# Patient Record
Sex: FEMALE | Race: WHITE | Employment: UNEMPLOYED | URBAN - METROPOLITAN AREA
[De-identification: names, ages, dates, MRNs, and addresses within clinical notes are randomized per-mention and may not be internally consistent; named-entity substitution may affect disease eponyms.]

---

## 2017-04-19 ENCOUNTER — HOSPITAL ENCOUNTER (OUTPATIENT)
Dept: LAB | Age: 72
Discharge: HOME | End: 2017-04-19
Payer: MEDICARE

## 2017-04-19 ENCOUNTER — HOSPITAL ENCOUNTER (OUTPATIENT)
Dept: GENERAL RADIOLOGY | Age: 72
Discharge: HOME | End: 2017-04-19
Payer: MEDICARE

## 2017-04-19 DIAGNOSIS — E55.9 UNSPECIFIED VITAMIN D DEFICIENCY: ICD-10-CM

## 2017-04-19 DIAGNOSIS — M21.921 SHOULDER JOINT DEFORMITY, RIGHT: ICD-10-CM

## 2017-04-19 DIAGNOSIS — I10 ESSENTIAL HYPERTENSION, MALIGNANT: ICD-10-CM

## 2017-04-19 LAB
25(OH)D3 SERPL-MCNC: 24 NG/ML (ref 30–100)
ALBUMIN SERPL BCP-MCNC: 3.3 G/DL (ref 3.4–5)
ALBUMIN/GLOB SERPL: 1.1 {RATIO} (ref 0.8–1.7)
ALP SERPL-CCNC: 72 U/L (ref 45–117)
ALT SERPL-CCNC: 18 U/L (ref 13–56)
ANION GAP BLD CALC-SCNC: 5 MMOL/L (ref 3–18)
AST SERPL W P-5'-P-CCNC: 12 U/L (ref 15–37)
BASOPHILS # BLD AUTO: 0 K/UL (ref 0–0.1)
BASOPHILS # BLD: 0 % (ref 0–2)
BILIRUB DIRECT SERPL-MCNC: 0.2 MG/DL (ref 0–0.2)
BILIRUB SERPL-MCNC: 0.4 MG/DL (ref 0.2–1)
BUN SERPL-MCNC: 21 MG/DL (ref 7–18)
BUN/CREAT SERPL: 46 (ref 12–20)
CALCIUM SERPL-MCNC: 8.8 MG/DL (ref 8.5–10.1)
CHLORIDE SERPL-SCNC: 105 MMOL/L (ref 100–108)
CHOLEST SERPL-MCNC: 153 MG/DL
CO2 SERPL-SCNC: 33 MMOL/L (ref 21–32)
CREAT SERPL-MCNC: 0.46 MG/DL (ref 0.6–1.3)
DIFFERENTIAL METHOD BLD: NORMAL
EOSINOPHIL # BLD: 0.2 K/UL (ref 0–0.4)
EOSINOPHIL NFR BLD: 5 % (ref 0–5)
ERYTHROCYTE [DISTWIDTH] IN BLOOD BY AUTOMATED COUNT: 14.2 % (ref 11.6–14.5)
GLOBULIN SER CALC-MCNC: 3 G/DL (ref 2–4)
GLUCOSE SERPL-MCNC: 81 MG/DL (ref 74–99)
HCT VFR BLD AUTO: 40.7 % (ref 35–45)
HDLC SERPL-MCNC: 79 MG/DL (ref 40–60)
HDLC SERPL: 1.9 {RATIO} (ref 0–5)
HGB BLD-MCNC: 13.2 G/DL (ref 12–16)
LDLC SERPL CALC-MCNC: 59 MG/DL (ref 0–100)
LIPID PROFILE,FLP: ABNORMAL
LYMPHOCYTES # BLD AUTO: 25 % (ref 21–52)
LYMPHOCYTES # BLD: 1.3 K/UL (ref 0.9–3.6)
MCH RBC QN AUTO: 28.6 PG (ref 24–34)
MCHC RBC AUTO-ENTMCNC: 32.4 G/DL (ref 31–37)
MCV RBC AUTO: 88.3 FL (ref 74–97)
MONOCYTES # BLD: 0.4 K/UL (ref 0.05–1.2)
MONOCYTES NFR BLD AUTO: 8 % (ref 3–10)
NEUTS SEG # BLD: 3.1 K/UL (ref 1.8–8)
NEUTS SEG NFR BLD AUTO: 62 % (ref 40–73)
PLATELET # BLD AUTO: 211 K/UL (ref 135–420)
PMV BLD AUTO: 11 FL (ref 9.2–11.8)
POTASSIUM SERPL-SCNC: 4.3 MMOL/L (ref 3.5–5.5)
PROT SERPL-MCNC: 6.3 G/DL (ref 6.4–8.2)
RBC # BLD AUTO: 4.61 M/UL (ref 4.2–5.3)
SODIUM SERPL-SCNC: 143 MMOL/L (ref 136–145)
T4 FREE SERPL-MCNC: 1 NG/DL (ref 0.7–1.5)
TRIGL SERPL-MCNC: 75 MG/DL (ref ?–150)
TSH SERPL DL<=0.05 MIU/L-ACNC: 2.03 UIU/ML (ref 0.36–3.74)
VLDLC SERPL CALC-MCNC: 15 MG/DL
WBC # BLD AUTO: 5 K/UL (ref 4.6–13.2)

## 2017-04-19 PROCEDURE — 84439 ASSAY OF FREE THYROXINE: CPT

## 2017-04-19 PROCEDURE — 72040 X-RAY EXAM NECK SPINE 2-3 VW: CPT

## 2017-04-19 PROCEDURE — 82306 VITAMIN D 25 HYDROXY: CPT

## 2017-04-19 PROCEDURE — 80061 LIPID PANEL: CPT

## 2017-04-19 PROCEDURE — 85025 COMPLETE CBC W/AUTO DIFF WBC: CPT

## 2017-04-19 PROCEDURE — 80076 HEPATIC FUNCTION PANEL: CPT

## 2017-04-19 PROCEDURE — 36415 COLL VENOUS BLD VENIPUNCTURE: CPT

## 2017-04-19 PROCEDURE — 80048 BASIC METABOLIC PNL TOTAL CA: CPT

## 2017-04-19 PROCEDURE — 84443 ASSAY THYROID STIM HORMONE: CPT

## 2018-06-14 ENCOUNTER — HOSPITAL ENCOUNTER (OUTPATIENT)
Dept: LAB | Age: 73
Discharge: HOME | End: 2018-06-14
Payer: MEDICARE

## 2018-06-14 LAB
ALBUMIN SERPL-MCNC: 3.3 G/DL (ref 3.4–5)
ALBUMIN/GLOB SERPL: 1.1 {RATIO} (ref 0.8–1.7)
ALP SERPL-CCNC: 74 U/L (ref 45–117)
ALT SERPL-CCNC: 16 U/L (ref 13–56)
ANION GAP SERPL CALC-SCNC: 4 MMOL/L (ref 3–18)
APPEARANCE UR: CLEAR
AST SERPL-CCNC: 12 U/L (ref 15–37)
BASOPHILS # BLD: 0 K/UL (ref 0–0.06)
BASOPHILS NFR BLD: 1 % (ref 0–2)
BILIRUB SERPL-MCNC: 0.6 MG/DL (ref 0.2–1)
BILIRUB UR QL: NEGATIVE
BUN SERPL-MCNC: 23 MG/DL (ref 7–18)
BUN/CREAT SERPL: 47 (ref 12–20)
CALCIUM SERPL-MCNC: 9.6 MG/DL (ref 8.5–10.1)
CHLORIDE SERPL-SCNC: 106 MMOL/L (ref 100–108)
CK SERPL-CCNC: 64 U/L (ref 26–192)
CO2 SERPL-SCNC: 34 MMOL/L (ref 21–32)
COLOR UR: YELLOW
CREAT SERPL-MCNC: 0.49 MG/DL (ref 0.6–1.3)
CRP SERPL-MCNC: 0.5 MG/DL (ref 0–0.3)
DIFFERENTIAL METHOD BLD: NORMAL
EOSINOPHIL # BLD: 0.3 K/UL (ref 0–0.4)
EOSINOPHIL NFR BLD: 5 % (ref 0–5)
ERYTHROCYTE [DISTWIDTH] IN BLOOD BY AUTOMATED COUNT: 13.9 % (ref 11.6–14.5)
GLOBULIN SER CALC-MCNC: 2.9 G/DL (ref 2–4)
GLUCOSE SERPL-MCNC: 87 MG/DL (ref 74–99)
GLUCOSE UR STRIP.AUTO-MCNC: NEGATIVE MG/DL
HAV IGM SER QL: NEGATIVE
HBV CORE IGM SER QL: NEGATIVE
HBV SURFACE AG SER QL: <0.1 INDEX
HBV SURFACE AG SER QL: NEGATIVE
HCT VFR BLD AUTO: 42.4 % (ref 35–45)
HCV AB SER IA-ACNC: 0.1 INDEX
HCV AB SERPL QL IA: NEGATIVE
HCV COMMENT,HCGAC: NORMAL
HGB BLD-MCNC: 13.6 G/DL (ref 12–16)
HGB UR QL STRIP: NEGATIVE
KETONES UR QL STRIP.AUTO: NEGATIVE MG/DL
LEUKOCYTE ESTERASE UR QL STRIP.AUTO: NEGATIVE
LYMPHOCYTES # BLD: 1.4 K/UL (ref 0.9–3.6)
LYMPHOCYTES NFR BLD: 22 % (ref 21–52)
MCH RBC QN AUTO: 28.9 PG (ref 24–34)
MCHC RBC AUTO-ENTMCNC: 32.1 G/DL (ref 31–37)
MCV RBC AUTO: 90 FL (ref 74–97)
MONOCYTES # BLD: 0.5 K/UL (ref 0.05–1.2)
MONOCYTES NFR BLD: 8 % (ref 3–10)
NEUTS SEG # BLD: 4.1 K/UL (ref 1.8–8)
NEUTS SEG NFR BLD: 64 % (ref 40–73)
NITRITE UR QL STRIP.AUTO: NEGATIVE
PH UR STRIP: 6 [PH] (ref 5–8)
PLATELET # BLD AUTO: 207 K/UL (ref 135–420)
PMV BLD AUTO: 10.9 FL (ref 9.2–11.8)
POTASSIUM SERPL-SCNC: 4.8 MMOL/L (ref 3.5–5.5)
PROT SERPL-MCNC: 6.2 G/DL (ref 6.4–8.2)
PROT UR STRIP-MCNC: NEGATIVE MG/DL
RBC # BLD AUTO: 4.71 M/UL (ref 4.2–5.3)
RHEUMATOID FACT SER QL LA: NEGATIVE
SODIUM SERPL-SCNC: 144 MMOL/L (ref 136–145)
SP GR UR REFRACTOMETRY: 1.02 (ref 1–1.03)
SP1: NORMAL
SP2: NORMAL
SP3: NORMAL
TSH SERPL DL<=0.05 MIU/L-ACNC: 2.79 UIU/ML (ref 0.36–3.74)
URATE SERPL-MCNC: 3.8 MG/DL (ref 2.6–7.2)
UROBILINOGEN UR QL STRIP.AUTO: 0.2 EU/DL (ref 0.2–1)
WBC # BLD AUTO: 6.4 K/UL (ref 4.6–13.2)

## 2018-06-14 PROCEDURE — 86235 NUCLEAR ANTIGEN ANTIBODY: CPT | Performed by: SPECIALIST

## 2018-06-14 PROCEDURE — 86480 TB TEST CELL IMMUN MEASURE: CPT | Performed by: SPECIALIST

## 2018-06-14 PROCEDURE — 86200 CCP ANTIBODY: CPT | Performed by: SPECIALIST

## 2018-06-14 PROCEDURE — 36415 COLL VENOUS BLD VENIPUNCTURE: CPT | Performed by: SPECIALIST

## 2018-06-14 PROCEDURE — 86140 C-REACTIVE PROTEIN: CPT | Performed by: SPECIALIST

## 2018-06-14 PROCEDURE — 80074 ACUTE HEPATITIS PANEL: CPT | Performed by: SPECIALIST

## 2018-06-14 PROCEDURE — 84443 ASSAY THYROID STIM HORMONE: CPT | Performed by: SPECIALIST

## 2018-06-14 PROCEDURE — 86038 ANTINUCLEAR ANTIBODIES: CPT | Performed by: SPECIALIST

## 2018-06-14 PROCEDURE — 80053 COMPREHEN METABOLIC PANEL: CPT | Performed by: SPECIALIST

## 2018-06-14 PROCEDURE — 81003 URINALYSIS AUTO W/O SCOPE: CPT | Performed by: SPECIALIST

## 2018-06-14 PROCEDURE — 84550 ASSAY OF BLOOD/URIC ACID: CPT | Performed by: SPECIALIST

## 2018-06-14 PROCEDURE — 86256 FLUORESCENT ANTIBODY TITER: CPT | Performed by: SPECIALIST

## 2018-06-14 PROCEDURE — 86430 RHEUMATOID FACTOR TEST QUAL: CPT | Performed by: SPECIALIST

## 2018-06-14 PROCEDURE — 82550 ASSAY OF CK (CPK): CPT | Performed by: SPECIALIST

## 2018-06-14 PROCEDURE — 85025 COMPLETE CBC W/AUTO DIFF WBC: CPT | Performed by: SPECIALIST

## 2018-06-15 LAB
ANA SER QL: NEGATIVE
ANA TITR SER IF: NEGATIVE {TITER}
CCP IGA+IGG SERPL IA-ACNC: 5 UNITS (ref 0–19)
ENA RNP AB SER-ACNC: <0.2 AI (ref 0–0.9)
ENA SM AB SER-ACNC: <0.2 AI (ref 0–0.9)

## 2018-06-18 LAB
C-ANCA TITR SER IF: NORMAL TITER
MYELOPEROXIDASE AB SER IA-ACNC: <9 U/ML (ref 0–9)
P-ANCA ATYPICAL TITR SER IF: NORMAL TITER
P-ANCA TITR SER IF: NORMAL TITER
PROTEINASE3 AB SER IA-ACNC: <3.5 U/ML (ref 0–3.5)

## 2018-06-19 LAB
ANNOTATION COMMENT IMP: NORMAL
M TB IFN-G CD4+ BCKGRND COR BLD-ACNC: <0 IU/ML
M TB IFN-G CD4+ T-CELLS BLD-ACNC: 0.07 IU/ML
M TB TUBERC IFN-G BLD QL: NEGATIVE
M TB TUBERC IGNF/MITOGEN IGNF CONTROL: >10 IU/ML
QUANTIFERON NIL VALUE: 0.08 IU/ML
SERVICE CMNT-IMP: NORMAL

## 2018-09-27 ENCOUNTER — HOSPITAL ENCOUNTER (OUTPATIENT)
Dept: LAB | Age: 73
Discharge: HOME | End: 2018-09-27
Payer: MEDICARE

## 2018-09-27 DIAGNOSIS — M35.9 DIFFUSE DISEASE OF CONNECTIVE TISSUE (HCC): ICD-10-CM

## 2018-09-27 DIAGNOSIS — M13.0 POLYARTHROPATHY: ICD-10-CM

## 2018-09-27 PROCEDURE — 36415 COLL VENOUS BLD VENIPUNCTURE: CPT | Performed by: SPECIALIST

## 2018-09-27 PROCEDURE — 86140 C-REACTIVE PROTEIN: CPT | Performed by: SPECIALIST

## 2018-09-28 LAB — CRP SERPL-MCNC: 0.4 MG/DL (ref 0–0.3)

## 2019-03-07 ENCOUNTER — HOSPITAL ENCOUNTER (OUTPATIENT)
Dept: GENERAL RADIOLOGY | Age: 74
Discharge: HOME | End: 2019-03-07
Payer: MEDICARE

## 2019-03-07 DIAGNOSIS — M25.552 LEFT HIP PAIN: ICD-10-CM

## 2019-03-07 DIAGNOSIS — M25.569 PAIN IN JOINT, LOWER LEG: ICD-10-CM

## 2019-03-07 DIAGNOSIS — M54.50 LUMBAGO: ICD-10-CM

## 2019-03-07 PROCEDURE — 73502 X-RAY EXAM HIP UNI 2-3 VIEWS: CPT

## 2019-03-07 PROCEDURE — 73560 X-RAY EXAM OF KNEE 1 OR 2: CPT

## 2019-03-07 PROCEDURE — 72100 X-RAY EXAM L-S SPINE 2/3 VWS: CPT

## 2019-03-28 ENCOUNTER — HOSPITAL ENCOUNTER (OUTPATIENT)
Dept: LAB | Age: 74
Discharge: HOME | End: 2019-03-28
Payer: MEDICARE

## 2019-03-28 DIAGNOSIS — I10 ESSENTIAL HYPERTENSION, MALIGNANT: ICD-10-CM

## 2019-03-28 DIAGNOSIS — M79.609 PAIN IN LIMB: ICD-10-CM

## 2019-03-28 DIAGNOSIS — I77.6 ARTERITIS, UNSPECIFIED (HCC): ICD-10-CM

## 2019-03-28 DIAGNOSIS — M13.0 POLYARTHROPATHY: ICD-10-CM

## 2019-03-28 LAB
ALBUMIN SERPL-MCNC: 3.5 G/DL (ref 3.4–5)
ALBUMIN/GLOB SERPL: 1.1 {RATIO} (ref 0.8–1.7)
ALP SERPL-CCNC: 95 U/L (ref 45–117)
ALT SERPL-CCNC: 16 U/L (ref 13–56)
ANION GAP SERPL CALC-SCNC: 2 MMOL/L (ref 3–18)
APPEARANCE UR: CLEAR
AST SERPL-CCNC: 12 U/L (ref 15–37)
BACTERIA URNS QL MICRO: ABNORMAL /HPF
BILIRUB SERPL-MCNC: 0.4 MG/DL (ref 0.2–1)
BILIRUB UR QL: NEGATIVE
BUN SERPL-MCNC: 39 MG/DL (ref 7–18)
BUN/CREAT SERPL: 85 (ref 12–20)
CALCIUM SERPL-MCNC: 9.4 MG/DL (ref 8.5–10.1)
CHLORIDE SERPL-SCNC: 104 MMOL/L (ref 100–108)
CO2 SERPL-SCNC: 34 MMOL/L (ref 21–32)
COLOR UR: YELLOW
CREAT SERPL-MCNC: 0.46 MG/DL (ref 0.6–1.3)
CRP SERPL-MCNC: 1.2 MG/DL (ref 0–0.3)
EPITH CASTS URNS QL MICRO: ABNORMAL /LPF (ref 0–5)
GLOBULIN SER CALC-MCNC: 3.1 G/DL (ref 2–4)
GLUCOSE SERPL-MCNC: 111 MG/DL (ref 74–99)
GLUCOSE UR STRIP.AUTO-MCNC: NEGATIVE MG/DL
HGB UR QL STRIP: NEGATIVE
KETONES UR QL STRIP.AUTO: NEGATIVE MG/DL
LEUKOCYTE ESTERASE UR QL STRIP.AUTO: ABNORMAL
NITRITE UR QL STRIP.AUTO: NEGATIVE
PH UR STRIP: 6.5 [PH] (ref 5–8)
POTASSIUM SERPL-SCNC: 5.1 MMOL/L (ref 3.5–5.5)
PROT SERPL-MCNC: 6.6 G/DL (ref 6.4–8.2)
PROT UR STRIP-MCNC: NEGATIVE MG/DL
RBC #/AREA URNS HPF: ABNORMAL /HPF (ref 0–5)
SODIUM SERPL-SCNC: 140 MMOL/L (ref 136–145)
SP GR UR REFRACTOMETRY: 1.03 (ref 1–1.03)
URATE SERPL-MCNC: 3.5 MG/DL (ref 2.6–7.2)
UROBILINOGEN UR QL STRIP.AUTO: 1 EU/DL (ref 0.2–1)
WBC URNS QL MICRO: ABNORMAL /HPF (ref 0–4)

## 2019-03-28 PROCEDURE — 36415 COLL VENOUS BLD VENIPUNCTURE: CPT

## 2019-03-28 PROCEDURE — 81001 URINALYSIS AUTO W/SCOPE: CPT

## 2019-03-28 PROCEDURE — 84550 ASSAY OF BLOOD/URIC ACID: CPT

## 2019-03-28 PROCEDURE — 80053 COMPREHEN METABOLIC PANEL: CPT

## 2019-03-28 PROCEDURE — 86140 C-REACTIVE PROTEIN: CPT

## 2019-04-22 ENCOUNTER — OFFICE VISIT (OUTPATIENT)
Dept: ORTHOPEDIC SURGERY | Age: 74
End: 2019-04-22

## 2019-04-22 VITALS
BODY MASS INDEX: 53.36 KG/M2 | HEART RATE: 67 BPM | SYSTOLIC BLOOD PRESSURE: 119 MMHG | HEIGHT: 61 IN | WEIGHT: 282.6 LBS | OXYGEN SATURATION: 89 % | DIASTOLIC BLOOD PRESSURE: 83 MMHG | TEMPERATURE: 98.9 F | RESPIRATION RATE: 22 BRPM

## 2019-04-22 DIAGNOSIS — M16.0 OSTEOARTHRITIS OF BOTH HIPS, UNSPECIFIED OSTEOARTHRITIS TYPE: ICD-10-CM

## 2019-04-22 DIAGNOSIS — M48.062 SPINAL STENOSIS OF LUMBAR REGION WITH NEUROGENIC CLAUDICATION: ICD-10-CM

## 2019-04-22 DIAGNOSIS — F40.240 CLAUSTROPHOBIA: ICD-10-CM

## 2019-04-22 DIAGNOSIS — M51.36 DDD (DEGENERATIVE DISC DISEASE), LUMBAR: Primary | ICD-10-CM

## 2019-04-22 DIAGNOSIS — R26.9 GAIT ABNORMALITY: ICD-10-CM

## 2019-04-22 DIAGNOSIS — M47.816 LUMBAR FACET ARTHROPATHY: ICD-10-CM

## 2019-04-22 DIAGNOSIS — Z96.653 S/P TOTAL KNEE REPLACEMENT, BILATERAL: ICD-10-CM

## 2019-04-22 DIAGNOSIS — M32.9 LUPUS (HCC): ICD-10-CM

## 2019-04-22 PROBLEM — E66.01 OBESITY, MORBID (HCC): Status: ACTIVE | Noted: 2019-04-22

## 2019-04-22 RX ORDER — IBUPROFEN 800 MG/1
1 TABLET ORAL
Refills: 3 | COMMUNITY
Start: 2019-04-03 | End: 2020-03-02 | Stop reason: DRUGHIGH

## 2019-04-22 RX ORDER — DIAZEPAM 5 MG/1
TABLET ORAL
Qty: 2 TAB | Refills: 0 | Status: SHIPPED | OUTPATIENT
Start: 2019-04-22 | End: 2020-07-28 | Stop reason: ALTCHOICE

## 2019-04-22 RX ORDER — GABAPENTIN 100 MG/1
1 CAPSULE ORAL
Refills: 0 | COMMUNITY
Start: 2019-03-29 | End: 2019-06-17 | Stop reason: SDUPTHER

## 2019-04-22 RX ORDER — DIFLUNISAL 500 MG/1
500 TABLET, FILM COATED ORAL
COMMUNITY
End: 2020-03-02

## 2019-04-22 RX ORDER — PREDNISONE 5 MG/1
TABLET ORAL
Refills: 0 | COMMUNITY
Start: 2019-04-03 | End: 2019-04-22 | Stop reason: SDUPTHER

## 2019-04-22 RX ORDER — ESOMEPRAZOLE MAGNESIUM 40 MG/1
CAPSULE, DELAYED RELEASE ORAL
COMMUNITY
End: 2019-04-22 | Stop reason: SDUPTHER

## 2019-04-22 RX ORDER — ASPIRIN 81 MG/1
TABLET ORAL DAILY
COMMUNITY
End: 2020-03-02

## 2019-04-22 RX ORDER — PREDNISONE 5 MG/1
5 TABLET ORAL DAILY
Qty: 21 TAB | Refills: 0 | Status: SHIPPED | OUTPATIENT
Start: 2019-04-22 | End: 2020-07-28 | Stop reason: ALTCHOICE

## 2019-04-22 RX ORDER — PREDNISONE 2.5 MG/1
2.5 TABLET ORAL
COMMUNITY
End: 2019-04-22 | Stop reason: ALTCHOICE

## 2019-04-22 NOTE — PATIENT INSTRUCTIONS
Low Back Arthritis: Exercises Your Care Instructions Here are some examples of typical rehabilitation exercises for your condition. Start each exercise slowly. Ease off the exercise if you start to have pain. Your doctor or physical therapist will tell you when you can start these exercises and which ones will work best for you. When you are not being active, find a comfortable position for rest. Some people are comfortable on the floor or a medium-firm bed with a small pillow under their head and another under their knees. Some people prefer to lie on their side with a pillow between their knees. Don't stay in one position for too long. Take short walks (10 to 20 minutes) every 2 to 3 hours. Avoid slopes, hills, and stairs until you feel better. Walk only distances you can manage without pain, especially leg pain. How to do the exercises Pelvic tilt 1. Lie on your back with your knees bent. 2. \"Brace\" your stomachtighten your muscles by pulling in and imagining your belly button moving toward your spine. 3. Press your lower back into the floor. You should feel your hips and pelvis rock back. 4. Hold for 6 seconds while breathing smoothly. 5. Relax and allow your pelvis and hips to rock forward. 6. Repeat 8 to 12 times. Back stretches 1. Get down on your hands and knees on the floor. 2. Relax your head and allow it to droop. Round your back up toward the ceiling until you feel a nice stretch in your upper, middle, and lower back. Hold this stretch for as long as it feels comfortable, or about 15 to 30 seconds. 3. Return to the starting position with a flat back while you are on your hands and knees. 4. Let your back sway by pressing your stomach toward the floor. Lift your buttocks toward the ceiling. 5. Hold this position for 15 to 30 seconds. 6. Repeat 2 to 4 times. Follow-up care is a key part of your treatment and safety.  Be sure to make and go to all appointments, and call your doctor if you are having problems. It's also a good idea to know your test results and keep a list of the medicines you take. Where can you learn more? Go to http://tanna-rasheeda.info/. Enter W174 in the search box to learn more about \"Low Back Arthritis: Exercises. \" Current as of: September 20, 2018 Content Version: 11.9 © 3678-0617 [employer]. Care instructions adapted under license by [employer] (which disclaims liability or warranty for this information). If you have questions about a medical condition or this instruction, always ask your healthcare professional. [address] any warranty or liability for your use of this information.

## 2019-04-22 NOTE — PROGRESS NOTES
[facility] [address]. 
[address] [place], [address] [place] [address] Street Phone: [phone] Fax: [phone] NEW PATIENT Pt's YOB: 1945 ASSESSMENT Diagnoses and all orders for this visit: 1. DDD (degenerative disc disease), lumbar -     MRI LUMB SPINE WO CONT; Future 2. Spinal stenosis of lumbar region with neurogenic claudication -     MRI LUMB SPINE WO CONT; Future 3. Lumbar facet arthropathy 
-     predniSONE (DELTASONE) 5 mg tablet; Take 1 Tab by mouth daily. 
-     MRI LUMB SPINE WO CONT; Future 4. Gait abnormality -     MRI LUMB SPINE WO CONT; Future 5. Osteoarthritis of both hips, unspecified osteoarthritis type 6. S/p total knee replacement, bilateral 
 
7. Lupus ([facility] [address] 8. BMI 50.0-59.9, adult ([facility] [address] -     MRI LUMB SPINE WO CONT; Future 9. Claustrophobia 
-     diazePAM (VALIUM) 5 mg tablet; Take 1 tablet 30 minutes before diagnostic test - may repeat x 1  Indications: inducing of a relaxed easy state -     MRI LUMB SPINE WO CONT; Future IMPRESSION AND PLAN: 
Derick Soria is a 75 y.o. female with history of low back pain. Pt complains of pain in the lower back that radiates down the left leg. She also reports weakness in the left leg and numbness in the right 1st-3rd toes. Pt notes significant relief when taking prednisone 5 mg as prescribed by LEVI Last. She also takes Neurontin 100 mg. 
 
1) Pt was given information on lumbar arthritis exercises. 2) Discussed treatment options with the Pt, including medication, physical therapy, steroid injections, and surgical intervention. 3) A lumbar MRI was ordered. She has progressive lower back pain, weakness in the left leg, and difficulty with ambulation. 4) Pt was offered a referral to [facility] physical therapy but declined. 5) She was prescribed Valium to take prior to the MRI. 6) Pt will continue taking Neurontin as prescribed by her PCP, Humberto LEVI Dupont. 7) Ms. Stan Fernandez has a reminder for a \"due or due soon\" health maintenance. I have asked that she contact her primary care provider, LEVI Cortes, for follow-up on this health maintenance. 8)  demonstrated consistency with prescribing. 9) Pt will follow-up in 2-3 weeks or sooner if needed. 10) Pt encouraged to lose weight HISTORY OF PRESENT ILLNESS: 
Alfonso Kiser is a 75 y.o. female with history of low back pain. Pt presents to the office today as a new patient and was last seen 9 years ago just prior to her knee replacement with Dr. Marco Antonio Lazo. Pt complains of pain in the lower back that radiates down the left leg. She admits to weakness in the left leg. Pt's pain is better when leaning forward. Pt notes that she hit her left calf on a car door and has numbness in the right 1st-3rd toes. Pt was followed by Dr. Ricci Jane after the injury and according to the patient she has nerve damage in the leg. She is currently taking Neurontin 100 mg as prescribed by LEVI Galaviz. Pt notes that she recently lost her  who was bedridden for the last 3 months of his life. She states that after her  passed so was no longer as active and started to experience increased lower back pain. Pt notes significant relief when taking prednisone 5 mg as prescribed by LEVI Galaviz. Of note, she has a Dalton filter and admits history of DVT's. She also admits to a history of Lupus. Pt is not on blood thinners. Pt at this time desires to proceed with medication treatment with prednisone and a lumbar MRI. Pain Scale: 8/10 PCP: LEVI Cortes Past Medical History:  
Diagnosis Date  Arthritis  Hypertension  Indigestion  Lupus ([facility] [address]  Memory difficulty  Ringing of ears   
 sometimes Social History Socioeconomic History  Marital status:  Spouse name: Not on file  Number of children: Not on file  Years of education: Not on file  Highest education level: Not on file Occupational History  Not on file Social Needs  Financial resource strain: Not on file  Food insecurity:  
  Worry: Not on file Inability: Not on file  Transportation needs:  
  Medical: Not on file Non-medical: Not on file Tobacco Use  Smoking status: Never Smoker  Smokeless tobacco: Never Used Substance and Sexual Activity  Alcohol use: No  
 Drug use: Not on file  Sexual activity: Never Lifestyle  Physical activity:  
  Days per week: Not on file Minutes per session: Not on file  Stress: Not on file Relationships  Social connections:  
  Talks on phone: Not on file Gets together: Not on file Attends [employer]ism service: Not on file Active member of club or organization: Not on file Attends meetings of clubs or organizations: Not on file Relationship status: Not on file  Intimate partner violence:  
  Fear of current or ex partner: Not on file Emotionally abused: Not on file Physically abused: Not on file Forced sexual activity: Not on file Other Topics Concern [address] Service Not Asked  Blood Transfusions Not Asked  Caffeine Concern Not Asked  Occupational Exposure Not Asked Candance Nevarez Hazards Not Asked  Sleep Concern Not Asked  Stress Concern Not Asked  Weight Concern Not Asked  Special Diet Not Asked  Back Care Not Asked  Exercise Not Asked  Bike Helmet Not Asked  Seat Belt Not Asked  Self-Exams Not Asked Social History Narrative  Not on file Current Outpatient Medications Medication Sig Dispense Refill  gabapentin (NEURONTIN) 100 mg capsule Take 1 Cap by mouth nightly. 0  
 ibuprofen (MOTRIN) 800 mg tablet Take 1 Tab by mouth two (2) times daily as needed. 3  
 aspirin delayed-release 81 mg tablet Take  by mouth daily.  predniSONE (DELTASONE) 5 mg tablet Take 1 Tab by mouth daily.  21 Tab 0  
  diazePAM (VALIUM) 5 mg tablet Take 1 tablet 30 minutes before diagnostic test - may repeat x 1  Indications: inducing of a relaxed easy state 2 Tab 0  
 ERGOCALCIFEROL, VITAMIN D2, (VITAMIN D PO) Take  by mouth.  sertraline (ZOLOFT) 50 mg tablet Take  by mouth daily.  hydroxychloroquine (PLAQUENIL) 200 mg tablet Take 200 mg by mouth daily.  benazepril-hydrochlorothiazide (LOTENSIN HCT) 20-25 mg per tablet Take  by mouth daily.  diflunisal (DOLOBID) 500 mg tab Take 500 mg by mouth every twelve (12) hours as needed.  LORazepam (ATIVAN) 0.5 mg tablet Take 0.5 mg by mouth.  oxyCODONE-acetaminophen (PERCOCET) 5-325 mg per tablet Take 1 tablet by mouth every four (4) hours as needed for Pain for up to 20 doses. (Patient not taking: Reported on 4/22/2019) 20 tablet 0  
 nortriptyline (PAMELOR) 25 mg capsule Take 1 Cap by mouth two (2) times a day. (Patient not taking: Reported on 4/22/2019) 60 Cap 6  celecoxib (CELEBREX) 200 mg capsule Take  by mouth two (2) times a day.  esomeprazole (NEXIUM) 40 mg capsule Take  by mouth daily.  ZOLPIDEM TARTRATE (AMBIEN PO) Take  by mouth. Allergies Allergen Reactions  Morphine Anxiety REVIEW OF SYSTEMS Constitutional: Negative for fever, chills, or weight change. Respiratory: Negative for cough or shortness of breath. Cardiovascular: Negative for chest pain or palpitations. Gastrointestinal: Negative for acid reflux, change in bowel habits, or constipation. Genitourinary: Negative for dysuria and flank pain. Musculoskeletal: Positive for lumbar pain and weakness in the left leg. Skin: Negative for rash. Neurological: Negative for headaches, dizziness; positive for numbness. Endo/Heme/Allergies: Negative for increased bruising. Psychiatric/Behavioral: Negative for difficulty with sleep. PHYSICAL EXAMINATION Visit Vitals /83 Pulse 67 Temp 98.9 °F (37.2 °C) (Oral) Resp 22 Ht 5' 1\" (1.549 m) Wt 282 lb 9.6 oz (128.2 kg) SpO2 (!) 89% BMI 53.40 kg/m² Constitutional: Awake, alert, and in no acute distress. HEENT: Normocephalic. Atraumatic. Oropharynx is moist and clear. PERRL. EOMI. Sclerae are nonicteric Heart: Regular rate and rhythm Lungs: Clear to auscultation bilaterally Abdomen: Soft and nontender. Bowel sounds are present Neurological: 1+ symmetrical DTRs in the upper extremities. 1+ symmetrical DTRs in the lower extremities. Sensation to light touch is intact. Negative Chang's sign bilaterally. Skin: warm, dry, and intact. Musculoskeletal: Good range of motion in the cervical spine with side to side cervical flexion. No tenderness with palpation of the upper trapezius bilaterally. No pain with extension, axial loading, or forward flexion. No pain with internal or external rotation of her hips. Negative straight leg raise bilaterally. Patient ambulates with the assistance of a rolling walker. Biceps  Triceps Deltoids Wrist Ext Wrist Flex Hand Intrin Right +4/5 +4/5 +4/5 +4/5 +4/5 +4/5 Left +4/5 +4/5 +4/5 +4/5 +4/5 +4/5 Hip Flex  Quads Hamstrings Ankle DF EHL Ankle PF Right +4/5 +4/5 +4/5 +4/5 +4/5 +4/5 Left +4/5 +4/5 +4/5  -4/5  -4/5 +4/5 IMAGING: 
 
Lumbar spine x-rays from 03/07/2019 were personally reviewed with the patient and demonstrated: FINDINGS: There are 5 lumbar-type vertebra. There is 7 mm of retrolisthesis of 
L4 on L5 which is progressed since prior plain film. No fracture appreciated. Multilevel facet arthropathy is noted. Moderate to severe disc space height loss 
is noted at L3-L4. Moderate to severe disc space height loss is noted at L4-L5 
and L5-S1. Endplate sclerosis and osteophyte formation are noted at every level. Prominent flowing ossification anterior longitudinal ligament is noted in the 
lower thoracic and upper lumbar spine.  IVC filter is noted with the superior 
portion of the L2-L3 disc space. 
  
 IMPRESSION: 
1.  Multilevel degenerative disc disease with retrolisthesis of L4 on L5.  
  
2.   Multilevel facet arthropathy. 
  
3. Changes in the lower thoracic and upper lumbar spine suggestive of diffuse 
idiopathic skeletal hyperostosis. Left knee x-rays from 03/07/2019 were personally reviewed with the patient and demonstrated: 
Results from [place] encounter on 03/07/19 XR KNEE LT MAX 2 VWS Narrative EXAM: Left knee X-Ray INDICATION: Left knee pain TECHNIQUE: 2 views of the left knee COMPARISON: 9/13/2010, 12/18/2009 and 2/10/2007 FINDINGS:  
A left total knee arthroplasty is present. No perihardware fracture or 
perihardware lucency appreciated. No dislocation present. The bone density is 
grossly unremarkable. Vascular calcifications are present. The soft tissues are 
unremarkable. Impression IMPRESSION: 
1. Left total knee   arthroplasty   without complication. Left hip x-rays from 03/07/2019 were personally reviewed with the patient and demonstrated: FINDINGS:  
The bone density is grossly unremarkable. No evidence of acute fracture of the 
pelvis. Mild SI joint osteoarthritis is noted. Degenerative changes in the lower 
lumbar spine are noted. The pubic symphysis is unremarkable. No lytic or 
blastic focus identified. No erosions or periostitis appreciated.  
  
The femoral heads are well aligned with the osseous pelvis. No evidence of left 
hip fracture or dislocation. The left hip demonstrates osteophyte formation and 
subchondral sclerosis. Mild asymmetric joint space narrowing. On the AP view of 
the pelvis, there is suggestion of mild to moderate degenerative changes of the 
right hip. 
  
IMPRESSION: 
1. Moderate left hip osteoarthritis and mild/moderate right hip osteoarthritis. Written by Anna Rao, as dictated by Palak Arizmendi MD. 
I, Dr. Palak Arizmendi confirm that all documentation is accurate.

## 2019-04-22 NOTE — PROGRESS NOTES
Shan Babin presents today for Chief Complaint Patient presents with  Back Pain  
  lower  Leg Pain  
  left  New Patient  
  referred by PA ROBERT [facility] Is someone accompanying this pt? Yes, Daughter Yani Cleveland Is the patient using any DME equipment during OV? Rolling walker with a seat XL Depression Screening: 
3 most recent PHQ Screens 4/22/2019 Little interest or pleasure in doing things Not at all Feeling down, depressed, irritable, or hopeless Several days Total Score PHQ 2 1 Learning Assessment: 
Learning Assessment 4/22/2019 PRIMARY LEARNER Patient HIGHEST LEVEL OF EDUCATION - PRIMARY LEARNER  GRADUATED HIGH SCHOOL OR GED  
BARRIERS PRIMARY LEARNER NONE  
CO-LEARNER CAREGIVER Yes CO-LEARNER NAME Yani Cleveland CO-LEARNER HIGHEST LEVEL OF EDUCATION GRADUATED HIGH SCHOOL OR GED  
BARRIERS CO-LEARNER NONE PRIMARY LANGUAGE ENGLISH  
PRIMARY LANGUAGE CO-LEARNER ENGLISH  NEED No  
LEARNER PREFERENCE PRIMARY DEMONSTRATION  
  READING  
LEARNER [address] ANSWERED BY Patient RELATIONSHIP SELF Abuse Screening: No flowsheet data found. Fall Risk Fall Risk Assessment, last 12 mths 4/22/2019 Able to walk? Yes Fall in past 12 months? No  
 
 
OPIOID RISK TOOL No flowsheet data found. Pt currently taking Antiplatelet therapy? No 
 
Coordination of Care: 1. Have you been to the ER, urgent care clinic since your last visit? Not in the past 30 days  Hospitalized since your last visit? No 
 
2. Have you seen or consulted any other health care providers outside of the [address] [place] [address] [place] since your last visit? No Include any pap smears or colon screening. Not in the past 30 days. Last  Checked 04/22/19 Last UDS Checked N/A Last Pain Agreement Signed N/A

## 2019-04-22 NOTE — LETTER
4/22/19 Patient: Derick Soria YOB: 1945 Date of Visit: 4/22/2019 Rajinder De Paz, [address] [address] VIA Facsimile: [phone] Dear LEVI Alfaro, Thank you for referring Ms. Deonte Nick to [facility] for evaluation. My notes for this consultation are attached. If you have questions, please do not hesitate to call me. I look forward to following your patient along with you. Sincerely, Seth Silva MD

## 2019-05-07 ENCOUNTER — HOSPITAL ENCOUNTER (OUTPATIENT)
Age: 74
Discharge: HOME | End: 2019-05-07
Attending: PHYSICAL MEDICINE & REHABILITATION
Payer: MEDICARE

## 2019-05-07 DIAGNOSIS — M47.816 LUMBAR FACET ARTHROPATHY: ICD-10-CM

## 2019-05-07 DIAGNOSIS — F40.240 CLAUSTROPHOBIA: ICD-10-CM

## 2019-05-07 DIAGNOSIS — M51.36 DDD (DEGENERATIVE DISC DISEASE), LUMBAR: ICD-10-CM

## 2019-05-07 DIAGNOSIS — M48.062 SPINAL STENOSIS OF LUMBAR REGION WITH NEUROGENIC CLAUDICATION: ICD-10-CM

## 2019-05-07 DIAGNOSIS — R26.9 GAIT ABNORMALITY: ICD-10-CM

## 2019-05-07 PROCEDURE — 72148 MRI LUMBAR SPINE W/O DYE: CPT

## 2019-05-15 ENCOUNTER — OFFICE VISIT (OUTPATIENT)
Dept: ORTHOPEDIC SURGERY | Age: 74
End: 2019-05-15

## 2019-05-15 VITALS
HEART RATE: 66 BPM | OXYGEN SATURATION: 96 % | BODY MASS INDEX: 51.86 KG/M2 | SYSTOLIC BLOOD PRESSURE: 128 MMHG | WEIGHT: 281.8 LBS | DIASTOLIC BLOOD PRESSURE: 85 MMHG | HEIGHT: 62 IN | TEMPERATURE: 97.9 F | RESPIRATION RATE: 20 BRPM

## 2019-05-15 DIAGNOSIS — M51.26 HNP (HERNIATED NUCLEUS PULPOSUS), LUMBAR: Primary | ICD-10-CM

## 2019-05-15 DIAGNOSIS — M48.062 SPINAL STENOSIS OF LUMBAR REGION WITH NEUROGENIC CLAUDICATION: ICD-10-CM

## 2019-05-15 DIAGNOSIS — R29.898 LEFT LEG WEAKNESS: ICD-10-CM

## 2019-05-15 DIAGNOSIS — M54.16 LUMBAR RADICULOPATHY: ICD-10-CM

## 2019-05-15 DIAGNOSIS — N28.1 RENAL CYST: ICD-10-CM

## 2019-05-15 RX ORDER — PREDNISONE 5 MG/1
TABLET ORAL
Qty: 35 TAB | Refills: 0 | Status: SHIPPED | OUTPATIENT
Start: 2019-05-15 | End: 2020-03-12 | Stop reason: SDUPTHER

## 2019-05-15 NOTE — LETTER
5/16/19 Patient: You Guzman YOB: 1945 Date of Visit: 5/15/2019 Nany Mack, [address] [address] VIA Facsimile: [phone] Dear LEVI Bruno, Thank you for referring Ms. Melinda Butts to [facility] for evaluation. My notes for this consultation are attached. If you have questions, please do not hesitate to call me. I look forward to following your patient along with you. Sincerely, William Britt MD

## 2019-05-15 NOTE — PATIENT INSTRUCTIONS
Herniated Disc: Exercises Your Care Instructions Here are some examples of typical rehabilitation exercises for your condition. Start each exercise slowly. Ease off the exercise if you start to have pain. Your doctor or physical therapist will tell you when you can start these exercises and which ones will work best for you. How to stay safe These exercises can help you move easier and feel better. But when you first start doing them, you may have more pain in your back. This is normal. But it is important to pay close attention to your pain during and after each exercise. · Keep doing these exercises if your pain stays the same or moves from your leg and buttock more toward the middle of your spine. Pain moving out of your leg and buttock is a good sign. · Stop doing these exercises if your pain gets worse in your leg and buttock. Stop if you start to have pain in your leg and buttock that you didn't have before. Be sure to do these exercises in the order they appear. Note how your pain changes before you move to the next one. If your pain is much worse right after exercise and stays worse the next day, do not do any of these exercises. How to do the exercises 1. Rest on belly 1. Lie on your stomach, face down, with your head turned to the side. Place your arms beside your body. If this bothers your neck, place your hands, one on top of the other, underneath your forehead. This will help support your head and neck. 2. Try to relax your lower back muscles as much as you can. 3. Continue to lie on your stomach for 2 minutes. 4. If your pain spreads down your leg or increases down your leg, stop this exercise and do not do the next exercises. 2. Press-up 1. Lie on your stomach, face down. Keep your elbows tucked into your sides and under your shoulders. 2. Press your elbows down into the floor to raise your upper back.  As you do this, relax your stomach muscles. Allow your back to arch without using your back muscles. Let your low back relax completely as you arch up. 3. Hold this position for 2 minutes. 4. Repeat 2 to 4 times. 5. If your pain spreads down your leg or increases down your leg, stop this exercise and do not do the next exercises. 3. Full press-up 1. Lie on your stomach, face down. Keep your elbows tucked into your sides and under your shoulders. 2. Straighten your elbows, and push your upper body up as far as you can. Allow your lower back to sag. Keep your hips, pelvis, and legs relaxed. 3. Hold this position for 5 seconds, and then relax. 4. Repeat 10 times. Each time, try to raise your upper body a little higher and hold your arms a bit straighter. 5. If your pain spreads down your leg or gets worse down your leg, stop this exercise and do not move to the next exercise. 6. If you can't do this exercise, you may instead try the backward bend exercise that follows. 4. Backward bend 1. Stand with your feet hip-width apart. Your toes should point forward. Do not lock your knees. 2. Place your hands in the small of your back. 3. Bend backward as far as you can, keeping your knees straight. Hold this position for 2 to 3 seconds. Then return to your starting position. 4. Repeat 2 to 4 times. Each time, try to bend backward a little farther, until you bend backward as far as you can. 5. If your pain spreads down your leg or increases down your leg, stop this exercise. Follow-up care is a key part of your treatment and safety. Be sure to make and go to all appointments, and call your doctor if you are having problems. It's also a good idea to know your test results and keep a list of the medicines you take. Where can you learn more? Go to http://tanna-rasheeda.info/. Enter 61633 [id number] in the search box to learn more about \"Herniated Disc: Exercises. \" Current as of: September 20, 2018 Content Version: 11.9 © 5982-7473 [employer]. Care instructions adapted under license by [employer] (which disclaims liability or warranty for this information). If you have questions about a medical condition or this instruction, always ask your healthcare professional. [address] any warranty or liability for your use of this information.

## 2019-05-15 NOTE — PROGRESS NOTES
Harshad Amato [facility] 2. 
[address] [place] Phone: [phone] Fax: [phone] Pt's YOB: 1945 ASSESSMENT Diagnoses and all orders for this visit: 
 
1. HNP (herniated nucleus pulposus), lumbar 
-     REFERRAL TO PHYSICAL THERAPY 2. Lumbar radiculopathy 
-     REFERRAL TO PHYSICAL THERAPY 3. Left leg weakness 
-     REFERRAL TO PHYSICAL THERAPY 4. Spinal stenosis of lumbar region with neurogenic claudication -     predniSONE (DELTASONE) 5 mg tablet; 1 po daily for 15 days then 1/2 tablet daily till completed 
-     REFERRAL TO PHYSICAL THERAPY 5. Renal cyst 
-     US RETROPERITONEUM COMP; Future 6. [facility] 50.0-59.9[facility] IMPRESSION AND PLAN: 
Derick Soria is a 75 y.o. female with history of lumbar pain. Pt complains of pain in the lower back that radiates down the left leg. She admits to improvement in her pain when taking prednisone 5 mg daily. Pt also takes Neurontin 100 mg 1 tab QHS. 1) Pt was given information on herniated disc exercises. 2) She was referred to physical therapy for pain centralization and modalities. Pt notes that she will try home exercise first. 3) A renal ultrasound was ordered to further assess MRI documented cystic changes. 4) She received a refill of prednisone 5 mg 1 tab daily for 15 days, the decrease to 1/2 tab daily until complete. 5) Pt will continue taking Neurontin 100 mg as prescribed and does not need refills at this time. 6) Ms. Darylene Jordan has a reminder for a \"due or due soon\" health maintenance. I have asked that she contact her primary care provider, LEVI Vegas, for follow-up on this health maintenance. 7)  demonstrated consistency with prescribing. 8) Pt will follow-up in 4 weeks or sooner if needed.  
 
 
HISTORY OF PRESENT ILLNESS: 
Derick Soria is a 75 y.o. female with history of lumbar pain and presents to the office today for MRI follow up. Pt complains of pain in the lower back that radiates down the left leg. Pt also complains of weakness in the left leg but denies dragging the leg with ambulation. She admits to improvement in her pain when taking prednisone 5 mg daily. Pt notes that she was placed on prednisone when she developed lesions with her Lupus. She also takes Neurontin 100 mg 1 tab QHS. Pt has not recently attended physical therapy. Pt at this time desires to proceed with a renal ultrasound, medication evaluation, and physical therapy. Pain Scale: /10 PCP: LEVI Villarreal Past Medical History:  
Diagnosis Date  Arthritis  Hypertension  Indigestion  Lupus ([facility]  Memory difficulty  Ringing of ears   
 sometimes Social History Socioeconomic History  Marital status:  Spouse name: Not on file  Number of children: Not on file  Years of education: Not on file  Highest education level: Not on file Occupational History  Not on file Social Needs  Financial resource strain: Not on file  Food insecurity:  
  Worry: Not on file Inability: Not on file  Transportation needs:  
  Medical: Not on file Non-medical: Not on file Tobacco Use  Smoking status: Never Smoker  Smokeless tobacco: Never Used Substance and Sexual Activity  Alcohol use: No  
 Drug use: Not on file  Sexual activity: Never Lifestyle  Physical activity:  
  Days per week: Not on file Minutes per session: Not on file  Stress: Not on file Relationships  Social connections:  
  Talks on phone: Not on file Gets together: Not on file Attends Oriental orthodox service: Not on file Active member of club or organization: Not on file Attends meetings of clubs or organizations: Not on file Relationship status: Not on file  Intimate partner violence:  
  Fear of current or ex partner: Not on file Emotionally abused: Not on file Physically abused: Not on file Forced sexual activity: Not on file Other Topics Concern [address] Service Not Asked  Blood Transfusions Not Asked  Caffeine Concern Not Asked  Occupational Exposure Not Asked Annika Fisher Hazards Not Asked  Sleep Concern Not Asked  Stress Concern Not Asked  Weight Concern Not Asked  Special Diet Not Asked  Back Care Not Asked  Exercise Not Asked  Bike Helmet Not Asked  Seat Belt Not Asked  Self-Exams Not Asked Social History Narrative  Not on file Current Outpatient Medications Medication Sig Dispense Refill  predniSONE (DELTASONE) 5 mg tablet 1 po daily for 15 days then 1/2 tablet daily till completed 35 Tab 0  
 gabapentin (NEURONTIN) 100 mg capsule Take 1 Cap by mouth nightly. 0  
 ibuprofen (MOTRIN) 800 mg tablet Take 1 Tab by mouth two (2) times daily as needed. 3  
 aspirin delayed-release 81 mg tablet Take  by mouth daily.  predniSONE (DELTASONE) 5 mg tablet Take 1 Tab by mouth daily. 21 Tab 0  
 sertraline (ZOLOFT) 50 mg tablet Take 50 mg by mouth three (3) times daily.  hydroxychloroquine (PLAQUENIL) 200 mg tablet Take 200 mg by mouth daily.  benazepril-hydrochlorothiazide (LOTENSIN HCT) 20-25 mg per tablet Take  by mouth daily.  diflunisal (DOLOBID) 500 mg tab Take 500 mg by mouth every twelve (12) hours as needed.  diazePAM (VALIUM) 5 mg tablet Take 1 tablet 30 minutes before diagnostic test - may repeat x 1  Indications: inducing of a relaxed easy state 2 Tab 0  
 LORazepam (ATIVAN) 0.5 mg tablet Take 0.5 mg by mouth.  oxyCODONE-acetaminophen (PERCOCET) 5-325 mg per tablet Take 1 tablet by mouth every four (4) hours as needed for Pain for up to 20 doses.  (Patient not taking: Reported on 4/22/2019) 20 tablet 0  
 nortriptyline (PAMELOR) 25 mg capsule Take 1 Cap by mouth two (2) times a day. (Patient not taking: Reported on 4/22/2019) 60 Cap 6  celecoxib (CELEBREX) 200 mg capsule Take  by mouth two (2) times a day.  esomeprazole (NEXIUM) 40 mg capsule Take  by mouth daily.  ZOLPIDEM TARTRATE (AMBIEN PO) Take  by mouth. Allergies Allergen Reactions  Morphine Anxiety REVIEW OF SYSTEMS Constitutional: Negative for fever, chills, or weight change. Respiratory: Negative for cough or shortness of breath. Cardiovascular: Negative for chest pain or palpitations. Gastrointestinal: Negative for acid reflux, change in bowel habits, or constipation. Genitourinary: Negative for dysuria and flank pain. Musculoskeletal: Positive for lumbar pain. Skin: Negative for rash. Neurological: Negative for headaches, dizziness, or numbness. Endo/Heme/Allergies: Negative for increased bruising. Psychiatric/Behavioral: Negative for difficulty with sleep. PHYSICAL EXAMINATION Visit Vitals /85 Pulse 66 Temp 97.9 °F (36.6 °C) (Oral) Resp 20 Ht 5' 2\" (1.575 m) Wt 281 lb 12.8 oz (127.8 kg) SpO2 96% BMI 51.54 kg/m² Constitutional: Awake, alert, and in no acute distress. Neurological: 1+ symmetrical DTRs in the lower extremities. Sensation to light touch is intact. Skin: warm, dry, and intact. Musculoskeletal: No tenderness with palpation of the lumbar region. Mild pain with extension, axial loading, less pain with forward flexion. No pain with internal or external rotation of her hips. Negative straight leg raise bilaterally. Patient ambulates with the assistance of a rolling walker. Hip Flex  Quads Hamstrings Ankle DF EHL Ankle PF Right +4/5 +4/5 +4/5 +4/5 +4/5 +4/5 Left +4/5 +4/5 +4/5 +4/5 +4/5 +4/5 IMAGING: 
 
Lumbar spine MRI from 05/07/2019 was personally reviewed with the patient and demonstrated: 
Results from [place] encounter on 05/07/19 MRI LUMB SPINE WO CONT Narrative EXAM: Lumbar MRI without contrast 
 
CLINICAL INDICATION: Degenerative disc disease but no stenosis. Neurogenic 
claudication. Facet arthropathy. Gait abnormality. Leg weakness in the L5 
distribution. TECHNIQUE: MRI of the lumbar spine without contrast obtained. Multiplanar 
multisequence MR images of the lumbar spine obtained. IV Contrast: None COMPARISON: CT abdomen pelvis dated 10/6/2009 FINDINGS: All numbering assumes 5 lumbar type vertebrae. The alignment 
demonstrates straightening of the lumbar lordosis. The marrow signal 
demonstrates several vertebral body hemangiomas. There is moderate type II and 
type III endplate changes at B9-G4 with Modic type II endplate changes at C0-D7 
and L5-S1. The cord terminates at L1. No cord signal abnormalities appreciated. The abdominal aorta is without evidence of aneurysm. No paraaortic adenopathy 
appreciated. Numerous cystic changes are noted in the left kidney which appear 
progressed since prior CT. Parapelvic cysts and cystic changes in the right 
kidney are suggested which also appear progressed since prior imaging. L1-L2 level: Facet hypertrophy and mild broad-based disc bulge is noted. No 
significant canal or neural foraminal stenosis. L2-L3: Facet hypertrophy and mild broad-based disc bulge. Mild canal and mild 
bilateral neural foraminal narrowing are noted. L3-L4: Facet hypertrophy with broad-based disc bulge and ligamentum flavum 
buckling. Mild to moderate canal narrowing is noted. Moderate bilateral neural 
foraminal narrowing with possible abutment of the exiting nerve roots. L4-L5: Facet hypertrophy and ligament flavum buckling are present. There is a 
large disc extrusion which extends above and below the disc level. It extends 6 
mm below the disc level and 3 mm above the disc level. There is complete 
effacement of the canal. Severe left and moderate to severe right neural 
foraminal narrowing are noted. L5-S1: Broad-based disc osteophyte formation and central disc protrusion are 
present. Facet hypertrophy and ligament flavum buckling are present. There is 
mild canal narrowing. The facet hypertrophy and disc protrusion combine to 
compress the descending S1 nerve roots in the lateral recesses. Severe bilateral 
neural foraminal narrowing as result of disc osteophyte formation and facet 
hypertrophy. Impression Impression: 1. Multilevel degenerative disc disease and facet arthropathy. 2.   Severe canal stenosis at L4-L5 with severe left and moderate to severe 
right neural foraminal narrowing 3. Likely compression of the descending S1 nerve roots at L5-S1 by disc 
protrusion combined with facet arthropathy with severe bilateral neural 
foraminal narrowing. 4.   Cystic changes in the kidneys which appear progressed since prior imaging. May consider correlation with ultrasound. 
 
  
  
Left knee x-rays from 03/07/2019 were personally reviewed with the patient and demonstrated: 
    
Results from [facility] on 03/07/19 XR KNEE LT MAX 2 VWS  
  Narrative EXAM: Left knee X-Ray 
  INDICATION: Left knee pain 
  
TECHNIQUE: 2 views of the left knee 
  
COMPARISON: 9/13/2010, 12/18/2009 and 2/10/2007 
  
FINDINGS:  
A left total knee arthroplasty is present. No perihardware fracture or 
perihardware lucency appreciated. No dislocation present. The bone density is 
grossly unremarkable. Vascular calcifications are present. The soft tissues are 
unremarkable.  
   
  Impression IMPRESSION: 
1. Left total knee   arthroplasty   without complication.   
  
Left hip x-rays from 03/07/2019 were personally reviewed with the patient and demonstrated: FINDINGS:  
The bone density is grossly unremarkable. No evidence of acute fracture of the 
pelvis. Mild SI joint osteoarthritis is noted. Degenerative changes in the lower 
lumbar spine are noted. The pubic symphysis is unremarkable.   No lytic or blastic focus identified. No erosions or periostitis appreciated.  
  
The femoral heads are well aligned with the osseous pelvis. No evidence of left 
hip fracture or dislocation.  The left hip demonstrates osteophyte formation and 
subchondral sclerosis. Mild asymmetric joint space narrowing. On the AP view of 
the pelvis, there is suggestion of mild to moderate degenerative changes of the 
right hip. 
  
IMPRESSION: 1.  Moderate left hip osteoarthritis and mild/moderate right hip osteoarthritis. Written by Bell Miranda, as dictated by Dina Fernandes MD. 
I, Dr. Dina Fernandes confirm that all documentation is accurate.

## 2019-05-22 ENCOUNTER — HOSPITAL ENCOUNTER (OUTPATIENT)
Dept: ULTRASOUND IMAGING | Age: 74
Discharge: HOME | End: 2019-05-22
Attending: PHYSICAL MEDICINE & REHABILITATION
Payer: MEDICARE

## 2019-05-22 DIAGNOSIS — N28.1 RENAL CYST: ICD-10-CM

## 2019-05-22 PROCEDURE — 76770 US EXAM ABDO BACK WALL COMP: CPT

## 2019-05-31 ENCOUNTER — HOSPITAL ENCOUNTER (OUTPATIENT)
Dept: PHYSICAL THERAPY | Age: 74
Discharge: HOME | End: 2019-05-31
Payer: MEDICARE

## 2019-05-31 PROCEDURE — 97162 PT EVAL MOD COMPLEX 30 MIN: CPT

## 2019-05-31 PROCEDURE — 97110 THERAPEUTIC EXERCISES: CPT

## 2019-05-31 NOTE — PROGRESS NOTES
[facility]  [address] [facility]  [phone] fax    Plan of Care/ Statement of Necessity for Physical Therapy Services    Patient name: Julita Luna Start of Care: 2019   Referral source: Elsie Mccoy MD : 1945    Medical Diagnosis: Spinal stenosis, lumbar region with neurogenic claudication [M48.062]  Other symptoms and signs involving the musculoskeletal system [R29.898]  [place]or: [place] MEDICARE / Plan: [place] MEDICARE PART A & B / Product Type: Medicare /  Onset Date:3 months ago    Treatment Diagnosis: low back pain   Prior Hospitalization: see medical history Provider#: [id number]   Medications: Verified on Patient summary List    Comorbidities: depression, arthritis, hypertension   Prior Level of Function: uses rollator during amb., independent with ADLs      The Plan of Care and following information is based on the information from the initial evaluation. Assessment/ key information: Pt is a 75 y.o. Female with complaints of low back pain for three months. Pt reports insidious onset of low back pain with radicular symptoms left>right. Radicular symptoms extend to toes. Left first three toes are reported as tingling and numbness in left foot due to injury to left middle third of tibia. Pt demonstrates decreased trunk AROM and strength. Negative slump test. Reduction of symptoms with forward flexion in seated position. Increase in symptoms with repeated extension in seated position. Pt also demonstrates weak hip musculature. Pt is unable to tolerate sitting for extended periods of time. Pt can tolerate 10-15 min of standing while holding onto furniture or rollator. Pt uses rollator during ambulation. Pt education included HEP and activity modification. Pt would benefit from skilled therapy to address the above deficits in order to facilitate recovery of function and improved QOL.  Pt is a good candidate for therapy due to motivation to return to PLOF. Evaluation Complexity History MEDIUM  Complexity : 1-2 comorbidities / personal factors will impact the outcome/ POC ; Examination MEDIUM Complexity : 3 Standardized tests and measures addressing body structure, function, activity limitation and / or participation in recreation  ;Presentation LOW Complexity : Stable, uncomplicated  ;Clinical Decision Making MEDIUM Complexity : FOTO score of 26-74  Overall Complexity Rating: MEDIUM  Problem List: pain affecting function, decrease ROM, decrease strength, impaired gait/ balance, decrease ADL/ functional abilitiies, decrease activity tolerance and decrease flexibility/ joint mobility   Treatment Plan may include any combination of the following: Therapeutic exercise, Physical agent/modality and Patient education  Patient / Family readiness to learn indicated by: asking questions, trying to perform skills and interest  Persons(s) to be included in education: patient (P)  Barriers to Learning/Limitations: None  Patient Goal (s): \"decrease pain  Patient Self Reported Health Status: fair  Rehabilitation Potential: good    Short Term Goals: To be accomplished in 1 weeks:   1. Pt will be 100% compliant with HEP in order to facilitate recovery of function. Long Term Goals: To be accomplished in 5 weeks:   1. Pt will increase FOTO score by 13 points in order to facilitate improved QOL. 2. Pt will increase trunk AROM: flex/ext by 25% in order to facilitate ability to perform ADLs with ease. 3. Pt will increase hip mmt ABD: 3/5 in order to facilitate ease of ambulation. 4. Pt will report 5/10 pain during daily activities in order to facilitate ability to perform ADLs with ease. Frequency / Duration: Patient to be seen 1-2 times per week for 5 weeks.     Patient/ Caregiver education and instruction: Diagnosis, prognosis, activity modification and exercises   [x]  Plan of care has been reviewed with PTA    Certification Period: 5/31/19 - 7/30/19  Abel Dunbar 5/31/2019 3:11 PM    ________________________________________________________________________    I certify that the above Therapy Services are being furnished while the patient is under my care. I agree with the treatment plan and certify that this therapy is necessary.     Physician's Signature:____________Date:_________TIME:________    ** Signature, Date and Time must be completed for valid certification **    Please sign and return to [facility]  [address] [place] [address]  [phone] fax

## 2019-05-31 NOTE — PROGRESS NOTES
PT DAILY TREATMENT NOTE/LUMBAR EVAL 10-18    Patient Name: Ed Alcazar  Date:2019  : 1945  [x]  Patient  Verified  Payor: VA MEDICARE / Plan: VA MEDICARE PART A & B / Product Type: Medicare /    In time:2:12  Out time:2:50  Total Treatment Time (min): 38  Visit #: 1 of 10    Medicare/BCBS Only   Total Timed Codes (min):  8 1:1 Treatment Time:  33     Treatment Area: Spinal stenosis, lumbar region with neurogenic claudication [M48.062]  Other symptoms and signs involving the musculoskeletal system [R29.898]  SUBJECTIVE  Pain Level (0-10 scale): 8/10  []constant []intermittent []improving []worsening [x]no change since onset    Any medication changes, allergies to medications, adverse drug reactions, diagnosis change, or new procedure performed?: [x] No    [] Yes (see summary sheet for update)  Subjective functional status/changes:     Mechanism of Injury: Pt reports 3 month hx of pain in LBP, some pain in bilateral hips. Sitting and laying down make the pain worse. Pain with standing up and washing dishes. Pain is constant at 8/10, worst at 10/10. Able to stand 10-15 minutes at time. Reports ibuprofen 3x/dayy 100 mg makes the pain better. Numbness and tingling down bilateral legs to toes. Toes on left LE are tingling and first three are numb due to hitting lower extremity on car door. In the last month, numbness and tingling reported in right LE to toes. Pt reports she sleeps on left side. Living Situation: lives with daughter. Able to perform ADLs. Has shower chair.    Pt Goals: decrease the pain    OBJECTIVE/EXAMINATION    33 min [x]Eval                  []Re-Eval     8 min: Therapeutic Exercise: pt. Education and HEP             [x] TE   [] TA   [] neuro   [] other: Patient Education: [x] Review HEP    [] Progressed/Changed HEP based on:   [] positioning   [] body mechanics   [] transfers   [] heat/ice application    [] other:      Other Objective/Functional Measures:   Pt demonstrated sit <--> supine x2. Pt required increased time to perform skill. Physical Therapy Evaluation - Lumbar Spine ([facility])  OBJECTIVE  Posture:  Forward head with rounded shoulders  Lateral Shift: [] R    [] L     [] +  [] -  Kyphosis: [x] Increased [] Decreased   []  WNL  Lordosis:  [] Increased [] Decreased   [] WNL  Pelvic symmetry: [x] WNL    [] Other:    Gait:  [] Normal     [] Abnormal:    Active Movements: [] N/A   [] Too acute   [] Other:  ROM % AROM % PROM Comments:pain, area   Forward flexion 40-60 50%     Extension 20-30 25%     SB right 20-30 25%     SB left 20-30 25%  Pain in back   Rotation right 5-10 25%  Pain in hip   Rotation left 5-10 25%  Most pain in back     Dural Mobility:  SLR Sitting: [] R    [] L    [] +    [] -  @ (degrees):           Supine: [] R    [] L    [] +    [] -  @ (degrees):   Slump Test: [x] R    [x] L    [] +    [x] -  @ (degrees):   Prone Knee Bend: [] R    [] L    [] +    [] -     Palpation  [] Min  [x] Mod  [] Severe    Location: L4-S2 TTP, Lumbar paraspinals left>right  [] Min  [] Mod  [] Severe    Location:  [] Min  [] Mod  [] Severe    Location:    Strength   L(0-5) R (0-5) N/T   Hip Flexion (L1,2) 4-/5 4/5 []   Knee Extension (L3,4) 5/5 5/5 []   Ankle Dorsiflexion (L4) 4+/5 4+/5 []   Great Toe Extension (L5)   []   Ankle Plantarflexion (S1) 4/5 4/5 []   Knee Flexion (S1,2) 5/5 5/5 []   Upper Abdominals   []   Lower Abdominals   []   Paraspinals   []   Back Rotators   []   Gluteus Gonzalez   []   Gluteus Medius  2/5 []     Special Tests  Sacroilliac:  Gaenslen's: [] R    [] L    [] +    [] -     Compression: [] +    [] -     Gapping:  [] +    [] -     Thigh Thrust: [] R    [] L    [] +    [] -     Leg Length: [] +    [] -   Position:    Crests:    ASIS:    PSIS:    Sacral Sulcus:    Mobility: Standing flex:     Sitting flex:     Supine to sit:     Prone knee bend:         Hip: Naya:  [] R    [] L    [] +    [] -     Scour:  [] R    [] L    [] +    [] -     Piriformis: [] R [] L    [] +    [] -     Hip flexor: R: positive for tightness         Deficits: Jacki's: [] R    [] L    [] +    [] -     Patrick: [] R    [] L    [] +    [] -     Hamstrings 90/90:    Gastrocsoleus (to neutral): Right: Left:    Pain Level (0-10 scale) post treatment: 8/10    ASSESSMENT/Changes in Function: See POC. Patient will continue to benefit from skilled PT services to modify and progress therapeutic interventions, address functional mobility deficits, address ROM deficits, address strength deficits, analyze and address soft tissue restrictions, analyze and cue movement patterns, analyze and modify body mechanics/ergonomics and assess and modify postural abnormalities to attain remaining goals. [x]  See Plan of Care  []  See progress note/recertification  []  See Discharge Summary         Progress towards goals / Updated goals:  See POC.     PLAN  []  Upgrade activities as tolerated     [x]  Continue plan of care  []  Update interventions per flow sheet       []  Discharge due to:_  []  Other:_      Lolis Crooks 5/31/2019  2:02 PM

## 2019-06-03 ENCOUNTER — HOSPITAL ENCOUNTER (OUTPATIENT)
Dept: PHYSICAL THERAPY | Age: 74
Discharge: HOME | End: 2019-06-03
Payer: MEDICARE

## 2019-06-03 PROCEDURE — 97110 THERAPEUTIC EXERCISES: CPT

## 2019-06-03 NOTE — PROGRESS NOTES
PT DAILY TREATMENT NOTE 10-18    Patient Name: Alfonso Kiser  Date:6/3/2019  : 1945  [x]  Patient  Verified  [place]or: [place] / Plan: [place] MED[place] A & B / Product Type: Medicare /    In time: 3:59  Out time: 4:28  Total Treatment Time (min): 29  Visit #: 2 of 10    Medicare/BCBS Only   Total Timed Codes (min):  29 1:1 Treatment Time:  29       Treatment Area: Spinal stenosis, lumbar region with neurogenic claudication [M48.062]  Other symptoms and signs involving the musculoskeletal system [R29.898]    SUBJECTIVE  Pain Level (0-10 scale):  6/10  Any medication changes, allergies to medications, adverse drug reactions, diagnosis change, or new procedure performed?: [x] No    [] Yes (see summary sheet for update)  Subjective functional status/changes:   [] No changes reported  Pt. Reports she had pain with the standing up exercise at home. OBJECTIVE    29 min Therapeutic Exercise:  [x] See flow sheet :   Rationale: increase ROM and increase strength to improve the patients ability to increase ease of ADLs        With   [x] TE   [] TA   [] neuro   [] other: Patient Education: [x] Review HEP    [] Progressed/Changed HEP based on:   [] positioning   [] body mechanics   [] transfers   [] heat/ice application    [] other:      Other Objective/Functional Measures:   Pt. Was educated on holding sit to stand exercise for now since it increased pain  No difficulty with orange band for seated abduction   She was challenged with seated wild disc     Pain Level (0-10 scale) post treatment:  7/10    ASSESSMENT/Changes in Function:  Pt. Initiated PT and is compliant with her HEP. She continues to have significant pain and difficulty with changing positions and sit to stand transfers.      Patient will continue to benefit from skilled PT services to modify and progress therapeutic interventions, address functional mobility deficits, address ROM deficits, address strength deficits, analyze and address soft tissue restrictions, analyze and cue movement patterns and analyze and modify body mechanics/ergonomics to attain remaining goals. Progress towards goals / Updated goals:  Short Term Goals: To be accomplished in 1 weeks:              1. Pt will be 100% compliant with HEP in order to facilitate recovery of function. Met (6/3/19)    Long Term Goals: To be accomplished in 5 weeks:              1. Pt will increase FOTO score by 13 points in order to facilitate improved QOL. 2. Pt will increase trunk AROM: flex/ext by 25% in order to facilitate ability to perform ADLs with ease. 3. Pt will increase hip mmt ABD: 3/5 in order to facilitate ease of ambulation. 4. Pt will report 5/10 pain during daily activities in order to facilitate ability to perform ADLs with ease.   PLAN  []  Upgrade activities as tolerated     [x]  Continue plan of care  []  Update interventions per flow sheet       []  Discharge due to:_  []  Other:_      Darlene Rachel, PT 6/3/2019  3:47 PM    Future Appointments   Date Time Provider Freya Murillo   6/3/2019  4:00 PM Landy Torrez PT [id number] [facility]   6/7/2019  4:00 PM Cecilia Lopes [id number] [facility]   6/10/2019  4:00 PM Landy Torrez PT [id number] [facility]   6/14/2019  4:30 PM Cecilia Lopes [id number] [facility]   6/17/2019  1:15 PM Vineet Guzman  [address]   6/21/2019  4:00 PM Cecilia Lopes [id number] [facility]   6/24/2019  2:00 PM Landy Torrez PT [id number] [facility]   6/26/2019  2:00 PM Landy Torrez PT [id number] [facility]

## 2019-06-07 ENCOUNTER — HOSPITAL ENCOUNTER (OUTPATIENT)
Dept: PHYSICAL THERAPY | Age: 74
Discharge: HOME | End: 2019-06-07
Payer: MEDICARE

## 2019-06-07 PROCEDURE — 97110 THERAPEUTIC EXERCISES: CPT

## 2019-06-07 NOTE — PROGRESS NOTES
PT DAILY TREATMENT NOTE 10-18    Patient Name: Sherren Moynahan  Date:2019  : 1945  [x]  Patient  Verified  Payor: [place] MEDICARE / Plan: [place] MEDICARE PART A & B / Product Type: Medicare /    In time:4:00  Out time: 4:43  Total Treatment Time (min): 43  Visit #: 3 of 10    Medicare/BCBS Only   Total Timed Codes (min):  33 1:1 Treatment Time:  23       Treatment Area: Spinal stenosis, lumbar region with neurogenic claudication [M48.062]  Other symptoms and signs involving the musculoskeletal system [R29.898]    SUBJECTIVE  Pain Level (0-10 scale): 6/10  Any medication changes, allergies to medications, adverse drug reactions, diagnosis change, or new procedure performed?: [x] No    [] Yes (see summary sheet for update)  Subjective functional status/changes:   [] No changes reported  Pt reports having soreness and fatigue after PT session     OBJECTIVE    Modality rationale: decrease pain and increase tissue extensibility to improve the patients ability to tolerate ADLs/amb   Min Type Additional Details    [] Estim:  []Unatt       []IFC  []Premod                        []Other:  []w/ice   []w/heat  Position:  Location:    [] Estim: []Att    []TENS instruct  []NMES                    []Other:  []w/US   []w/ice   []w/heat  Position:  Location:    []  Traction: [] Cervical       []Lumbar                       [] Prone          []Supine                       []Intermittent   []Continuous Lbs:  [] before manual  [] after manual    []  Ultrasound: []Continuous   [] Pulsed                           []1MHz   []3MHz W/cm2:  Location:    []  Iontophoresis with dexamethasone         Location: [] Take home patch   [] In clinic   10 []  Ice     [x]  heat  []  Ice massage  []  Laser   []  Anodyne Position: long sitting  Location: back    []  Laser with stim  []  Other:  Position:  Location:    []  Vasopneumatic Device Pressure:       [] lo [] med [] hi   Temperature: [] lo [] med [] hi   [x] Skin assessment post-treatment:  [x]intact []redness- no adverse reaction    []redness  adverse reaction:     33 min Therapeutic Exercise:  [x] See flow sheet :   Rationale: increase ROM and increase strength to improve the patients ability to increase ease of ADLs                                                            With   - TE   - TA   - neuro   - other: Patient Education: - Review HEP    - Progressed/Changed HEP based on:   - positioning   - body mechanics   - transfers   - heat/ice application    - other:      Other Objective/Functional Measures:    Fair motivation with therex    Pleased with heat but reported no change of pain   Compensated with ER during side walk    Pain Level (0-10 scale) post treatment: 6/10    ASSESSMENT/Changes in Function: pt making limited progress, cont to present with mod pain and fair tolerance/motivation with therex. Pleased with moist heat. Will cont to progress strengthening therex as tolerated. Patient will continue to benefit from skilled PT services to modify and progress therapeutic interventions, address functional mobility deficits, address ROM deficits, address strength deficits, analyze and address soft tissue restrictions, analyze and cue movement patterns and analyze and modify body mechanics/ergonomics to attain remaining goals. [x]  See Plan of Care  []  See progress note/recertification  []  See Discharge Summary               Progress towards goals / Updated goals:  Short Term Goals: To be accomplished in 1 weeks:              1. Pt will be 100% compliant with HEP in order to facilitate recovery of function. Met (6/3/19)     Long Term Goals: To be accomplished in 5 weeks:              1. Pt will increase FOTO score by 13 points in order to facilitate improved QOL.               2. Pt will increase trunk AROM: flex/ext by 25% in order to facilitate ability to perform ADLs with ease.              3. Pt will increase hip mmt ABD: 3/5 in order to facilitate ease of ambulation.              4. Pt will report 5/10 pain during daily activities in order to facilitate ability to perform ADLs with ease.     PLAN  []  Upgrade activities as tolerated     [x]  Continue plan of care  []  Update interventions per flow sheet       []  Discharge due to:_  []  Other:_      Ke Green, PT 6/7/2019  9:54 AM    Future Appointments   Date Time Provider Freya Murillo   6/7/2019  4:00 PM Gurmeet Bryant [id number] [facility]   6/10/2019  4:00 PM Ramo David, PT MMCPTPB [facility]   6/14/2019  4:30 PM Gurmeet Bryant [id number] [facility]   6/17/2019  1:15 PM Adelina Ramirez  [address]   6/21/2019  4:00 PM Gurmeet Bryant [id number] [facility]   6/24/2019  2:00 PM Ramo David, PT [id number] [facility]   6/26/2019  2:00 PM Ramo David PT [id number] [facility]

## 2019-06-10 ENCOUNTER — HOSPITAL ENCOUNTER (OUTPATIENT)
Dept: PHYSICAL THERAPY | Age: 74
Discharge: HOME | End: 2019-06-10
Payer: MEDICARE

## 2019-06-10 PROCEDURE — 97110 THERAPEUTIC EXERCISES: CPT

## 2019-06-10 NOTE — PROGRESS NOTES
PT DAILY TREATMENT NOTE 10-18    Patient Name: Owen Thomas  Date:6/10/2019  : 1945  [x]  Patient  Verified  Payor: [place] MEDICARE / Plan: [place] MEDICARE PART A & B / Product Type: Medicare /    In time:3:57  Out time:4:23  Total Treatment Time (min): 26  Visit #: 4 of 10    Medicare/BCBS Only   Total Timed Codes (min):  26 1:1 Treatment Time:  26       Treatment Area: Spinal stenosis, lumbar region with neurogenic claudication [M48.062]  Other symptoms and signs involving the musculoskeletal system [R29.898]    SUBJECTIVE  Pain Level (0-10 scale): 8-10  Any medication changes, allergies to medications, adverse drug reactions, diagnosis change, or new procedure performed?: [x] No    [] Yes (see summary sheet for update)  Subjective functional status/changes:   [] No changes reported  I'm in a lot of pain today. I don't know if it's my lupus or the rain. OBJECTIVE    26 min Therapeutic Exercise:  [x] See flow sheet :   Rationale: increase ROM and increase strength to improve the patients ability to perform ADLs with ease. With   [] TE   [] TA   [] neuro   [] other: Patient Education: [x] Review HEP    [] Progressed/Changed HEP based on:   [] positioning   [] body mechanics   [] transfers   [] heat/ice application    [] other:      Other Objective/Functional Measures:   Progressed ball/band to GTB. Challenged with GTB. Challenged with horizontal adduction on dynadisc. Pain Level (0-10 scale) post treatment: 8-9/10    ASSESSMENT/Changes in Function: Pt is making limited progress with therapy. She reported high pain today. Therex did not increase symptoms. Cont with POC.     Patient will continue to benefit from skilled PT services to modify and progress therapeutic interventions, address functional mobility deficits, address ROM deficits, address strength deficits, analyze and address soft tissue restrictions, analyze and cue movement patterns, analyze and modify body mechanics/ergonomics and assess and modify postural abnormalities to attain remaining goals. [x]  See Plan of Care  []  See progress note/recertification  []  See Discharge Summary         Progress towards goals / Updated goals:  Short Term Goals: To be accomplished in 1 weeks:              1. Pt will be 100% compliant with HEP in order to facilitate recovery of function. Met (6/3/19)     Long Term Goals: To be accomplished in 5 weeks:              1. Pt will increase FOTO score by 13 points in order to facilitate improved QOL.             2. Pt will increase trunk AROM: flex/ext by 25% in order to facilitate ability to perform ADLs with ease.              3. Pt will increase hip mmt ABD: 3/5 in order to facilitate ease of ambulation.              4. Pt will report 5/10 pain during daily activities in order to facilitate ability to perform ADLs with ease.     PLAN  []  Upgrade activities as tolerated     [x]  Continue plan of care  []  Update interventions per flow sheet       []  Discharge due to:_  []  Other:_      Kelvin Daina 6/10/2019  4:02 PM    Future Appointments   Date Time Provider Freya Murillo   6/14/2019  4:30 PM Ramila De La Rosa PT [id number] [facility]   6/17/2019  1:15 PM Aracely Fox  [address]   6/21/2019  4:00 PM Edmonia Soulier [id number] [facility]   6/24/2019  2:00 PM Ramila De La Rosa PT [id number] [facility]   6/26/2019  2:00 PM Ramila De La Rosa PT [id number] [facility]

## 2019-06-17 ENCOUNTER — OFFICE VISIT (OUTPATIENT)
Dept: ORTHOPEDIC SURGERY | Age: 74
End: 2019-06-17

## 2019-06-17 VITALS
WEIGHT: 282 LBS | OXYGEN SATURATION: 93 % | DIASTOLIC BLOOD PRESSURE: 90 MMHG | TEMPERATURE: 97.7 F | HEIGHT: 62 IN | RESPIRATION RATE: 16 BRPM | BODY MASS INDEX: 51.89 KG/M2 | HEART RATE: 78 BPM | SYSTOLIC BLOOD PRESSURE: 162 MMHG

## 2019-06-17 DIAGNOSIS — N28.1 RENAL CYST: ICD-10-CM

## 2019-06-17 DIAGNOSIS — M51.26 HNP (HERNIATED NUCLEUS PULPOSUS), LUMBAR: Primary | ICD-10-CM

## 2019-06-17 DIAGNOSIS — M54.16 LUMBAR RADICULOPATHY: ICD-10-CM

## 2019-06-17 DIAGNOSIS — M48.062 SPINAL STENOSIS OF LUMBAR REGION WITH NEUROGENIC CLAUDICATION: ICD-10-CM

## 2019-06-17 DIAGNOSIS — R29.898 LEFT LEG WEAKNESS: ICD-10-CM

## 2019-06-17 RX ORDER — GABAPENTIN 100 MG/1
CAPSULE ORAL
Qty: 180 CAP | Refills: 1 | Status: SHIPPED | OUTPATIENT
Start: 2019-06-17 | End: 2019-09-05 | Stop reason: SDUPTHER

## 2019-06-17 NOTE — PROGRESS NOTES
[facility]  [address] [place], [facility] [address]  Phone: [phone]  Fax: [phone]    Pt's YOB: 1945    ASSESSMENT   Diagnoses and all orders for this visit:    1. HNP (herniated nucleus pulposus), lumbar  -     SCHEDULE SURGERY    2. Lumbar radiculopathy  -     gabapentin (NEURONTIN) 100 mg capsule; Take 2-3 po in the morning and 2-3 po in the evening as directed  -     SCHEDULE SURGERY    3. Left leg weakness  -     SCHEDULE SURGERY    4. Spinal stenosis of lumbar region with neurogenic claudication  -     SCHEDULE SURGERY    5. Renal cyst         IMPRESSION AND PLAN:  Julita Luna is a 75 y.o. female with history of lumbar pain. She complains of pain in the lower back that radiates down both legs, L>R. She also complains of weakness in the legs. Pt takes Neurontin 100 mg 1 tab QAM and 1 tab QHS. 1) Pt was given information on herniated disc exercises. 2) Discussed treatment options with the patient including medication and steroid injections. 3) Pt was scheduled for a left L4-5 SNRB. 4) She will increase her Neurontin 300 mg to 3 tabs BID to better manage her symptoms. 5) Ms. Carey Wasserman has a reminder for a \"due or due soon\" health maintenance. I have asked that she contact her primary care provider, LEVI Luis, for follow-up on this health maintenance. 6)  demonstrated consistency with prescribing. 7) Pt may hold PT for now pending response to injection  8) Weight loss also recommended. Follow-up and Dispositions    · Return in about 1 month (around 7/15/2019) for Medication follow up, Injection follow up. HISTORY OF PRESENT ILLNESS:  Julita Luna is a 75 y.o. female with history of lumbar pain and presents to the office today for follow up. She complains of pain in the lower back that radiates down both legs, L>R. Pt also complains of weakness in the legs.  She notes that she is unable to stand to cook an entire meal before she has to take a break to sit down. Pt reports increased minimal improvement with physical therapy. She notes that she experiences soreness for about 1 day after each session which improves after she takes ibuprofen. Pt notes that he attends sessions 2 x a week. She reports mild relief when taking prednisone 5 mg. Pt has been prescribed Neurontin 100 mg and takes 1 tab QAM and 1 tab QHS. She is not currently on blood thinner and denies a history of diabetes mellitus. Pt takes Motrin as needed. Pt at this time desires to proceed with medication evaluation and steroid injections.     Pain Scale: 10 - Worst pain ever/10    PCP: LEVI Willis     Past Medical History:   Diagnosis Date    Arthritis     Hypertension     Indigestion     Lupus ([facility] [address]     Memory difficulty     Ringing of ears     sometimes        Social History     Socioeconomic History    Marital status:      Spouse name: Not on file    Number of children: Not on file    Years of education: Not on file    Highest education level: Not on file   Occupational History    Not on file   Social Needs    Financial resource strain: Not on file    Food insecurity:     Worry: Not on file     Inability: Not on file    Transportation needs:     Medical: Not on file     Non-medical: Not on file   Tobacco Use    Smoking status: Never Smoker    Smokeless tobacco: Never Used   Substance and Sexual Activity    Alcohol use: No    Drug use: Not on file    Sexual activity: Never   Lifestyle    Physical activity:     Days per week: Not on file     Minutes per session: Not on file    Stress: Not on file   Relationships    Social connections:     Talks on phone: Not on file     Gets together: Not on file     Attends Restorationist service: Not on file     Active member of club or organization: Not on file     Attends meetings of clubs or organizations: Not on file     Relationship status: Not on file    Intimate partner violence:     Fear of current or ex partner: Not on file     Emotionally abused: Not on file     Physically abused: Not on file     Forced sexual activity: Not on file   Other Topics Concern     Service Not Asked    Blood Transfusions Not Asked    Caffeine Concern Not Asked    Occupational Exposure Not Asked    Hobby Hazards Not Asked    Sleep Concern Not Asked    Stress Concern Not Asked    Weight Concern Not Asked    Special Diet Not Asked    Back Care Not Asked    Exercise Not Asked    Bike Helmet Not Asked   [address] Not Asked    Self-Exams Not Asked   Social History Narrative    Not on file       Current Outpatient Medications   Medication Sig Dispense Refill    gabapentin (NEURONTIN) 100 mg capsule Take 2-3 po in the morning and 2-3 po in the evening as directed 180 Cap 1    predniSONE (DELTASONE) 5 mg tablet 1 po daily for 15 days then 1/2 tablet daily till completed 35 Tab 0    diflunisal (DOLOBID) 500 mg tab Take 500 mg by mouth every twelve (12) hours as needed.  ibuprofen (MOTRIN) 800 mg tablet Take 1 Tab by mouth two (2) times daily as needed. 3    aspirin delayed-release 81 mg tablet Take  by mouth daily.  predniSONE (DELTASONE) 5 mg tablet Take 1 Tab by mouth daily. 21 Tab 0    diazePAM (VALIUM) 5 mg tablet Take 1 tablet 30 minutes before diagnostic test - may repeat x 1  Indications: inducing of a relaxed easy state 2 Tab 0    LORazepam (ATIVAN) 0.5 mg tablet Take 0.5 mg by mouth.  oxyCODONE-acetaminophen (PERCOCET) 5-325 mg per tablet Take 1 tablet by mouth every four (4) hours as needed for Pain for up to 20 doses. 20 tablet 0    nortriptyline (PAMELOR) 25 mg capsule Take 1 Cap by mouth two (2) times a day. 60 Cap 6    celecoxib (CELEBREX) 200 mg capsule Take  by mouth two (2) times a day.  sertraline (ZOLOFT) 50 mg tablet Take 50 mg by mouth three (3) times daily.  esomeprazole (NEXIUM) 40 mg capsule Take  by mouth daily.       hydroxychloroquine (PLAQUENIL) 200 mg tablet Take 200 mg by mouth daily.  benazepril-hydrochlorothiazide (LOTENSIN HCT) 20-25 mg per tablet Take  by mouth daily.  ZOLPIDEM TARTRATE (AMBIEN PO) Take  by mouth. Allergies   Allergen Reactions    Morphine Anxiety         REVIEW OF SYSTEMS    Constitutional: Negative for fever, chills, or weight change. Respiratory: Negative for cough or shortness of breath. Cardiovascular: Negative for chest pain or palpitations. Gastrointestinal: Negative for acid reflux, change in bowel habits, or constipation. Genitourinary: Negative for dysuria and flank pain. Musculoskeletal: Positive for lumbar pain. Skin: Negative for rash. Neurological: Negative for headaches, dizziness, or numbness. Endo/Heme/Allergies: Negative for increased bruising. Psychiatric/Behavioral: Negative for difficulty with sleep. PHYSICAL EXAMINATION  Visit Vitals  /90   Pulse 78   Temp 97.7 °F (36.5 °C)   Resp 16   Ht 5' 2\" (1.575 m)   Wt 282 lb (127.9 kg)   SpO2 93%   BMI 51.58 kg/m²       Constitutional: Awake, alert, and in no acute distress. Neurological: 1+ symmetrical DTRs in the upper extremities. 1+ symmetrical DTRs in the lower extremities. Sensation to light touch is intact. Negative Chang's sign bilaterally. Skin: warm, dry, and intact. Musculoskeletal: Tenderness to palpation in the lower lumbar region. Moderate pain with extension and axial loading. No pain with internal or external rotation of her hips. Negative straight leg raise bilaterally. Patient ambulates with the assistance of a rolling walker.      Hip Flex  Quads Hamstrings Ankle DF EHL Ankle PF   Right +4/5 +4/5 +4/5 +4/5 +4/5 +4/5   Left +4/5 +4/5 +4/5 +4/5  -4/5 +4/5     IMAGING:    Lumbar spine MRI from 05/07/2019 was personally reviewed with the patient and demonstrated:       Results from [facility] on 05/07/19   MRI LUMB SPINE WO CONT     Narrative EXAM: Lumbar MRI without contrast     CLINICAL INDICATION: Degenerative disc disease but no stenosis. Neurogenic  claudication. Facet arthropathy. Gait abnormality. Leg weakness in the L5  distribution.     TECHNIQUE: MRI of the lumbar spine without contrast obtained. Multiplanar  multisequence MR images of the lumbar spine obtained.     IV Contrast: None     COMPARISON: CT abdomen pelvis dated 10/6/2009     FINDINGS: All numbering assumes 5 lumbar type vertebrae. The alignment  demonstrates straightening of the lumbar lordosis. The marrow signal  demonstrates several vertebral body hemangiomas. There is moderate type II and  type III endplate changes at S8-I0 with Modic type II endplate changes at Y9-G0  and L5-S1. The cord terminates at L1. No cord signal abnormalities appreciated.     The abdominal aorta is without evidence of aneurysm. No paraaortic adenopathy  appreciated. Numerous cystic changes are noted in the left kidney which appear  progressed since prior CT. Parapelvic cysts and cystic changes in the right  kidney are suggested which also appear progressed since prior imaging.     L1-L2 level: Facet hypertrophy and mild broad-based disc bulge is noted. No  significant canal or neural foraminal stenosis.     L2-L3: Facet hypertrophy and mild broad-based disc bulge. Mild canal and mild  bilateral neural foraminal narrowing are noted.     L3-L4: Facet hypertrophy with broad-based disc bulge and ligamentum flavum  buckling. Mild to moderate canal narrowing is noted. Moderate bilateral neural  foraminal narrowing with possible abutment of the exiting nerve roots.     L4-L5: Facet hypertrophy and ligament flavum buckling are present. There is a  large disc extrusion which extends above and below the disc level. It extends 6  mm below the disc level and 3 mm above the disc level.  There is complete  effacement of the canal. Severe left and moderate to severe right neural  foraminal narrowing are noted.     L5-S1: Broad-based disc osteophyte formation and central disc protrusion are  present. Facet hypertrophy and ligament flavum buckling are present. There is  mild canal narrowing. The facet hypertrophy and disc protrusion combine to  compress the descending S1 nerve roots in the lateral recesses. Severe bilateral  neural foraminal narrowing as result of disc osteophyte formation and facet  hypertrophy.        Impression Impression:  1. Multilevel degenerative disc disease and facet arthropathy.      2. Severe canal stenosis at L4-L5 with severe left and moderate to severe  right neural foraminal narrowing      3. Likely compression of the descending S1 nerve roots at L5-S1 by disc  protrusion combined with facet arthropathy with severe bilateral neural  foraminal narrowing.      4.   Cystic changes in the kidneys which appear progressed since prior imaging. May consider correlation with ultrasound.            Left knee x-rays from 03/07/2019 were personally reviewed with the patient and demonstrated:          Results from Hospital Encounter on 03/07/19   XR KNEE LT MAX 2 VWS     Narrative EXAM: Left knee X-Ray     INDICATION: Left knee pain     TECHNIQUE: 2 views of the left knee     COMPARISON: 9/13/2010, 12/18/2009 and 2/10/2007     FINDINGS:   A left total knee arthroplasty is present. No perihardware fracture or  perihardware lucency appreciated. No dislocation present. The bone density is  grossly unremarkable. Vascular calcifications are present. The soft tissues are  unremarkable.         Impression IMPRESSION:  1.  Left total knee   arthroplasty   without complication.       Left hip x-rays from 03/07/2019 were personally reviewed with the patient and demonstrated:  FINDINGS:   The bone density is grossly unremarkable. No evidence of acute fracture of the  pelvis. Mild SI joint osteoarthritis is noted. Degenerative changes in the lower  lumbar spine are noted.  The pubic symphysis is unremarkable.   No lytic or  blastic focus identified. No erosions or periostitis appreciated.      The femoral heads are well aligned with the osseous pelvis. No evidence of left  hip fracture or dislocation.  The left hip demonstrates osteophyte formation and  subchondral sclerosis. Mild asymmetric joint space narrowing. On the AP view of  the pelvis, there is suggestion of mild to moderate degenerative changes of the  right hip.     IMPRESSION:  1.  Moderate left hip osteoarthritis and mild/moderate right hip osteoarthritis. Retroperitoneum ultrasound from 05/22/2019 was personally reviewed with the patient and demonstrated:  Results from [place] encounter on 05/22/19   US RETROPERITONEUM COMP    Narrative Ultrasound retroperitoneal complete    INDICATION: Renal cystic changes on MRI    COMPARISON: Recent MR lumbar spine 5/19, ultrasound 1/11    FINDINGS:    Both kidneys are echogenic. Right kidney measures 9.6 x 5.3 x 5.4 cm. Left  kidney measures 11.5 x 7.8 x 6.0 cm. No hydronephrosis or nephrolithiasis. Multiple cysts are noted bilaterally. There is a 3.5 x 1.8 x 2.5 cm cyst in the  midpole of the right kidney. 1.6 x 1.3 x 1.4 cm adjacent right renal cyst. 4.8 x  4.7 x 4.2 cm cyst within the left renal midpole. This is limited in evaluation  secondary to patient body habitus. Underdistended bladder limits evaluation, however grossly within normal limits. No free fluid. Impression Bilateral renal cysts. Echogenic kidneys, may be seen with chronic medical renal  disease. Written by Eulogio Chester, as dictated by Edwar Chinchilla MD.  I, Dr. Edwar Chinchilla confirm that all documentation is accurate.

## 2019-06-17 NOTE — LETTER
6/18/19 Patient: Hayley Goldsmith YOB: 1945 Date of Visit: 6/17/2019 Jeff Ordoñez, [address] [facility] [address] V[address] Facsimile: [phone] Dear LEVI Boyer, Thank you for referring Ms. Gi Harrington to [facility] for evaluation. My notes for this consultation are attached. If you have questions, please do not hesitate to call me. I look forward to following your patient along with you. Sincerely, Dilshad High MD

## 2019-06-21 ENCOUNTER — HOSPITAL ENCOUNTER (OUTPATIENT)
Dept: PHYSICAL THERAPY | Age: 74
Discharge: HOME | End: 2019-06-21
Payer: MEDICARE

## 2019-06-21 PROCEDURE — 97110 THERAPEUTIC EXERCISES: CPT

## 2019-06-21 PROCEDURE — 97530 THERAPEUTIC ACTIVITIES: CPT

## 2019-06-21 PROCEDURE — 97014 ELECTRIC STIMULATION THERAPY: CPT

## 2019-06-21 NOTE — PROGRESS NOTES
PT DAILY TREATMENT NOTE 10-18    Patient Name: Sharyle Breeze  Date:2019  : 1945  [x]  Patient  Verified  Payor: Eliza Cunningham / Plan: [place] & B / Product Type: Medicare /    In time: 4:00  Out time:4:45  Total Treatment Time (min): 45  Visit #: 6 of 10    Medicare/BCBS Only   Total Timed Codes (min):  35 1:1 Treatment Time:  30       Treatment Area: Spinal stenosis, lumbar region with neurogenic claudication [M48.062]  Other symptoms and signs involving the musculoskeletal system [R29.898]    SUBJECTIVE  Pain Level (0-10 scale): -7/10  Any medication changes, allergies to medications, adverse drug reactions, diagnosis change, or new procedure performed?: [x] No    [] Yes (see summary sheet for update)  Subjective functional status/changes:   [] No changes reported  Pt reports cont to have mod pain along lower l/s an sacrum region    OBJECTIVE    Modality rationale: decrease pain and increase tissue extensibility to improve the patients ability to tolerate ADLs   Min Type Additional Details   10 with set up time [x] Estim:  []Unatt       [x]IFC  []Premod                        []Other:  []w/ice   [x]w/heat  Position: seated  Location: l/s    [] Estim: []Att    []TENS instruct  []NMES                    []Other:  []w/US   []w/ice   []w/heat  Position:  Location:    []  Traction: [] Cervical       []Lumbar                       [] Prone          []Supine                       []Intermittent   []Continuous Lbs:  [] before manual  [] after manual    []  Ultrasound: []Continuous   [] Pulsed                           []1MHz   []3MHz W/cm2:  Location:    []  Iontophoresis with dexamethasone         Location: [] Take home patch   [] In clinic    []  Ice     []  heat  []  Ice massage  []  Laser   []  Anodyne Position:  Location:    []  Laser with stim  []  Other:  Position:  Location:    []  Vasopneumatic Device Pressure:       [] lo [] med [] hi   Temperature: [] lo [] med [] hi   [x] Skin assessment post-treatment:  [x]intact []redness- no adverse reaction    []redness  adverse reaction:        25 min Therapeutic Exercise:  [x] See flow sheet :   Rationale: increase ROM and increase strength to improve the patients ability to perform ADLs with ease. 10 min Therapeutic Activity:  []  See flow sheet : pt education on TENs unit/ESTIM use; pain management   Rationale: increase ROM, increase strength, improve coordination, improve balance and increase proprioception  to improve the patients ability to perform ADLs/amb with ease and safety          With   [] TE   [] TA   [] neuro   [] other: Patient Education: [x] Review HEP    [] Progressed/Changed HEP based on:   [] positioning   [] body mechanics   [] transfers   [] heat/ice application    [] other:      Other Objective/Functional Measures:    Cont to demonstrates max difficulty with core strengthening therex, repetitive verbal cues and tactile cues for form   Min fatigued with sit to stand   Very pleased with ESTIM     Pain Level (0-10 scale) post treatment: 0/10    ASSESSMENT/Changes in Function: see progress note please     Patient will continue to benefit from skilled PT services to modify and progress therapeutic interventions, address functional mobility deficits, address ROM deficits, address strength deficits, analyze and address soft tissue restrictions, analyze and cue movement patterns, analyze and modify body mechanics/ergonomics and assess and modify postural abnormalities to attain remaining goals. []  See Plan of Care  [x]  See progress note/recertification  []  See Discharge Summary         Progress towards goals / Updated goals:  Short Term Goals: To be accomplished in 1 weeks:              1. Pt will be 100% compliant with HEP in order to facilitate recovery of function. Met (6/3/19)     Long Term Goals: To be accomplished in 5 weeks:              1.  Pt will increase FOTO score by 13 points in order to facilitate improved QOL. Regressed min 6-21-19              2. Pt will increase trunk AROM: flex/ext by 25% in order to facilitate ability to perform ADLs with ease. Able to touch toes in sitting 6-21-19              3. Pt will increase hip mmt ABD: 3/5 in order to facilitate ease of ambulation. Grossly 3+/5 with Left and 2+/5 with Right 6-21-19              4. Pt will report 5/10 pain during daily activities in order to facilitate ability to perform ADLs with ease.  Fluctuating in high range 6-21-19      PLAN  []  Upgrade activities as tolerated     []  Continue plan of care  []  Update interventions per flow sheet       []  Discharge due to:_  [x]  Other:_  hold PT for 2 weeks to wait for MD's instruction     Peter Rai PT 6/21/2019  9:45 AM    Future Appointments   Date Time Provider Freya Murillo   6/21/2019  4:00 PM [facility]   8/12/2019  2:45 PM Ketty Oneill  [address]

## 2019-06-21 NOTE — PROGRESS NOTES
[facility] Benito Moreira  [address] [facility]  [phone] fax    Medicare Progress Report    Patient name: Liz Younger Start of Care: 2019   Referral source: Neema Ramon MD : 1945               Medical Diagnosis: Spinal stenosis, lumbar region with neurogenic claudication [M48.062]  Other symptoms and signs involving the musculoskeletal system [R29.898]  Payor: [place] MEDICARE / Plan: [place] MEDICARE PART A & B / Product Type: Medicare /  Onset Date:3 months ago               Treatment Diagnosis: low back pain   Prior Hospitalization: see medical history Provider#: [id number]   Medications: Verified on Patient summary List    Comorbidities: depression, arthritis, hypertension   Prior Level of Function: uses rollator during amb., independent with ADLs    Visits from Start of Care: 6    Missed Visits: 0    Reporting Period: 2019 to 2019    Subjective Reports: pt reports cont to have mod-max pain along lower lumbar and sacral region    Current Status/ treatment goals Objective measures   1. Pt will be 100% compliant with HEP in order to facilitate recovery of function. [x] met                 [] not met  [] progressing  Met (6/3/19)    2. Pt will increase FOTO score by 13 points in order to facilitate improved QOL. [] met                 [x] not met  [] progressing Regressed min, 36/100 19   3. Pt will increase trunk AROM: flex/ext by 25% in order to facilitate ability to perform ADLs with ease. [] met                 [] not met  [x] progressing Able to touch toes in sitting 19   4. Pt will increase hip mmt ABD: 3/5 in order to facilitate ease of ambulation. [] met                 [] not met  [x] progressing Grossly 3+/5 with Left and 2+/5 with Right 19   5. Pt will report 5/10 pain during daily activities in order to facilitate ability to perform ADLs with ease.   [] met                 [x] not met  [] progressing Fluctuating in high range, 6-10/10 6-21-19     Key functional changes: min improvement of trunk AROM      Problems/ barriers to goal attainment: comorbidity, poor strength and tolerance to therex     Assessment / Recommendations:pt making very limited progress, min improvement with AROM and min improvement of pain with trunk flexion but no significant, long term pain relief. Pt cont to present with poor strength and tolerance with therex, partially due to comorbidity. Performed trial of ESTIM today and pt was very pleased with 0/10 pain level afterwards; educated pt on discuss pain management and TENs unit with MD. Pt is also scheduled for injection/nerve block next week. Will hold PT for 2 weeks for MD's further instruction after procedure. Problem List: pain affecting function, decrease ROM, decrease strength, edema affecting function, impaired gait/ balance, decrease ADL/ functional abilitiies, decrease activity tolerance, decrease flexibility/ joint mobility and decrease transfer abilities   Treatment Plan: Therapeutic exercise, Therapeutic activities, Neuromuscular re-education, Physical agent/modality, Gait/balance training, Manual therapy, Patient education, Self Care training, Functional mobility training, Home safety training and Stair training    Patient Goal (s) has been updated and includes: feel better     Updated Goals to be accomplished in 4 weeks:   1. Pt will increase FOTO score by 13 points in order to facilitate improved QOL. Regressed min 6-21-19   2. Pt will increase trunk AROM: flex/ext by 25% in order to facilitate ability to perform ADLs with ease. Able to touch toes in sitting 6-21-19   3. Pt will increase hip mmt ABD: 3/5 in order to facilitate ease of ambulation. Grossly 3+/5 with Left and 2+/5 with Right 6-21-19   4. Pt will report 5/10 pain during daily activities in order to facilitate ability to perform ADLs with ease.  Fluctuating in high range 6-21-19    Frequency / Duration: Patient to be seen 2 times per week for 4 weeks:      Vale Grey, PT 6/21/2019 5:08 PM

## 2019-06-24 ENCOUNTER — APPOINTMENT (OUTPATIENT)
Dept: PHYSICAL THERAPY | Age: 74
End: 2019-06-24
Payer: MEDICARE

## 2019-06-26 ENCOUNTER — APPOINTMENT (OUTPATIENT)
Dept: PHYSICAL THERAPY | Age: 74
End: 2019-06-26
Payer: MEDICARE

## 2019-06-26 ENCOUNTER — APPOINTMENT (OUTPATIENT)
Dept: GENERAL RADIOLOGY | Age: 74
End: 2019-06-26
Attending: PHYSICAL MEDICINE & REHABILITATION
Payer: MEDICARE

## 2019-06-26 ENCOUNTER — HOSPITAL ENCOUNTER (OUTPATIENT)
Age: 74
Setting detail: OUTPATIENT SURGERY
Discharge: HOME | End: 2019-06-26
Attending: PHYSICAL MEDICINE & REHABILITATION | Admitting: PHYSICAL MEDICINE & REHABILITATION
Payer: MEDICARE

## 2019-06-26 VITALS
HEIGHT: 61 IN | BODY MASS INDEX: 53.28 KG/M2 | OXYGEN SATURATION: 97 % | DIASTOLIC BLOOD PRESSURE: 92 MMHG | RESPIRATION RATE: 22 BRPM | TEMPERATURE: 98.2 F | SYSTOLIC BLOOD PRESSURE: 147 MMHG | HEART RATE: 78 BPM

## 2019-06-26 PROCEDURE — 74011636320 HC RX REV CODE- 636/320: Performed by: PHYSICAL MEDICINE & REHABILITATION

## 2019-06-26 PROCEDURE — 74011250636 HC RX REV CODE- 250/636: Performed by: PHYSICAL MEDICINE & REHABILITATION

## 2019-06-26 PROCEDURE — 76010000009 HC PAIN MGT 0 TO 30 MIN PROC: Performed by: PHYSICAL MEDICINE & REHABILITATION

## 2019-06-26 PROCEDURE — 77030003676 HC NDL SPN MPRI -A: Performed by: PHYSICAL MEDICINE & REHABILITATION

## 2019-06-26 PROCEDURE — 77030039433 HC TY MYLEOGRAM BD -B: Performed by: PHYSICAL MEDICINE & REHABILITATION

## 2019-06-26 PROCEDURE — 74011250637 HC RX REV CODE- 250/637: Performed by: PHYSICAL MEDICINE & REHABILITATION

## 2019-06-26 PROCEDURE — 77030003669 HC NDL SPN COOK -B: Performed by: PHYSICAL MEDICINE & REHABILITATION

## 2019-06-26 RX ORDER — DIAZEPAM 5 MG/1
5-20 TABLET ORAL ONCE
Status: COMPLETED | OUTPATIENT
Start: 2019-06-26 | End: 2019-06-26

## 2019-06-26 RX ORDER — DEXAMETHASONE SODIUM PHOSPHATE 100 MG/10ML
INJECTION INTRAMUSCULAR; INTRAVENOUS AS NEEDED
Status: DISCONTINUED | OUTPATIENT
Start: 2019-06-26 | End: 2019-06-26 | Stop reason: HOSPADM

## 2019-06-26 RX ORDER — LIDOCAINE HYDROCHLORIDE 10 MG/ML
INJECTION, SOLUTION EPIDURAL; INFILTRATION; INTRACAUDAL; PERINEURAL AS NEEDED
Status: DISCONTINUED | OUTPATIENT
Start: 2019-06-26 | End: 2019-06-26 | Stop reason: HOSPADM

## 2019-06-26 RX ADMIN — DIAZEPAM 10 MG: 5 TABLET ORAL at 14:49

## 2019-06-26 NOTE — H&P
Date of Surgery Update:  Kana Girard was seen and examined. History and physical has been reviewed. The patient has been examined. There have been no significant clinical changes since the last office visit.       Signed By: Quirino Senior MD     June 26, 2019 2:09 PM

## 2019-06-26 NOTE — PROCEDURES
PROCEDURE NOTE      Patient Name: Rambo Patel    Date of Procedure: June 26, 2019    Preoperative Diagnosis:  LUMBAR RADICULOPATHY  HNP    Post Operative Diagnosis:  LUMBAR RADICULOPATHY  HNP    Procedure :    left L4 Selective Nerve Root Block  left L5 Selective Nerve Root Block    Consent:  Informed consent was obtained prior to the procedure. The patient was given the opportunity to ask questions regarding the procedure and its associated risks. In addition to the potential risks associated with the procedure itself, the patient was informed both verbally and in writing of the potential side effects of the use of glucocorticoid. The patient appeared to comprehend the informed consent and desired to have the procedure performed. Procedure: The patient was placed in the prone position on the fluoroscopy table and the back was prepped and draped in the usual sterile manner. The exact spinal level was  identified using fluoroscopy, and Lidocaine 1 % was injected locally, a # 22 gauge spinal needle was passed to the transverse process. The depth was noted and the needle redirected to pass inferior and approximately one cm anterior to the transverse process. YES  1 cc of Isovue M-200 was used to verify positioning in the epidural and paravertebral space and outlined the course of the spinal nerve into the epidural space. The same procedure was repeated at each spinal level indicated above. A total of 30 mg of preservative free Dexamethasone and 5 cc of Lidocaine was slowly injected. The patient tolerated the procedure well. The injection area was cleaned and bandaids applied. Not excessive bleeding was noted. Patient dressed and discharged to home with instructions. Discussion: The patient tolerated the procedure well.                                               Devonte Hdez MD  June 26, 2019

## 2019-08-12 NOTE — PROGRESS NOTES
[facility] Alecia Rider  [address] [facility]  [phone] fax    Physical Therapy Discharge Summary    Patient name: Owen Thomas Start of Care:  19   Referral source: Mag Porter MD : 1945   Medical/Treatment Diagnosis: Spinal stenosis, lumbar region with neurogenic claudication [M48.062]  Other symptoms and signs involving the musculoskeletal system [R29.898]  [place]or: [place] MEDICARE / Plan: [place] MEDICARE PART A & B / Product Type: Medicare /  Onset Date: 3 months ago     Prior Hospitalization: see medical history Provider#: [id number]   Medications: Verified on Patient Summary List    Comorbidities: depression, arthritis, hypertension   Prior Level of Function: uses rollator during amb., independent with ADLs    Visits from Start of Care: 6    Missed Visits: 0    Reporting Period :  19 to 19    Summary of Care:  Goal: Pt will increase FOTO score by 13 points in order to facilitate improved QOL. Status at last note/certification: 13  Status at discharge: not met    Goal: Pt will increase trunk AROM: flex/ext by 25% in order to facilitate ability to perform ADLs with ease  Status at last note/certification: decreased mobility   Status at discharge: not met    Goal: Pt will increase hip mmt ABD: 3/5 in order to facilitate ease of ambulation. Status at last note/certification: left: 3+/5 right: 2+/5  Status at discharge: not met    Goal: Pt will report 5/10 pain during daily activities in order to facilitate ability to perform ADLs with ease  Status at last note/certification: 3-0/74  Status at discharge: not met    Pt. Did not return to PT after last progress note so will be D/C at this time. Goals were unable to be re-assessed secondary to unplanned D/C. Per last progress note \":pt making very limited progress, min improvement with AROM and min improvement of pain with trunk flexion but no significant, long term pain relief. Pt cont to present with poor strength and tolerance with therex, partially due to comorbidity.  Performed trial of ESTIM today and pt was very pleased with 0/10 pain level afterwards; educated pt on discuss pain management and TENs unit with MD. Pt is also scheduled for injection/nerve block next week\"    ASSESSMENT/RECOMMENDATIONS:  [x]Discontinue therapy: []Patient has reached or is progressing toward set goals      [x]Patient is non-compliant or has abdicated      []Due to lack of appreciable progress towards set goals    Matteo Velasquez, PT 8/12/2019 11:33 AM

## 2019-09-05 DIAGNOSIS — M54.16 LUMBAR RADICULOPATHY: ICD-10-CM

## 2019-09-05 RX ORDER — GABAPENTIN 100 MG/1
CAPSULE ORAL
Qty: 180 CAP | Refills: 1 | Status: SHIPPED | OUTPATIENT
Start: 2019-09-05 | End: 2020-03-12 | Stop reason: SDUPTHER

## 2019-09-05 NOTE — TELEPHONE ENCOUNTER
Patient called, verified , informed that Gabapentin Rx is available for  at the [facility]. Reminded patient to bring photo ID when picking up Rx. Patient verbalized understanding. No further action required at this time.

## 2019-09-05 NOTE — TELEPHONE ENCOUNTER
Last Visit: 6/17/19 with MD Waqas Dixon  Next Appointment: 10/7/19 with MD Waqas Dixon  Previous Refill Encounter(s): 6/17/19 #180 with 1 refill    Requested Prescriptions     Pending Prescriptions Disp Refills    gabapentin (NEURONTIN) 100 mg capsule 180 Cap 1     Sig: Take 2-3 po in the morning and 2-3 po in the evening as directed

## 2019-10-07 ENCOUNTER — OFFICE VISIT (OUTPATIENT)
Dept: ORTHOPEDIC SURGERY | Age: 74
End: 2019-10-07

## 2019-10-07 VITALS
BODY MASS INDEX: 53.47 KG/M2 | OXYGEN SATURATION: 96 % | HEART RATE: 77 BPM | RESPIRATION RATE: 24 BRPM | SYSTOLIC BLOOD PRESSURE: 148 MMHG | DIASTOLIC BLOOD PRESSURE: 90 MMHG | WEIGHT: 283.2 LBS | TEMPERATURE: 97.4 F | HEIGHT: 61 IN

## 2019-10-07 DIAGNOSIS — R26.9 GAIT ABNORMALITY: ICD-10-CM

## 2019-10-07 DIAGNOSIS — M48.062 SPINAL STENOSIS OF LUMBAR REGION WITH NEUROGENIC CLAUDICATION: Primary | ICD-10-CM

## 2019-10-07 DIAGNOSIS — E66.01 MORBID OBESITY WITH BMI OF 50.0-59.9, ADULT (HCC): ICD-10-CM

## 2019-10-07 DIAGNOSIS — R29.898 LEFT LEG WEAKNESS: ICD-10-CM

## 2019-10-07 DIAGNOSIS — M51.26 HNP (HERNIATED NUCLEUS PULPOSUS), LUMBAR: ICD-10-CM

## 2019-10-07 RX ORDER — GABAPENTIN 300 MG/1
CAPSULE ORAL
Qty: 270 CAP | Refills: 1 | Status: SHIPPED | OUTPATIENT
Start: 2019-10-07 | End: 2020-04-15 | Stop reason: SDUPTHER

## 2019-10-07 NOTE — LETTER
10/9/19 Patient: Vickie eBrry YOB: 1945 Date of Visit: 10/7/2019 Margie Dumont, [address] [facility] [address] VIA Facsimile: [phone] Dear LEVI Lewis, Thank you for referring Ms. Jillian Arias to [facility] for evaluation. My notes for this consultation are attached. If you have questions, please do not hesitate to call me. I look forward to following your patient along with you. Sincerely, Jah Khalil MD

## 2019-10-07 NOTE — PROGRESS NOTES
[facility]  [address] [place], [address]  Phone: [phone]  Fax: [phone]    Pt's YOB: 1945    ASSESSMENT   Diagnoses and all orders for this visit:    1. Spinal stenosis of lumbar region with neurogenic claudication  -     REFERRAL TO SPINE SURGERY  -     gabapentin (NEURONTIN) 300 mg capsule; Take 1 cap by mouth in the morning and 1-2 at night as directed. 2. Left leg weakness    3. HNP (herniated nucleus pulposus), lumbar  -     REFERRAL TO SPINE SURGERY    4. Gait abnormality    5. Morbid obesity with BMI of 50.0-59.9, adult [facility])         IMPRESSION AND PLAN:  Ino Da Silva is a 75 y.o.  female with history of lumbar pain. She notes temporary relief of 3 weeks with left L4-5 SNRB. She notes increased left leg pain. Pt has been prescribed Neurontin 300 mg to 3 tabs TID and it has been effective. 1) Pt was given information on herniated disc exercises. 2) She received a refill Neurontin 300 mg and will increase dosage to 1 cap QAM and 1-2 at QHS. 3) She was referred to Dr. Mally Arriola for surgical evaluations due to increased leg pain radiating from back and leg weaknes  4) Ms. Claude Lopez has a reminder for a \"due or due soon\" health maintenance. I have asked that she contact her primary care provider, LEVI Miranda, for follow-up on this health maintenance. 5)  demonstrated consistency with prescribing. 6) Weight loss recommended     Follow-up and Dispositions    · Return in about 1 week (around 10/14/2019) for Dr Mally Arriola for surgical evaluation. HISTORY OF PRESENT ILLNESS:  Ino Da Silva is a 75 y.o.  female with history of lumbar pain and presents to the office today for  follow up. Pt had to reschedule her last appointment due to her daughter not being able to come with her. She notes temporary relief of 3 weeks with left L4-5 SNRB. She notes increased left leg pain.  She denies any symptoms on the right. Pt is worse with prolonged standing. Pt is better with sitting. Pt has been prescribed Neurontin 300 mg to 3 tabs TID and it has been effective. Pt at this time desires to  continue with current care. She states she just wants her leg to get better -- \"I just need some relief\".     Pain Scale: 8/10    PCP: LEVI Campuzano     Past Medical History:   Diagnosis Date    Arthritis     Hypertension     Indigestion     Lupus ([facility] [address]     Memory difficulty     Ringing of ears     sometimes        Social History     Socioeconomic History    Marital status:      Spouse name: Not on file    Number of children: Not on file    Years of education: Not on file    Highest education level: Not on file   Occupational History    Not on file   Social Needs    Financial resource strain: Not on file    Food insecurity:     Worry: Not on file     Inability: Not on file    Transportation needs:     Medical: Not on file     Non-medical: Not on file   Tobacco Use    Smoking status: Never Smoker    Smokeless tobacco: Never Used   Substance and Sexual Activity    Alcohol use: No    Drug use: Not on file    Sexual activity: Never   Lifestyle    Physical activity:     Days per week: Not on file     Minutes per session: Not on file    Stress: Not on file   Relationships    Social connections:     Talks on phone: Not on file     Gets together: Not on file     Attends Anabaptist service: Not on file     Active member of club or organization: Not on file     Attends meetings of clubs or organizations: Not on file     Relationship status: Not on file    Intimate partner violence:     Fear of current or ex partner: Not on file     Emotionally abused: Not on file     Physically abused: Not on file     Forced sexual activity: Not on file   Other Topics Concern   [address] Service Not Asked    Blood Transfusions Not Asked    Caffeine Concern Not Asked    Occupational Exposure Not Asked   Lavenia Levels Hazards Not Asked  Sleep Concern Not Asked    Stress Concern Not Asked    Weight Concern Not Asked    Special Diet Not Asked    Back Care Not Asked    Exercise Not Asked    Bike Helmet Not Asked   [address] Not Asked    Self-Exams Not Asked   Social History Narrative    Not on file       Current Outpatient Medications   Medication Sig Dispense Refill    gabapentin (NEURONTIN) 300 mg capsule Take 1 cap by mouth in the morning and 1-2 at night as directed. 270 Cap 1    gabapentin (NEURONTIN) 100 mg capsule Take 2-3 po in the morning and 2-3 po in the evening as directed 180 Cap 1    ibuprofen (MOTRIN) 800 mg tablet Take 1 Tab by mouth two (2) times daily as needed. 3    aspirin delayed-release 81 mg tablet Take  by mouth daily.  oxyCODONE-acetaminophen (PERCOCET) 5-325 mg per tablet Take 1 tablet by mouth every four (4) hours as needed for Pain for up to 20 doses. 20 tablet 0    nortriptyline (PAMELOR) 25 mg capsule Take 1 Cap by mouth two (2) times a day. 60 Cap 6    sertraline (ZOLOFT) 50 mg tablet Take 50 mg by mouth three (3) times daily.  hydroxychloroquine (PLAQUENIL) 200 mg tablet Take 200 mg by mouth daily.  benazepril-hydrochlorothiazide (LOTENSIN HCT) 20-25 mg per tablet Take  by mouth daily.  predniSONE (DELTASONE) 5 mg tablet 1 po daily for 15 days then 1/2 tablet daily till completed 35 Tab 0    diflunisal (DOLOBID) 500 mg tab Take 500 mg by mouth every twelve (12) hours as needed.  predniSONE (DELTASONE) 5 mg tablet Take 1 Tab by mouth daily. 21 Tab 0    diazePAM (VALIUM) 5 mg tablet Take 1 tablet 30 minutes before diagnostic test - may repeat x 1  Indications: inducing of a relaxed easy state (Patient not taking: Reported on 10/7/2019) 2 Tab 0    LORazepam (ATIVAN) 0.5 mg tablet Take 0.5 mg by mouth.  celecoxib (CELEBREX) 200 mg capsule Take  by mouth two (2) times a day.  esomeprazole (NEXIUM) 40 mg capsule Take  by mouth daily.       ZOLPIDEM TARTRATE (AMBIEN PO) Take  by mouth. Allergies   Allergen Reactions    Morphine Anxiety         REVIEW OF SYSTEMS    Constitutional: Negative for fever, chills, or weight change. Respiratory: Negative for cough or shortness of breath. Cardiovascular: Negative for chest pain or palpitations. Gastrointestinal: Negative for acid reflux, change in bowel habits, or constipation. Genitourinary: Negative for dysuria and flank pain. Musculoskeletal: Positive for lumbar and leg pain. Skin: Negative for rash. Neurological: Negative for headaches, dizziness, or numbness. Endo/Heme/Allergies: Negative for increased bruising. Psychiatric/Behavioral: Negative for difficulty with sleep. PHYSICAL EXAMINATION  Visit Vitals  /90   Pulse 77   Temp 97.4 °F (36.3 °C) (Oral)   Resp 24   Ht 5' 1\" (1.549 m)   Wt 283 lb 3.2 oz (128.5 kg)   SpO2 96%   BMI 53.51 kg/m²       Constitutional: Awake, alert, and in no acute distress. Neurological: 1+ symmetrical DTRs in the upper extremities. 1+ symmetrical DTRs in the lower extremities. Sensation to light touch is intact. Negative Jason's sign bilaterally. Skin: warm, dry, and intact. Musculoskeletal: Tenderness to palpation  Mild pain with extension, axial loading, or forward flexion. No pain with internal or external rotation of her hips. Negative straight leg raise bilaterally. Hip Flex  Quads Hamstrings Ankle DF EHL Ankle PF   Right +4/5 +4/5 +4/5 +4/5 +4/5 +4/5   Left +4/5 +4/5 +4/5 +4/5 +4/5 +4/5     Patient ambulates with the assistance of a rolling walker with a seat. IMAGING:    Lumbar spine MRI from 05/07/2019 was personally reviewed with the patient and demonstrated:          Results from [facility] on 05/07/19   MRI LUMB SPINE WO CONT     Narrative EXAM: Lumbar MRI without contrast     CLINICAL INDICATION: Degenerative disc disease but no stenosis. Neurogenic  claudication. Facet arthropathy. Gait abnormality.  Leg weakness in the L5  distribution.     TECHNIQUE: MRI of the lumbar spine without contrast obtained. Multiplanar  multisequence MR images of the lumbar spine obtained.     IV Contrast: None     COMPARISON: CT abdomen pelvis dated 10/6/2009     FINDINGS: All numbering assumes 5 lumbar type vertebrae. The alignment  demonstrates straightening of the lumbar lordosis. The marrow signal  demonstrates several vertebral body hemangiomas. There is moderate type II and  type III endplate changes at O8-R1 with Modic type II endplate changes at A8-N2  and L5-S1. The cord terminates at L1.  No cord signal abnormalities appreciated.     The abdominal aorta is without evidence of aneurysm. No paraaortic adenopathy  appreciated. Numerous cystic changes are noted in the left kidney which appear  progressed since prior CT. Parapelvic cysts and cystic changes in the right  kidney are suggested which also appear progressed since prior imaging.     L1-L2 level: Facet hypertrophy and mild broad-based disc bulge is noted. No  significant canal or neural foraminal stenosis.     L2-L3: Facet hypertrophy and mild broad-based disc bulge. Mild canal and mild  bilateral neural foraminal narrowing are noted.     L3-L4: Facet hypertrophy with broad-based disc bulge and ligamentum flavum  buckling. Mild to moderate canal narrowing is noted. Moderate bilateral neural  foraminal narrowing with possible abutment of the exiting nerve roots.     L4-L5: Facet hypertrophy and ligament flavum buckling are present. There is a  large disc extrusion which extends above and below the disc level. It extends 6  mm below the disc level and 3 mm above the disc level. There is complete  effacement of the canal. Severe left and moderate to severe right neural  foraminal narrowing are noted.     L5-S1: Broad-based disc osteophyte formation and central disc protrusion are  present. Facet hypertrophy and ligament flavum buckling are present. There is  mild canal narrowing.  The facet hypertrophy and disc protrusion combine to  compress the descending S1 nerve roots in the lateral recesses. Severe bilateral  neural foraminal narrowing as result of disc osteophyte formation and facet  hypertrophy.        Impression Impression:  1.  Multilevel degenerative disc disease and facet arthropathy.      2.   Severe canal stenosis at L4-L5 with severe left and moderate to severe  right neural foraminal narrowing      3.  Likely compression of the descending S1 nerve roots at L5-S1 by disc  protrusion combined with facet arthropathy with severe bilateral neural  foraminal narrowing.      4.   Cystic changes in the kidneys which appear progressed since prior imaging. May consider correlation with ultrasound.            Left knee x-rays from 03/07/2019 were personally reviewed with the patient and demonstrated:          Results from Hospital Encounter on 03/07/19   XR KNEE LT MAX 2 VWS     Narrative EXAM: Left knee X-Ray     INDICATION: Left knee pain     TECHNIQUE: 2 views of the left knee     COMPARISON: 9/13/2010, 12/18/2009 and 2/10/2007     FINDINGS:   A left total knee arthroplasty is present. No perihardware fracture or  perihardware lucency appreciated. No dislocation present. The bone density is  grossly unremarkable. Vascular calcifications are present. The soft tissues are  unremarkable.         Impression IMPRESSION:  1.  Left total knee   arthroplasty   without complication.       Left hip x-rays from 03/07/2019 were personally reviewed and demonstrated:  FINDINGS:   The bone density is grossly unremarkable. No evidence of acute fracture of the  pelvis. Mild SI joint osteoarthritis is noted. Degenerative changes in the lower  lumbar spine are noted. The pubic symphysis is unremarkable.   No lytic or  blastic focus identified. No erosions or periostitis appreciated.      The femoral heads are well aligned with the osseous pelvis.  No evidence of left  hip fracture or dislocation.  The left hip demonstrates osteophyte formation and  subchondral sclerosis. Mild asymmetric joint space narrowing. On the AP view of  the pelvis, there is suggestion of mild to moderate degenerative changes of the  right hip.     IMPRESSION:  1.  Moderate left hip osteoarthritis and mild/moderate right hip osteoarthritis.     Retroperitoneum ultrasound from 05/22/2019 was personally reviewed and demonstrated:       Results from Hospital Encounter encounter on 05/22/19   US RETROPERITONEUM COMP     Narrative Ultrasound retroperitoneal complete     INDICATION: Renal cystic changes on MRI     COMPARISON: Recent MR lumbar spine 5/19, ultrasound 1/11     FINDINGS:     Both kidneys are echogenic. Right kidney measures 9.6 x 5.3 x 5.4 cm. Left  kidney measures 11.5 x 7.8 x 6.0 cm. No hydronephrosis or nephrolithiasis. Multiple cysts are noted bilaterally. There is a 3.5 x 1.8 x 2.5 cm cyst in the  midpole of the right kidney. 1.6 x 1.3 x 1.4 cm adjacent right renal cyst. 4.8 x  4.7 x 4.2 cm cyst within the left renal midpole. This is limited in evaluation  secondary to patient body habitus.     Underdistended bladder limits evaluation, however grossly within normal limits. No free fluid.        Impression Bilateral renal cysts. Echogenic kidneys, may be seen with chronic medical renal  disease.          Written by Lula Guillen, as dictated by Kenroy Villasenor MD.  I, Dr. Kenroy Villasenor confirm that all documentation is accurate.

## 2019-10-07 NOTE — PATIENT INSTRUCTIONS
Herniated Disc: Exercises  Introduction  Here are some examples of exercises for you to try. The exercises may be suggested for a condition or for rehabilitation. Start each exercise slowly. Ease off the exercises if you start to have pain. You will be told when to start these exercises and which ones will work best for you. How to stay safe  These exercises can help you move easier and feel better. But when you first start doing them, you may have more pain in your back. This is normal. But it is important to pay close attention to your pain during and after each exercise. · Keep doing these exercises if your pain stays the same or moves from your leg and buttock more toward the middle of your spine. Pain moving out of your leg and buttock is a good sign. · Stop doing these exercises if your pain gets worse in your leg and buttock. Stop if you start to have pain in your leg and buttock that you didn't have before. Be sure to do these exercises in the order they appear. Note how your pain changes before you move to the next one. If your pain is much worse right after exercise and stays worse the next day, do not do any of these exercises. How to do the exercises  1. Rest on belly    1. Lie on your stomach, with your head turned to the side. ? Keep your arms beside your body. ? If that position bothers your neck, place your hands, one on top of the other, underneath your forehead. This will help support your head and neck. 2. Try to relax your lower back muscles as much as you can. 3. Continue to lie on your stomach for 2 minutes. 2. Press-up back extension    1. Lie on your stomach, with your face down and your elbows tucked into your sides and under your shoulders. 2. Press your elbows down into the floor to raise your upper back. ? As you do this, relax your stomach muscles and allow your back to arch without using your back muscles. ? Let your low back relax completely as you arch up.  Don't let your hips or pelvis come off the floor. 3. Hold this position for 15 to 30 seconds. Then relax, and return to the start position. Over time, work up to staying in the press-up position for up to 2 minutes. 4. Repeat 2 to 4 times. 3. Full press-up back extension    1. Lie on your stomach with your face down, keeping your elbows tucked into your sides and under your shoulders. 2. Straighten your elbows, and push your upper body up as far as you can. Allow your lower back to sag. Keep your hips, pelvis, and legs relaxed. 3. Hold this position for 5 seconds, and then relax. 4. Repeat 10 times. Each time, try to raise your upper body a little higher and hold your arms a bit straighter. 4. Backward bend    1. Stand with your feet hip-width apart, and don't lock your knees. Your toes should be pointing forward. 2. Place your hands in the small of your back. 3. Bend backward as far as you can, keeping your knees straight. Hold this position for 2 to 3 seconds. Then return to your starting position. 4. Repeat 2 to 4 times. Each time, try to bend backward a little farther, until you bend backward as far as you can. Follow-up care is a key part of your treatment and safety. Be sure to make and go to all appointments, and call your doctor if you are having problems. It's also a good idea to know your test results and keep a list of the medicines you take. Where can you learn more? Go to http://tanna-rasheeda.info/. Enter [phone] in the search box to learn more about \"Herniated Disc: Exercises. \"  Current as of: June 26, 2019  Content Version: 12.2  © 8802-2366 [employer]. Care instructions adapted under license by [employer] (which disclaims liability or warranty for this information).  If you have questions about a medical condition or this instruction, always ask your healthcare professional. Yamileth Simmons disclaims any warranty or liability for your use of this information.

## 2019-11-01 ENCOUNTER — OFFICE VISIT (OUTPATIENT)
Dept: ORTHOPEDIC SURGERY | Age: 74
End: 2019-11-01

## 2019-11-01 VITALS
HEIGHT: 61 IN | SYSTOLIC BLOOD PRESSURE: 186 MMHG | HEART RATE: 89 BPM | BODY MASS INDEX: 52.11 KG/M2 | RESPIRATION RATE: 16 BRPM | WEIGHT: 276 LBS | OXYGEN SATURATION: 95 % | DIASTOLIC BLOOD PRESSURE: 75 MMHG

## 2019-11-01 DIAGNOSIS — M48.062 SPINAL STENOSIS OF LUMBAR REGION WITH NEUROGENIC CLAUDICATION: Primary | ICD-10-CM

## 2019-11-01 DIAGNOSIS — M51.26 HNP (HERNIATED NUCLEUS PULPOSUS), LUMBAR: ICD-10-CM

## 2019-11-01 NOTE — PROGRESS NOTES
[address]  [address], [address]  [place], [address] [place] [address] Street  Phone: [phone]  Fax: [phone]  INITIAL CONSULTATION  Patient: Jm Aguilar                MRN: [id number]       SSN: [id number]  YOB: 1945        AGE: 75 y.o. SEX: female  Body mass index is 52.15 kg/m². PCP: LEVI Bailon  11/01/19    Chief Complaint   Patient presents with    Back Pain     back pain         HISTORY OF PRESENT ILLNESS, RADIOGRAPHS, and PLAN:         HISTORY OF PRESENT ILLNESS:  Ms. Terrance Raya is seen today at the request of Dr. Jefe Lam. Ms. Terrance Raya is a 79-year-old female with a history of morbid obesity with a BMI of 50+, lupus, and progressive back and left greater than right leg pain. She finds it is hard to ambulate with the progressive numbness and weakness in her legs. She denies bowel or bladder dysfunction. The MRI demonstrates spinal stenosis at L4-5 with a left paracentral massive disc herniation causing canal obliteration. She was referred by Dr. Jefe Lam with progressive weakness having failed medications. She had only temporary relief from injections. She cannot stand the pain. PHYSICAL EXAMINATION:  She has good strength of her EHL, tib/ant, hamstrings, and quadriceps. She does not note any bowel or bladder dysfunction. RADIOGRAPHS:  MRI demonstrates severe stenosis, left greater than right and a large central disc herniation, as well as facet and ligamentous hypertrophy. She is morbid obesity with central obesity, large girth, and short in stature. ASSESSMENT/PLAN: I discussed the matter at length with her. It is technically hard to operate on her just from her habitus. The incision will be probably at least 10-12 centimeters deep before we got to her spine. It is hard to balance her on an operating room table, but surgery would be a laminectomy and decompression.   The goal would be to try to provide her some relief. I discussed with her the risks, benefits, complications, and alternatives to surgery, and she wishes to proceed. We will proceed with surgical scheduling once the appropriate approvals and clearances take place. cc: ARELI Gilliam Past Medical History:   Diagnosis Date    Arthritis     Hypertension     Indigestion     Lupus ([address]     Memory difficulty     Ringing of ears     sometimes       Family History   Problem Relation Age of Onset    Stroke Father     Cancer Father         prastate        Current Outpatient Medications   Medication Sig Dispense Refill    gabapentin (NEURONTIN) 300 mg capsule Take 1 cap by mouth in the morning and 1-2 at night as directed. 270 Cap 1    gabapentin (NEURONTIN) 100 mg capsule Take 2-3 po in the morning and 2-3 po in the evening as directed 180 Cap 1    predniSONE (DELTASONE) 5 mg tablet 1 po daily for 15 days then 1/2 tablet daily till completed 35 Tab 0    diflunisal (DOLOBID) 500 mg tab Take 500 mg by mouth every twelve (12) hours as needed.  ibuprofen (MOTRIN) 800 mg tablet Take 1 Tab by mouth two (2) times daily as needed. 3    aspirin delayed-release 81 mg tablet Take  by mouth daily.  predniSONE (DELTASONE) 5 mg tablet Take 1 Tab by mouth daily. 21 Tab 0    diazePAM (VALIUM) 5 mg tablet Take 1 tablet 30 minutes before diagnostic test - may repeat x 1  Indications: inducing of a relaxed easy state 2 Tab 0    LORazepam (ATIVAN) 0.5 mg tablet Take 0.5 mg by mouth.  oxyCODONE-acetaminophen (PERCOCET) 5-325 mg per tablet Take 1 tablet by mouth every four (4) hours as needed for Pain for up to 20 doses. 20 tablet 0    nortriptyline (PAMELOR) 25 mg capsule Take 1 Cap by mouth two (2) times a day. 60 Cap 6    celecoxib (CELEBREX) 200 mg capsule Take  by mouth two (2) times a day.  sertraline (ZOLOFT) 50 mg tablet Take 50 mg by mouth three (3) times daily.       esomeprazole (NEXIUM) 40 mg capsule Take  by mouth daily.      hydroxychloroquine (PLAQUENIL) 200 mg tablet Take 200 mg by mouth daily.  benazepril-hydrochlorothiazide (LOTENSIN HCT) 20-25 mg per tablet Take  by mouth daily.  ZOLPIDEM TARTRATE (AMBIEN PO) Take  by mouth.          Allergies   Allergen Reactions    Morphine Anxiety       Past Surgical History:   Procedure Laterality Date    HX CHOLECYSTECTOMY  1988    HX ORTHOPAEDIC  April 09'    right knee replacement     HX ORTHOPAEDIC  Sept 09'    left knee replacement    REMOVAL GALLBLADDER         Past Medical History:   Diagnosis Date    Arthritis     Hypertension     Indigestion     [facility] [address]     Memory difficulty     Ringing of ears     sometimes       Social History     Socioeconomic History    Marital status:      Spouse name: Not on file    Number of children: Not on file    Years of education: Not on file    Highest education level: Not on file   Occupational History    Not on file   Social Needs    Financial resource strain: Not on file    Food insecurity:     Worry: Not on file     Inability: Not on file    Transportation needs:     Medical: Not on file     Non-medical: Not on file   Tobacco Use    Smoking status: Never Smoker    Smokeless tobacco: Never Used   Substance and Sexual Activity    Alcohol use: No    Drug use: Not on file    Sexual activity: Never   Lifestyle    Physical activity:     Days per week: Not on file     Minutes per session: Not on file    Stress: Not on file   Relationships    Social connections:     Talks on phone: Not on file     Gets together: Not on file     Attends [employer] service: Not on file     Active member of club or organization: Not on file     Attends meetings of clubs or organizations: Not on file     Relationship status: Not on file    Intimate partner violence:     Fear of current or ex partner: Not on file     Emotionally abused: Not on file     Physically abused: Not on file     Forced sexual activity: Not on file Other Topics Concern     Service Not Asked    Blood Transfusions Not Asked    Caffeine Concern Not Asked    Occupational Exposure Not Asked    Hobby Hazards Not Asked    Sleep Concern Not Asked    Stress Concern Not Asked    Weight Concern Not Asked    Special Diet Not Asked    Back Care Not Asked    Exercise Not Asked    Bike Helmet Not Asked   [address] Not Asked    Self-Exams Not Asked   Social History Narrative    Not on file           REVIEW OF SYSTEMS:   CONSTITUTIONAL SYMPTOMS:  Negative. EYES:  Negative. EARS, NOSE, THROAT AND MOUTH:  Negative. CARDIOVASCULAR:  Negative. RESPIRATORY:  Negative. GENITOURINARY: Per HPI. GASTROINTESTINAL:  Per HPI. INTEGUMENTARY (SKIN AND/OR BREAST):  Negative. MUSCULOSKELETAL: Per HPI.   ENDOCRINE/RHEUMATOLOGIC:  Negative. NEUROLOGICAL:  Per HPI. HEMATOLOGIC/LYMPHATIC:  Negative. ALLERGIC/IMMUNOLOGIC:  Negative. PSYCHIATRIC:  Negative. PHYSICAL EXAMINATION:   Visit Vitals  /75   Pulse 89   Resp 16   Ht 5' 1\" (1.549 m)   Wt 276 lb (125.2 kg)   SpO2 95%   BMI 52.15 kg/m²    PAIN SCALE: 9/10    CONSTITUTIONAL: The patient is in no apparent distress and is alert and oriented x 3. HEENT: Normocephalic. Hearing grossly intact. NECK: Supple and symmetric. no tenderness, or masses were felt. RESPIRATORY: No labored breathing. CARDIOVASCULAR: The carotid pulses were normal. Peripheral pulses were 2+. CHEST: Normal AP diameter and normal contour without any kyphoscoliosis. LYMPHATIC: No lymphadenopathy was appreciated in the neck, axillae or groin. SKIN:  Negative for scars, rashes, lesions, or ulcers on the right upper, right lower, left upper, left lower and trunk. NEUROLOGICAL: Alert and oriented x 3. Ambulation with walker. FWB. EXTREMITIES:  See musculoskeletal.  MUSCULOSKELETAL:   Head and Neck:  Negative for misalignment, asymmetry, crepitation, defects, tenderness masses or effusions.     Left Upper Extremity: Inspection, percussion and palpation performed. Jasons sign is negative.  Right Upper Extremity: Inspection, percussion and palpation performed. Jasons sign is negative.  Spine, Ribs and Pelvis: Back pain. Inspection, percussion and palpation performed. Negative for misalignment, asymmetry, crepitation, defects, tenderness masses or effusions.  Left Lower Extremity: Pain, L>R. Numbness in toes. Inspection, percussion and palpation performed. Negative straight leg raise.  Right Lower Extremity: Pain. Numbness in toes. Inspection, percussion and palpation performed. Negative straight leg raise. SPINE EXAM:     Cervical spine: Neck is midline. Normal muscle tone. No focal atrophy is noted. Lumbar spine: No rash, ecchymosis, or gross obliquity. No focal atrophy is noted. ASSESSMENT    ICD-10-CM ICD-9-CM    1. Spinal stenosis of lumbar region with neurogenic claudication M48.062 724.03    2. HNP (herniated nucleus pulposus), lumbar M51.26 722.10        Written by Sam Florez, as dictated by Tyesha Richmond MD.    I, Dr. Tyesha Richmond MD, confirm that all documentation is accurate.

## 2019-11-02 ENCOUNTER — HOSPITAL ENCOUNTER (OUTPATIENT)
Dept: LAB | Age: 74
Discharge: HOME | End: 2019-11-02
Payer: MEDICARE

## 2019-11-02 DIAGNOSIS — E55.9 VITAMIN D DEFICIENCY: ICD-10-CM

## 2019-11-02 DIAGNOSIS — I10 ESSENTIAL HYPERTENSION, MALIGNANT: ICD-10-CM

## 2019-11-02 LAB
25(OH)D3 SERPL-MCNC: 25.4 NG/ML (ref 30–100)
ALBUMIN SERPL-MCNC: 3.3 G/DL (ref 3.4–5)
ALBUMIN/GLOB SERPL: 1.2 {RATIO} (ref 0.8–1.7)
ALP SERPL-CCNC: 69 U/L (ref 45–117)
ALT SERPL-CCNC: 16 U/L (ref 13–56)
ANION GAP SERPL CALC-SCNC: 5 MMOL/L (ref 3–18)
AST SERPL-CCNC: 16 U/L (ref 10–38)
BASOPHILS # BLD: 0 K/UL (ref 0–0.1)
BASOPHILS NFR BLD: 0 % (ref 0–2)
BILIRUB SERPL-MCNC: 0.6 MG/DL (ref 0.2–1)
BUN SERPL-MCNC: 28 MG/DL (ref 7–18)
BUN/CREAT SERPL: 70 (ref 12–20)
CALCIUM SERPL-MCNC: 9 MG/DL (ref 8.5–10.1)
CHLORIDE SERPL-SCNC: 106 MMOL/L (ref 100–111)
CHOLEST SERPL-MCNC: 165 MG/DL
CO2 SERPL-SCNC: 34 MMOL/L (ref 21–32)
CREAT SERPL-MCNC: 0.4 MG/DL (ref 0.6–1.3)
DIFFERENTIAL METHOD BLD: ABNORMAL
EOSINOPHIL # BLD: 0.5 K/UL (ref 0–0.4)
EOSINOPHIL NFR BLD: 6 % (ref 0–5)
ERYTHROCYTE [DISTWIDTH] IN BLOOD BY AUTOMATED COUNT: 13.5 % (ref 11.6–14.5)
GLOBULIN SER CALC-MCNC: 2.7 G/DL (ref 2–4)
GLUCOSE SERPL-MCNC: 88 MG/DL (ref 74–99)
HCT VFR BLD AUTO: 40.3 % (ref 35–45)
HDLC SERPL-MCNC: 82 MG/DL (ref 40–60)
HDLC SERPL: 2 {RATIO} (ref 0–5)
HGB BLD-MCNC: 13.1 G/DL (ref 12–16)
LDLC SERPL CALC-MCNC: 71 MG/DL (ref 0–100)
LIPID PROFILE,FLP: ABNORMAL
LYMPHOCYTES # BLD: 1.4 K/UL (ref 0.9–3.6)
LYMPHOCYTES NFR BLD: 17 % (ref 21–52)
MCH RBC QN AUTO: 29.4 PG (ref 24–34)
MCHC RBC AUTO-ENTMCNC: 32.5 G/DL (ref 31–37)
MCV RBC AUTO: 90.4 FL (ref 74–97)
MONOCYTES # BLD: 0.8 K/UL (ref 0.05–1.2)
MONOCYTES NFR BLD: 9 % (ref 3–10)
NEUTS SEG # BLD: 5.4 K/UL (ref 1.8–8)
NEUTS SEG NFR BLD: 68 % (ref 40–73)
PLATELET # BLD AUTO: 182 K/UL (ref 135–420)
PMV BLD AUTO: 10.4 FL (ref 9.2–11.8)
POTASSIUM SERPL-SCNC: 3.3 MMOL/L (ref 3.5–5.5)
PROT SERPL-MCNC: 6 G/DL (ref 6.4–8.2)
RBC # BLD AUTO: 4.46 M/UL (ref 4.2–5.3)
SODIUM SERPL-SCNC: 145 MMOL/L (ref 136–145)
TRIGL SERPL-MCNC: 60 MG/DL (ref ?–150)
VLDLC SERPL CALC-MCNC: 12 MG/DL
WBC # BLD AUTO: 8.1 K/UL (ref 4.6–13.2)

## 2019-11-02 PROCEDURE — 36415 COLL VENOUS BLD VENIPUNCTURE: CPT

## 2019-11-02 PROCEDURE — 82306 VITAMIN D 25 HYDROXY: CPT

## 2019-11-02 PROCEDURE — 80053 COMPREHEN METABOLIC PANEL: CPT

## 2019-11-02 PROCEDURE — 85025 COMPLETE CBC W/AUTO DIFF WBC: CPT

## 2019-11-02 PROCEDURE — 80061 LIPID PANEL: CPT

## 2019-11-05 ENCOUNTER — TELEPHONE (OUTPATIENT)
Dept: ORTHOPEDIC SURGERY | Age: 74
End: 2019-11-05

## 2019-11-05 NOTE — TELEPHONE ENCOUNTER
Regarding RX:   gabapentin (NEURONTIN) 300 mg capsule      gabapentin (NEURONTIN) 100 mg capsule       Preferred Pharmacy:   [address] [place] [address]. Pharmacy called needing clarification on the gabapentin refills. She advised patient just filled her 100mg RX written by [facility] and then dropped off the 300mg RX written by [facility] to be filled today. Pharmacy advised the RX's have different instructions. Does [facility] want the 300mg filled too?     Pharmacy can be reached at: [phone]

## 2019-12-23 ENCOUNTER — HOSPITAL ENCOUNTER (OUTPATIENT)
Dept: PREADMISSION TESTING | Age: 74
Discharge: HOME | End: 2019-12-23
Payer: MEDICARE

## 2019-12-23 ENCOUNTER — HOSPITAL ENCOUNTER (OUTPATIENT)
Dept: GENERAL RADIOLOGY | Age: 74
Discharge: HOME | End: 2019-12-23
Payer: MEDICARE

## 2019-12-23 DIAGNOSIS — Z01.818 PRE-OP TESTING: ICD-10-CM

## 2019-12-23 LAB
ALBUMIN SERPL-MCNC: 3.2 G/DL (ref 3.4–5)
ALBUMIN/GLOB SERPL: 1.1 {RATIO} (ref 0.8–1.7)
ALP SERPL-CCNC: 146 U/L (ref 45–117)
ALT SERPL-CCNC: 92 U/L (ref 13–56)
ANION GAP SERPL CALC-SCNC: 2 MMOL/L (ref 3–18)
AST SERPL-CCNC: 137 U/L (ref 10–38)
BILIRUB SERPL-MCNC: 0.5 MG/DL (ref 0.2–1)
BUN SERPL-MCNC: 27 MG/DL (ref 7–18)
BUN/CREAT SERPL: 66 (ref 12–20)
CALCIUM SERPL-MCNC: 9.4 MG/DL (ref 8.5–10.1)
CHLORIDE SERPL-SCNC: 104 MMOL/L (ref 100–111)
CO2 SERPL-SCNC: 37 MMOL/L (ref 21–32)
CREAT SERPL-MCNC: 0.41 MG/DL (ref 0.6–1.3)
ERYTHROCYTE [DISTWIDTH] IN BLOOD BY AUTOMATED COUNT: 13.6 % (ref 11.6–14.5)
GLOBULIN SER CALC-MCNC: 2.8 G/DL (ref 2–4)
GLUCOSE SERPL-MCNC: 85 MG/DL (ref 74–99)
HCT VFR BLD AUTO: 42.9 % (ref 35–45)
HGB BLD-MCNC: 13.8 G/DL (ref 12–16)
MCH RBC QN AUTO: 29.2 PG (ref 24–34)
MCHC RBC AUTO-ENTMCNC: 32.2 G/DL (ref 31–37)
MCV RBC AUTO: 90.9 FL (ref 74–97)
PLATELET # BLD AUTO: 199 K/UL (ref 135–420)
PMV BLD AUTO: 11.4 FL (ref 9.2–11.8)
POTASSIUM SERPL-SCNC: 3.6 MMOL/L (ref 3.5–5.5)
PROT SERPL-MCNC: 6 G/DL (ref 6.4–8.2)
RBC # BLD AUTO: 4.72 M/UL (ref 4.2–5.3)
SODIUM SERPL-SCNC: 143 MMOL/L (ref 136–145)
WBC # BLD AUTO: 6.8 K/UL (ref 4.6–13.2)

## 2019-12-23 PROCEDURE — 93005 ELECTROCARDIOGRAM TRACING: CPT

## 2019-12-23 PROCEDURE — 36415 COLL VENOUS BLD VENIPUNCTURE: CPT

## 2019-12-23 PROCEDURE — 71046 X-RAY EXAM CHEST 2 VIEWS: CPT

## 2019-12-23 PROCEDURE — 80053 COMPREHEN METABOLIC PANEL: CPT

## 2019-12-23 PROCEDURE — 85027 COMPLETE CBC AUTOMATED: CPT

## 2019-12-24 LAB
ATRIAL RATE: 68 BPM
CALCULATED P AXIS, ECG09: 35 DEGREES
CALCULATED R AXIS, ECG10: -5 DEGREES
CALCULATED T AXIS, ECG11: -11 DEGREES
DIAGNOSIS, 93000: NORMAL
P-R INTERVAL, ECG05: 196 MS
Q-T INTERVAL, ECG07: 428 MS
QRS DURATION, ECG06: 86 MS
QTC CALCULATION (BEZET), ECG08: 455 MS
VENTRICULAR RATE, ECG03: 68 BPM

## 2020-01-01 ENCOUNTER — APPOINTMENT (OUTPATIENT)
Dept: GENERAL RADIOLOGY | Age: 75
End: 2020-01-01
Attending: STUDENT IN AN ORGANIZED HEALTH CARE EDUCATION/TRAINING PROGRAM
Payer: MEDICARE

## 2020-01-01 ENCOUNTER — HOSPITAL ENCOUNTER (EMERGENCY)
Age: 75
Discharge: HOME | End: 2020-01-01
Attending: EMERGENCY MEDICINE
Payer: MEDICARE

## 2020-01-01 VITALS
TEMPERATURE: 97.3 F | SYSTOLIC BLOOD PRESSURE: 146 MMHG | RESPIRATION RATE: 19 BRPM | HEART RATE: 80 BPM | OXYGEN SATURATION: 95 % | DIASTOLIC BLOOD PRESSURE: 68 MMHG

## 2020-01-01 DIAGNOSIS — M32.9 LUPUS (HCC): ICD-10-CM

## 2020-01-01 DIAGNOSIS — J20.9 ACUTE BRONCHITIS, UNSPECIFIED ORGANISM: Primary | ICD-10-CM

## 2020-01-01 LAB
ALBUMIN SERPL-MCNC: 2.9 G/DL (ref 3.4–5)
ALBUMIN/GLOB SERPL: 0.9 {RATIO} (ref 0.8–1.7)
ALP SERPL-CCNC: 350 U/L (ref 45–117)
ALT SERPL-CCNC: 179 U/L (ref 13–56)
ANION GAP SERPL CALC-SCNC: 3 MMOL/L (ref 3–18)
AST SERPL-CCNC: 67 U/L (ref 10–38)
BASOPHILS # BLD: 0 K/UL (ref 0–0.1)
BASOPHILS NFR BLD: 0 % (ref 0–2)
BILIRUB SERPL-MCNC: 0.4 MG/DL (ref 0.2–1)
BNP SERPL-MCNC: 156 PG/ML (ref 0–1800)
BUN SERPL-MCNC: 29 MG/DL (ref 7–18)
BUN/CREAT SERPL: 73 (ref 12–20)
CALCIUM SERPL-MCNC: 9.1 MG/DL (ref 8.5–10.1)
CHLORIDE SERPL-SCNC: 107 MMOL/L (ref 100–111)
CO2 SERPL-SCNC: 34 MMOL/L (ref 21–32)
CREAT SERPL-MCNC: 0.4 MG/DL (ref 0.6–1.3)
DIFFERENTIAL METHOD BLD: ABNORMAL
EOSINOPHIL # BLD: 0.6 K/UL (ref 0–0.4)
EOSINOPHIL NFR BLD: 8 % (ref 0–5)
ERYTHROCYTE [DISTWIDTH] IN BLOOD BY AUTOMATED COUNT: 14.4 % (ref 11.6–14.5)
GLOBULIN SER CALC-MCNC: 3.2 G/DL (ref 2–4)
GLUCOSE SERPL-MCNC: 92 MG/DL (ref 74–99)
HCT VFR BLD AUTO: 40 % (ref 35–45)
HGB BLD-MCNC: 12.9 G/DL (ref 12–16)
LYMPHOCYTES # BLD: 1.2 K/UL (ref 0.9–3.6)
LYMPHOCYTES NFR BLD: 17 % (ref 21–52)
MCH RBC QN AUTO: 29.5 PG (ref 24–34)
MCHC RBC AUTO-ENTMCNC: 32.3 G/DL (ref 31–37)
MCV RBC AUTO: 91.5 FL (ref 74–97)
MONOCYTES # BLD: 0.7 K/UL (ref 0.05–1.2)
MONOCYTES NFR BLD: 10 % (ref 3–10)
NEUTS SEG # BLD: 4.7 K/UL (ref 1.8–8)
NEUTS SEG NFR BLD: 65 % (ref 40–73)
PLATELET # BLD AUTO: 179 K/UL (ref 135–420)
PMV BLD AUTO: 10.8 FL (ref 9.2–11.8)
POTASSIUM SERPL-SCNC: 3.9 MMOL/L (ref 3.5–5.5)
PROT SERPL-MCNC: 6.1 G/DL (ref 6.4–8.2)
RBC # BLD AUTO: 4.37 M/UL (ref 4.2–5.3)
SODIUM SERPL-SCNC: 144 MMOL/L (ref 136–145)
TROPONIN I SERPL-MCNC: <0.02 NG/ML (ref 0–0.04)
WBC # BLD AUTO: 7.2 K/UL (ref 4.6–13.2)

## 2020-01-01 PROCEDURE — 93005 ELECTROCARDIOGRAM TRACING: CPT

## 2020-01-01 PROCEDURE — 71046 X-RAY EXAM CHEST 2 VIEWS: CPT

## 2020-01-01 PROCEDURE — 94640 AIRWAY INHALATION TREATMENT: CPT

## 2020-01-01 PROCEDURE — 83880 ASSAY OF NATRIURETIC PEPTIDE: CPT

## 2020-01-01 PROCEDURE — 80053 COMPREHEN METABOLIC PANEL: CPT

## 2020-01-01 PROCEDURE — 85025 COMPLETE CBC W/AUTO DIFF WBC: CPT

## 2020-01-01 PROCEDURE — 87081 CULTURE SCREEN ONLY: CPT

## 2020-01-01 PROCEDURE — 84484 ASSAY OF TROPONIN QUANT: CPT

## 2020-01-01 PROCEDURE — 74011000250 HC RX REV CODE- 250: Performed by: STUDENT IN AN ORGANIZED HEALTH CARE EDUCATION/TRAINING PROGRAM

## 2020-01-01 PROCEDURE — 99284 EMERGENCY DEPT VISIT MOD MDM: CPT

## 2020-01-01 RX ORDER — IPRATROPIUM BROMIDE AND ALBUTEROL SULFATE 2.5; .5 MG/3ML; MG/3ML
3 SOLUTION RESPIRATORY (INHALATION)
Status: DISCONTINUED | OUTPATIENT
Start: 2020-01-01 | End: 2020-01-01

## 2020-01-01 RX ORDER — DOXYCYCLINE 100 MG/1
100 CAPSULE ORAL 2 TIMES DAILY
Qty: 14 CAP | Refills: 0 | Status: SHIPPED | OUTPATIENT
Start: 2020-01-01 | End: 2020-01-08

## 2020-01-01 RX ORDER — IPRATROPIUM BROMIDE AND ALBUTEROL SULFATE 2.5; .5 MG/3ML; MG/3ML
3 SOLUTION RESPIRATORY (INHALATION) ONCE
Status: COMPLETED | OUTPATIENT
Start: 2020-01-01 | End: 2020-01-01

## 2020-01-01 RX ORDER — ALBUTEROL SULFATE 0.83 MG/ML
2.5 SOLUTION RESPIRATORY (INHALATION)
Qty: 30 NEBULE | Refills: 0 | Status: SHIPPED | OUTPATIENT
Start: 2020-01-01 | End: 2020-01-06

## 2020-01-01 RX ADMIN — IPRATROPIUM BROMIDE AND ALBUTEROL SULFATE 3 ML: .5; 3 SOLUTION RESPIRATORY (INHALATION) at 16:24

## 2020-01-01 RX ADMIN — IPRATROPIUM BROMIDE AND ALBUTEROL SULFATE 3 ML: .5; 3 SOLUTION RESPIRATORY (INHALATION) at 18:56

## 2020-01-01 NOTE — ED TRIAGE NOTES
Pt arrived through triage with c/o cough and SOB x 1 month. Pt was seen by PCP and given antibiotics, but pt states she is not feeling any better.

## 2020-01-01 NOTE — ED PROVIDER NOTES
EMERGENCY DEPARTMENT HISTORY AND PHYSICAL EXAM    3:56 PM      Date: 1/1/2020  Patient Name: Iraj Perez [facility]    History of Presenting Illness     Chief Complaint   Patient presents with    Shortness of Breath         History Provided By: Patient and Patient's Daughter  Location/Duration/Severity/Modifying factors   74 y/o F with hx lupus and chronic back pain on plaquenil with 1 month of worsening cough and SOB. Took amoxicillin at the beginning of symptoms but continued to worsen. States she has difficulty lying flat now and feels like she is about to choke on sputum. Sputum was green at first and is now clear. Also in the past day or so has developed Left-sided sore throat. No hx asthma, COPD, or CHF. Noticed her legs have been swelling, especially the right one, for the past 3 days. Has an IVC filter. No chest pain, leg pain, fever, other complaints. PCP: LEVI Ocasio    Current Outpatient Medications   Medication Sig Dispense Refill    doxycycline (MONODOX) 100 mg capsule Take 1 Cap by mouth two (2) times a day for 7 days. 14 Cap 0    albuterol (PROVENTIL VENTOLIN) 2.5 mg /3 mL (0.083 %) nebu 3 mL by Nebulization route every four (4) hours as needed for Wheezing or Shortness of Breath for up to 5 days. 30 Nebule 0    gabapentin (NEURONTIN) 300 mg capsule Take 1 cap by mouth in the morning and 1-2 at night as directed. 270 Cap 1    gabapentin (NEURONTIN) 100 mg capsule Take 2-3 po in the morning and 2-3 po in the evening as directed 180 Cap 1    predniSONE (DELTASONE) 5 mg tablet 1 po daily for 15 days then 1/2 tablet daily till completed 35 Tab 0    diflunisal (DOLOBID) 500 mg tab Take 500 mg by mouth every twelve (12) hours as needed.  ibuprofen (MOTRIN) 800 mg tablet Take 1 Tab by mouth two (2) times daily as needed. 3    aspirin delayed-release 81 mg tablet Take  by mouth daily.  predniSONE (DELTASONE) 5 mg tablet Take 1 Tab by mouth daily.  21 Tab 0    diazePAM (VALIUM) 5 mg tablet Take 1 tablet 30 minutes before diagnostic test - may repeat x 1  Indications: inducing of a relaxed easy state 2 Tab 0    LORazepam (ATIVAN) 0.5 mg tablet Take 0.5 mg by mouth.  oxyCODONE-acetaminophen (PERCOCET) 5-325 mg per tablet Take 1 tablet by mouth every four (4) hours as needed for Pain for up to 20 doses. 20 tablet 0    nortriptyline (PAMELOR) 25 mg capsule Take 1 Cap by mouth two (2) times a day. 60 Cap 6    celecoxib (CELEBREX) 200 mg capsule Take  by mouth two (2) times a day.  sertraline (ZOLOFT) 50 mg tablet Take 50 mg by mouth three (3) times daily.  esomeprazole (NEXIUM) 40 mg capsule Take  by mouth daily.  hydroxychloroquine (PLAQUENIL) 200 mg tablet Take 200 mg by mouth daily.  benazepril-hydrochlorothiazide (LOTENSIN HCT) 20-25 mg per tablet Take  by mouth daily.  ZOLPIDEM TARTRATE (AMBIEN PO) Take  by mouth. Past History     Past Medical History:  Past Medical History:   Diagnosis Date    Arthritis     Hypertension     Indigestion     [address] ([facility] [address]     Memory difficulty     Ringing of ears     sometimes       Past Surgical History:  Past Surgical History:   Procedure Laterality Date    HX CHOLECYSTECTOMY  1988    HX ORTHOPAEDIC  April 09'    right knee replacement     HX ORTHOPAEDIC  Sept 09'    left knee replacement    REMOVAL GALLBLADDER         Family History:  Family History   Problem Relation Age of Onset    Stroke Father     Cancer Father         prastate        Social History:  Social History     Tobacco Use    Smoking status: Never Smoker    Smokeless tobacco: Never Used   Substance Use Topics    Alcohol use: No    Drug use: Not on file       Allergies: Allergies   Allergen Reactions    Morphine Anxiety         Review of Systems       Review of Systems   HENT: Positive for sore throat. Negative for congestion, rhinorrhea, trouble swallowing and voice change.     Eyes: Negative for photophobia and visual disturbance. Respiratory: Positive for cough, shortness of breath and wheezing. Cardiovascular: Positive for leg swelling. Negative for chest pain and palpitations. Gastrointestinal: Negative for abdominal pain, diarrhea, nausea and vomiting. Genitourinary: Negative for dysuria. Musculoskeletal: Positive for arthralgias and back pain. Negative for neck pain and neck stiffness. Neurological: Negative for facial asymmetry, weakness and numbness. Physical Exam     Visit Vitals  /68   Pulse 80   Temp 97.3 °F (36.3 °C)   Resp 19   SpO2 95%         Physical Exam  Vitals signs and nursing note reviewed. Constitutional:       General: She is not in acute distress. Appearance: She is well-developed. She is obese. HENT:      Head: Normocephalic and atraumatic. Mouth/Throat:      Mouth: Mucous membranes are moist.      Comments: Mild tonsilar swelling and erythema, worse on left  Eyes:      Extraocular Movements: Extraocular movements intact. Pupils: Pupils are equal, round, and reactive to light. Neck:      Musculoskeletal: Normal range of motion and neck supple. Cardiovascular:      Rate and Rhythm: Normal rate and regular rhythm. Pulmonary:      Effort: Pulmonary effort is normal.      Breath sounds: Examination of the right-middle field reveals wheezing. Examination of the left-middle field reveals wheezing. Examination of the right-lower field reveals wheezing. Examination of the left-lower field reveals wheezing. Wheezing present. Chest:      Chest wall: No tenderness. Abdominal:      Palpations: Abdomen is soft. Tenderness: There is no tenderness. Musculoskeletal:      Right lower leg: She exhibits no tenderness. Edema present. Left lower leg: She exhibits no tenderness. Edema present. Lymphadenopathy:      Cervical: Cervical adenopathy present. Skin:     General: Skin is warm and dry.       Capillary Refill: Capillary refill takes less than 2 seconds. Neurological:      General: No focal deficit present. Mental Status: She is alert and oriented to person, place, and time. Psychiatric:         Mood and Affect: Mood normal.         Behavior: Behavior normal.           Diagnostic Study Results     Labs -  Recent Results (from the past 12 hour(s))   EKG, 12 LEAD, INITIAL    Collection Time: 01/01/20  4:07 PM   Result Value Ref Range    Ventricular Rate 67 BPM    Atrial Rate 67 BPM    P-R Interval 216 ms    QRS Duration 98 ms    Q-T Interval 444 ms    QTC Calculation (Bezet) 469 ms    Calculated P Axis -5 degrees    Calculated R Axis 2 degrees    Calculated T Axis 8 degrees    Diagnosis       Sinus rhythm with 1st degree AV block with occasional premature ventricular   complexes  Minimal voltage criteria for LVH, may be normal variant  Cannot rule out Anterior infarct , age undetermined  Abnormal ECG  When compared with ECG of 23-DEC-2019 14:37,  premature ventricular complexes are now present     CBC WITH AUTOMATED DIFF    Collection Time: 01/01/20  4:17 PM   Result Value Ref Range    WBC 7.2 4.6 - 13.2 K/uL    RBC 4.37 4.20 - 5.30 M/uL    HGB 12.9 12.0 - 16.0 g/dL    HCT 40.0 35.0 - 45.0 %    MCV 91.5 74.0 - 97.0 FL    MCH 29.5 24.0 - 34.0 PG    MCHC 32.3 31.0 - 37.0 g/dL    RDW 14.4 11.6 - 14.5 %    PLATELET 551 398 - 099 K/uL    MPV 10.8 9.2 - 11.8 FL    NEUTROPHILS 65 40 - 73 %    LYMPHOCYTES 17 (L) 21 - 52 %    MONOCYTES 10 3 - 10 %    EOSINOPHILS 8 (H) 0 - 5 %    BASOPHILS 0 0 - 2 %    ABS. NEUTROPHILS 4.7 1.8 - 8.0 K/UL    ABS. LYMPHOCYTES 1.2 0.9 - 3.6 K/UL    ABS. MONOCYTES 0.7 0.05 - 1.2 K/UL    ABS. EOSINOPHILS 0.6 (H) 0.0 - 0.4 K/UL    ABS.  BASOPHILS 0.0 0.0 - 0.1 K/UL    DF AUTOMATED     METABOLIC PANEL, COMPREHENSIVE    Collection Time: 01/01/20  4:17 PM   Result Value Ref Range    Sodium 144 136 - 145 mmol/L    Potassium 3.9 3.5 - 5.5 mmol/L    Chloride 107 100 - 111 mmol/L    CO2 34 (H) 21 - 32 mmol/L    Anion gap 3 3.0 - 18 mmol/L Glucose 92 74 - 99 mg/dL    BUN 29 (H) 7.0 - 18 MG/DL    Creatinine 0.40 (L) 0.6 - 1.3 MG/DL    BUN/Creatinine ratio 73 (H) 12 - 20      GFR est AA >60 >60 ml/min/1.73m2    GFR est non-AA >60 >60 ml/min/1.73m2    Calcium 9.1 8.5 - 10.1 MG/DL    Bilirubin, total 0.4 0.2 - 1.0 MG/DL    ALT (SGPT) 179 (H) 13 - 56 U/L    AST (SGOT) 67 (H) 10 - 38 U/L    Alk. phosphatase 350 (H) 45 - 117 U/L    Protein, total 6.1 (L) 6.4 - 8.2 g/dL    Albumin 2.9 (L) 3.4 - 5.0 g/dL    Globulin 3.2 2.0 - 4.0 g/dL    A-G Ratio 0.9 0.8 - 1.7     NT-PRO BNP    Collection Time: 01/01/20  4:17 PM   Result Value Ref Range    NT pro- 0 - 1,800 PG/ML   TROPONIN I    Collection Time: 01/01/20  4:17 PM   Result Value Ref Range    Troponin-I, QT <0.02 0.0 - 0.045 NG/ML   STREP THROAT SCREEN    Collection Time: 01/01/20  4:45 PM   Result Value Ref Range    Special Requests: NO SPECIAL REQUESTS      Strep Screen NEGATIVE       Culture result: PENDING        Radiologic Studies -   XR CHEST PA LAT    (Results Pending)         Medical Decision Making   I am the first provider for this patient. I reviewed the vital signs, available nursing notes, past medical history, past surgical history, family history and social history. Vital Signs-Reviewed the patient's vital signs. EKG: Sinus rhythm with 1st degree av block (), no ST or T wave changes consistent with ischemia    Records Reviewed: Nursing Notes, Old Medical Records, Previous Radiology Studies and Previous Laboratory Studies (Time of Review: 3:56 PM)    ED Course: Progress Notes, Reevaluation, and Consults:         Provider Notes (Medical Decision Making):   MDM     74 y/o F with likely bronchitis in the setting of SLE. Patient afebrile, hemodynamically stable in ED. No respiratory distress. Noted to have expiratory wheezes on exam which improved after 2x duonebs. CBC, BMP unremarkable. LFTs mildly elevated; no RUQ ttp or pain. Also elevated 12/23.   , troponin negative. Rapid strep negative. Given chronicity of symptoms, did not test for flu. CXR with bibasilar opacities similar to that of 12/23. Will treat with doxycycline and give neb refills as patient has machine at home. Will also given pulmonology follow-up if symptoms not improved after antibiotics. Patient given return precautions and understands and agrees with plan. Diagnosis     Clinical Impression:   1. Acute bronchitis, unspecified organism    2. Lupus ([address]        Disposition: Discharged    Follow-up Information     Follow up With Specialties Details Why Contact Info    LEVI Oneil Physician Assistant In 1 week For re-check [address]  [facility] [address]  [phone]      [facility] EMERGENCY DEPT Emergency Medicine  If symptoms worsen or you develop fever or other medical concerns [address] [facility] [address] [facility]    Valeria Bateman MD Pulmonary Disease, Urgent Care, Internal Medicine   [address] [facility]  [phone]             Patient's Medications   Start Taking    ALBUTEROL (PROVENTIL VENTOLIN) 2.5 MG /3 ML (0.083 %) NEBU    3 mL by Nebulization route every four (4) hours as needed for Wheezing or Shortness of Breath for up to 5 days. DOXYCYCLINE (MONODOX) 100 MG CAPSULE    Take 1 Cap by mouth two (2) times a day for 7 days. Continue Taking    ASPIRIN DELAYED-RELEASE 81 MG TABLET    Take  by mouth daily. BENAZEPRIL-HYDROCHLOROTHIAZIDE (LOTENSIN HCT) 20-25 MG PER TABLET    Take  by mouth daily. CELECOXIB (CELEBREX) 200 MG CAPSULE    Take  by mouth two (2) times a day. DIAZEPAM (VALIUM) 5 MG TABLET    Take 1 tablet 30 minutes before diagnostic test - may repeat x 1  Indications: inducing of a relaxed easy state    DIFLUNISAL (DOLOBID) 500 MG TAB    Take 500 mg by mouth every twelve (12) hours as needed. ESOMEPRAZOLE (NEXIUM) 40 MG CAPSULE    Take  by mouth daily.     GABAPENTIN (NEURONTIN) 100 MG CAPSULE    Take 2-3 po in the morning and 2-3 po in the evening as directed    GABAPENTIN (NEURONTIN) 300 MG CAPSULE    Take 1 cap by mouth in the morning and 1-2 at night as directed. HYDROXYCHLOROQUINE (PLAQUENIL) 200 MG TABLET    Take 200 mg by mouth daily. IBUPROFEN (MOTRIN) 800 MG TABLET    Take 1 Tab by mouth two (2) times daily as needed. LORAZEPAM (ATIVAN) 0.5 MG TABLET    Take 0.5 mg by mouth. NORTRIPTYLINE (PAMELOR) 25 MG CAPSULE    Take 1 Cap by mouth two (2) times a day. OXYCODONE-ACETAMINOPHEN (PERCOCET) 5-325 MG PER TABLET    Take 1 tablet by mouth every four (4) hours as needed for Pain for up to 20 doses. PREDNISONE (DELTASONE) 5 MG TABLET    Take 1 Tab by mouth daily. PREDNISONE (DELTASONE) 5 MG TABLET    1 po daily for 15 days then 1/2 tablet daily till completed    SERTRALINE (ZOLOFT) 50 MG TABLET    Take 50 mg by mouth three (3) times daily. ZOLPIDEM TARTRATE (AMBIEN PO)    Take  by mouth. These Medications have changed    No medications on file   Stop Taking    No medications on file     Disclaimer: Sections of this note are dictated using utilizing voice recognition software. Minor typographical errors may be present. If questions arise, please do not hesitate to contact me or call our department.

## 2020-01-02 LAB
ATRIAL RATE: 67 BPM
CALCULATED P AXIS, ECG09: -5 DEGREES
CALCULATED R AXIS, ECG10: 2 DEGREES
CALCULATED T AXIS, ECG11: 8 DEGREES
DIAGNOSIS, 93000: NORMAL
P-R INTERVAL, ECG05: 216 MS
Q-T INTERVAL, ECG07: 444 MS
QRS DURATION, ECG06: 98 MS
QTC CALCULATION (BEZET), ECG08: 469 MS
VENTRICULAR RATE, ECG03: 67 BPM

## 2020-01-02 NOTE — DISCHARGE INSTRUCTIONS
Patient Education        Bronchitis: Care Instructions  Your Care Instructions    Bronchitis is inflammation of the bronchial tubes, which carry air to the lungs. The tubes swell and produce mucus, or phlegm. The mucus and inflamed bronchial tubes make you cough. You may have trouble breathing. Most cases of bronchitis are caused by viruses like those that cause colds. Antibiotics usually do not help and they may be harmful. Bronchitis usually develops rapidly and lasts about 2 to 3 weeks in otherwise healthy people. Follow-up care is a key part of your treatment and safety. Be sure to make and go to all appointments, and call your doctor if you are having problems. It's also a good idea to know your test results and keep a list of the medicines you take. How can you care for yourself at home? · Take all medicines exactly as prescribed. Call your doctor if you think you are having a problem with your medicine. · Get some extra rest.  · Take an over-the-counter pain medicine, such as acetaminophen (Tylenol), ibuprofen (Advil, Motrin), or naproxen (Aleve) to reduce fever and relieve body aches. Read and follow all instructions on the label. · Do not take two or more pain medicines at the same time unless the doctor told you to. Many pain medicines have acetaminophen, which is Tylenol. Too much acetaminophen (Tylenol) can be harmful. · Take an over-the-counter cough medicine that contains dextromethorphan to help quiet a dry, hacking cough so that you can sleep. Avoid cough medicines that have more than one active ingredient. Read and follow all instructions on the label. · Breathe moist air from a humidifier, hot shower, or sink filled with hot water. The heat and moisture will thin mucus so you can cough it out. · Do not smoke. Smoking can make bronchitis worse. If you need help quitting, talk to your doctor about stop-smoking programs and medicines.  These can increase your chances of quitting for good.  When should you call for help? Call 911 anytime you think you may need emergency care. For example, call if:    · You have severe trouble breathing.    Call your doctor now or seek immediate medical care if:    · You have new or worse trouble breathing.     · You cough up dark brown or bloody mucus (sputum).     · You have a new or higher fever.     · You have a new rash.    Watch closely for changes in your health, and be sure to contact your doctor if:    · You cough more deeply or more often, especially if you notice more mucus or a change in the color of your mucus.     · You are not getting better as expected. Where can you learn more? Go to http://tanna-rasheeda.info/. Enter H333 in the search box to learn more about \"Bronchitis: Care Instructions. \"  Current as of: June 9, 2019  Content Version: 12.2  © 7330-2995 [employer]. Care instructions adapted under license by [employer] (which disclaims liability or warranty for this information). If you have questions about a medical condition or this instruction, always ask your healthcare professional. [address] any warranty or liability for your use of this information.

## 2020-01-03 LAB
B-HEM STREP THROAT QL CULT: NEGATIVE
BACTERIA SPEC CULT: NORMAL
SERVICE CMNT-IMP: NORMAL

## 2020-02-18 ENCOUNTER — HOSPITAL ENCOUNTER (OUTPATIENT)
Dept: LAB | Age: 75
Discharge: HOME | End: 2020-02-18
Payer: MEDICARE

## 2020-02-18 DIAGNOSIS — Z01.818 PRE-OP TESTING: ICD-10-CM

## 2020-02-18 LAB
ALBUMIN SERPL-MCNC: 3.5 G/DL (ref 3.4–5)
ALBUMIN/GLOB SERPL: 1.1 {RATIO} (ref 0.8–1.7)
ALP SERPL-CCNC: 78 U/L (ref 45–117)
ALT SERPL-CCNC: 19 U/L (ref 13–56)
ANION GAP SERPL CALC-SCNC: 3 MMOL/L (ref 3–18)
AST SERPL-CCNC: 17 U/L (ref 10–38)
BILIRUB SERPL-MCNC: 0.6 MG/DL (ref 0.2–1)
BUN SERPL-MCNC: 27 MG/DL (ref 7–18)
BUN/CREAT SERPL: 61 (ref 12–20)
CALCIUM SERPL-MCNC: 9.4 MG/DL (ref 8.5–10.1)
CHLORIDE SERPL-SCNC: 104 MMOL/L (ref 100–111)
CO2 SERPL-SCNC: 32 MMOL/L (ref 21–32)
CREAT SERPL-MCNC: 0.44 MG/DL (ref 0.6–1.3)
ERYTHROCYTE [DISTWIDTH] IN BLOOD BY AUTOMATED COUNT: 13.7 % (ref 11.6–14.5)
GLOBULIN SER CALC-MCNC: 3.1 G/DL (ref 2–4)
GLUCOSE SERPL-MCNC: 82 MG/DL (ref 74–99)
HCT VFR BLD AUTO: 45.3 % (ref 35–45)
HGB BLD-MCNC: 14.6 G/DL (ref 12–16)
MCH RBC QN AUTO: 28.9 PG (ref 24–34)
MCHC RBC AUTO-ENTMCNC: 32.2 G/DL (ref 31–37)
MCV RBC AUTO: 89.7 FL (ref 74–97)
PLATELET # BLD AUTO: 194 K/UL (ref 135–420)
PMV BLD AUTO: 11.8 FL (ref 9.2–11.8)
POTASSIUM SERPL-SCNC: 4.2 MMOL/L (ref 3.5–5.5)
PROT SERPL-MCNC: 6.6 G/DL (ref 6.4–8.2)
RBC # BLD AUTO: 5.05 M/UL (ref 4.2–5.3)
SODIUM SERPL-SCNC: 139 MMOL/L (ref 136–145)
WBC # BLD AUTO: 5.7 K/UL (ref 4.6–13.2)

## 2020-02-18 PROCEDURE — 36415 COLL VENOUS BLD VENIPUNCTURE: CPT

## 2020-02-18 PROCEDURE — 80053 COMPREHEN METABOLIC PANEL: CPT

## 2020-02-18 PROCEDURE — 85027 COMPLETE CBC AUTOMATED: CPT

## 2020-02-21 NOTE — H&P
Pre-Admission History and Physical    Patient: Katie Morales   MRN: [id number]   SSN: [id number]   YOB: 1945   Age: 75 y.o. Sex: female     Patient scheduled for: L4/5 lami disc, tubular retractor. Date of surgery: 2/27/2020. [facility] Surgeon: Wing Roseann MD    HPI:  Katie Morales is a 75 y.o. female with a history of morbid obesity with a BMI of 50+, lupus, and progressive back and left greater than right leg pain. She finds it is hard to ambulate with the progressive numbness and weakness in her legs. She denies bowel or bladder dysfunction. The MRI demonstrates spinal stenosis at L4-5 with a left paracentral massive disc herniation causing canal obliteration. She was referred by Dr. Sherrell Maddox with progressive weakness having failed medications. She had only temporary relief from injections. She cannot stand the pain. She has good strength of her EHL, tib/ant, hamstrings, and quadriceps. She does not note any bowel or bladder dysfunction. MRI demonstrates severe stenosis, left greater than right and a large central disc herniation, as well as facet and ligamentous hypertrophy. She reports a pain level of 9/10. This patient has failed the presurgical conservative treatments  including physical therapy, spinal block injections and medications. Pain has impacted the patient's functional ability to perform daily activity. She is being admitted for surgical intervention.          Past Medical History:   Diagnosis Date    Arthritis     Hypertension     Indigestion     Lupus ([facility] [address]     Memory difficulty     Ringing of ears     sometimes     Social History     Socioeconomic History    Marital status:      Spouse name: Not on file    Number of children: Not on file    Years of education: Not on file    Highest education level: Not on file   Tobacco Use    Smoking status: Never Smoker    Smokeless tobacco: Never Used Substance and Sexual Activity    Alcohol use: No    Sexual activity: Never   Other Topics Concern     Past Surgical History:   Procedure Laterality Date    HX CHOLECYSTECTOMY  1988    HX ORTHOPAEDIC  April 09'    right knee replacement     HX ORTHOPAEDIC  Sept 09'    left knee replacement    REMOVAL GALLBLADDER       Family History   Problem Relation Age of Onset    Stroke Father     Cancer Father         prastate      Allergies   Allergen Reactions    Morphine Anxiety     Current Outpatient Medications   Medication Sig Dispense Refill    gabapentin (NEURONTIN) 300 mg capsule Take 1 cap by mouth in the morning and 1-2 at night as directed. 270 Cap 1    gabapentin (NEURONTIN) 100 mg capsule Take 2-3 po in the morning and 2-3 po in the evening as directed 180 Cap 1    predniSONE (DELTASONE) 5 mg tablet 1 po daily for 15 days then 1/2 tablet daily till completed 35 Tab 0    diflunisal (DOLOBID) 500 mg tab Take 500 mg by mouth every twelve (12) hours as needed.  ibuprofen (MOTRIN) 800 mg tablet Take 1 Tab by mouth two (2) times daily as needed. 3    aspirin delayed-release 81 mg tablet Take  by mouth daily.  predniSONE (DELTASONE) 5 mg tablet Take 1 Tab by mouth daily. 21 Tab 0    diazePAM (VALIUM) 5 mg tablet Take 1 tablet 30 minutes before diagnostic test - may repeat x 1  Indications: inducing of a relaxed easy state 2 Tab 0    LORazepam (ATIVAN) 0.5 mg tablet Take 0.5 mg by mouth.  oxyCODONE-acetaminophen (PERCOCET) 5-325 mg per tablet Take 1 tablet by mouth every four (4) hours as needed for Pain for up to 20 doses. 20 tablet 0    nortriptyline (PAMELOR) 25 mg capsule Take 1 Cap by mouth two (2) times a day. 60 Cap 6    celecoxib (CELEBREX) 200 mg capsule Take  by mouth two (2) times a day.  sertraline (ZOLOFT) 50 mg tablet Take 50 mg by mouth three (3) times daily.  esomeprazole (NEXIUM) 40 mg capsule Take  by mouth daily.       hydroxychloroquine (PLAQUENIL) 200 mg tablet Take 200 mg by mouth daily.  benazepril-hydrochlorothiazide (LOTENSIN HCT) 20-25 mg per tablet Take  by mouth daily.  ZOLPIDEM TARTRATE (AMBIEN PO) Take  by mouth. ROS:  Denies chills, fever,night sweats,  bowel or bladder dysfunction, unexplained weight loss/weight gain, chest pain, sob or anxiety. Physical Examination    Gen: Well developed, well nourished 75 y.o. female Visit Vitals  /75   Pulse 89   Resp 16   Ht 5' 1\" (1.549 m)   Wt 276 lb (125.2 kg)   SpO2 95%   BMI 52.15 kg/m²    PAIN SCALE: 9/10     CONSTITUTIONAL: The patient is in no apparent distress and is alert and oriented x 3. HEENT: Normocephalic. Hearing grossly intact. NECK: Supple and symmetric. no tenderness, or masses were felt. RESPIRATORY: No labored breathing. CARDIOVASCULAR: The carotid pulses were normal. Peripheral pulses were 2+. CHEST: Normal AP diameter and normal contour without any kyphoscoliosis. LYMPHATIC: No lymphadenopathy was appreciated in the neck, axillae or groin. SKIN:  Negative for scars, rashes, lesions, or ulcers on the right upper, right lower, left upper, left lower and trunk. NEUROLOGICAL: Alert and oriented x 3. Ambulation with walker. FWB. EXTREMITIES:  See musculoskeletal.  MUSCULOSKELETAL:  · Head and Neck:  Negative for misalignment, asymmetry, crepitation, defects, tenderness masses or effusions. · Left Upper Extremity: Inspection, percussion and palpation performed. Jasons sign is negative. · Right Upper Extremity: Inspection, percussion and palpation performed. Jasons sign is negative. · Spine, Ribs and Pelvis: Back pain. Inspection, percussion and palpation performed. Negative for misalignment, asymmetry, crepitation, defects, tenderness masses or effusions. · Left Lower Extremity: Pain, L>R. Numbness in toes. Inspection, percussion and palpation performed. Negative straight leg raise. · Right Lower Extremity: Pain. Numbness in toes.  Inspection, percussion and palpation performed. Negative straight leg raise.        SPINE EXAM:      Cervical spine: Neck is midline. Normal muscle tone. No focal atrophy is noted.     Lumbar spine: No rash, ecchymosis, or gross obliquity. No focal atrophy is noted. Assessment and Plan    Due to the pt's persistent symptoms unrelieved by conservative measure [place] is being admitted [facility] to undergo surgical intervention. The post-operative plan of care consists of physical therapy, home health and a 2 week f/u office visit. We are pending medical clearance by REMIGIO Paul. The risks, benefits, complications and alternatives to surgery have been discussed in detail with the patient. The patient understands and agrees to proceed.      Yesica Dorado NP-C for Minor Toth MD

## 2020-02-24 ENCOUNTER — HOSPITAL ENCOUNTER (OUTPATIENT)
Dept: GENERAL RADIOLOGY | Age: 75
Discharge: HOME | End: 2020-02-24
Payer: MEDICARE

## 2020-02-24 DIAGNOSIS — Z01.818 PREOP TESTING: ICD-10-CM

## 2020-02-24 PROCEDURE — 71046 X-RAY EXAM CHEST 2 VIEWS: CPT

## 2020-02-26 ENCOUNTER — ANESTHESIA EVENT (OUTPATIENT)
Dept: SURGERY | Age: 75
End: 2020-02-26
Payer: MEDICARE

## 2020-02-27 ENCOUNTER — HOSPITAL ENCOUNTER (OUTPATIENT)
Age: 75
Discharge: HOME | End: 2020-02-29
Attending: ORTHOPAEDIC SURGERY | Admitting: ORTHOPAEDIC SURGERY
Payer: MEDICARE

## 2020-02-27 ENCOUNTER — APPOINTMENT (OUTPATIENT)
Dept: GENERAL RADIOLOGY | Age: 75
End: 2020-02-27
Attending: ORTHOPAEDIC SURGERY
Payer: MEDICARE

## 2020-02-27 ENCOUNTER — ANESTHESIA (OUTPATIENT)
Dept: SURGERY | Age: 75
End: 2020-02-27
Payer: MEDICARE

## 2020-02-27 DIAGNOSIS — Z98.890 S/P LAMINECTOMY: Primary | ICD-10-CM

## 2020-02-27 PROBLEM — M51.26 HNP (HERNIATED NUCLEUS PULPOSUS), LUMBAR: Status: ACTIVE | Noted: 2020-02-27

## 2020-02-27 PROBLEM — M48.061 LUMBAR SPINAL STENOSIS: Status: ACTIVE | Noted: 2020-02-27

## 2020-02-27 LAB
ABO + RH BLD: NORMAL
BLOOD GROUP ANTIBODIES SERPL: NORMAL
SPECIMEN EXP DATE BLD: NORMAL

## 2020-02-27 PROCEDURE — 99218 HC RM OBSERVATION: CPT

## 2020-02-27 PROCEDURE — 77030004402 HC BUR NEUR STRY -C: Performed by: ORTHOPAEDIC SURGERY

## 2020-02-27 PROCEDURE — 77030018836 HC SOL IRR NACL ICUM -A: Performed by: ORTHOPAEDIC SURGERY

## 2020-02-27 PROCEDURE — 77030040922 HC BLNKT HYPOTHRM STRY -A: Performed by: ORTHOPAEDIC SURGERY

## 2020-02-27 PROCEDURE — 74011250636 HC RX REV CODE- 250/636: Performed by: NURSE ANESTHETIST, CERTIFIED REGISTERED

## 2020-02-27 PROCEDURE — 86900 BLOOD TYPING SEROLOGIC ABO: CPT

## 2020-02-27 PROCEDURE — 74011250637 HC RX REV CODE- 250/637: Performed by: NURSE ANESTHETIST, CERTIFIED REGISTERED

## 2020-02-27 PROCEDURE — 77030003029 HC SUT VCRL J&J -B: Performed by: ORTHOPAEDIC SURGERY

## 2020-02-27 PROCEDURE — 74011000250 HC RX REV CODE- 250: Performed by: NURSE ANESTHETIST, CERTIFIED REGISTERED

## 2020-02-27 PROCEDURE — 74011250637 HC RX REV CODE- 250/637: Performed by: ANESTHESIOLOGY

## 2020-02-27 PROCEDURE — 77030030163 HC BN WAX J&J -A: Performed by: ORTHOPAEDIC SURGERY

## 2020-02-27 PROCEDURE — 74011000250 HC RX REV CODE- 250: Performed by: ORTHOPAEDIC SURGERY

## 2020-02-27 PROCEDURE — 76210000006 HC OR PH I REC 0.5 TO 1 HR: Performed by: ORTHOPAEDIC SURGERY

## 2020-02-27 PROCEDURE — 77030026438 HC STYL ET INTUB CARD -A: Performed by: ANESTHESIOLOGY

## 2020-02-27 PROCEDURE — 77030027138 HC INCENT SPIROMETER -A

## 2020-02-27 PROCEDURE — 77030008683 HC TU ET CUF COVD -A: Performed by: ANESTHESIOLOGY

## 2020-02-27 PROCEDURE — 74011250636 HC RX REV CODE- 250/636: Performed by: ORTHOPAEDIC SURGERY

## 2020-02-27 PROCEDURE — 74011250637 HC RX REV CODE- 250/637: Performed by: ORTHOPAEDIC SURGERY

## 2020-02-27 PROCEDURE — 77030040361 HC SLV COMPR DVT MDII -B: Performed by: ORTHOPAEDIC SURGERY

## 2020-02-27 PROCEDURE — 77030012890: Performed by: ORTHOPAEDIC SURGERY

## 2020-02-27 PROCEDURE — 77030027703 HC MAXCESS 4 KT DISP NUVA -H: Performed by: ORTHOPAEDIC SURGERY

## 2020-02-27 PROCEDURE — 74011000272 HC RX REV CODE- 272: Performed by: ORTHOPAEDIC SURGERY

## 2020-02-27 PROCEDURE — 76060000035 HC ANESTHESIA 2 TO 2.5 HR: Performed by: ORTHOPAEDIC SURGERY

## 2020-02-27 PROCEDURE — 74011250636 HC RX REV CODE- 250/636: Performed by: NURSE PRACTITIONER

## 2020-02-27 PROCEDURE — 77030013079 HC BLNKT BAIR HGGR 3M -A: Performed by: ANESTHESIOLOGY

## 2020-02-27 PROCEDURE — 77030018390 HC SPNG HEMSTAT2 J&J -B: Performed by: ORTHOPAEDIC SURGERY

## 2020-02-27 PROCEDURE — 76010000131 HC OR TIME 2 TO 2.5 HR: Performed by: ORTHOPAEDIC SURGERY

## 2020-02-27 PROCEDURE — 77030002933 HC SUT MCRYL J&J -A: Performed by: ORTHOPAEDIC SURGERY

## 2020-02-27 PROCEDURE — 77030027138 HC INCENT SPIROMETER -A: Performed by: ORTHOPAEDIC SURGERY

## 2020-02-27 RX ORDER — SERTRALINE HYDROCHLORIDE 50 MG/1
150 TABLET, FILM COATED ORAL DAILY
Status: DISCONTINUED | OUTPATIENT
Start: 2020-02-28 | End: 2020-02-29 | Stop reason: HOSPADM

## 2020-02-27 RX ORDER — GLYCOPYRROLATE 0.2 MG/ML
INJECTION INTRAMUSCULAR; INTRAVENOUS AS NEEDED
Status: DISCONTINUED | OUTPATIENT
Start: 2020-02-27 | End: 2020-02-27 | Stop reason: HOSPADM

## 2020-02-27 RX ORDER — DIAZEPAM 5 MG/1
5 TABLET ORAL
Status: DISCONTINUED | OUTPATIENT
Start: 2020-02-27 | End: 2020-02-29 | Stop reason: HOSPADM

## 2020-02-27 RX ORDER — INSULIN LISPRO 100 [IU]/ML
INJECTION, SOLUTION INTRAVENOUS; SUBCUTANEOUS ONCE
Status: DISCONTINUED | OUTPATIENT
Start: 2020-02-27 | End: 2020-02-27 | Stop reason: HOSPADM

## 2020-02-27 RX ORDER — FENTANYL CITRATE 50 UG/ML
50 INJECTION, SOLUTION INTRAMUSCULAR; INTRAVENOUS
Status: DISCONTINUED | OUTPATIENT
Start: 2020-02-27 | End: 2020-02-27 | Stop reason: HOSPADM

## 2020-02-27 RX ORDER — VANCOMYCIN HYDROCHLORIDE 1 G/20ML
INJECTION, POWDER, LYOPHILIZED, FOR SOLUTION INTRAVENOUS AS NEEDED
Status: DISCONTINUED | OUTPATIENT
Start: 2020-02-27 | End: 2020-02-27 | Stop reason: HOSPADM

## 2020-02-27 RX ORDER — LORAZEPAM 2 MG/ML
1 INJECTION INTRAMUSCULAR
Status: DISCONTINUED | OUTPATIENT
Start: 2020-02-27 | End: 2020-02-29 | Stop reason: HOSPADM

## 2020-02-27 RX ORDER — HYDROMORPHONE HYDROCHLORIDE 2 MG/ML
0.2 INJECTION, SOLUTION INTRAMUSCULAR; INTRAVENOUS; SUBCUTANEOUS
Status: DISCONTINUED | OUTPATIENT
Start: 2020-02-27 | End: 2020-02-27 | Stop reason: SDUPTHER

## 2020-02-27 RX ORDER — SODIUM CHLORIDE 0.9 % (FLUSH) 0.9 %
5-40 SYRINGE (ML) INJECTION EVERY 8 HOURS
Status: DISCONTINUED | OUTPATIENT
Start: 2020-02-27 | End: 2020-02-27 | Stop reason: HOSPADM

## 2020-02-27 RX ORDER — ROCURONIUM BROMIDE 10 MG/ML
INJECTION, SOLUTION INTRAVENOUS AS NEEDED
Status: DISCONTINUED | OUTPATIENT
Start: 2020-02-27 | End: 2020-02-27 | Stop reason: HOSPADM

## 2020-02-27 RX ORDER — FENTANYL CITRATE 50 UG/ML
INJECTION, SOLUTION INTRAMUSCULAR; INTRAVENOUS AS NEEDED
Status: DISCONTINUED | OUTPATIENT
Start: 2020-02-27 | End: 2020-02-27 | Stop reason: HOSPADM

## 2020-02-27 RX ORDER — SCOLOPAMINE TRANSDERMAL SYSTEM 1 MG/1
1 PATCH, EXTENDED RELEASE TRANSDERMAL ONCE
Status: DISCONTINUED | OUTPATIENT
Start: 2020-02-27 | End: 2020-02-29 | Stop reason: HOSPADM

## 2020-02-27 RX ORDER — NEOSTIGMINE METHYLSULFATE 1 MG/ML
INJECTION, SOLUTION INTRAVENOUS AS NEEDED
Status: DISCONTINUED | OUTPATIENT
Start: 2020-02-27 | End: 2020-02-27 | Stop reason: HOSPADM

## 2020-02-27 RX ORDER — HYDROMORPHONE HYDROCHLORIDE 2 MG/ML
1 INJECTION, SOLUTION INTRAMUSCULAR; INTRAVENOUS; SUBCUTANEOUS
Status: DISCONTINUED | OUTPATIENT
Start: 2020-02-27 | End: 2020-02-29 | Stop reason: HOSPADM

## 2020-02-27 RX ORDER — LISINOPRIL 20 MG/1
20 TABLET ORAL DAILY
Status: DISCONTINUED | OUTPATIENT
Start: 2020-02-28 | End: 2020-02-29 | Stop reason: HOSPADM

## 2020-02-27 RX ORDER — PANTOPRAZOLE SODIUM 40 MG/1
40 TABLET, DELAYED RELEASE ORAL
Status: DISCONTINUED | OUTPATIENT
Start: 2020-02-28 | End: 2020-02-29 | Stop reason: HOSPADM

## 2020-02-27 RX ORDER — SODIUM CHLORIDE, SODIUM LACTATE, POTASSIUM CHLORIDE, CALCIUM CHLORIDE 600; 310; 30; 20 MG/100ML; MG/100ML; MG/100ML; MG/100ML
100 INJECTION, SOLUTION INTRAVENOUS CONTINUOUS
Status: DISCONTINUED | OUTPATIENT
Start: 2020-02-27 | End: 2020-02-27 | Stop reason: HOSPADM

## 2020-02-27 RX ORDER — EPHEDRINE SULFATE/0.9% NACL/PF 50 MG/5 ML
SYRINGE (ML) INTRAVENOUS AS NEEDED
Status: DISCONTINUED | OUTPATIENT
Start: 2020-02-27 | End: 2020-02-27 | Stop reason: HOSPADM

## 2020-02-27 RX ORDER — ACETAMINOPHEN 500 MG
1000 TABLET ORAL EVERY 6 HOURS
Status: DISCONTINUED | OUTPATIENT
Start: 2020-02-27 | End: 2020-02-29 | Stop reason: HOSPADM

## 2020-02-27 RX ORDER — PREGABALIN 50 MG/1
50 CAPSULE ORAL ONCE
Status: COMPLETED | OUTPATIENT
Start: 2020-02-27 | End: 2020-02-27

## 2020-02-27 RX ORDER — SODIUM CHLORIDE, SODIUM LACTATE, POTASSIUM CHLORIDE, CALCIUM CHLORIDE 600; 310; 30; 20 MG/100ML; MG/100ML; MG/100ML; MG/100ML
50 INJECTION, SOLUTION INTRAVENOUS CONTINUOUS
Status: DISCONTINUED | OUTPATIENT
Start: 2020-02-27 | End: 2020-02-27 | Stop reason: HOSPADM

## 2020-02-27 RX ORDER — NORTRIPTYLINE HYDROCHLORIDE 25 MG/1
25 CAPSULE ORAL 2 TIMES DAILY
Status: DISCONTINUED | OUTPATIENT
Start: 2020-02-27 | End: 2020-02-27

## 2020-02-27 RX ORDER — FENTANYL CITRATE 50 UG/ML
25 INJECTION, SOLUTION INTRAMUSCULAR; INTRAVENOUS AS NEEDED
Status: DISCONTINUED | OUTPATIENT
Start: 2020-02-27 | End: 2020-02-27 | Stop reason: SDUPTHER

## 2020-02-27 RX ORDER — PROPOFOL 10 MG/ML
INJECTION, EMULSION INTRAVENOUS AS NEEDED
Status: DISCONTINUED | OUTPATIENT
Start: 2020-02-27 | End: 2020-02-27 | Stop reason: HOSPADM

## 2020-02-27 RX ORDER — LIDOCAINE HYDROCHLORIDE 10 MG/ML
0.1 INJECTION, SOLUTION EPIDURAL; INFILTRATION; INTRACAUDAL; PERINEURAL AS NEEDED
Status: DISCONTINUED | OUTPATIENT
Start: 2020-02-27 | End: 2020-02-27 | Stop reason: HOSPADM

## 2020-02-27 RX ORDER — ONDANSETRON 2 MG/ML
INJECTION INTRAMUSCULAR; INTRAVENOUS AS NEEDED
Status: DISCONTINUED | OUTPATIENT
Start: 2020-02-27 | End: 2020-02-27 | Stop reason: HOSPADM

## 2020-02-27 RX ORDER — GABAPENTIN 100 MG/1
100 CAPSULE ORAL 2 TIMES DAILY
Status: DISCONTINUED | OUTPATIENT
Start: 2020-02-27 | End: 2020-02-27 | Stop reason: SDUPTHER

## 2020-02-27 RX ORDER — ONDANSETRON 2 MG/ML
4 INJECTION INTRAMUSCULAR; INTRAVENOUS ONCE
Status: DISCONTINUED | OUTPATIENT
Start: 2020-02-27 | End: 2020-02-27 | Stop reason: HOSPADM

## 2020-02-27 RX ORDER — HYDROMORPHONE HYDROCHLORIDE 2 MG/ML
0.5 INJECTION, SOLUTION INTRAMUSCULAR; INTRAVENOUS; SUBCUTANEOUS
Status: DISCONTINUED | OUTPATIENT
Start: 2020-02-27 | End: 2020-02-27 | Stop reason: HOSPADM

## 2020-02-27 RX ORDER — MAGNESIUM SULFATE 100 %
4 CRYSTALS MISCELLANEOUS AS NEEDED
Status: DISCONTINUED | OUTPATIENT
Start: 2020-02-27 | End: 2020-02-27 | Stop reason: HOSPADM

## 2020-02-27 RX ORDER — CEFAZOLIN SODIUM 2 G/50ML
2 SOLUTION INTRAVENOUS EVERY 8 HOURS
Status: DISCONTINUED | OUTPATIENT
Start: 2020-02-27 | End: 2020-02-29 | Stop reason: HOSPADM

## 2020-02-27 RX ORDER — SODIUM CHLORIDE 0.9 % (FLUSH) 0.9 %
5-40 SYRINGE (ML) INJECTION AS NEEDED
Status: DISCONTINUED | OUTPATIENT
Start: 2020-02-27 | End: 2020-02-29 | Stop reason: HOSPADM

## 2020-02-27 RX ORDER — ONDANSETRON 2 MG/ML
4 INJECTION INTRAMUSCULAR; INTRAVENOUS
Status: DISCONTINUED | OUTPATIENT
Start: 2020-02-27 | End: 2020-02-29 | Stop reason: HOSPADM

## 2020-02-27 RX ORDER — SUCCINYLCHOLINE CHLORIDE 20 MG/ML
INJECTION INTRAMUSCULAR; INTRAVENOUS AS NEEDED
Status: DISCONTINUED | OUTPATIENT
Start: 2020-02-27 | End: 2020-02-27 | Stop reason: HOSPADM

## 2020-02-27 RX ORDER — HYDROMORPHONE HYDROCHLORIDE 1 MG/ML
1 INJECTION, SOLUTION INTRAMUSCULAR; INTRAVENOUS; SUBCUTANEOUS
Status: DISCONTINUED | OUTPATIENT
Start: 2020-02-27 | End: 2020-02-27

## 2020-02-27 RX ORDER — SODIUM CHLORIDE 0.9 % (FLUSH) 0.9 %
5-40 SYRINGE (ML) INJECTION EVERY 8 HOURS
Status: DISCONTINUED | OUTPATIENT
Start: 2020-02-27 | End: 2020-02-29 | Stop reason: HOSPADM

## 2020-02-27 RX ORDER — DIPHENHYDRAMINE HYDROCHLORIDE 50 MG/ML
12.5 INJECTION, SOLUTION INTRAMUSCULAR; INTRAVENOUS
Status: DISCONTINUED | OUTPATIENT
Start: 2020-02-27 | End: 2020-02-29 | Stop reason: HOSPADM

## 2020-02-27 RX ORDER — SODIUM CHLORIDE, SODIUM LACTATE, POTASSIUM CHLORIDE, CALCIUM CHLORIDE 600; 310; 30; 20 MG/100ML; MG/100ML; MG/100ML; MG/100ML
25 INJECTION, SOLUTION INTRAVENOUS CONTINUOUS
Status: DISCONTINUED | OUTPATIENT
Start: 2020-02-27 | End: 2020-02-27 | Stop reason: HOSPADM

## 2020-02-27 RX ORDER — DIPHENHYDRAMINE HYDROCHLORIDE 50 MG/ML
12.5 INJECTION, SOLUTION INTRAMUSCULAR; INTRAVENOUS
Status: DISCONTINUED | OUTPATIENT
Start: 2020-02-27 | End: 2020-02-27 | Stop reason: HOSPADM

## 2020-02-27 RX ORDER — LIDOCAINE HYDROCHLORIDE 20 MG/ML
INJECTION, SOLUTION EPIDURAL; INFILTRATION; INTRACAUDAL; PERINEURAL AS NEEDED
Status: DISCONTINUED | OUTPATIENT
Start: 2020-02-27 | End: 2020-02-27 | Stop reason: HOSPADM

## 2020-02-27 RX ORDER — HYDROCHLOROTHIAZIDE 25 MG/1
25 TABLET ORAL DAILY
Status: DISCONTINUED | OUTPATIENT
Start: 2020-02-28 | End: 2020-02-29 | Stop reason: HOSPADM

## 2020-02-27 RX ORDER — OXYCODONE HYDROCHLORIDE 5 MG/1
5-10 TABLET ORAL
Status: DISCONTINUED | OUTPATIENT
Start: 2020-02-27 | End: 2020-02-29 | Stop reason: HOSPADM

## 2020-02-27 RX ORDER — CEFAZOLIN SODIUM 2 G/50ML
2 SOLUTION INTRAVENOUS ONCE
Status: COMPLETED | OUTPATIENT
Start: 2020-02-27 | End: 2020-02-27

## 2020-02-27 RX ORDER — SODIUM CHLORIDE 0.9 % (FLUSH) 0.9 %
5-40 SYRINGE (ML) INJECTION AS NEEDED
Status: DISCONTINUED | OUTPATIENT
Start: 2020-02-27 | End: 2020-02-27 | Stop reason: HOSPADM

## 2020-02-27 RX ORDER — GABAPENTIN 300 MG/1
300 CAPSULE ORAL 2 TIMES DAILY
Status: DISCONTINUED | OUTPATIENT
Start: 2020-02-27 | End: 2020-02-29 | Stop reason: HOSPADM

## 2020-02-27 RX ORDER — FAMOTIDINE 20 MG/1
20 TABLET, FILM COATED ORAL ONCE
Status: COMPLETED | OUTPATIENT
Start: 2020-02-27 | End: 2020-02-27

## 2020-02-27 RX ORDER — CELECOXIB 100 MG/1
200 CAPSULE ORAL ONCE
Status: COMPLETED | OUTPATIENT
Start: 2020-02-27 | End: 2020-02-27

## 2020-02-27 RX ORDER — ONDANSETRON 2 MG/ML
4 INJECTION INTRAMUSCULAR; INTRAVENOUS
Status: COMPLETED | OUTPATIENT
Start: 2020-02-27 | End: 2020-02-27

## 2020-02-27 RX ORDER — DEXAMETHASONE SODIUM PHOSPHATE 4 MG/ML
INJECTION, SOLUTION INTRA-ARTICULAR; INTRALESIONAL; INTRAMUSCULAR; INTRAVENOUS; SOFT TISSUE AS NEEDED
Status: DISCONTINUED | OUTPATIENT
Start: 2020-02-27 | End: 2020-02-27 | Stop reason: HOSPADM

## 2020-02-27 RX ORDER — NALOXONE HYDROCHLORIDE 0.4 MG/ML
0.4 INJECTION, SOLUTION INTRAMUSCULAR; INTRAVENOUS; SUBCUTANEOUS AS NEEDED
Status: DISCONTINUED | OUTPATIENT
Start: 2020-02-27 | End: 2020-02-29 | Stop reason: HOSPADM

## 2020-02-27 RX ORDER — DEXTROSE 50 % IN WATER (D50W) INTRAVENOUS SYRINGE
25-50 AS NEEDED
Status: DISCONTINUED | OUTPATIENT
Start: 2020-02-27 | End: 2020-02-27 | Stop reason: HOSPADM

## 2020-02-27 RX ORDER — HYDROXYCHLOROQUINE SULFATE 200 MG/1
200 TABLET, FILM COATED ORAL DAILY
Status: DISCONTINUED | OUTPATIENT
Start: 2020-02-28 | End: 2020-02-29 | Stop reason: HOSPADM

## 2020-02-27 RX ORDER — MIDAZOLAM HYDROCHLORIDE 1 MG/ML
INJECTION, SOLUTION INTRAMUSCULAR; INTRAVENOUS AS NEEDED
Status: DISCONTINUED | OUTPATIENT
Start: 2020-02-27 | End: 2020-02-27 | Stop reason: HOSPADM

## 2020-02-27 RX ORDER — BUPIVACAINE HYDROCHLORIDE 5 MG/ML
INJECTION, SOLUTION EPIDURAL; INTRACAUDAL AS NEEDED
Status: DISCONTINUED | OUTPATIENT
Start: 2020-02-27 | End: 2020-02-27 | Stop reason: HOSPADM

## 2020-02-27 RX ORDER — DIPHENHYDRAMINE HCL 25 MG
25 CAPSULE ORAL
Status: DISCONTINUED | OUTPATIENT
Start: 2020-02-27 | End: 2020-02-29 | Stop reason: HOSPADM

## 2020-02-27 RX ADMIN — ACETAMINOPHEN 1000 MG: 500 TABLET ORAL at 20:17

## 2020-02-27 RX ADMIN — MIDAZOLAM HYDROCHLORIDE 1 MG: 2 INJECTION, SOLUTION INTRAMUSCULAR; INTRAVENOUS at 14:45

## 2020-02-27 RX ADMIN — FAMOTIDINE 20 MG: 20 TABLET ORAL at 12:56

## 2020-02-27 RX ADMIN — HYDROCORTISONE SODIUM SUCCINATE 250 MG: 250 INJECTION, POWDER, FOR SOLUTION INTRAMUSCULAR; INTRAVENOUS at 20:18

## 2020-02-27 RX ADMIN — ONDANSETRON 4 MG: 2 INJECTION INTRAMUSCULAR; INTRAVENOUS at 16:36

## 2020-02-27 RX ADMIN — SODIUM CHLORIDE, SODIUM LACTATE, POTASSIUM CHLORIDE, AND CALCIUM CHLORIDE 25 ML/HR: 600; 310; 30; 20 INJECTION, SOLUTION INTRAVENOUS at 12:57

## 2020-02-27 RX ADMIN — HYDROMORPHONE HYDROCHLORIDE 0.5 MG: 2 INJECTION, SOLUTION INTRAMUSCULAR; INTRAVENOUS; SUBCUTANEOUS at 17:31

## 2020-02-27 RX ADMIN — Medication 20 MG: at 15:53

## 2020-02-27 RX ADMIN — FENTANYL CITRATE 50 MCG: 50 INJECTION INTRAMUSCULAR; INTRAVENOUS at 14:50

## 2020-02-27 RX ADMIN — GLYCOPYRROLATE 0.8 MG: 0.2 INJECTION INTRAMUSCULAR; INTRAVENOUS at 16:36

## 2020-02-27 RX ADMIN — Medication 10 MG: at 15:13

## 2020-02-27 RX ADMIN — HYDROMORPHONE HYDROCHLORIDE 0.5 MG: 2 INJECTION, SOLUTION INTRAMUSCULAR; INTRAVENOUS; SUBCUTANEOUS at 17:21

## 2020-02-27 RX ADMIN — Medication 5 MG: at 16:36

## 2020-02-27 RX ADMIN — PROPOFOL 100 MG: 10 INJECTION, EMULSION INTRAVENOUS at 14:50

## 2020-02-27 RX ADMIN — CEFAZOLIN 3 G: 10 INJECTION, POWDER, FOR SOLUTION INTRAVENOUS at 15:00

## 2020-02-27 RX ADMIN — SUCCINYLCHOLINE CHLORIDE 100 MG: 20 INJECTION, SOLUTION INTRAMUSCULAR; INTRAVENOUS at 14:50

## 2020-02-27 RX ADMIN — Medication 10 ML: at 22:05

## 2020-02-27 RX ADMIN — MIDAZOLAM HYDROCHLORIDE 1 MG: 2 INJECTION, SOLUTION INTRAMUSCULAR; INTRAVENOUS at 14:50

## 2020-02-27 RX ADMIN — CELECOXIB 200 MG: 100 CAPSULE ORAL at 12:56

## 2020-02-27 RX ADMIN — CEFAZOLIN SODIUM 2 G: 2 SOLUTION INTRAVENOUS at 22:04

## 2020-02-27 RX ADMIN — GABAPENTIN 300 MG: 300 CAPSULE ORAL at 20:17

## 2020-02-27 RX ADMIN — LIDOCAINE HYDROCHLORIDE 50 MG: 20 INJECTION, SOLUTION EPIDURAL; INFILTRATION; INTRACAUDAL; PERINEURAL at 14:50

## 2020-02-27 RX ADMIN — ONDANSETRON 4 MG: 2 INJECTION INTRAMUSCULAR; INTRAVENOUS at 22:03

## 2020-02-27 RX ADMIN — DEXAMETHASONE SODIUM PHOSPHATE 4 MG: 4 INJECTION, SOLUTION INTRAMUSCULAR; INTRAVENOUS at 15:16

## 2020-02-27 RX ADMIN — ROCURONIUM BROMIDE 10 MG: 10 INJECTION, SOLUTION INTRAVENOUS at 16:13

## 2020-02-27 RX ADMIN — ONDANSETRON 4 MG: 2 INJECTION INTRAMUSCULAR; INTRAVENOUS at 17:22

## 2020-02-27 RX ADMIN — Medication 10 MG: at 15:33

## 2020-02-27 RX ADMIN — Medication 10 MG: at 15:42

## 2020-02-27 RX ADMIN — PREGABALIN 50 MG: 50 CAPSULE ORAL at 12:56

## 2020-02-27 NOTE — PROGRESS NOTES
Problem: Infection - Risk of, Surgical Site Infection  Goal: *Absence of surgical site infection signs and symptoms  Outcome: Progressing Towards Goal     Problem: Patient Education: Go to Patient Education Activity  Goal: Patient/Family Education  Outcome: Progressing Towards Goal     Problem: Falls - Risk of  Goal: *Absence of Falls  Description  Document Lynn Sachinroscoe Fall Risk and appropriate interventions in the flowsheet.   Outcome: Progressing Towards Goal  Note: Fall Risk Interventions:            Medication Interventions: Patient to call before getting OOB, Teach patient to arise slowly                   Problem: Patient Education: Go to Patient Education Activity  Goal: Patient/Family Education  Outcome: Progressing Towards Goal     Problem: Pain  Goal: *Control of Pain  Outcome: Progressing Towards Goal  Goal: *PALLIATIVE CARE:  Alleviation of Pain  Outcome: Progressing Towards Goal     Problem: Patient Education: Go to Patient Education Activity  Goal: Patient/Family Education  Outcome: Progressing Towards Goal

## 2020-02-27 NOTE — BRIEF OP NOTE
BRIEF OPERATIVE NOTE    Date of Procedure: 2/27/2020   Preoperative Diagnosis: Spinal stenosis, lumbar region with neurogenic claudication [M48.062]  HNP (herniated nucleus pulposus), lumbar [M51.26]  Postoperative Diagnosis: Spinal stenosis, lumbar region with neurogenic claudication [M48.062]  HNP (herniated nucleus pulposus), lumbar [M51.26]    Procedure(s):  LeftL4/5 LAMINECTOMY/DISCECTOMY/TUBULAR RETRACTOR/C-ARM  Surgeon(s) and Role:     * Jess Bach MD - Primary         Surgical Assistant: 0    Surgical Staff:  Circ-1: Christopher Babb RN  Radiology Technician: Mari Tello RT  Scrub Tech-1: [facility]  Surg Asst-1: Doroteo Randhawa  Event Time In Time Out   Incision Start 1513    Incision Close       Anesthesia: General   Estimated Blood Loss: 10  Specimens: * No specimens in log *   Findings: synovial cyst, inflamatory mass, hnp  Complications: 0  Implants: * No implants in log *

## 2020-02-27 NOTE — PROGRESS NOTES
conducted a pre-surgery visit with Suleiman Bautista, who is a 75 y.o.,female. The  provided the following Interventions:  Initiated a relationship of care and support. Offered prayer and assurance of continued prayers on patient's behalf. Plan:  Chaplains will continue to follow and will provide pastoral care on an as needed/requested basis.  recommends bedside caregivers page  on duty if patient shows signs of acute spiritual or emotional distress.     [address] [place], [place] [address]  [phone]

## 2020-02-27 NOTE — PERIOP NOTES
TRANSFER - OUT REPORT:    Verbal report given to Stiven Magdaleno RN(name) on Cisco Rolon  being transferred to [address] [place] [address](unit) for routine post - op       Report consisted of patients Situation, Background, Assessment and   Recommendations(SBAR). Information from the following report(s) SBAR, OR Summary, Procedure Summary, Intake/Output and MAR was reviewed with the receiving nurse. Lines:   Peripheral IV 02/27/20 Left Hand (Active)   Site Assessment Clean, dry, & intact 2/27/2020  5:04 PM   Phlebitis Assessment 0 2/27/2020  5:04 PM   Infiltration Assessment 0 2/27/2020  5:04 PM   Dressing Status Clean, dry, & intact 2/27/2020  5:04 PM   Dressing Type Transparent;Tape 2/27/2020  5:04 PM   Hub Color/Line Status Pink; Infusing 2/27/2020  5:04 PM   Alcohol Cap Used No 2/27/2020 12:53 PM        Opportunity for questions and clarification was provided.       Patient transported with:   O2 @ 3 liters  Tech

## 2020-02-27 NOTE — INTERVAL H&P NOTE
H&P Update: 
Dawood Card was seen and examined. History and physical has been reviewed. The patient has been examined.  There have been no significant clinical changes since the completion of the originally dated History and Physical.

## 2020-02-27 NOTE — ANESTHESIA POSTPROCEDURE EVALUATION
Procedure(s):  L4/5 LAMINECTOMY/DISCECTOMY/TUBULAR RETRACTOR/C-ARM. general    Anesthesia Post Evaluation      Multimodal analgesia: multimodal analgesia used between 6 hours prior to anesthesia start to PACU discharge  Patient location during evaluation: bedside  Patient participation: complete - patient participated  Level of consciousness: awake  Pain management: adequate  Airway patency: patent  Anesthetic complications: no  Cardiovascular status: stable  Respiratory status: acceptable  Hydration status: acceptable  Post anesthesia nausea and vomiting:  controlled      Vitals Value Taken Time   /65 2/27/2020  5:44 PM   Temp 36.4 °C (97.5 °F) 2/27/2020  5:44 PM   Pulse 86 2/27/2020  5:46 PM   Resp 17 2/27/2020  5:46 PM   SpO2 99 % 2/27/2020  5:46 PM   Vitals shown include unvalidated device data.

## 2020-02-28 ENCOUNTER — APPOINTMENT (OUTPATIENT)
Dept: CT IMAGING | Age: 75
End: 2020-02-28
Attending: NURSE PRACTITIONER
Payer: MEDICARE

## 2020-02-28 ENCOUNTER — APPOINTMENT (OUTPATIENT)
Dept: GENERAL RADIOLOGY | Age: 75
End: 2020-02-28
Attending: NURSE PRACTITIONER
Payer: MEDICARE

## 2020-02-28 ENCOUNTER — HOME HEALTH ADMISSION (OUTPATIENT)
Dept: HOME HEALTH SERVICES | Facility: HOME HEALTH | Age: 75
End: 2020-02-28
Payer: MEDICARE

## 2020-02-28 LAB
ALBUMIN SERPL-MCNC: 2.9 G/DL (ref 3.4–5)
ALBUMIN/GLOB SERPL: 0.9 {RATIO} (ref 0.8–1.7)
ALP SERPL-CCNC: 100 U/L (ref 45–117)
ALT SERPL-CCNC: 29 U/L (ref 13–56)
ANION GAP SERPL CALC-SCNC: 4 MMOL/L (ref 3–18)
AST SERPL-CCNC: 37 U/L (ref 10–38)
BILIRUB SERPL-MCNC: 0.2 MG/DL (ref 0.2–1)
BUN SERPL-MCNC: 25 MG/DL (ref 7–18)
BUN/CREAT SERPL: 42 (ref 12–20)
CALCIUM SERPL-MCNC: 9.3 MG/DL (ref 8.5–10.1)
CHLORIDE SERPL-SCNC: 106 MMOL/L (ref 100–111)
CO2 SERPL-SCNC: 32 MMOL/L (ref 21–32)
CREAT SERPL-MCNC: 0.6 MG/DL (ref 0.6–1.3)
GLOBULIN SER CALC-MCNC: 3.3 G/DL (ref 2–4)
GLUCOSE SERPL-MCNC: 156 MG/DL (ref 74–99)
POTASSIUM SERPL-SCNC: 3.9 MMOL/L (ref 3.5–5.5)
PROT SERPL-MCNC: 6.2 G/DL (ref 6.4–8.2)
SODIUM SERPL-SCNC: 142 MMOL/L (ref 136–145)

## 2020-02-28 PROCEDURE — 97530 THERAPEUTIC ACTIVITIES: CPT

## 2020-02-28 PROCEDURE — 71275 CT ANGIOGRAPHY CHEST: CPT

## 2020-02-28 PROCEDURE — 74011636320 HC RX REV CODE- 636/320: Performed by: ORTHOPAEDIC SURGERY

## 2020-02-28 PROCEDURE — 71045 X-RAY EXAM CHEST 1 VIEW: CPT

## 2020-02-28 PROCEDURE — 77010033678 HC OXYGEN DAILY: Performed by: ORTHOPAEDIC SURGERY

## 2020-02-28 PROCEDURE — 97116 GAIT TRAINING THERAPY: CPT

## 2020-02-28 PROCEDURE — 97162 PT EVAL MOD COMPLEX 30 MIN: CPT

## 2020-02-28 PROCEDURE — 51798 US URINE CAPACITY MEASURE: CPT

## 2020-02-28 PROCEDURE — 97535 SELF CARE MNGMENT TRAINING: CPT

## 2020-02-28 PROCEDURE — 74011250637 HC RX REV CODE- 250/637: Performed by: ORTHOPAEDIC SURGERY

## 2020-02-28 PROCEDURE — 80053 COMPREHEN METABOLIC PANEL: CPT

## 2020-02-28 PROCEDURE — 97165 OT EVAL LOW COMPLEX 30 MIN: CPT

## 2020-02-28 PROCEDURE — 74011250636 HC RX REV CODE- 250/636: Performed by: ORTHOPAEDIC SURGERY

## 2020-02-28 RX ORDER — OXYCODONE HYDROCHLORIDE 5 MG/1
5 TABLET ORAL
Qty: 20 TAB | Refills: 0 | Status: SHIPPED | OUTPATIENT
Start: 2020-02-28 | End: 2020-03-04

## 2020-02-28 RX ADMIN — PANTOPRAZOLE SODIUM 40 MG: 40 TABLET, DELAYED RELEASE ORAL at 16:08

## 2020-02-28 RX ADMIN — ACETAMINOPHEN 1000 MG: 500 TABLET ORAL at 05:42

## 2020-02-28 RX ADMIN — IOPAMIDOL 100 ML: 755 INJECTION, SOLUTION INTRAVENOUS at 20:16

## 2020-02-28 RX ADMIN — CEFAZOLIN SODIUM 2 G: 2 SOLUTION INTRAVENOUS at 14:53

## 2020-02-28 RX ADMIN — ACETAMINOPHEN 1000 MG: 500 TABLET ORAL at 12:26

## 2020-02-28 RX ADMIN — Medication 10 ML: at 20:30

## 2020-02-28 RX ADMIN — Medication 10 ML: at 05:45

## 2020-02-28 RX ADMIN — GABAPENTIN 300 MG: 300 CAPSULE ORAL at 09:58

## 2020-02-28 RX ADMIN — HYDROXYCHLOROQUINE SULFATE 200 MG: 200 TABLET, FILM COATED ORAL at 09:58

## 2020-02-28 RX ADMIN — HYDROCORTISONE SODIUM SUCCINATE 250 MG: 250 INJECTION, POWDER, FOR SOLUTION INTRAMUSCULAR; INTRAVENOUS at 03:26

## 2020-02-28 RX ADMIN — PANTOPRAZOLE SODIUM 40 MG: 40 TABLET, DELAYED RELEASE ORAL at 09:58

## 2020-02-28 RX ADMIN — HYDROCHLOROTHIAZIDE 25 MG: 25 TABLET ORAL at 09:58

## 2020-02-28 RX ADMIN — HYDROCORTISONE SODIUM SUCCINATE 250 MG: 250 INJECTION, POWDER, FOR SOLUTION INTRAMUSCULAR; INTRAVENOUS at 12:26

## 2020-02-28 RX ADMIN — CEFAZOLIN SODIUM 2 G: 2 SOLUTION INTRAVENOUS at 05:42

## 2020-02-28 RX ADMIN — GABAPENTIN 300 MG: 300 CAPSULE ORAL at 18:10

## 2020-02-28 RX ADMIN — Medication 10 ML: at 21:42

## 2020-02-28 RX ADMIN — SERTRALINE HYDROCHLORIDE 150 MG: 50 TABLET ORAL at 11:00

## 2020-02-28 RX ADMIN — ACETAMINOPHEN 1000 MG: 500 TABLET ORAL at 01:24

## 2020-02-28 RX ADMIN — LISINOPRIL 20 MG: 20 TABLET ORAL at 09:58

## 2020-02-28 NOTE — PROGRESS NOTES
Problem: Mobility Impaired (Adult and Pediatric)  Goal: *Acute Goals and Plan of Care (Insert Text)  Description  Physical Therapy Goals  Initiated 2/28/2020 and to be accomplished within 7 day(s)  1. Patient will move from supine to sit and sit to supine , scoot up and down and roll side to side in bed with supervision/set-up. 2.  Patient will transfer from bed to chair and chair to bed with supervision/set-up using the least restrictive device. 3.  Patient will perform sit to stand with supervision/set-up. 4.  Patient will ambulate with SBA/CGA   feet safely with the least restrictive device. Prior Level of Function:   Patient was modified independence for all mobility including gait using RW. Patient lives with her daughters and granddaughter in a single story home with ramp access. Outcome: Progressing Towards Goal     PHYSICAL THERAPY EVALUATION    Patient: Bernadine Jones (88 y.o. female)  Date: 2/28/2020  Primary Diagnosis: Spinal stenosis, lumbar region with neurogenic claudication [M48.062]  HNP (herniated nucleus pulposus), lumbar [M51.26]  Lumbar spinal stenosis [M48.061]  HNP (herniated nucleus pulposus), lumbar [M51.26]  Procedure(s) (LRB):  L4/5 LAMINECTOMY/DISCECTOMY/TUBULAR RETRACTOR/C-ARM (N/A) 1 Day Post-Op   Precautions:   Fall, Spinal    ASSESSMENT :  PT orders received and patient cleared by nursing to participate with therapy. Patient is a 75 y.o. female admitted to the hospital due to s/p L4/5 laminectomy/discectomy POD#1. Patient consents to PT evaluation and treatment. Patient received semi-reclined in bed, daughter at bedside. Patient education provided regarding spinal precautions and logrolling. Patient indicates she has gotten up and gone to the bathroom with nursing, and per daughter seems likely that she had difficulty with logroll. Patient instructed in performance of logroll with mod/max cuing.  Patient attempted to twist to lower legs off side of bed before rolling, and insisted on keeping HOB elevated. Patient educated regarding need for logroll and lowered HOB in order to adhere to precautions, with little acceptance. Patient with gross strength deficits to B LE 4/5. Patient performs sit to stand with CGA to RW. Patient with forward flexed posture in standing. Patient ambulated total of 50ft with RW CGA/Daisy. Patient with 3 standing rest breaks during which she grasped the railing on the wall or the doorway and seemed weak and unstable. Patient indicated her arms felt weak and shaky, and had difficulty using/holding onto the RW. Patient performed stand to sit with CGA, max cuing to turn completely with RW and reach back before cuing; patient impulsive and ignored most cuing d/t desire to sit. After ambulation and seated in recliner patient's SpO2 83%. Patient was not wearing O2 when this therapist began the session, but supplemental O2 was donned 3L; SpO2 increased to 95% after 1-2 minutes. Patient indicates she does not use O2 at home. Nursing informed of patient's response to exercise and need for O2. Patient education regarding spinal precautions (able to recite 3/3), need for safety with RW, pacing, energy conservation, pursed lip breathing, and role of PT in the hospital. Session ended with patient seated upright in recliner, all needs in reach,     Pt is NOT cleared by physical therapy as pt is NOT safe with mobility at this time. Patient will benefit from skilled intervention to address the above impairments.   Patient's rehabilitation potential is considered to be Fair  Factors which may influence rehabilitation potential include:   []         None noted  []         Mental ability/status  [x]         Medical condition  []         Home/family situation and support systems  [x]         Safety awareness  []         Pain tolerance/management  []         Other:      PLAN :  Recommendations and Planned Interventions:   [x]           Bed Mobility Training             [x]    Neuromuscular Re-Education  [x]           Transfer Training                   []    Orthotic/Prosthetic Training  [x]           Gait Training                          [x]    Modalities  [x]           Therapeutic Exercises           [x]    Edema Management/Control  [x]           Therapeutic Activities            [x]    Family Training/Education  [x]           Patient Education  []           Other (comment):    Frequency/Duration: Patient will be followed by physical therapy 1-2 times per day/4-7 days per week to address goals. Discharge Recommendations: Home Health  Further Equipment Recommendations for Discharge: N/A     SUBJECTIVE:   Patient stated \"I didn't think I needed oxygen.     OBJECTIVE DATA SUMMARY:     Past Medical History:   Diagnosis Date    Arthritis     Bronchitis 01/01/2020    Hypertension     Indigestion     Lupus ([facility] [address]     Memory difficulty     Nausea & vomiting     Ringing of ears     sometimes    Thromboembolus [facility]) 2009     Past Surgical History:   Procedure Laterality Date    HX CHOLECYSTECTOMY  1988    HX COLONOSCOPY      HX ORTHOPAEDIC  April 09'    right knee replacement     HX ORTHOPAEDIC  Sept 09'    left knee replacement    HX VASCULAR ACCESS      IVC filter    REMOVAL GALLBLADDER       Barriers to Learning/Limitations: None  Compensate with: N/A  Home Situation:  Home Situation  Home Environment: Private residence  # Steps to Enter: 3(ramp available)  One/Two Story Residence: One story  Living Alone: No  Support Systems: Family member(s), Child(carina)  Patient Expects to be Discharged to[de-identified] Private residence  Current DME Used/Available at Home: Luellen Canavan, rolling, Commode, bedside, Shower chair(lift chair)  Critical Behavior:  Neurologic State: Alert  Orientation Level: Oriented X4  Cognition: Follows commands; Impulsive  Safety/Judgement: Fall prevention  Psychosocial  Patient Behaviors: Calm; Cooperative  Family  Behaviors: Calm; Cooperative  Family Behaviors: Calm; Cooperative     B LE Strength:    Strength: Generally decreased, functional(B LE 4/5)              B LE Tone & Sensation:   Tone: Normal          Sensation: Intact           B LE Range Of Motion:  AROM: Generally decreased, functional                 Posture:  Posture (WDL): Exceptions to WDL  Posture Assessment: Forward head;Trunk flexion;Rounded shoulders  Functional Mobility:  Bed Mobility:  Rolling: Minimum assistance(Mod cuing to logroll)  Supine to Sit: Minimum assistance(mod cuing for logroll)     Transfers:  Sit to Stand: Contact guard assistance   Stand to Sit: Contact guard assistance       Balance:   Sitting: Intact  Standing: Impaired; With support  Standing - Static: Fair  Standing - Dynamic : Occasional    Ambulation/Gait Training:  Distance (ft): 50 Feet (ft)(3 standing rest breaks, holding walls/rails)  Assistive Device: Walker, rolling  Ambulation - Level of Assistance: Minimal assistance  Gait Abnormalities: Antalgic;Circumduction;Decreased step clearance; Path deviations  Base of Support: Center of gravity altered; Widened  Stance: Weight shift  Speed/Yaquelin: Pace decreased (<100 feet/min)  Step Length: Left shortened;Right shortened     Therapeutic Exercises:   Reviewed and performed ankle pumps to increase blood flow and circulation. Pain:  Pain level pre-treatment: 0/10   Pain level post-treatment: 0/10   Pain Intervention(s) : Medication (see MAR); Rest, Repositioning  Response to intervention: Nurse notified, See doc flow    Activity Tolerance:   Fair  Please refer to the flowsheet for vital signs taken during this treatment.     After treatment:   [x]         Patient left in no apparent distress sitting up in chair  []         Patient left in no apparent distress in bed  [x]         Call bell left within reach  [x]   Personal items in reach   [x]         Nursing notified Lina Macedo  []         Caregiver present  []         Bed/chair alarm activated  []         SCDs applied    COMMUNICATION/EDUCATION:   [x]         Role of Physical Therapy in the acute care setting. [x]         Fall prevention education was provided and the patient/caregiver indicated understanding. [x]         Patient/family have participated as able in goal setting and plan of care. [x]         Patient/family agree to work toward stated goals and plan of care. []         Patient understands intent and goals of therapy, but is neutral about his/her participation. []         Patient is unable to participate in goal setting/plan of care: ongoing with therapy staff. [x]         Out of bed with nursing assistance 3-5 times a day. []         Other: Thank you for this referral.  CACHORRO ButcherT   Time Calculation: 38 mins      Eval Complexity: History: HIGH Complexity :3+ comorbidities / personal factors will impact the outcome/ POC Exam:HIGH Complexity : 4+ Standardized tests and measures addressing body structure, function, activity limitation and / or participation in recreation  Presentation: MEDIUM Complexity : Evolving with changing characteristics  Clinical Decision Making:Medium Complexity    Overall Complexity:MEDIUM    A student participated in this treatment session. Per CMS Medicare statements and guidelines I certify that the following was true:   1. I was present and directly observed the entire session. 2. I made all skilled judgments and clinical decisions regarding care. 3. I am the practitioner responsible for assessment, treatment, and documentation.     Thank you,   Anup Marmolejo, PT, DPT

## 2020-02-28 NOTE — PROGRESS NOTES
vss afeb  Neuro intact  Min pain  Drain decreasing  Chronic urinary retention  Pt ot  Dc home  Fu 2 weeks

## 2020-02-28 NOTE — PROGRESS NOTES
Problem: Infection - Risk of, Surgical Site Infection  Goal: *Absence of surgical site infection signs and symptoms  Outcome: Progressing Towards Goal     Problem: Patient Education: Go to Patient Education Activity  Goal: Patient/Family Education  Outcome: Progressing Towards Goal     Problem: Falls - Risk of  Goal: *Absence of Falls  Description  Document Sarah Montiel Fall Risk and appropriate interventions in the flowsheet.   Outcome: Progressing Towards Goal  Note: Fall Risk Interventions:  Mobility Interventions: Patient to call before getting OOB         Medication Interventions: Patient to call before getting OOB                   Problem: Patient Education: Go to Patient Education Activity  Goal: Patient/Family Education  Outcome: Progressing Towards Goal     Problem: Patient Education: Go to Patient Education Activity  Goal: Patient/Family Education  Outcome: Progressing Towards Goal     Problem: Pain  Goal: *Control of Pain  Outcome: Progressing Towards Goal  Goal: *PALLIATIVE CARE:  Alleviation of Pain  Outcome: Progressing Towards Goal     Problem: Patient Education: Go to Patient Education Activity  Goal: Patient/Family Education  Outcome: Progressing Towards Goal

## 2020-02-28 NOTE — PROGRESS NOTES
Bedside shift change report given to Apparity Cadent (oncoming nurse) by Renu Rodriges (offgoing nurse). Report included the following information SBAR, Kardex, Procedure Summary, Intake/Output and MAR.

## 2020-02-28 NOTE — ROUTINE PROCESS
Pain score:  0/10 Name and time of last pain med given:  Tylenol 1000 mg Neuro status: AAOx4 Dressing/incision status:  Opsite dressing to lower back dry and clean, MALACHI drain in place, dressing with moderate amounts of serosanguinous drainage MALACHI/Hemovace output:  62 cc emptied at this time Voiding status:  Patient has not voided yet, encouraged to drink fluis. Will continues to monitor Mobility status:  Patient dangled at side of bed, stated feeling dizziness. Patient will walk later. Other:  Patient move all extremities denies numbness /tingling \.

## 2020-02-28 NOTE — HOME CARE
Discharge noted for today. Received home health referral for [facility] for SN and PT - Ryan protocol. Spoke with patient, explained services and answered all questions. Demographics verified. Order processed and emailed to central office. Patient has the following DME: bedside commode, rolling walker, manual wheelchair, lift chair, ramp, and bath chair.  Gibson Roque, [facility] Liaison

## 2020-02-28 NOTE — ROUTINE PROCESS
Bedside and Verbal shift change report given to [address] (oncoming nurse) by Alonna Kehr, RN (offgoing nurse). Report included the following information SBAR, Kardex, Intake/Output, MAR and Recent Results.

## 2020-02-28 NOTE — PROGRESS NOTES
Problem: Mobility Impaired (Adult and Pediatric)  Goal: *Acute Goals and Plan of Care (Insert Text)  Description  Physical Therapy Goals  Initiated 2/28/2020 and to be accomplished within 7 day(s)  1. Patient will move from supine to sit and sit to supine , scoot up and down and roll side to side in bed with supervision/set-up. 2.  Patient will transfer from bed to chair and chair to bed with supervision/set-up using the least restrictive device. 3.  Patient will perform sit to stand with supervision/set-up. 4.  Patient will ambulate with SBA/CGA   feet safely with the least restrictive device. Prior Level of Function:   Patient was modified independence for all mobility including gait using RW. Patient lives with her daughters and granddaughter in a single story home with ramp access. 2/28/2020 1000 by Allyne Oppenheim  Outcome: Progressing Towards Goal    PHYSICAL THERAPY TREATMENT    Patient: Sim Castillo (75 y.o. female)  Date: 2/28/2020  Diagnosis: Spinal stenosis, lumbar region with neurogenic claudication [M48.062]  HNP (herniated nucleus pulposus), lumbar [M51.26]  Lumbar spinal stenosis [M48.061]  HNP (herniated nucleus pulposus), lumbar [M51.26]   <principal problem not specified>  Procedure(s) (LRB):  L4/5 LAMINECTOMY/DISCECTOMY/TUBULAR RETRACTOR/C-ARM (N/A) 1 Day Post-Op  Precautions: Fall, Spinal    ASSESSMENT:  Patient is cleared by nursing for PT, and patient consents to therapy. Patient received sitting upright in recliner, son at bedside. Patient found to be without supplemental O2; SpO2 on RA: 80%. Nursing Martha notified; placed patient on 2L O2 via NC. After several minutes patient's O2 progressed from 88%-95%. Educated on breathing techniques. Patient education regarding spinal precautions (could only recall 1/3), need for O2  at the moment, importance of OOB mobility and log roll procedure. Patient performs sit<>stand to RW SBA.  Patient ambulated 40ft with RW CGA, one standing rest break at prison point. Patient with improved mobility from this morning, but still requires cuing for safety. After ambulation patient's SpO2 88%, which progressed to 100% after several minutes. Patient instructed in practice of logroll into and out of bed with CGA/Daisy for management of LE. Patient much improved in logrolling from this morning. Patient's son educated regarding sequencing of logroll. Session ended with patient seated upright in chair, son at bedside, patient able to recite spinal precautions, all needs in reach. Patient cleared by physical therapy for safe DC home with 24/7 assist, nursing informed. Will continue to see pt for PT while in the hospital.       PLAN:  Patient continues to benefit from skilled intervention to address the above impairments. Continue treatment per established plan of care. Discharge Recommendations:  Home Health with 24/7 assist  Further Equipment Recommendations for Discharge:  Possible O2 needs     SUBJECTIVE:   Patient stated I don't really walk that far at home.     OBJECTIVE DATA SUMMARY:   Critical Behavior:  Neurologic State: Alert  Orientation Level: Oriented X4  Cognition: Follows commands  Safety/Judgement: Fall prevention  Functional Mobility Training:  Bed Mobility:  Rolling: Minimum assistance(less cuing to logroll)  Supine to Sit: Contact guard assistance  Sit to Supine: Minimum assistance(less cuing to logroll, LE management)    Transfers:  Sit to Stand: Stand-by assistance  Stand to Sit: Stand-by assistance    Balance:  Sitting: Intact  Standing: Impaired; With support  Standing - Static: Fair  Standing - Dynamic : Fair     Ambulation/Gait Training:  Distance (ft): 40 Feet (ft)(1 rest break)  Assistive Device: Walker, rolling  Ambulation - Level of Assistance: Contact guard assistance  Gait Abnormalities: Decreased step clearance;Circumduction  Base of Support: Center of gravity altered  Stance: Weight shift  Speed/Yaquelin: Pace decreased (<100 feet/min)  Step Length: Left shortened;Right shortened      Therapeutic Exercises:   Reviewed and performed ankle pumps to increase blood flow and circulation. Pain:  Pain level pre-treatment: 0/10    Pain level post-treatment: 0/10    Pain Intervention(s): Medication (see MAR); Rest, Repositioning   Response to intervention: Nurse notified, See doc flow    Activity Tolerance:   Fair  Please refer to the flowsheet for vital signs taken during this treatment. After treatment:   [x] Patient left in no apparent distress sitting up in chair  [] Patient left in no apparent distress in bed  [x] Call bell left within reach  [x] Personal items in reach  [x] Nursing notified Zoey Lopez  [] Caregiver present  [] Bed/chair alarm activated  [] SCDs applied      COMMUNICATION/EDUCATION:   [x]         Role of Physical Therapy in the acute care setting. [x]         Fall prevention education was provided and the patient/caregiver indicated understanding. [x]         Patient/family have participated as able and agree with findings and recommendations. []         Patient is unable to participate in plan of care at this time. [x]         Out of bed at least 3-5 times a day with nursing assistance. []         Other:        Gia Avalos DPT   Time Calculation: 30 mins     A student participated in this treatment session. Per CMS Medicare statements and guidelines I certify that the following was true:   1. I was present and directly observed the entire session. 2. I made all skilled judgments and clinical decisions regarding care. 3. I am the practitioner responsible for assessment, treatment, and documentation.     Thank you,   Laura Lamb, MADAI, DPT

## 2020-02-28 NOTE — OP NOTES
[address] [place] [address]   OPERATIVE REPORT    Name:  Av George  MR#:   [id number]  :  1945  ACCOUNT #:  [de-identified]  DATE OF SERVICE:  2020    PREOPERATIVE DIAGNOSIS:  Degenerative spinal stenosis, herniated nucleus pulposus L4-5. POSTOPERATIVE DIAGNOSIS:  Degenerative spinal stenosis, herniated nucleus pulposus L4-5. PROCEDURE PERFORMED:  Left L4-5 hemilaminotomy, medial facetectomy, diskectomy, resection of synovial mass. SURGEON:  Chrissy Mcdaniel MD    ASSISTANT:  No assistant. ANESTHESIA:  General endotracheal.    COMPLICATIONS:  No complications. SPECIMENS REMOVED:  No specimens. IMPLANTS:  No implants. ESTIMATED BLOOD LOSS:  25 mL. FINDINGS:  Surgery was extremely difficult from the patient's morbid obesity. It was difficult just to get a plain x-ray of her spine defining her level. Surgery was then technically difficult because of her girth. The patient was found to have disc herniation causing nerve root compression and an inflammatory mass consistent with a chronic synovial cyst causing severe compression of the left lateral recess. This was terribly adherent to the dura and very inflamed. DESCRIPTION OF OPERATION:  Following induction of tracheal anesthesia, the patient was turned to prone position on a spinal frame. The patient was prepped and draped in usual fashion. Midline incision was made. Paramedian incision was made in the lumbodorsal fascia and subperiosteal dissection done of L4-5. Tubular retractor dilators were then placed down into the interlaminar space and a split tubular retractor placed over it. Fluoroscopic imaging AP and lateral modes with some difficulty confirmed our surgical level. The interval was debrided. Bone quality was poor. Hemilaminotomy was done with resection of interval ligamentum flavum with medial facetectomy until I could palpate the pedicle of 4 and 5. Nerve roots were under severe compression.   There was a large inflammatory mass present on the lateral aspect of the dura on the left apparently emanating from the facet at that segment. I have tried to free it proximally and distally, then shell it out. It was densely adherent to the dura, ultimately resected. This increased the mobility of the neural elements and our ability to get lateral to the thecal sac without undue tension. I then gently began from an annulotomy and progressive diskectomy until the neural elements were free and mobile and I could palpate under the thecal sac with a Teena elevator. There was no one large fragment. It was chronic disc detritus. Following this decompression, given the patient's bone quality and the predominant symptoms on the left hand side, I decided to avoid decompressing the right hand side as of my concern for inducing instability. This patient would be extraordinarily difficult to perform a fusion on. She has a huge pannus. I can see no x-ray landmarks for placement of pedicle instrumentation. She has multilevel severe degenerative changes to her spine and would end up requiring a multilevel decompression and fusion. Care was taken to maintain facet and ligamentous integrity. Following the decompression and palpation, noting that now it appeared that the spinal cord is without evidence of direct compression. A pledget of Gelfoam and thrombin was placed over the laminotomy site. There was no evidence of CSF leak or other bleeding. Vancomycin powder instilled. The lumbodorsal fascia was closed over a drain with #1 Vicryl. Subcutaneous tissues closed with 2-0 Vicryl and the skin closed with a 4-0 Monocryl subcuticular suture and Dermabond. Sterile occlusive dressing placed upon the wound. All counts correct.       MD PETE Wilkerson/[email or web address]  D:  02/27/2020 17:17  T:  02/27/2020 21:45  JOB #:  [id number]

## 2020-02-28 NOTE — ROUTINE PROCESS
Mobility Intervention:  
 
  [] Pt dangled at edge of bed 
  [] Pt assisted OOB to bedside commode 
  [] Pt assisted OOB to chair 
  [x] Pt ambulated to bathroom 
  [] Patient was ambulated in room/hallway Assistive Device Utilized:  
  
 [x] Rolling walker 
 [] Crutches 
 [] Straight Cane 
 [] Knee immobilizer [] IV pole After Mobilization:  
 
[] Patient left in no apparent distress sitting up in chair 
[x] Patient left in no apparent distress in bed 
[x] Call bell left within reach [x] SCDs on & machine turned on 
[x] Ice applied 
[] RN notified 
[] Caregiver present 
[] Bed alarm activated Reason patient not mobilized:  
 
 [] Patient refused 
 [] Nausea/vomiting 
 [] Low blood pressure 
 [] Drowsy/lethargic Pain Rating:  
 
[] 0 [] 1 Assistive Device:       
[] 2 [] 3 [] 4 
[] 5 
[] 6 Assistive Device:       
[] 7 
[] 8 
[] 9 [] 10 Comments:

## 2020-02-28 NOTE — PROGRESS NOTES
POD #1 Left L4-5 hemilaminotomy, medial facetectomy, diskectomy, resection of synovial mass. Hx Lupus    VS: O2Sat drop to 70s-80s while ambulating, LS clear, Apical pulse regular  Dressing clean, dry and intact  UA: voiding, had retention yesterday  PT:40 ft had SOB and drop in O2 Sats  Neuro  Intact, had LLE pain before surgery, leg pain is now resolved. Moving all extremities. 4/5 strength to BLE. Plan: chest x-ray ordered and consult to pulmonology placed. Dr. Lorie Gilford paged.     Home Valle NP

## 2020-02-28 NOTE — ROUTINE PROCESS
0330 Patient voided 100 c of silvia urine. Post void bladder cfriiue=973 ml. Patient stated that she will try to void later , encouraged fluids. 6019 Patient void 50 ml, Straight cath, tolerated procedure.

## 2020-02-28 NOTE — PROGRESS NOTES
Problem: Self Care Deficits Care Plan (Adult)  Goal: *Acute Goals and Plan of Care (Insert Text)  Description  Occupational Therapy Goals  Initiated 2/28/2020 within 7 day(s). 1.  Patient will perform bed mobility in preparation for self-care with supervision/set-up. 2.  Patient will perform functional activity standing for 4-7 minutes with modified independence and F+ balance. 3.  Patient will perform lower body dressing standing with supervision/set-up. 4.  Patient will perform toilet transfers with supervision/set-up. 5.  Patient will perform all aspects of toileting with supervision/set-up. 6.  Patient will participate in upper extremity therapeutic exercise/activities with independence for 8 minutes. 7.  Patient will utilize energy conservation techniques during functional activities with verbal cues. Prior Level of Function: Patient was modified independent with self-care and used a RW for functional mobility PTA. Outcome: Progressing Towards Goal       OCCUPATIONAL THERAPY EVALUATION    Patient: Jacquie Mcfarland (57 y.o. female)  Date: 2/28/2020  Primary Diagnosis: Spinal stenosis, lumbar region with neurogenic claudication [M48.062]  HNP (herniated nucleus pulposus), lumbar [M51.26]  Lumbar spinal stenosis [M48.061]  HNP (herniated nucleus pulposus), lumbar [M51.26]  Procedure(s) (LRB):  L4/5 LAMINECTOMY/DISCECTOMY/TUBULAR RETRACTOR/C-ARM (N/A) 1 Day Post-Op   Precautions: Fall, Spinal    ASSESSMENT :  Pt cleared to participate in OT evaluation by RN. Upon entering the room, the pt was seated in chair, alert, and agreeable to participate in OT evaluation. Back precautions reviewed and patient verbalized and demonstrated understanding, precaution handout given. Patient is able to perform basic self care tasks without assistance using AE (reacher, sock aid, long handled-shoe horn) owned after practice with stand by assist seated and contact guard assist standing.  The pt presents with fair static standing and occasional dynamic, however will defer to PT for functional balance and functional mobility tasks. Based on the objective data described below, the patient presents with decreased independence with standing self-care tasks, decreased endurance, decreased functional balance, and decreased functional mobility, which impedes pt performance in basic self-care/ADL tasks. Pt would benefit from skilled OT to restore PLOF and maximize function. Patient will benefit from skilled intervention to address the above impairments. Patient's rehabilitation potential is considered to be Good  Factors which may influence rehabilitation potential include:   []             None noted  [x]             Mental ability/status  [x]             Medical condition  [x]             Home/family situation and support systems  [x]             Safety awareness  [x]             Pain tolerance/management  []             Other:      PLAN :  Recommendations and Planned Interventions:   [x]               Self Care Training                  [x]      Therapeutic Activities  [x]               Functional Mobility Training   []      Cognitive Retraining  [x]               Therapeutic Exercises           [x]      Endurance Activities  [x]               Balance Training                    []      Neuromuscular Re-Education  []               Visual/Perceptual Training     [x]      Home Safety Training  [x]               Patient Education                   [x]      Family Training/Education  []               Other (comment):    Frequency/Duration: Patient will be followed by occupational therapy daily to address goals.   Discharge Recommendations: Home Health with 24/7 Supervision  Further Equipment Recommendations for Discharge: Patient has all DME     SUBJECTIVE:   Patient stated at times when im washing dishes I have to lean on my elbows because my arms start to get tingly    OBJECTIVE DATA SUMMARY:     Past Medical History: Diagnosis Date    Arthritis     Bronchitis 01/01/2020    Hypertension     Indigestion     Lupus ([facility] [address]     Memory difficulty     Nausea & vomiting     Ringing of ears     sometimes    Thromboembolus [facility]) 2009     Past Surgical History:   Procedure Laterality Date    HX CHOLECYSTECTOMY  1988    HX COLONOSCOPY      HX ORTHOPAEDIC  April 09'    right knee replacement     HX ORTHOPAEDIC  Sept 09'    left knee replacement    HX VASCULAR ACCESS      IVC filter    REMOVAL GALLBLADDER       Barriers to Learning/Limitations: None  Compensate with: visual, verbal, tactile, kinesthetic cues/model    Home Situation:   Home Situation  Home Environment: Private residence  # Steps to Enter: 3(ramp available)  One/Two Story Residence: One story  Living Alone: No  Support Systems: Family member(s), Child(carina)  Patient Expects to be Discharged to[de-identified] Private residence  Current DME Used/Available at Home: Walker, rolling, Commode, bedside, Shower chair(lift chair)  Tub or Shower Type: Tub/Shower combination  [x]  Right hand dominant   []  Left hand dominant    Cognitive/Behavioral Status:  Neurologic State: Alert  Orientation Level: Oriented X4  Cognition: Follows commands  Safety/Judgement: Fall prevention    Skin: Intact  Edema: None noted    Vision/Perceptual:    Acuity: Within Defined Limits      Coordination: BUE  Fine Motor Skills-Upper: Left Intact; Right Intact    Gross Motor Skills-Upper: Left Intact; Right Intact    Balance:  Sitting: Intact  Standing: Impaired; With support  Standing - Static: Fair  Standing - Dynamic : Fair;Occasional    Strength: BUE  Strength: Generally decreased, functional    Tone & Sensation: BUE  Tone: Normal  Sensation: Impaired(numbness in BUE; PTA)    Range of Motion: BUE  AROM: Within functional limits    Functional Mobility and Transfers for ADLs:    Transfers:  Sit to Stand: Contact guard assistance  Stand to Sit: Contact guard assistance    ADL Assessment:   Feeding: Modified independent    Oral Facial Hygiene/Grooming: Modified Independent    Bathing: Stand-by assistance; Adaptive equipment    Upper Body Dressing: Modified independent    Lower Body Dressing: Contact guard assistance    Toileting: Contact guard assistance    ADL Intervention:  Lower Body Dressing Assistance  Dressing Assistance: Contact guard assistance  Pants With Elastic Waist: Contact guard assistance(standing)  Socks: Stand-by assistance  Position Performed: Seated in chair;Standing  Adaptive Equipment Used: Reacher;Sock aid    Cognitive Retraining  Safety/Judgement: Fall prevention    Pain:  Pain level pre-treatment: 5/10, back   Pain Intervention(s): Medication (see MAR); Response to intervention: See doc flow    Activity Tolerance:   Fair    Please refer to the flowsheet for vital signs taken during this treatment. After treatment:   [x] Patient left in no apparent distress sitting up in chair  [] Patient left in no apparent distress in bed  [x] Call bell left within reach  [x] Nursing notified  [] Caregiver present  [] Bed alarm activated    COMMUNICATION/EDUCATION:   [x] Role of Occupational Therapy in the acute care setting  [x] Home safety education was provided and the patient/caregiver indicated understanding. [x] Patient/family have participated as able in goal setting and plan of care. [x] Patient/family agree to work toward stated goals and plan of care. [] Patient understands intent and goals of therapy, but is neutral about his/her participation. [] Patient is unable to participate in goal setting and plan of care. Thank you for this referral.  Sariah Espinosa OTR/L  Time Calculation: 23 mins    Eval Complexity: History: LOW Complexity : Brief history review ; Examination: MEDIUM Complexity : 3-5 performance deficits relating to physical, cognitive , or psychosocial skils that result in activity limitations and / or participation restrictions;    Decision Making:MEDIUM Complexity : Patient may present with comorbidities that affect occupational performnce.  Miniml to moderate modification of tasks or assistance (eg, physical or verbal ) with assesment(s) is necessary to enable patient to complete evaluation

## 2020-02-28 NOTE — PROGRESS NOTES
Reason for Admission:  Spinal stenosis, lumbar region with neurogenic claudication [M48.062]  HNP (herniated nucleus pulposus), lumbar [M51.26]  Lumbar spinal stenosis [M48.061]  HNP (herniated nucleus pulposus), lumbar [M51.26]                 RRAT Score:   8%           Plan for utilizing home health: [address]                     Likelihood of Readmission:   LOW                         Transition of Care Plan:              Initial assessment completed with patient. Cognitive status of patient: oriented to time, place, person and situation. Face sheet information confirmed:  yes. The patient designates Joe Tejeda, daughter ([phone]) to participate in her discharge plan and to receive any needed information. This patient lives in a single family home with daughters. Patient is not able to navigate steps as needed. Prior to hospitalization, patient was considered to be independent with ADLs/IADLS : yes . Patient has a current ACP document on file: no  The patient's family  will be available to transport patient home upon discharge. The patient already has Elwanda Lyudmila, W/C, Lift chair, Shower chair, BSC, and wheelchair ramp medical equipment available in the home. Patient is not currently active with home health. .  Patient has not stayed in a skilled nursing facility or rehab. This patient is on dialysis :no      List of available Home Health agencies were provided and reviewed with the patient prior to discharge. Freedom of choice signed: yes, for [address] and referral sent     Currently, the discharge plan is Home with [address] [place] [address] [facility]. CM will continue to monitor for transitional needs. The patient states that she can obtain her medications from the pharmacy, and take her medications as directed. Patient's current insurance is Medicare Part A and B and Generic Commercial       Care Management Interventions  PCP Verified by CM: Yes(per patient, saw pcp 2 weeks ago. )  Mode of Transport at Discharge: Self  Transition of Care Consult (CM Consult): Discharge Planning, [address]: Yes  Discharge Durable Medical Equipment: No  Physical Therapy Consult: Yes  Occupational Therapy Consult: Yes  Speech Therapy Consult: No  Current Support Network: Own Home(Daughters live in home with patient. )  Confirm Follow Up Transport: Self  The Plan for Transition of Care is Related to the Following Treatment Goals : home with home health  The Patient and/or Patient Representative was Provided with a Choice of Provider and Agrees with the Discharge Plan?: Yes  Freedom of Choice List was Provided with Basic Dialogue that Supports the Patient's Individualized Plan of Care/Goals, Treatment Preferences and Shares the Quality Data Associated with the Providers?: Yes  Discharge Location  Discharge Placement: Home with home health        SARAH Sy, RN  Pager # [phone]  Care Manager

## 2020-02-28 NOTE — PROGRESS NOTES
Problem: Infection - Risk of, Surgical Site Infection  Goal: *Absence of surgical site infection signs and symptoms  Outcome: Progressing Towards Goal     Problem: Patient Education: Go to Patient Education Activity  Goal: Patient/Family Education  Outcome: Progressing Towards Goal     Problem: Falls - Risk of  Goal: *Absence of Falls  Description  Document Tia Manus Fall Risk and appropriate interventions in the flowsheet.   Outcome: Progressing Towards Goal  Note: Fall Risk Interventions:  Mobility Interventions: Patient to call before getting OOB, PT Consult for mobility concerns         Medication Interventions: Patient to call before getting OOB, Teach patient to arise slowly    Elimination Interventions: Call light in reach, Patient to call for help with toileting needs              Problem: Patient Education: Go to Patient Education Activity  Goal: Patient/Family Education  Outcome: Progressing Towards Goal     Problem: Pain  Goal: *Control of Pain  Outcome: Progressing Towards Goal  Goal: *PALLIATIVE CARE:  Alleviation of Pain  Outcome: Progressing Towards Goal     Problem: Patient Education: Go to Patient Education Activity  Goal: Patient/Family Education  Outcome: Progressing Towards Goal     Problem: Patient Education: Go to Patient Education Activity  Goal: Patient/Family Education  Outcome: Progressing Towards Goal     Problem: Patient Education: Go to Patient Education Activity  Goal: Patient/Family Education  Outcome: Progressing Towards Goal     Problem: Patient Education: Go to Patient Education Activity  Goal: Patient/Family Education  Outcome: Progressing Towards Goal

## 2020-02-28 NOTE — PROGRESS NOTES
Spoke with Dr. Deepika Bass from pulmonology, Dr. Jacobo First will see the pt tomorrow morning.     Len Ferrari NP

## 2020-02-29 ENCOUNTER — HOME CARE VISIT (OUTPATIENT)
Dept: HOME HEALTH SERVICES | Facility: HOME HEALTH | Age: 75
End: 2020-02-29

## 2020-02-29 VITALS
HEIGHT: 62 IN | SYSTOLIC BLOOD PRESSURE: 90 MMHG | RESPIRATION RATE: 16 BRPM | DIASTOLIC BLOOD PRESSURE: 52 MMHG | OXYGEN SATURATION: 97 % | BODY MASS INDEX: 48.58 KG/M2 | HEART RATE: 77 BPM | TEMPERATURE: 97.3 F | WEIGHT: 264 LBS

## 2020-02-29 PROCEDURE — 74011250637 HC RX REV CODE- 250/637: Performed by: ORTHOPAEDIC SURGERY

## 2020-02-29 RX ADMIN — HYDROXYCHLOROQUINE SULFATE 200 MG: 200 TABLET, FILM COATED ORAL at 08:16

## 2020-02-29 RX ADMIN — SERTRALINE HYDROCHLORIDE 150 MG: 50 TABLET ORAL at 08:16

## 2020-02-29 RX ADMIN — ACETAMINOPHEN 1000 MG: 500 TABLET ORAL at 12:34

## 2020-02-29 RX ADMIN — PANTOPRAZOLE SODIUM 40 MG: 40 TABLET, DELAYED RELEASE ORAL at 17:14

## 2020-02-29 RX ADMIN — ACETAMINOPHEN 1000 MG: 500 TABLET ORAL at 17:13

## 2020-02-29 RX ADMIN — GABAPENTIN 300 MG: 300 CAPSULE ORAL at 17:14

## 2020-02-29 RX ADMIN — Medication 10 ML: at 05:54

## 2020-02-29 RX ADMIN — PANTOPRAZOLE SODIUM 40 MG: 40 TABLET, DELAYED RELEASE ORAL at 08:16

## 2020-02-29 RX ADMIN — GABAPENTIN 300 MG: 300 CAPSULE ORAL at 08:18

## 2020-02-29 NOTE — PROGRESS NOTES
Discharge orders noted. Per pt her daughter is coming to pick her up. Home health set. No other discharge needs at this time. 1240: Pt has order for home use oxygen. Waiting for order to be clarified and for walking test.  Pt's nurse Jenny Lombardi aware. 1612: Order for oxygen and pt's clinicals faxed to [facility]. Called Freya Duncan of [facility]. She stated they will work on the order and deliver to pt's room. Nursing informed.       NABEEL GilesN RN  Care Management  Pager: [phone]

## 2020-02-29 NOTE — ROUTINE PROCESS
notified of low BP. No new at this time. Will continue to monitor patient. Her condition is stable at present time.

## 2020-02-29 NOTE — DISCHARGE INSTRUCTIONS
DISCHARGE SUMMARY from Nurse    PATIENT INSTRUCTIONS:    After general anesthesia or intravenous sedation, for 24 hours or while taking prescription Narcotics:  · Limit your activities  · Do not drive and operate hazardous machinery  · Do not make important personal or business decisions  · Do  not drink alcoholic beverages  · If you have not urinated within 8 hours after discharge, please contact your surgeon on call. Report the following to your surgeon:  · Excessive pain, swelling, redness or odor of or around the surgical area  · Temperature over 100.5  · Nausea and vomiting lasting longer than 4 hours or if unable to take medications  · Any signs of decreased circulation or nerve impairment to extremity: change in color, persistent  numbness, tingling, coldness or increase pain  · Any questions    What to do at Home:  Recommended activity: Activity as tolerated, follow instructions given by physical and occupational therapist.    If you experience any of the following symptoms ,See copy of spine surgery discharge instructions provided , please follow up with Dwain Schmidt. *  Please give a list of your current medications to your Primary Care Provider. *  Please update this list whenever your medications are discontinued, doses are      changed, or new medications (including over-the-counter products) are added. *  Please carry medication information at all times in case of emergency situations. These are general instructions for a healthy lifestyle:    No smoking/ No tobacco products/ Avoid exposure to second hand smoke  Surgeon General's Warning:  Quitting smoking now greatly reduces serious risk to your health.     Obesity, smoking, and sedentary lifestyle greatly increases your risk for illness    A healthy diet, regular physical exercise & weight monitoring are important for maintaining a healthy lifestyle    You may be retaining fluid if you have a history of heart failure or if you experience any of the following symptoms:  Weight gain of 3 pounds or more overnight or 5 pounds in a week, increased swelling in our hands or feet or shortness of breath while lying flat in bed. Please call your doctor as soon as you notice any of these symptoms; do not wait until your next office visit. Patient armband removed and shredded. MyChart Activation    Thank you for requesting access to [employer]. Please follow the instructions below to securely access and download your online medical record. [employer] allows you to send messages to your doctor, view your test results, renew your prescriptions, schedule appointments, and more. How Do I Sign Up? 1. In your internet browser, go to [email or web address]. 2. Click on the First Time User? Click Here link in the Sign In box. You will see the New Member Sign Up page. 3. Enter your [employer] Access Code exactly as it appears below. You will not need to use this code after youve completed the sign-up process. If you do not sign up before the expiration date, you must request a new code. [employer] Access Code: [email or web address]  Expires: 2020  5:33 PM (This is the date your [employer] access code will )    4. Enter the last four digits of your Social Security Number (xxxx) and Date of Birth (mm/dd/yyyy) as indicated and click Submit. You will be taken to the next sign-up page. 5. Create a [employer] ID. This will be your [employer] login ID and cannot be changed, so think of one that is secure and easy to remember. 6. Create a [employer] password. You can change your password at any time. 7. Enter your Password Reset Question and Answer. This can be used at a later time if you forget your password. 8. Enter your e-mail address. You will receive e-mail notification when new information is available in [address] 9. Click Sign Up. You can now view and download portions of your medical record.   10. Click the Download Summary menu link to download a portable copy of your medical information. Additional Information    If you have questions, please visit the Frequently Asked Questions section of the [employer] website at [email or web address] Remember, [employer] is NOT to be used for urgent needs. For medical emergencies, dial 911. The discharge information has been reviewed with the patient. The patient verbalized understanding. Discharge medications reviewed with the patient and appropriate educational materials and side effects teaching were provided.   ___________________________________________________________________________________________________________________________________

## 2020-02-29 NOTE — CONSULTS
[facility]  Pulmonary, Critical Care, and Sleep Medicine    Initial Patient Consult    Name: Kizzy Jones MRN: [id number]   : 1945 Hospital: [address] [place] [address]    Date: 2020        IMPRESSION:   · Hypoxemia of uncertain etiology. Differentials include post op atelectasis, shrinking lung syndrome (SLS) due to Lupus, jodie with lack of evidence of interstitial lung disease on CT chest. Obesity hypoventilation is also a possibility. Suspect chronic hypoxemia, jodie with CTA negative for PE   · Spinal stenosis and disc herniation S/p L4/5 laminectomy/discectomy/medial facetectomy, resection of synovial mass 2020  · Morbid obesity  · Lupus on Plaquenil  · HTN history  · History of venous thromboembolic disease, S/p IVC filter      RECOMMENDATIONS:   · Start supplemental O2 at 2 LNC HS and activity. Home health consulted to set up home O2  · Full PFT and ABG to be done as outpatient  · Pt with note of daytime hypersomnolence, snoring and fractured sleep ?undiagnosed HO. Will schedule sleep studies after pt recovers from surgery  · Echocardiogram jodie to assess R sided pressures  · Encouraged incentive spirometry  · Will arrange for office follow up with jerry charles for discharge per pulmonary perspective     Subjective: This patient has been seen and evaluated at the request of Dr. Jamie Nath for exercise desaturation. Patient is a 75 y.o. female who is S/p lumbar laminectomy, discectomy after failing conservative measures. Pt was noted to be hypoxemic by physical therapy and started on O2, prompting pulmonary consult. Pt denies chest pain, SOB, hemoptysis. Of note, pt was treated with antibiotics ~5 weeks ago for \"pneumonia\". She has had a non productive cough since then. No fever. Pt denies PND, orthopnea or pedal edema. Pt's family has noted loud snoring but no witnessed apneas. +daytime hypersomnolence and am headaches.     Pt has Lupus and is followed by Dr. Doug Null who has her on Plaquenil. On my exam, I did note pt desaturation to 86%after mild activity. She was not in respiratory distress,      Past Medical History:   Diagnosis Date    Arthritis     Bronchitis 01/01/2020    Hypertension     Indigestion     Lupus ([address]     Memory difficulty     Nausea & vomiting     Ringing of ears     sometimes    Thromboembolus [facility]) 2009      Past Surgical History:   Procedure Laterality Date    HX CHOLECYSTECTOMY  1988    HX COLONOSCOPY      HX ORTHOPAEDIC  April 09'    right knee replacement     HX ORTHOPAEDIC  Sept 09'    left knee replacement    HX VASCULAR ACCESS      IVC filter    REMOVAL GALLBLADDER        Prior to Admission medications    Medication Sig Start Date End Date Taking? Authorizing Provider   oxyCODONE IR (ROXICODONE) 5 mg immediate release tablet Take 1 Tab by mouth every six (6) hours as needed for Pain for up to 5 days. Max Daily Amount: 20 mg. 2/28/20 3/4/20 Yes Elizbaeth Anne MD   gabapentin (NEURONTIN) 300 mg capsule Take 1 cap by mouth in the morning and 1-2 at night as directed. Patient taking differently: two (2) times a day. Take 1 cap by mouth in the morning and 1-2 at night as directed. 10/7/19  Yes Yesenia Mckinnon MD   celecoxib (CELEBREX) 200 mg capsule Take  by mouth two (2) times a day. Yes Provider, Historical   sertraline (ZOLOFT) 50 mg tablet Take 150 mg by mouth daily. Yes Provider, Historical   esomeprazole (NEXIUM) 40 mg capsule Take  by mouth daily. Yes Provider, Historical   hydroxychloroquine (PLAQUENIL) 200 mg tablet Take 200 mg by mouth daily. Yes Provider, Historical   benazepril-hydrochlorothiazide (LOTENSIN HCT) 20-25 mg per tablet Take  by mouth daily. Yes Provider, Historical   gabapentin (NEURONTIN) 100 mg capsule Take 2-3 po in the morning and 2-3 po in the evening as directed  Patient taking differently: two (2) times a day.  Take 2-3 po in the morning and 2-3 po in the evening as directed 9/5/19   Cindi Kirby C, NP   predniSONE (DELTASONE) 5 mg tablet 1 po daily for 15 days then 1/2 tablet daily till completed 5/15/19   John Benitez MD   diflunisal (DOLOBID) 500 mg tab Take 500 mg by mouth every twelve (12) hours as needed. Provider, Historical   ibuprofen (MOTRIN) 800 mg tablet Take 1 Tab by mouth two (2) times daily as needed. 4/3/19   Provider, Historical   aspirin delayed-release 81 mg tablet Take  by mouth daily. Provider, Historical   predniSONE (DELTASONE) 5 mg tablet Take 1 Tab by mouth daily. 4/22/19   John Benitez MD   diazePAM (VALIUM) 5 mg tablet Take 1 tablet 30 minutes before diagnostic test - may repeat x 1  Indications: inducing of a relaxed easy state 4/22/19   John Benitez MD   LORazepam (ATIVAN) 0.5 mg tablet Take 0.5 mg by mouth. Other, MD Artis   oxyCODONE-acetaminophen (PERCOCET) 5-325 mg per tablet Take 1 tablet by mouth every four (4) hours as needed for Pain for up to 20 doses. 11/16/14   Fatimah John MD   nortriptyline (PAMELOR) 25 mg capsule Take 1 Cap by mouth two (2) times a day. 9/14/12   Cayla Lawson MD   ZOLPIDEM TARTRATE (AMBIEN PO) Take  by mouth.     Provider, Historical     Allergies   Allergen Reactions    Morphine Anxiety      Social History     Tobacco Use    Smoking status: Never Smoker    Smokeless tobacco: Never Used   Substance Use Topics    Alcohol use: No      Family History   Problem Relation Age of Onset    Stroke Father     Cancer Father         prastate         Current Facility-Administered Medications   Medication Dose Route Frequency    scopolamine (TRANSDERM-SCOP) 1 mg over 3 days 1 Patch  1 Patch TransDERmal ONCE    pantoprazole (PROTONIX) tablet 40 mg  40 mg Oral ACB&D    gabapentin (NEURONTIN) capsule 300 mg  300 mg Oral BID    hydroxychloroquine (PLAQUENIL) tablet 200 mg  200 mg Oral DAILY    sertraline (ZOLOFT) tablet 150 mg  150 mg Oral DAILY    sodium chloride (NS) flush 5-40 mL  5-40 mL IntraVENous Q8H    acetaminophen (TYLENOL) tablet 1,000 mg  1,000 mg Oral Q6H    ceFAZolin (ANCEF) 2g IVPB in 50 mL D5W  2 g IntraVENous Q8H    lisinopril (PRINIVIL, ZESTRIL) tablet 20 mg  20 mg Oral DAILY    And    hydroCHLOROthiazide (HYDRODIURIL) tablet 25 mg  25 mg Oral DAILY       Review of Systems:  Pertinent items are noted in HPI. Objective:   Vital Signs:    Visit Vitals  BP (!) 80/60   Pulse 64   Temp 97 °F (36.1 °C)   Resp 19   Ht 5' 2\" (1.575 m)   Wt 119.7 kg (264 lb)   SpO2 94%   BMI 48.29 kg/m²       O2 Device: Nasal cannula   O2 Flow Rate (L/min): 2 l/min   Temp (24hrs), Av.8 °F (36.6 °C), Min:97 °F (36.1 °C), Max:98.1 °F (36.7 °C)       Intake/Output:   Last shift:      No intake/output data recorded. Last 3 shifts:  1901 -  0700  In: 2340 [P.O.:2340]  Out: 858 [Urine:700; Drains:158]    Intake/Output Summary (Last 24 hours) at 2020 1109  Last data filed at 2020 2253  Gross per 24 hour   Intake 720 ml   Output 200 ml   Net 520 ml      Physical Exam:   General:  Alert, cooperative, no distress, appears stated age. obese   Head:  Normocephalic, without obvious abnormality, atraumatic. Eyes:  Conjunctivae/corneas clear. PERRL, EOMs intact. Nose: Nares normal.  Mucosa normal. No drainage or sinus tenderness. Throat: Lips, mucosa, and tongue normal.    Neck: Supple, symmetrical, trachea midline, no adenopathy, thyroid: no enlargment/tenderness/nodules    Back:   Symmetric, post op   Lungs:   Clear to auscultation bilaterally. No rales or wheezes   Chest wall:  No tenderness or deformity. Heart:  Regular rate and rhythm, S1, S2 normal, no murmur, click, rub or gallop. Abdomen:   Soft, non-tender. Bowel sounds normal. No masses,  No organomegaly. Extremities: Extremities normal, atraumatic, dependent rubor, trace edema   Pulses: 2+ and symmetric all extremities.    Skin: Skin color, texture, turgor normal. No rashes or lesions   Lymph nodes: Cervical, supraclavicular nodes normal. Neurologic: Grossly nonfocal     Data review:     Recent Results (from the past 24 hour(s))   METABOLIC PANEL, COMPREHENSIVE    Collection Time: 02/28/20  6:14 PM   Result Value Ref Range    Sodium 142 136 - 145 mmol/L    Potassium 3.9 3.5 - 5.5 mmol/L    Chloride 106 100 - 111 mmol/L    CO2 32 21 - 32 mmol/L    Anion gap 4 3.0 - 18 mmol/L    Glucose 156 (H) 74 - 99 mg/dL    BUN 25 (H) 7.0 - 18 MG/DL    Creatinine 0.60 0.6 - 1.3 MG/DL    BUN/Creatinine ratio 42 (H) 12 - 20      GFR est AA >60 >60 ml/min/1.73m2    GFR est non-AA >60 >60 ml/min/1.73m2    Calcium 9.3 8.5 - 10.1 MG/DL    Bilirubin, total 0.2 0.2 - 1.0 MG/DL    ALT (SGPT) 29 13 - 56 U/L    AST (SGOT) 37 10 - 38 U/L    Alk. phosphatase 100 45 - 117 U/L    Protein, total 6.2 (L) 6.4 - 8.2 g/dL    Albumin 2.9 (L) 3.4 - 5.0 g/dL    Globulin 3.3 2.0 - 4.0 g/dL    A-G Ratio 0.9 0.8 - 1.7         Imaging:  I have personally reviewed the patients radiographs and have reviewed the reports:  XR Results (most recent):  Results from Hospital Encounter encounter on 02/27/20   XR CHEST PORT    Narrative EXAM:  Chest Portable. INDICATION:  Shortness of breath, oxygen saturation dropped     COMPARISON:  02/24/20. TECHNIQUE:  Portable AP chest study    FINDINGS:      - Atelectatic changes at the right lung base. Subtle streaky densities at the  left lower lung zone retrocardiac region.  - Both lungs are hypoinflated. No pneumothorax. No convincing evidence for  significant costophrenic angle blunting.  - No significant cardiac silhouette enlargement. - Mild gastric distention with air. Impression IMPRESSION:    1. Hyperinflation. 2.  Atelectatic changes at the lung bases. CT Results (most recent):  Results from Hospital Encounter encounter on 02/27/20   CTA CHEST W OR W WO CONT    Narrative EXAM:  CTA Chest with Contrast         CLINICAL INDICATION:  Shortness of breath and low O2 saturation.             COMPARISON:  No prior CTA chest.  No VQ scan.  Chest x-ray dated 02/24/20. CT  abdomen-pelvis 02/12/09. TECHNIQUE:    - Helical volumetric sections of the chest are obtained with CT pulmonary  angiogram protocol. Subsequently, sagittal and coronal multiplanar  reconstruction images are obtained. Maximum intensity projection images are  generated to better delineate the pulmonary vasculature, differentiate between  the pulmonary arteries and veins and to increase sensitivity to pulmonary  emboli.    - IV contrast 100 mL Isovue-370.  - Radiation dose optimization techniques are utilized as appropriate to the  exam, with combination of automated exposure control, adjustment of the mA  and/or kV according to patient's size (Including appropriate matching for  site-specific examinations), or use of iterative reconstruction technique. FINDINGS:     Pulmonary Arteries:  No convincing evidence for pulmonary embolism is detected. Lung, Pleura, Airways: Atelectatic changes at the lung bases bilaterally. No  dominant mass or discrete suspicious lung nodules. No definite airspace  opacities. Mediastinum, Clotilde:  No adenopathy. Aorta:  No evidence of aortic dissection. Apparent dilation of the ascending  aorta measuring about 4.3 cm. Base of Neck:  No acute findings. Axillae:  Unremarkable. Esophagus:  Unremarkable. Abdomen:  Both kidneys demonstrate multiple exophytic hypodensities. .  The  largest of the exophytic hypodensity is observed in the left kidney mid  interpolar region with an internal septation, measuring up to about 4.4 x 3.9  cm. Skeletal Structures:  No acute findings. Impression IMPRESSION:         1. No convincing evidence of pulmonary embolism. 2.  Atelectatic changes in both lungs especially at the lung bases. 3.  Multiple renal hypodensities. At least one of the renal cyst appears to  show a nonsimple feature, i.e. septation, not as apparent on the previous study.    Recommend follow-up dedicated CT of the abdomen without and with contrast for  further delineation. 4.  Mild dilation of the ascending aorta.                   Ursula Burnette MD

## 2020-02-29 NOTE — ROUTINE PROCESS
Drsg. Cy Pipes to incision lower back and old MALACHI site. [facility] drsg aplied to incision and a 2x2 & tegaderm drsg to MALACHI site. No active drainage or s/s of infection noted.

## 2020-02-29 NOTE — PROGRESS NOTES
vss afeb  sats mid 90's on 2 liter  No complaints of respiratory issues  Yesterday sats recorded in 70's  cxr and spiral ct normal  Will dc o2 and observe  Pulmonary consulted  If patient continues to do well and pulmonary sees no issue will dc.home  Fu 2 weeks

## 2020-02-29 NOTE — ROUTINE PROCESS
BP=82/60, HR=64. Patient is up to chair. No c/o of chest pain, SOB or dizziness. Will continue to monitor patients condition.

## 2020-02-29 NOTE — ROUTINE PROCESS
O2 SAT= 96% on O2 2L  NC at rest. O2 SAT=81% on RA with ambulation. O2 SAT= 96% on O2 2L NC while ambulating.

## 2020-02-29 NOTE — ROUTINE PROCESS
End of Shift Note Bedside and verbal shift change report given to Camilla Neal RN (On coming nurse) by Chelsie Brizuela RN (Off going nurse). Report included the following information:  
   --Procedure Summary 
   --MAR, 
   --Recent Results --Med Rec Status SBAR Recommendations: Ambulate Issues for Provider to address Low O2 Activity This Shift 
 
 [] Bed Rest Order 
 [] Refused 
 [] Dangled  
 [] TDWB Ambulating: 
   [x] Bathroom [] BSC [] Room/Hallway Up in Chair for meals []Yes [] No  
Voiding       [x] Yes  [] No 
Atkins          [] Yes  [] No 
Incontinent [] Yes  [] No 
 
DUE TO VOID POUR        [] Yes [] No 
Purewick    [] Yes [] No 
New Onset [] Yes [] No Straight Cath   []Yes  [] No 
Condom Cath  [] Yes [] No 
MD Called      [] Yes  [] No  
Blood Sugars Managed []Yes [x] No   
Bowels Moved [] Yes [x] No 
 
Incontinent     [] Yes [] No Passed Gas []Yes [x] No 
 
New Onset  []Yes [] No 
  
 
 MD Called []Yes  [] No 
  
CHG Bath Done Before Surgery After Surgery  
  
[] Yes  [x] No 
[] Yes  [x] No   
  
Drain Removed [] Yes  [] No [x] N/A Dressing Changed [x] Yes   [] No [] N/A Nausea/Vomiting [] Yes   [x] No    
Ice Packs Changed [] Yes   [x] No  [] N/A Incentive Spirometer  [x] Yes  [] No     
SCD Pumps On Ankle Pumping  [x] Yes   [] No  
  
[] Yes   [x] No    
  
Telemetry Monitoring [] Yes   [x] No   Rhythm

## 2020-02-29 NOTE — PROGRESS NOTES
Bedside shift change report given to Ajay Mak (oncoming nurse) by Marcelino Thomas (offgoing nurse). Report included the following information SBAR, Kardex, Procedure Summary, Intake/Output and MAR.

## 2020-02-29 NOTE — PROGRESS NOTES
2/28/20   1400: Physical Therapy notified RN that patient was in chair without oxygen and pulse ox was 80% after putting on 2L oxygen patients O2 WNL. Will notify NP when she rounds. 1530: Gala Medina, REMIGIO at patients bedside. NP updated on pt low pulse ox reported to RN by physical therapy. Pt reports that she has pneumonia recently before surgery. Debby Hilliard asked for patient to be put on room air and assess pulse ox. 1540: Pulse ox 89-90 after approximately 5 minutes on room air. 1550: Pulse ox down to 81-84. Debby Hilliard notified of all of the oxygen saturations. Order received to put patient on O2 and she would order pulm consult and xray. Patient and family updated and agree with plan.

## 2020-03-01 ENCOUNTER — HOME CARE VISIT (OUTPATIENT)
Dept: SCHEDULING | Facility: HOME HEALTH | Age: 75
End: 2020-03-01
Payer: MEDICARE

## 2020-03-01 VITALS
DIASTOLIC BLOOD PRESSURE: 70 MMHG | TEMPERATURE: 97.4 F | RESPIRATION RATE: 16 BRPM | HEART RATE: 76 BPM | SYSTOLIC BLOOD PRESSURE: 110 MMHG | OXYGEN SATURATION: 90 %

## 2020-03-01 PROCEDURE — G0299 HHS/HOSPICE OF RN EA 15 MIN: HCPCS

## 2020-03-01 PROCEDURE — A6213 FOAM DRG >16<=48 SQ IN W/BDR: HCPCS

## 2020-03-01 PROCEDURE — 3331090002 HH PPS REVENUE DEBIT

## 2020-03-01 PROCEDURE — G0151 HHCP-SERV OF PT,EA 15 MIN: HCPCS

## 2020-03-01 PROCEDURE — 400013 HH SOC

## 2020-03-01 PROCEDURE — 3331090001 HH PPS REVENUE CREDIT

## 2020-03-02 ENCOUNTER — HOME CARE VISIT (OUTPATIENT)
Dept: HOME HEALTH SERVICES | Facility: HOME HEALTH | Age: 75
End: 2020-03-02
Payer: MEDICARE

## 2020-03-02 ENCOUNTER — HOME CARE VISIT (OUTPATIENT)
Dept: SCHEDULING | Facility: HOME HEALTH | Age: 75
End: 2020-03-02
Payer: MEDICARE

## 2020-03-02 VITALS
DIASTOLIC BLOOD PRESSURE: 70 MMHG | SYSTOLIC BLOOD PRESSURE: 105 MMHG | OXYGEN SATURATION: 92 % | HEART RATE: 89 BPM | TEMPERATURE: 97.3 F | RESPIRATION RATE: 17 BRPM

## 2020-03-02 PROCEDURE — G0151 HHCP-SERV OF PT,EA 15 MIN: HCPCS

## 2020-03-02 PROCEDURE — 3331090001 HH PPS REVENUE CREDIT

## 2020-03-02 PROCEDURE — 3331090002 HH PPS REVENUE DEBIT

## 2020-03-02 NOTE — HOME CARE
Discharge noted over the weekend, [facility] will follow, [facility] referral was processed on 2/28/20 by [facility] central intake. DOTTY ESPINOZA.

## 2020-03-03 PROCEDURE — 3331090002 HH PPS REVENUE DEBIT

## 2020-03-03 PROCEDURE — 3331090001 HH PPS REVENUE CREDIT

## 2020-03-04 ENCOUNTER — HOME CARE VISIT (OUTPATIENT)
Dept: SCHEDULING | Facility: HOME HEALTH | Age: 75
End: 2020-03-04
Payer: MEDICARE

## 2020-03-04 VITALS
DIASTOLIC BLOOD PRESSURE: 65 MMHG | HEART RATE: 79 BPM | TEMPERATURE: 98.4 F | OXYGEN SATURATION: 93 % | SYSTOLIC BLOOD PRESSURE: 89 MMHG

## 2020-03-04 PROCEDURE — 3331090002 HH PPS REVENUE DEBIT

## 2020-03-04 PROCEDURE — 3331090001 HH PPS REVENUE CREDIT

## 2020-03-04 PROCEDURE — G0157 HHC PT ASSISTANT EA 15: HCPCS

## 2020-03-05 VITALS
HEART RATE: 70 BPM | DIASTOLIC BLOOD PRESSURE: 80 MMHG | TEMPERATURE: 98.2 F | SYSTOLIC BLOOD PRESSURE: 118 MMHG | OXYGEN SATURATION: 91 %

## 2020-03-05 PROCEDURE — 3331090001 HH PPS REVENUE CREDIT

## 2020-03-05 PROCEDURE — 3331090002 HH PPS REVENUE DEBIT

## 2020-03-06 ENCOUNTER — HOME CARE VISIT (OUTPATIENT)
Dept: SCHEDULING | Facility: HOME HEALTH | Age: 75
End: 2020-03-06
Payer: MEDICARE

## 2020-03-06 PROCEDURE — G0157 HHC PT ASSISTANT EA 15: HCPCS

## 2020-03-06 PROCEDURE — G0299 HHS/HOSPICE OF RN EA 15 MIN: HCPCS

## 2020-03-06 PROCEDURE — 3331090001 HH PPS REVENUE CREDIT

## 2020-03-06 PROCEDURE — 3331090002 HH PPS REVENUE DEBIT

## 2020-03-07 VITALS — SYSTOLIC BLOOD PRESSURE: 115 MMHG | TEMPERATURE: 98.5 F | DIASTOLIC BLOOD PRESSURE: 80 MMHG

## 2020-03-07 PROCEDURE — 3331090001 HH PPS REVENUE CREDIT

## 2020-03-07 PROCEDURE — 3331090002 HH PPS REVENUE DEBIT

## 2020-03-08 PROCEDURE — 3331090002 HH PPS REVENUE DEBIT

## 2020-03-08 PROCEDURE — 3331090001 HH PPS REVENUE CREDIT

## 2020-03-09 VITALS
RESPIRATION RATE: 20 BRPM | OXYGEN SATURATION: 93 % | TEMPERATURE: 97.1 F | SYSTOLIC BLOOD PRESSURE: 116 MMHG | DIASTOLIC BLOOD PRESSURE: 80 MMHG | HEART RATE: 63 BPM

## 2020-03-09 PROCEDURE — 3331090001 HH PPS REVENUE CREDIT

## 2020-03-09 PROCEDURE — 3331090002 HH PPS REVENUE DEBIT

## 2020-03-10 ENCOUNTER — HOME CARE VISIT (OUTPATIENT)
Dept: SCHEDULING | Facility: HOME HEALTH | Age: 75
End: 2020-03-10
Payer: MEDICARE

## 2020-03-10 VITALS
SYSTOLIC BLOOD PRESSURE: 128 MMHG | TEMPERATURE: 97.9 F | HEART RATE: 80 BPM | DIASTOLIC BLOOD PRESSURE: 69 MMHG | RESPIRATION RATE: 18 BRPM | OXYGEN SATURATION: 98 %

## 2020-03-10 PROCEDURE — G0299 HHS/HOSPICE OF RN EA 15 MIN: HCPCS

## 2020-03-10 PROCEDURE — 3331090002 HH PPS REVENUE DEBIT

## 2020-03-10 PROCEDURE — 3331090001 HH PPS REVENUE CREDIT

## 2020-03-11 ENCOUNTER — HOME CARE VISIT (OUTPATIENT)
Dept: SCHEDULING | Facility: HOME HEALTH | Age: 75
End: 2020-03-11
Payer: MEDICARE

## 2020-03-11 PROCEDURE — 3331090002 HH PPS REVENUE DEBIT

## 2020-03-11 PROCEDURE — 3331090001 HH PPS REVENUE CREDIT

## 2020-03-11 PROCEDURE — G0157 HHC PT ASSISTANT EA 15: HCPCS

## 2020-03-12 ENCOUNTER — OFFICE VISIT (OUTPATIENT)
Dept: ORTHOPEDIC SURGERY | Age: 75
End: 2020-03-12

## 2020-03-12 ENCOUNTER — HOME CARE VISIT (OUTPATIENT)
Dept: SCHEDULING | Facility: HOME HEALTH | Age: 75
End: 2020-03-12
Payer: MEDICARE

## 2020-03-12 VITALS
BODY MASS INDEX: 48.29 KG/M2 | DIASTOLIC BLOOD PRESSURE: 87 MMHG | HEIGHT: 62 IN | TEMPERATURE: 97.5 F | OXYGEN SATURATION: 92 % | RESPIRATION RATE: 16 BRPM | SYSTOLIC BLOOD PRESSURE: 132 MMHG | HEART RATE: 71 BPM

## 2020-03-12 VITALS
SYSTOLIC BLOOD PRESSURE: 112 MMHG | TEMPERATURE: 97.7 F | OXYGEN SATURATION: 82 % | HEART RATE: 55 BPM | DIASTOLIC BLOOD PRESSURE: 72 MMHG

## 2020-03-12 DIAGNOSIS — Z98.890 S/P LAMINECTOMY: Primary | ICD-10-CM

## 2020-03-12 PROCEDURE — G0157 HHC PT ASSISTANT EA 15: HCPCS

## 2020-03-12 PROCEDURE — 3331090001 HH PPS REVENUE CREDIT

## 2020-03-12 PROCEDURE — 3331090002 HH PPS REVENUE DEBIT

## 2020-03-12 RX ORDER — SERTRALINE HYDROCHLORIDE 50 MG/1
150 TABLET, FILM COATED ORAL DAILY
COMMUNITY

## 2020-03-12 NOTE — PROGRESS NOTES
[address]  [address], [address]  [place], [facility]  Phone: [phone]  Fax: [phone]    Spine Post-Op Office Visit Note    Patient: Kizzy Jones   MRN: [id number]     Age:  75 y.o.,      Sex: female    YOB: 1945     PCP: LEVI Lange    HISTORY OF PRESENT ILLNESS:  Chief Complaint   Patient presents with    Back Pain     lumbar pain, post op appt. Kizzy Jones is a 75 y.o.  female with history of back pain. Patient had L4/5 lami disc surgery 2 weeks ago. Prior to surgery, she had a progressive back and left greater than right leg pain. Today, she is feeling ok. The pain in her legs has resolved. She still has some weakness. The sensations have improved. She is having normal post op pains. Patient denies any bladder/bowel dysfunction, new onset weakness, or other neurological deficits. ASSESSMENT   Diagnoses and all orders for this visit:    1. S/P laminectomy            IMPRESSION AND PLAN   1) Pt was given information on s/p lami. 2) tylenol for pain, prn  3) Wound care and activity level reviewed. 4) Ms. Marcela Lopez has a reminder for a \"due or due soon\" health maintenance. I have asked that she contact her primary care provider, LEVI Lange, for follow-up on this health maintenance. 5) We have informed the patient to notify us for immediate appointment if he has any worsening neurogical symptoms or if an emergency situation presents, then call 911  6)  demonstrated consistency with prescribing. 7) Pt will follow-up in as scheduled . SUBJECTIVE    Pain Scale: 5/10    Pain Assessment  3/12/2020   Location of Pain Back   Location Modifiers (No Data)   Severity of Pain 5   Quality of Pain Aching; Other (Comment)   Quality of Pain Comment weakness   Duration of Pain Persistent   Frequency of Pain Constant   Aggravating Factors Standing;Bending; Other (Comment); Walking   Aggravating Factors Comment sitting    Limiting Behavior Yes   Relieving Factors Ice; Other (Comment)   Relieving Factors Comment tylenol   Result of Injury No         Review of systems  Constitutional: Negative for fever, chills, or weight change. Respiratory: Negative for cough or shortness of breath. Cardiovascular: Negative for chest pain or palpitations. Gastrointestinal: Negative for acid reflux, change in bowel habits, or constipation. Genitourinary: Negative for dysuria and flank pain. Musculoskeletal: Positive for back pain. Skin: Negative for rash. Neurological: Negative for headaches, dizziness, or numbness. Endo/Heme/Allergies: Negative for increased bruising. Psychiatric/Behavioral: Negative for difficulty with sleep. Past Medical History:   Diagnosis Date    Arthritis     Bronchitis 01/01/2020    Hypertension     Indigestion     Lupus ([facility] [address]     Memory difficulty     Nausea & vomiting     Ringing of ears     sometimes    Thromboembolus [facility]) 2009       Past Surgical History:   Procedure Laterality Date    HX CHOLECYSTECTOMY  1988    HX COLONOSCOPY      HX ORTHOPAEDIC  April 09'    right knee replacement     HX ORTHOPAEDIC  Sept 09'    left knee replacement    HX VASCULAR ACCESS      IVC filter    REMOVAL GALLBLADDER         Current Outpatient Medications   Medication Sig Dispense Refill    sertraline (Zoloft) 50 mg tablet Take  by mouth daily.  acetaminophen (TYLENOL) 500 mg tablet Take 1,000 mg by mouth every six (6) hours as needed for Pain.  ibuprofen (MOTRIN) 200 mg tablet Take 800 mg by mouth every eight (8) hours as needed for Pain.  OXYGEN-AIR DELIVERY SYSTEMS 2 L by IntraNASal route continuous.  gabapentin (NEURONTIN) 300 mg capsule Take 1 cap by mouth in the morning and 1-2 at night as directed. (Patient taking differently: two (2) times a day.  Take 1 cap by mouth in the morning and 1-2 at night as directed.) 270 Cap 1    hydroxychloroquine (PLAQUENIL) 200 mg tablet Take 200 mg by mouth daily.  benazepril-hydrochlorothiazide (LOTENSIN HCT) 20-25 mg per tablet Take  by mouth daily.  predniSONE (DELTASONE) 5 mg tablet Take 1 Tab by mouth daily. (Patient not taking: Reported on 3/2/2020) 21 Tab 0    diazePAM (VALIUM) 5 mg tablet Take 1 tablet 30 minutes before diagnostic test - may repeat x 1  Indications: inducing of a relaxed easy state 2 Tab 0    oxyCODONE-acetaminophen (PERCOCET) 5-325 mg per tablet Take 1 tablet by mouth every four (4) hours as needed for Pain for up to 20 doses. (Patient not taking: Reported on 3/2/2020) 20 tablet 0    nortriptyline (PAMELOR) 25 mg capsule Take 1 Cap by mouth two (2) times a day. (Patient not taking: Reported on 3/2/2020) 60 Cap 6       Allergies   Allergen Reactions    Morphine Anxiety            OBJECTIVE    Vitals:    03/12/20 1105   BP: 132/87   Pulse: 71   Resp: 16   Temp: 97.5 °F (36.4 °C)   TempSrc: Oral   SpO2: 92%   Height: 5' 2\" (1.575 m)   PainSc:   5       Physical Exam:  General: alert, cooperative, no distress, appears stated age  Constitutional:  Well developed, well nourished, in no acute distress. Psychiatric: Affect and mood are appropriate. Integumentary: No rashes or abrasions noted on exposed areas. Wound: Incision healing well, has well approximated edges, no erythema, warmth, drainage or signs of infection. Cardiovascular/Peripheral Vascular: No peripheral edema is noted. Lymphatic:  No evidence of lymphedema. No cervical lymphadenopathy. MOTOR     Hip Flex  Quads Hamstrings Ankle DF EHL Ankle PF   Right +4/5 +4/5 +4/5 +4/5 +4/5 +4/5   Left +4/5 +4/5 +4/5 +4/5 +4/5 +4/5       Straight Leg raise - bialterally. wheelchair    Accompanied by family member.       John Hobson NP  March 12, 2020  9:03 AM

## 2020-03-13 ENCOUNTER — HOME CARE VISIT (OUTPATIENT)
Dept: SCHEDULING | Facility: HOME HEALTH | Age: 75
End: 2020-03-13
Payer: MEDICARE

## 2020-03-13 VITALS
OXYGEN SATURATION: 96 % | DIASTOLIC BLOOD PRESSURE: 75 MMHG | HEART RATE: 75 BPM | TEMPERATURE: 98 F | SYSTOLIC BLOOD PRESSURE: 112 MMHG

## 2020-03-13 PROCEDURE — G0299 HHS/HOSPICE OF RN EA 15 MIN: HCPCS

## 2020-03-13 PROCEDURE — 3331090002 HH PPS REVENUE DEBIT

## 2020-03-13 PROCEDURE — 3331090001 HH PPS REVENUE CREDIT

## 2020-03-13 PROCEDURE — G0157 HHC PT ASSISTANT EA 15: HCPCS

## 2020-03-14 VITALS
OXYGEN SATURATION: 97 % | DIASTOLIC BLOOD PRESSURE: 70 MMHG | HEART RATE: 67 BPM | TEMPERATURE: 98 F | SYSTOLIC BLOOD PRESSURE: 110 MMHG

## 2020-03-14 PROCEDURE — 3331090002 HH PPS REVENUE DEBIT

## 2020-03-14 PROCEDURE — 3331090001 HH PPS REVENUE CREDIT

## 2020-03-15 PROCEDURE — 3331090001 HH PPS REVENUE CREDIT

## 2020-03-15 PROCEDURE — 3331090002 HH PPS REVENUE DEBIT

## 2020-03-16 PROCEDURE — 3331090001 HH PPS REVENUE CREDIT

## 2020-03-16 PROCEDURE — 3331090002 HH PPS REVENUE DEBIT

## 2020-03-17 PROCEDURE — 3331090001 HH PPS REVENUE CREDIT

## 2020-03-17 PROCEDURE — 3331090002 HH PPS REVENUE DEBIT

## 2020-03-18 PROCEDURE — 3331090001 HH PPS REVENUE CREDIT

## 2020-03-18 PROCEDURE — 3331090002 HH PPS REVENUE DEBIT

## 2020-03-19 VITALS
DIASTOLIC BLOOD PRESSURE: 80 MMHG | TEMPERATURE: 97.4 F | RESPIRATION RATE: 20 BRPM | OXYGEN SATURATION: 90 % | HEART RATE: 61 BPM | SYSTOLIC BLOOD PRESSURE: 108 MMHG

## 2020-03-19 PROCEDURE — 3331090001 HH PPS REVENUE CREDIT

## 2020-03-19 PROCEDURE — 3331090002 HH PPS REVENUE DEBIT

## 2020-03-20 PROCEDURE — 3331090002 HH PPS REVENUE DEBIT

## 2020-03-20 PROCEDURE — 3331090001 HH PPS REVENUE CREDIT

## 2020-03-21 PROCEDURE — 3331090002 HH PPS REVENUE DEBIT

## 2020-03-21 PROCEDURE — 3331090001 HH PPS REVENUE CREDIT

## 2020-03-22 PROCEDURE — 3331090001 HH PPS REVENUE CREDIT

## 2020-03-22 PROCEDURE — 3331090002 HH PPS REVENUE DEBIT

## 2020-03-23 PROCEDURE — 3331090001 HH PPS REVENUE CREDIT

## 2020-03-23 PROCEDURE — 3331090002 HH PPS REVENUE DEBIT

## 2020-03-24 PROCEDURE — 3331090002 HH PPS REVENUE DEBIT

## 2020-03-24 PROCEDURE — 3331090001 HH PPS REVENUE CREDIT

## 2020-03-25 PROCEDURE — 3331090002 HH PPS REVENUE DEBIT

## 2020-03-25 PROCEDURE — 3331090001 HH PPS REVENUE CREDIT

## 2020-03-26 ENCOUNTER — TELEPHONE (OUTPATIENT)
Dept: PULMONOLOGY | Age: 75
End: 2020-03-26

## 2020-03-26 PROCEDURE — 3331090001 HH PPS REVENUE CREDIT

## 2020-03-26 PROCEDURE — 3331090002 HH PPS REVENUE DEBIT

## 2020-03-26 NOTE — TELEPHONE ENCOUNTER
PT MINDY([phone]). PT SAW DR Fields [address]. SHE STATES THAT DR Celia Hall PUT HER ON O2. SHE WANTS TO KNOW HOW LONG SHE IS TO STAY ON IT. PLEASE CHECK AND CALL HER BACK.

## 2020-03-26 NOTE — TELEPHONE ENCOUNTER
Pt states sometimes during the day she will take the oxygen off and will still be in the mid 90's but if it drops to low 90's which it has she puts back on. Also she states she does sleep in it ever night. Pt advised until will test her we will not d/c her oxygen and with what she is telling me she needs to keep it for now.  Pt verbalizes understanding

## 2020-03-27 PROCEDURE — 3331090002 HH PPS REVENUE DEBIT

## 2020-03-27 PROCEDURE — 3331090001 HH PPS REVENUE CREDIT

## 2020-03-28 PROCEDURE — 3331090002 HH PPS REVENUE DEBIT

## 2020-03-28 PROCEDURE — 3331090001 HH PPS REVENUE CREDIT

## 2020-03-29 PROCEDURE — 3331090001 HH PPS REVENUE CREDIT

## 2020-03-29 PROCEDURE — 3331090002 HH PPS REVENUE DEBIT

## 2020-03-30 PROCEDURE — 3331090001 HH PPS REVENUE CREDIT

## 2020-03-30 PROCEDURE — 3331090002 HH PPS REVENUE DEBIT

## 2020-04-03 ENCOUNTER — HOME CARE VISIT (OUTPATIENT)
Dept: HOME HEALTH SERVICES | Facility: HOME HEALTH | Age: 75
End: 2020-04-03

## 2020-04-14 ENCOUNTER — OFFICE VISIT (OUTPATIENT)
Dept: ORTHOPEDIC SURGERY | Age: 75
End: 2020-04-14

## 2020-04-14 ENCOUNTER — HOME CARE VISIT (OUTPATIENT)
Dept: HOME HEALTH SERVICES | Facility: HOME HEALTH | Age: 75
End: 2020-04-14

## 2020-04-15 ENCOUNTER — VIRTUAL VISIT (OUTPATIENT)
Dept: ORTHOPEDIC SURGERY | Age: 75
End: 2020-04-15

## 2020-04-15 VITALS
HEART RATE: 61 BPM | TEMPERATURE: 97.4 F | OXYGEN SATURATION: 90 % | SYSTOLIC BLOOD PRESSURE: 108 MMHG | DIASTOLIC BLOOD PRESSURE: 80 MMHG | RESPIRATION RATE: 20 BRPM

## 2020-04-15 DIAGNOSIS — M48.062 SPINAL STENOSIS OF LUMBAR REGION WITH NEUROGENIC CLAUDICATION: ICD-10-CM

## 2020-04-15 RX ORDER — GABAPENTIN 300 MG/1
CAPSULE ORAL
Qty: 270 CAP | Refills: 1 | Status: SHIPPED | OUTPATIENT
Start: 2020-04-15 | End: 2020-07-28 | Stop reason: ALTCHOICE

## 2020-04-15 RX ORDER — FUROSEMIDE 20 MG/1
20 TABLET ORAL DAILY
COMMUNITY
End: 2021-04-25

## 2020-04-15 NOTE — PROGRESS NOTES
HPI  Jonas Lal is a 75 y.o. female who was seen by telephone since the pt doesn't have access to an electronic device with a camera and/or internet, patient's location home, provider's location [facility] office with the patient's verbal consent-with the understanding that charges maybe billed for the visit. This visit was conducted using the telephone on 4/15/2020 for     Chief Complaint   Patient presents with    Back Pain     lower       Additional Participants during visit: none    Jonas Lal is a 75 y.o. female with a history of morbid obesity with a BMI of 50+, lupus, and progressive back and left greater than right leg pain.  She finds it is hard to ambulate with the progressive numbness and weakness in her legs. She is a s/p L L4-5 decompression w/ a resection of a synovial mass. She reports that she is doing well with improvement in her severe back and leg pain. She is pleased with the outcome of her surgery. She has some residual numbness and continues with generalized weakness in her legs. She has been using a walker for over a year now, so I don't think that will change overnight. She denies any bladder/bowel dysfunction, new weakness or neuropathic pain. Medications: Gabapentin 300mg TID with moderate, complete, relief    Assessment & Plan:   Diagnoses and all orders for this visit:    1. Spinal stenosis of lumbar region with neurogenic claudication  -     gabapentin (Neurontin) 300 mg capsule; Take 1 cap by mouth in the morning and 1-2 at night as directed. This is a pt with stenosis 6 wks out from her decompression, doing well with improved back and leg pain. She reports her incision is healed. > Pt was given information on exercises   > Discussed activity and restrictions  > Pt will follow up w/ another VV in 2 mo  > Ms. Elisa Florez has a reminder for a \"due or due soon\" health maintenance.  I have asked that she contact her primary care provider, Tawanna Keys, Romana Garre, PA, for follow-up on this health maintenance.  > We have informed patient to notify us for immediate appointment if he has any worsening neurogical symptoms or if an emergency situation presents, then call 911  >  has been reviewed and is appropriate        CPT Codes 29990-77372 for Established Patients may apply to this Telehealth Visit  Time-based coding, delete if not needed: I spent at least 15 minutes with this established patient, and >50% of the time was spent counseling and/or coordinating care regarding back pain  Start Time: 1405  End Time: 1423    Subjective:   Gómez Donnelly was seen for Back Pain (lower)      Prior to Admission medications    Medication Sig Start Date End Date Taking? Authorizing Provider   furosemide (LASIX) 20 mg tablet Take  by mouth daily. Yes Provider, Historical   gabapentin (Neurontin) 300 mg capsule Take 1 cap by mouth in the morning and 1-2 at night as directed. 4/15/20  Yes Benedicto Mcghee NP   sertraline (Zoloft) 50 mg tablet Take  by mouth daily. Yes Provider, Historical   acetaminophen (TYLENOL) 500 mg tablet Take 1,000 mg by mouth every six (6) hours as needed for Pain. Yes Provider, Historical   ibuprofen (MOTRIN) 200 mg tablet Take 800 mg by mouth every eight (8) hours as needed for Pain. Yes Provider, Historical   OXYGEN-AIR DELIVERY SYSTEMS 2 L by IntraNASal route continuous. Yes Provider, Historical   hydroxychloroquine (PLAQUENIL) 200 mg tablet Take 200 mg by mouth daily. Yes Provider, Historical   benazepril-hydrochlorothiazide (LOTENSIN HCT) 20-25 mg per tablet Take  by mouth daily. Yes Provider, Historical   gabapentin (NEURONTIN) 300 mg capsule Take 1 cap by mouth in the morning and 1-2 at night as directed. Patient taking differently: two (2) times a day. Take 1 cap by mouth in the morning and 1-2 at night as directed. 10/7/19 4/15/20  Mushtaq Barr MD   predniSONE (DELTASONE) 5 mg tablet Take 1 Tab by mouth daily.   Patient not taking: Reported on 3/2/2020 4/22/19   Marquise Villavicencio MD   diazePAM (VALIUM) 5 mg tablet Take 1 tablet 30 minutes before diagnostic test - may repeat x 1  Indications: inducing of a relaxed easy state 4/22/19   Marquise Villavicencio MD   oxyCODONE-acetaminophen (PERCOCET) 5-325 mg per tablet Take 1 tablet by mouth every four (4) hours as needed for Pain for up to 20 doses. Patient not taking: Reported on 3/2/2020 11/16/14   Amalia Arndt MD   nortriptyline (PAMELOR) 25 mg capsule Take 1 Cap by mouth two (2) times a day. Patient not taking: Reported on 3/2/2020 9/14/12   Patrick Villalobos MD     Allergies   Allergen Reactions    Morphine Anxiety         Patient Active Problem List   Diagnosis Code    Hypertension I10    Lupus ([facility] [address] M32.9    Arthritis M19.90    Neuropathy G62.9    Obesity, morbid ([facility] [address] E66.01    Lumbar spinal stenosis M48.061    HNP (herniated nucleus pulposus), lumbar M51.26     Patient Active Problem List    Diagnosis Date Noted    Neuropathy 08/23/2011     Priority: 2 - Two    Lumbar spinal stenosis 02/27/2020    HNP (herniated nucleus pulposus), lumbar 02/27/2020    Obesity, morbid ([facility] [address] 04/22/2019    Hypertension     Lupus (HCC)     Arthritis      Current Outpatient Medications   Medication Sig Dispense Refill    furosemide (LASIX) 20 mg tablet Take  by mouth daily.  gabapentin (Neurontin) 300 mg capsule Take 1 cap by mouth in the morning and 1-2 at night as directed. 270 Cap 1    sertraline (Zoloft) 50 mg tablet Take  by mouth daily.  acetaminophen (TYLENOL) 500 mg tablet Take 1,000 mg by mouth every six (6) hours as needed for Pain.  ibuprofen (MOTRIN) 200 mg tablet Take 800 mg by mouth every eight (8) hours as needed for Pain.  OXYGEN-AIR DELIVERY SYSTEMS 2 L by IntraNASal route continuous.  hydroxychloroquine (PLAQUENIL) 200 mg tablet Take 200 mg by mouth daily.       benazepril-hydrochlorothiazide (LOTENSIN HCT) 20-25 mg per tablet Take  by mouth daily.  predniSONE (DELTASONE) 5 mg tablet Take 1 Tab by mouth daily. (Patient not taking: Reported on 3/2/2020) 21 Tab 0    diazePAM (VALIUM) 5 mg tablet Take 1 tablet 30 minutes before diagnostic test - may repeat x 1  Indications: inducing of a relaxed easy state 2 Tab 0    oxyCODONE-acetaminophen (PERCOCET) 5-325 mg per tablet Take 1 tablet by mouth every four (4) hours as needed for Pain for up to 20 doses. (Patient not taking: Reported on 3/2/2020) 20 tablet 0    nortriptyline (PAMELOR) 25 mg capsule Take 1 Cap by mouth two (2) times a day. (Patient not taking: Reported on 3/2/2020) 60 Cap 6     Allergies   Allergen Reactions    Morphine Anxiety     Past Medical History:   Diagnosis Date    Arthritis     Bronchitis 01/01/2020    Hypertension     Indigestion     Lupus ([facility] [address]     Memory difficulty     Nausea & vomiting     Ringing of ears     sometimes    Thromboembolus [facility]) 2009     Past Surgical History:   Procedure Laterality Date    HX CHOLECYSTECTOMY  1988    HX COLONOSCOPY      HX ORTHOPAEDIC  April 09'    right knee replacement     HX ORTHOPAEDIC  Sept 09'    left knee replacement    HX VASCULAR ACCESS      IVC filter    REMOVAL GALLBLADDER            ROS  REVIEW OF SYSTEMS  Constitutional: Negative for fever, chills, or weight change. Respiratory: Negative for cough or shortness of breath. Cardiovascular: Negative for chest pain or palpitations. Gastrointestinal: Negative for incontinence, acid reflux, change in bowel habits, or constipation. Genitourinary: Negative for incontinence, dysuria and flank pain. Musculoskeletal: Positive for back and leg pain pain. See HPI. Skin: Negative for rash. Neurological:BLE  radiculopathy. See HPI. Endo/Heme/Allergies: Negative. Psychiatric/Behavioral: Negative.         Objective:                         None, this was a telephone visit  Due to this being a TeleHealth evaluation, many elements of the physical examination are unable to be assessed. We discussed the expected course, resolution and complications of the diagnosis(es) in detail. Medication risks, benefits, costs, interactions, and alternatives were discussed as indicated. I advised her to contact the office if her condition worsens, changes or fails to improve as anticipated. She expressed understanding with the diagnosis(es) and plan. Pursuant to the emergency declaration under the [address] [place] [address], [facility] waiver authority and the [employer] Act, this Virtual  Visit was conducted, with patient's consent, to reduce the patient's risk of exposure to COVID-19 and provide continuity of care for an established patient. Services were provided through a video synchronous discussion virtually to substitute for in-person clinic visit.     Gordo Frey NP

## 2020-05-08 ENCOUNTER — VIRTUAL VISIT (OUTPATIENT)
Dept: PULMONOLOGY | Age: 75
End: 2020-05-08

## 2020-05-08 DIAGNOSIS — Z99.81 HYPOXEMIA REQUIRING SUPPLEMENTAL OXYGEN: Primary | ICD-10-CM

## 2020-05-08 DIAGNOSIS — R60.0 PEDAL EDEMA: ICD-10-CM

## 2020-05-08 DIAGNOSIS — E66.01 MORBID OBESITY WITH BMI OF 45.0-49.9, ADULT (HCC): ICD-10-CM

## 2020-05-08 DIAGNOSIS — Z98.890 S/P LUMBAR LAMINECTOMY: ICD-10-CM

## 2020-05-08 DIAGNOSIS — M32.9 SLE (SYSTEMIC LUPUS ERYTHEMATOSUS RELATED SYNDROME) (HCC): ICD-10-CM

## 2020-05-08 DIAGNOSIS — R09.02 HYPOXEMIA REQUIRING SUPPLEMENTAL OXYGEN: Primary | ICD-10-CM

## 2020-06-10 DIAGNOSIS — M32.9 SLE (SYSTEMIC LUPUS ERYTHEMATOSUS RELATED SYNDROME) (HCC): ICD-10-CM

## 2020-06-10 DIAGNOSIS — R60.0 PEDAL EDEMA: ICD-10-CM

## 2020-06-10 DIAGNOSIS — Z99.81 HYPOXEMIA REQUIRING SUPPLEMENTAL OXYGEN: Primary | ICD-10-CM

## 2020-06-10 DIAGNOSIS — R09.02 HYPOXEMIA REQUIRING SUPPLEMENTAL OXYGEN: Primary | ICD-10-CM

## 2020-06-10 NOTE — PROGRESS NOTES
Order placed for Overnight, per Verbal Order from Dr. Latonia Hernandez on 6/10/2020. Last office visit: 5/8/2020  Follow up Visit: due July 2020 (pt has echo on 7/15/2020, Dr. Limon Husbands wants Echo, overnight, and 6 minute walk done prior to patient follow up. Provider is aware of last office visit and follow up. No further action requested from provider.

## 2020-06-15 ENCOUNTER — VIRTUAL VISIT (OUTPATIENT)
Dept: ORTHOPEDIC SURGERY | Age: 75
End: 2020-06-15

## 2020-06-15 DIAGNOSIS — R60.0 EDEMA OF BOTH LOWER LEGS: ICD-10-CM

## 2020-06-15 DIAGNOSIS — M48.062 SPINAL STENOSIS OF LUMBAR REGION WITH NEUROGENIC CLAUDICATION: Primary | ICD-10-CM

## 2020-06-15 DIAGNOSIS — Z98.890 S/P LAMINECTOMY: ICD-10-CM

## 2020-06-15 NOTE — PATIENT INSTRUCTIONS
Gabapentin (By mouth) Gabapentin (america-a-PEN-tin) Treats seizures and pain caused by shingles. Brand Name(s): ACTIVE-PAC with Gabapentin, Convenience Doron, Cyclo/Bob 10/300 Pack, [employer], Bob-V, Gralise, Gralise Starter Pack, Neurontin, [employer] Kit There may be other brand names for this medicine. When This Medicine Should Not Be Used: This medicine is not right for everyone. Do not use it if you had an allergic reaction to gabapentin. How to Use This Medicine:  
Capsule, Liquid, Tablet · Take your medicine as directed. Your dose may need to be changed several times to find what works best for you. If you have epilepsy, do not allow more than 12 hours to pass between doses. · Capsule: Swallow the capsule whole with plenty of water. Do not open, crush, or chew it. · Gralise® tablet: Swallow the tablet whole . Do not crush, break, or chew it. · Neurontin® tablet: If you break a tablet into 2 pieces, use the second half as your next dose. If you don't use it within 28 days, throw it away. · Measure the oral liquid medicine with a marked measuring spoon, oral syringe, or medicine cup. · This medicine should come with a Medication Guide. Ask your pharmacist for a copy if you do not have one. · Missed dose: Take a dose as soon as you remember. If it is almost time for your next dose, wait until then and take a regular dose. Do not take extra medicine to make up for a missed dose. · Store the medicine in a closed container at room temperature, away from heat, moisture, and direct light. Store the Neurontin® oral liquid in the refrigerator. Do not freeze. Drugs and Foods to Avoid: Ask your doctor or pharmacist before using any other medicine, including over-the-counter medicines, vitamins, and herbal products. · Some medicines can affect how gabapentin works. Tell your doctor if you also use any of the following: ¨ Hydrocodone ¨ Morphine · If you take an antacid, wait at least 2 hours before you take gabapentin. · Tell your doctor if you use anything else that makes you sleepy. Some examples are allergy medicine, narcotic pain medicine, and alcohol. Warnings While Using This Medicine: · Tell your doctor if you are pregnant or breastfeeding, or if you have kidney problems or are receiving dialysis. Tell your doctor if you have a history of depression or mental health problems. · This medicine may increase depression or thoughts of suicide. Tell your doctor right away if you start to feel more depressed or think about hurting yourself. · This medicine may cause a serious allergic reaction called multiorgan hypersensitivity, which can damage organs and be life-threatening. · Do not stop using this medicine suddenly. Your doctor will need to slowly decrease your dose before you stop it completely. If you take this medicine to prevent seizures, your seizures may return or occur more often if you stop this medicine suddenly. · This medicine may make you dizzy or drowsy. Do not drive or do anything else that could be dangerous until you know how this medicine affects you. · Tell any doctor or dentist who treats you that you are using this medicine. This medicine may affect certain medical test results. · Your doctor will check your progress and the effects of this medicine at regular visits. Keep all appointments. · Keep all medicine out of the reach of children. Never share your medicine with anyone. Possible Side Effects While Using This Medicine:  
Call your doctor right away if you notice any of these side effects: · Allergic reaction: Itching or hives, swelling in your face or hands, swelling or tingling in your mouth or throat, chest tightness, trouble breathing · Behavior problems, aggression, restlessness, trouble concentrating, moodiness (especially in children) · Blistering, peeling, red skin rash · Change in how much or how often you urinate, bloody or cloudy urine, 
· Chest pain, fast heartbeat, trouble breathing · Dark urine or pale stools, nausea, vomiting, loss of appetite, stomach pain, yellow skin or eyes · Fever, rash, swollen or tender glands in the neck, armpit, or groin · Problems with coordination, shakiness, unsteadiness · Rapid weight gain, swelling in your hands, ankles, or feet · Unusual moods or behaviors, thoughts of hurting yourself, feeling depressed If you notice these less serious side effects, talk with your doctor: · Dizziness, drowsiness, sleepiness, tiredness If you notice other side effects that you think are caused by this medicine, tell your doctor. Call your doctor for medical advice about side effects. You may report side effects to FDA at 5-269-FDA-1955 © 2017 [address] [employer] Information is for End User's use only and may not be sold, redistributed or otherwise used for commercial purposes. The above information is an  only. It is not intended as medical advice for individual conditions or treatments. Talk to your doctor, nurse or pharmacist before following any medical regimen to see if it is safe and effective for you.

## 2020-06-15 NOTE — PROGRESS NOTES
NIKOLAS Lyons is a 75 y.o. female who was seen by synchronous (real-time) audio-video technology, patient's location home, provider's location [facility] with the patient's verbal consent-with the understanding that charges maybe billed for the visit. This visit was conducted using the doxy. me platform on 6/15/2020 for     Chief Complaint   Patient presents with    Back Pain     follow up low back        Additional Participants during visit: daughter    Susan Bardales a 75 y. o. female with a history of morbid obesity with a BMI of 50+, lupus, and progressive back and left greater than right leg pain.  She finds it is hard to ambulate with the progressive numbness and weakness in her legs. She is a s/p L L4-5 decompression w/ a resection of a synovial mass on 2/28/20. Today, she is having some ongoing BLE edema, she is on lasix for that. The Lasix is not helping. I was able to see the edema on the video and it looks like about a +3 grade. As far as her back, She reports that she is doing well with improvement in her severe back and leg pain. She is pleased with the outcome of her surgery. She reports some BLE weakness that she isn't sure if its from the swelling. She has been seeing a pulmonologist for some SOB and is supposed to get some testing for her heart. She has some residual numbness and continues with generalized weakness in her legs. She has been using a walker for over a year now, so I don't think that will change overnight. She denies any bladder/bowel dysfunction, new weakness or neuropathic pain.     Medications: Gabapentin 300mg BID with moderate, complete, relief    Assessment & Plan:   Diagnoses and all orders for this visit:    1. Spinal stenosis of lumbar region with neurogenic claudication  -     DUPLEX LOWER EXT VENOUS LEFT; Future  -     DUPLEX LOWER EXT VENOUS RIGHT; Future  -     REFERRAL TO VASCULAR SURGERY    2.  S/P laminectomy  -     DUPLEX LOWER EXT VENOUS LEFT; Future  -     DUPLEX LOWER EXT VENOUS RIGHT; Future  -     REFERRAL TO VASCULAR SURGERY    3. Edema of both lower legs  -     DUPLEX LOWER EXT VENOUS LEFT; Future  -     DUPLEX LOWER EXT VENOUS RIGHT; Future  -     REFERRAL TO VASCULAR SURGERY        This is a pt doing well from her decompression surgery 4mo ago. She has ongoing BLE swelling and weakness. > Pt was given information on PVL   > Advised to decrease the Gabapentin to QHS  > PVL bilateral r/o DVT  > Refer to vascular surgeon  > Advised that pt should come in the office for a physical exam to examine the weakness of her legs, pt deferred until 6/30-discussed risks of waiting.  > Ms. Leonora Vasquez has a reminder for a \"due or due soon\" health maintenance. I have asked that she contact her primary care provider, LEVI Hernandez, for follow-up on this health maintenance.  > We have informed patient to notify us for immediate appointment if he has any worsening neurogical symptoms or if an emergency situation presents, then call 911  >  has been reviewed and is appropriate        CPT Codes 24037-57394 for Established Patients may apply to this Telehealth Visit  Time-based coding, delete if not needed: I spent at least 15 minutes with this established patient, and >50% of the time was spent counseling and/or coordinating care regarding back pain  Start Time: 1220  End Time: 1248    Subjective:   Shiela Wood was seen for Back Pain (follow up low back )      Prior to Admission medications    Medication Sig Start Date End Date Taking? Authorizing Provider   furosemide (LASIX) 20 mg tablet Take  by mouth daily. Yes Provider, Historical   gabapentin (Neurontin) 300 mg capsule Take 1 cap by mouth in the morning and 1-2 at night as directed. 4/15/20  Yes Geri Romberg, NP   sertraline (Zoloft) 50 mg tablet Take  by mouth daily.    Yes Provider, Historical   acetaminophen (TYLENOL) 500 mg tablet Take 1,000 mg by mouth every six (6) hours as needed for Pain.   Yes Provider, Historical   ibuprofen (MOTRIN) 200 mg tablet Take 800 mg by mouth every eight (8) hours as needed for Pain. Yes Provider, Historical   OXYGEN-AIR DELIVERY SYSTEMS 2 L by IntraNASal route continuous. Yes Provider, Historical   hydroxychloroquine (PLAQUENIL) 200 mg tablet Take 200 mg by mouth daily. Yes Provider, Historical   benazepril-hydrochlorothiazide (LOTENSIN HCT) 20-25 mg per tablet Take  by mouth daily. Yes Provider, Historical   predniSONE (DELTASONE) 5 mg tablet Take 1 Tab by mouth daily. Patient not taking: Reported on 3/2/2020 4/22/19   Babak Rehman MD   diazePAM (VALIUM) 5 mg tablet Take 1 tablet 30 minutes before diagnostic test - may repeat x 1  Indications: inducing of a relaxed easy state 4/22/19   Babak Rehman MD   oxyCODONE-acetaminophen (PERCOCET) 5-325 mg per tablet Take 1 tablet by mouth every four (4) hours as needed for Pain for up to 20 doses. Patient not taking: Reported on 3/2/2020 11/16/14   David Goodman MD   nortriptyline (PAMELOR) 25 mg capsule Take 1 Cap by mouth two (2) times a day. Patient not taking: Reported on 3/2/2020 9/14/12   Sandy Meneses MD     Allergies   Allergen Reactions    Morphine Anxiety         Patient Active Problem List   Diagnosis Code    Hypertension I10    Lupus ([address] M32.9    Arthritis M19.90    Neuropathy G62.9    Obesity, morbid ([address] E66.01    Lumbar spinal stenosis M48.061    HNP (herniated nucleus pulposus), lumbar M51.26     Patient Active Problem List    Diagnosis Date Noted    Neuropathy 08/23/2011     Priority: 2 - Two    Lumbar spinal stenosis 02/27/2020    HNP (herniated nucleus pulposus), lumbar 02/27/2020    Obesity, morbid ([address] 04/22/2019    Hypertension     Lupus (HCC)     Arthritis      Current Outpatient Medications   Medication Sig Dispense Refill    furosemide (LASIX) 20 mg tablet Take  by mouth daily.       gabapentin (Neurontin) 300 mg capsule Take 1 cap by mouth in the morning and 1-2 at night as directed. 270 Cap 1    sertraline (Zoloft) 50 mg tablet Take  by mouth daily.  acetaminophen (TYLENOL) 500 mg tablet Take 1,000 mg by mouth every six (6) hours as needed for Pain.  ibuprofen (MOTRIN) 200 mg tablet Take 800 mg by mouth every eight (8) hours as needed for Pain.  OXYGEN-AIR DELIVERY SYSTEMS 2 L by IntraNASal route continuous.  hydroxychloroquine (PLAQUENIL) 200 mg tablet Take 200 mg by mouth daily.  benazepril-hydrochlorothiazide (LOTENSIN HCT) 20-25 mg per tablet Take  by mouth daily.  predniSONE (DELTASONE) 5 mg tablet Take 1 Tab by mouth daily. (Patient not taking: Reported on 3/2/2020) 21 Tab 0    diazePAM (VALIUM) 5 mg tablet Take 1 tablet 30 minutes before diagnostic test - may repeat x 1  Indications: inducing of a relaxed easy state 2 Tab 0    oxyCODONE-acetaminophen (PERCOCET) 5-325 mg per tablet Take 1 tablet by mouth every four (4) hours as needed for Pain for up to 20 doses. (Patient not taking: Reported on 3/2/2020) 20 tablet 0    nortriptyline (PAMELOR) 25 mg capsule Take 1 Cap by mouth two (2) times a day. (Patient not taking: Reported on 3/2/2020) 60 Cap 6     Allergies   Allergen Reactions    Morphine Anxiety     Past Medical History:   Diagnosis Date    Arthritis     Bronchitis 01/01/2020    Hypertension     Indigestion     [address]     Memory difficulty     Nausea & vomiting     Ringing of ears     sometimes    Thromboembolus [facility]) 2009     Past Surgical History:   Procedure Laterality Date    HX CHOLECYSTECTOMY  1988    HX COLONOSCOPY      HX ORTHOPAEDIC  April 09'    right knee replacement     HX ORTHOPAEDIC  Sept 09'    left knee replacement    HX VASCULAR ACCESS      IVC filter    REMOVAL GALLBLADDER            ROS  REVIEW OF SYSTEMS  Constitutional: Negative for fever, chills, or weight change. Respiratory: Negative for cough or shortness of breath.      Cardiovascular: Negative for chest pain or palpitations. Gastrointestinal: Negative for incontinence, acid reflux, change in bowel habits, or constipation. Genitourinary: Negative for incontinence, dysuria and flank pain. Musculoskeletal: Positive for back and leg pain. See HPI. Skin: Negative for rash. Neurological:ble  radiculopathy. See HPI. Endo/Heme/Allergies: Negative. Psychiatric/Behavioral: Negative. Objective:     General: alert, cooperative, no distress   Mental  status: mental status: alert, oriented to person, place, and time, normal mood, behavior, speech, dress, motor activity, and thought processes   Resp: resp: normal effort and no respiratory distress   Neuro: neuro: no gross deficits   Skin: skin: no discoloration or lesions of concern on visible areas     Due to this being a TeleHealth evaluation, many elements of the physical examination are unable to be assessed. We discussed the expected course, resolution and complications of the diagnosis(es) in detail. Medication risks, benefits, costs, interactions, and alternatives were discussed as indicated. I advised her to contact the office if her condition worsens, changes or fails to improve as anticipated. She expressed understanding with the diagnosis(es) and plan. Pursuant to the emergency declaration under the [facility], [facility] waiver authority and the [employer] Act, this Virtual  Visit was conducted, with patient's consent, to reduce the patient's risk of exposure to COVID-19 and provide continuity of care for an established patient. Services were provided through a video synchronous discussion virtually to substitute for in-person clinic visit.     Abilio Ramirez NP

## 2020-06-30 ENCOUNTER — OFFICE VISIT (OUTPATIENT)
Dept: ORTHOPEDIC SURGERY | Age: 75
End: 2020-06-30

## 2020-06-30 VITALS
RESPIRATION RATE: 18 BRPM | SYSTOLIC BLOOD PRESSURE: 137 MMHG | HEIGHT: 62 IN | HEART RATE: 72 BPM | OXYGEN SATURATION: 92 % | WEIGHT: 277 LBS | BODY MASS INDEX: 50.97 KG/M2 | TEMPERATURE: 97.9 F | DIASTOLIC BLOOD PRESSURE: 87 MMHG

## 2020-06-30 DIAGNOSIS — Z98.890 S/P LAMINECTOMY: ICD-10-CM

## 2020-06-30 DIAGNOSIS — M48.062 SPINAL STENOSIS OF LUMBAR REGION WITH NEUROGENIC CLAUDICATION: Primary | ICD-10-CM

## 2020-06-30 DIAGNOSIS — R60.0 EDEMA OF BOTH LOWER LEGS: ICD-10-CM

## 2020-06-30 RX ORDER — TOPIRAMATE 25 MG/1
25-50 TABLET ORAL 2 TIMES DAILY WITH MEALS
Qty: 120 TAB | Refills: 1 | Status: SHIPPED | OUTPATIENT
Start: 2020-06-30 | End: 2020-07-07 | Stop reason: ALTCHOICE

## 2020-06-30 NOTE — PROGRESS NOTES
[address]  [address], [address]  [place] [address]  Phone: [phone]  Fax: [phone]  PROGRESS NOTE  Patient: Gómez Donnelly                MRN: [id number]       SSN: [id number]  YOB: 1945        AGE: 75 y.o. SEX: female  Body mass index is 50.66 kg/m². PCP: LEVI Baca  06/30/20    Chief Complaint   Patient presents with    Back Pain     lower, legs are swelling       HISTORY OF PRESENT ILLNESS:  Allegra Magaña is a 75 y. o. female with a history of morbid obesity with a BMI of 50+, lupus, and progressive back and left greater than right leg pain.  She finds it is hard to ambulate with the progressive numbness and weakness in her legs. She is a s/p L L4-5 decompression w/ a resection of a synovial mass on 2/28/20. She has had ongoing BLE edema since her surgery that hasn't improved. I saw her virtually a few wks ago and ordered a bilateral PVL, she was never able to get that done. She also reported some BLE weakness, it is generalized. Today, she reports that she hurts from the \"waist down. \" She has generalized BLE swelling and leg pain and weakness but no focal weakness on exam. She has used a wheelchair in public for some time now. As far as her back, She reports that she is doing well with improvement in her severe back and leg pain. She is pleased with the outcome of her surgery. . She has been seeing a pulmonologist for some SOB and is supposed to get some testing for her heart. She has some residual numbness and continues with generalized weakness in her legs.  She has been using a walker for over a year now, so I don't think that will change overnight. She denies any bladder/bowel dysfunction, new weakness or neuropathic pain.     Medications: Gabapentin 300mg BID with moderate, complete, relief    PMHx: Lupus, has IVC filter in LLE, Pneumonia w/ low O2-seeing pulmonary supposed to use home O2 but doesn't, BMI 50      ASSESSMENT   Diagnoses and all orders for this visit:    1. Spinal stenosis of lumbar region with neurogenic claudication    2. Edema of both lower legs  -     DUPLEX LOWER EXT VENOUS BILAT; Future  -     REFERRAL TO VASCULAR SURGERY    3. S/P laminectomy  -     DUPLEX LOWER EXT VENOUS BILAT; Future  -     REFERRAL TO VASCULAR SURGERY    Other orders  -     topiramate (Topamax) 25 mg tablet; Take 1-2 Tabs by mouth two (2) times daily (with meals). IMPRESSION AND PLAN:  This is a pt with stenosis and multiple medical issues. Doing well after her lumbar lami. She needs her BLE edema addressed    > Pt was given information on Topamax   > Stop Gabapentin, trial of Topamax  > BLE PVL r/o DVT  > Refer vascular for ongoing edema unresponsive to Lasix, already seeing pulmonary   > Ms. Kristyn Benedict has a reminder for a \"due or due soon\" health maintenance. I have asked that she contact her primary care provider, LEVI Vasquez, for follow-up on this health maintenance.  > We have informed patient to notify us for immediate appointment if he has any worsening neurogical symptoms or if an emergency situation presents, then call 911  >  has been reviewed and is appropriate  > Pt will follow-up in 4 wks w/ me. Subjective      Pain Scale: 1/10    Pain Assessment  6/30/2020   Location of Pain Back   Location Modifiers (No Data)   Severity of Pain 1   Quality of Pain Aching   Quality of Pain Comment -   Duration of Pain Persistent   Frequency of Pain Constant   Aggravating Factors Standing;Walking;Stairs   Aggravating Factors Comment -   Limiting Behavior Some   Relieving Factors (No Data)   Relieving Factors Comment s/p sx lumbar   Result of Injury No         REVIEW OF SYSTEMS  Constitutional: Negative for fever, chills, or weight change. Respiratory: Negative for cough or shortness of breath. Cardiovascular: Negative for chest pain or palpitations.   Gastrointestinal: Negative for incontinence, acid reflux, change in bowel habits, or constipation. Genitourinary: Negative for incontinence, dysuria and flank pain. Musculoskeletal: Positive for BLE pain. See HPI. Skin: Negative for rash. Neurological:BLE generalized pain  radiculopathy. See HPI. Endo/Heme/Allergies: Negative. Psychiatric/Behavioral: Negative. PHYSICAL EXAMINATION  Visit Vitals  /87 (BP 1 Location: Right arm, BP Patient Position: Sitting)   Pulse 72   Temp 97.9 °F (36.6 °C) (Skin)   Resp 18   Ht 5' 2\" (1.575 m)   Wt 277 lb (125.6 kg)   SpO2 92%   BMI 50.66 kg/m²         Accompanied by daughter. Constitutional:  Well developed, well nourished, in no acute distress. Psychiatric: Affect and mood are appropriate. Integumentary: No rashes or abrasions noted on exposed areas. Cardiovascular/Peripheral Vascular: +2 radial & pedal pulses. +3BLE edema with no signs of infection  Lymphatic:  No evidence of lymphedema. No cervical lymphadenopathy. SPINE/MUSCULOSKELETAL EXAM     Lumbar spine:  No rash, ecchymosis, or gross obliquity. No fasciculations. No focal atrophy is noted. Range of motion is not tested. Tenderness to palpation none. No tenderness to palpation at the sciatic notch. SI joints non-tender. Trochanters non tender. Straight leg raise neg  Hip Impingement neg    Sensation grossly intact to light touch. MOTOR:     Hip Flex Quads Hamstrings Ankle DF EHL Ankle PF   Right +4/5 +4/5 +4/5 +4/5 +4/5 +4/5   Left +4/5 +4/5 +4/5 +4/5 +4/5 +4/5       Ambulation presents in wheelchair. FWB.         PAST MEDICAL HISTORY   Past Medical History:   Diagnosis Date    Arthritis     Bronchitis 01/01/2020    Hypertension     Indigestion     [address] [address]     Memory difficulty     Nausea & vomiting     Ringing of ears     sometimes    [facility]embolus [facility]) 2009       Past Surgical History:   Procedure Laterality Date    HX CHOLECYSTECTOMY  1988    HX COLONOSCOPY      HX ORTHOPAEDIC  April 09'    right knee replacement     HX ORTHOPAEDIC  Sept 09'    left knee replacement    HX VASCULAR ACCESS      IVC filter    REMOVAL GALLBLADDER     . MEDICATIONS      Current Outpatient Medications   Medication Sig Dispense Refill    topiramate (Topamax) 25 mg tablet Take 1-2 Tabs by mouth two (2) times daily (with meals). 120 Tab 1    furosemide (LASIX) 20 mg tablet Take  by mouth daily.  gabapentin (Neurontin) 300 mg capsule Take 1 cap by mouth in the morning and 1-2 at night as directed. 270 Cap 1    sertraline (Zoloft) 50 mg tablet Take  by mouth daily.  acetaminophen (TYLENOL) 500 mg tablet Take 1,000 mg by mouth every six (6) hours as needed for Pain.  ibuprofen (MOTRIN) 200 mg tablet Take 800 mg by mouth every eight (8) hours as needed for Pain.  OXYGEN-AIR DELIVERY SYSTEMS 2 L by IntraNASal route continuous.  predniSONE (DELTASONE) 5 mg tablet Take 1 Tab by mouth daily. 21 Tab 0    diazePAM (VALIUM) 5 mg tablet Take 1 tablet 30 minutes before diagnostic test - may repeat x 1  Indications: inducing of a relaxed easy state 2 Tab 0    oxyCODONE-acetaminophen (PERCOCET) 5-325 mg per tablet Take 1 tablet by mouth every four (4) hours as needed for Pain for up to 20 doses. 20 tablet 0    nortriptyline (PAMELOR) 25 mg capsule Take 1 Cap by mouth two (2) times a day. 60 Cap 6    hydroxychloroquine (PLAQUENIL) 200 mg tablet Take 200 mg by mouth daily.  benazepril-hydrochlorothiazide (LOTENSIN HCT) 20-25 mg per tablet Take  by mouth daily.           ALLERGIES    Allergies   Allergen Reactions    Morphine Anxiety          SOCIAL HISTORY    Social History     Socioeconomic History    Marital status:      Spouse name: Not on file    Number of children: Not on file    Years of education: Not on file    Highest education level: Not on file   Occupational History    Not on file   Social Needs    Financial resource strain: Not on file    Food insecurity     Worry: Not on file Inability: Not on file    Transportation needs     Medical: Not on file     Non-medical: Not on file   Tobacco Use    Smoking status: Never Smoker    Smokeless tobacco: Never Used    Tobacco comment: heavy exposure to second hand smoke growing up   Substance and Sexual Activity    Alcohol use: No    Drug use: Never    Sexual activity: Never   Lifestyle    Physical activity     Days per week: Not on file     Minutes per session: Not on file    Stress: Not on file   Relationships    Social connections     Talks on phone: Not on file     Gets together: Not on file     Attends Advent service: Not on file     Active member of club or organization: Not on file     Attends meetings of clubs or organizations: Not on file     Relationship status: Not on file    Intimate partner violence     Fear of current or ex partner: Not on file     Emotionally abused: Not on file     Physically abused: Not on file     Forced sexual activity: Not on file   Other Topics Concern     Service Not Asked    Blood Transfusions Not Asked    Caffeine Concern Not Asked    Occupational Exposure Not Asked   Love Stairs Hazards Not Asked    Sleep Concern Not Asked    Stress Concern Not Asked    Weight Concern Not Asked    Special Diet Not Asked    Back Care Not Asked    Exercise Not Asked    Bike Helmet Not Asked    Seat Belt Not Asked    Self-Exams Not Asked   Social History Narrative    Not on file       FAMILY HISTORY    Family History   Problem Relation Age of Onset    Stroke Father     Cancer Father         ania Zurita NP

## 2020-06-30 NOTE — PATIENT INSTRUCTIONS
Aquaphor or Cera Ve ointment to bilateral feet Topiramate (By mouth) Topiramate (toe-PIR-a-mate) Treats and prevents seizures, and helps prevent migraine headaches. Brand Name(s): Qudexy XR, Topamax, Trokendi XR There may be other brand names for this medicine. When This Medicine Should Not Be Used: This medicine is not right for everyone. Do not use it if you had an allergic reaction to topiramate, or if you are pregnant. How to Use This Medicine:  
Capsule, Long Acting Capsule, Tablet · Take your medicine as directed. Your dose may need to be changed several times to find what works best for you. · Tablet: Swallow whole. Do not break, crush, or chew the tablet. It has a very bitter taste. · Capsule or extended-release capsule: Do not crush or chew the capsule. Swallow whole or open the capsule and pour the medicine into a small amount (1 teaspoon) of soft food, such as applesauce. Swallow the mixture right away without chewing. Do not store the mixture for use at a later time. · Drink extra fluids so you will urinate more often and help prevent kidney problems. · This medicine should come with a Medication Guide. Ask your pharmacist for a copy if you do not have one. · Missed dose: Take a dose as soon as you remember. If it is almost time for your next dose, wait until then and take a regular dose. Do not take extra medicine to make up for a missed dose. If you miss a dose or forget to use your medicine, use it as soon as you can. If your next regular dose of Topamax® is less than 6 hours away, wait until then to use the medicine and skip the missed dose. If you miss more than 1 dose of Topamax®, call your doctor for instructions. · Store the medicine in a closed container at room temperature, away from heat, moisture, and direct light. Drugs and Foods to Avoid: Ask your doctor or pharmacist before using any other medicine, including over-the-counter medicines, vitamins, and herbal products. · Do not drink alcohol with Qudexy XR or Topamax®. Do not drink alcohol for 6 hours before and 6 hours after you take the Trokendi XR capsule. · Some medicines can affect how topiramate works. Tell your doctor if you are using acetazolamide, dichlorphenamide, dichloralphenazone, digoxin, lithium, metformin, zonisamide, other medicine for seizures (such as carbamazepine, phenytoin, valproic acid), or birth control pills. · Tell your doctor if you are using any medicine that makes you sleepy, such as allergy medicine or narcotic pain medicine. Warnings While Using This Medicine: · It is not safe to take this medicine during pregnancy. It could harm an unborn baby. Tell your doctor right away if you become pregnant. · Tell your doctor if you are breastfeeding, or if you have kidney disease, liver disease, glaucoma, lung or breathing problems, osteoporosis, or a history of depression or mood disorders. Tell your doctor if you are on a ketogenic diet (high in fat and low in carbohydrates). · This medicine may cause the following problems: ¨ Eye pain or vision changes, including glaucoma ¨ Changes in body temperature ¨ Metabolic acidosis (too much acid in the blood) ¨ Kidney stones · This medicine may increase depression or thoughts of suicide. Tell your doctor right away if you start to feel more depressed or think about hurting yourself. · This medicine may make you dizzy, drowsy, or tired. Do not drive or do anything else that could be dangerous until you know how this medicine affects you. · Do not stop using this medicine suddenly. Your doctor will need to slowly decrease your dose before you stop it completely. · Your doctor will do lab tests at regular visits to check on the effects of this medicine. Keep all appointments. · Keep all medicine out of the reach of children. Never share your medicine with anyone. Possible Side Effects While Using This Medicine:  
Call your doctor right away if you notice any of these side effects: · Allergic reaction: Itching or hives, swelling in your face or hands, swelling or tingling in your mouth or throat, chest tightness, trouble breathing · Bloody or cloudy urine, painful urination, sudden lower back or stomach pain · Changes in vision, eye pain · Confusion, problems with walking, clumsiness, dizziness, or trouble talking, concentrating, or remembering · Feeling agitated, depressed, nervous, or irritable, thoughts of hurting yourself or others, unusual mood or behavior · Fever, decreased sweating · Numbness, tingling, or burning pain in your hands, arms, legs, or feet · Rapid, deep breathing, loss of appetite, fast or uneven heartbeat · Vomiting, unusual drowsiness, tiredness, or weakness If you notice these less serious side effects, talk with your doctor: · Change in taste · Nausea, diarrhea · Stuffy or runny nose · Weight loss If you notice other side effects that you think are caused by this medicine, tell your doctor. Call your doctor for medical advice about side effects. You may report side effects to FDA at 6-788-FDA-0612 © 2017 [address] Information is for End User's use only and may not be sold, redistributed or otherwise used for commercial purposes. The above information is an  only. It is not intended as medical advice for individual conditions or treatments. Talk to your doctor, nurse or pharmacist before following any medical regimen to see if it is safe and effective for you.

## 2020-07-01 ENCOUNTER — HOSPITAL ENCOUNTER (OUTPATIENT)
Dept: VASCULAR SURGERY | Age: 75
Discharge: HOME | End: 2020-07-01
Attending: NURSE PRACTITIONER
Payer: MEDICARE

## 2020-07-01 DIAGNOSIS — Z98.890 S/P LAMINECTOMY: ICD-10-CM

## 2020-07-01 DIAGNOSIS — R60.0 EDEMA OF BOTH LOWER LEGS: ICD-10-CM

## 2020-07-01 PROCEDURE — 93970 EXTREMITY STUDY: CPT

## 2020-07-07 ENCOUNTER — TELEPHONE (OUTPATIENT)
Dept: ORTHOPEDIC SURGERY | Age: 75
End: 2020-07-07

## 2020-07-07 RX ORDER — TOPIRAMATE 25 MG/1
TABLET ORAL
Qty: 90 TAB | Refills: 1 | Status: SHIPPED | OUTPATIENT
Start: 2020-07-07 | End: 2020-10-06

## 2020-07-07 NOTE — TELEPHONE ENCOUNTER
Her insurance will only pay for 3 tabs per day. Did you want to change the RX directions to 1 tab po Q am and 2 tabs po Q pm , quantity 90 tabs?

## 2020-07-07 NOTE — TELEPHONE ENCOUNTER
Patient was told by Emanuel Christensen to call us regarding rx Topamax - she says they cannot fill without us contacting her insurance or changing the medication. Please review for patient to obtain refill.

## 2020-07-15 ENCOUNTER — HOSPITAL ENCOUNTER (OUTPATIENT)
Dept: NON INVASIVE DIAGNOSTICS | Age: 75
Discharge: HOME | End: 2020-07-15
Attending: INTERNAL MEDICINE
Payer: MEDICARE

## 2020-07-15 VITALS
DIASTOLIC BLOOD PRESSURE: 87 MMHG | WEIGHT: 277 LBS | HEIGHT: 62 IN | SYSTOLIC BLOOD PRESSURE: 137 MMHG | BODY MASS INDEX: 50.97 KG/M2

## 2020-07-15 DIAGNOSIS — R60.0 PEDAL EDEMA: ICD-10-CM

## 2020-07-15 LAB
ECHO AO ROOT DIAM: 3.64 CM
ECHO LA AREA 4C: 17.09 CM2
ECHO LA VOL 2C: 54.19 ML (ref 22–52)
ECHO LA VOL 4C: 47.48 ML (ref 22–52)
ECHO LA VOL BP: 57.58 ML (ref 22–52)
ECHO LA VOL/BSA BIPLANE: 26.23 ML/M2 (ref 16–28)
ECHO LA VOLUME INDEX A2C: 24.68 ML/M2 (ref 16–28)
ECHO LA VOLUME INDEX A4C: 21.63 ML/M2 (ref 16–28)
ECHO LV INTERNAL DIMENSION DIASTOLIC: 5.06 CM (ref 3.9–5.3)
ECHO LV INTERNAL DIMENSION SYSTOLIC: 4 CM
ECHO LV IVSD: 1.13 CM (ref 0.6–0.9)
ECHO LV MASS 2D: 213.8 G (ref 67–162)
ECHO LV MASS INDEX 2D: 97.4 G/M2 (ref 43–95)
ECHO LV POSTERIOR WALL DIASTOLIC: 1.09 CM (ref 0.6–0.9)
ECHO LVOT DIAM: 2.22 CM
ECHO LVOT PEAK GRADIENT: 5.79 MMHG
ECHO LVOT PEAK VELOCITY: 120.34 CM/S
ECHO LVOT SV: 98.6 ML
ECHO LVOT VTI: 25.41 CM
ECHO MV A VELOCITY: 88.39 CM/S
ECHO MV E DECELERATION TIME (DT): 0.22 S
ECHO MV E VELOCITY: 83.21 CM/S
ECHO MV E/A RATIO: 0.94
ECHO RV INTERNAL DIMENSION: 3.72 CM
ECHO TV REGURGITANT MAX VELOCITY: 306.14 CM/S
ECHO TV REGURGITANT PEAK GRADIENT: 37.49 MMHG
LVOT MG: 2.7 MMHG

## 2020-07-15 PROCEDURE — 93306 TTE W/DOPPLER COMPLETE: CPT

## 2020-07-28 ENCOUNTER — TELEPHONE (OUTPATIENT)
Dept: ORTHOPEDIC SURGERY | Age: 75
End: 2020-07-28

## 2020-07-28 ENCOUNTER — VIRTUAL VISIT (OUTPATIENT)
Dept: ORTHOPEDIC SURGERY | Age: 75
End: 2020-07-28

## 2020-07-28 DIAGNOSIS — M48.062 SPINAL STENOSIS OF LUMBAR REGION WITH NEUROGENIC CLAUDICATION: Primary | ICD-10-CM

## 2020-07-28 DIAGNOSIS — R60.0 EDEMA OF BOTH LOWER LEGS: ICD-10-CM

## 2020-07-28 RX ORDER — NORTRIPTYLINE HYDROCHLORIDE 10 MG/1
10 CAPSULE ORAL
Qty: 60 CAP | Refills: 1 | Status: SHIPPED | OUTPATIENT
Start: 2020-07-28 | End: 2020-11-23

## 2020-07-28 NOTE — TELEPHONE ENCOUNTER
Kash Aguilera from [facility] called stating that the patient is on Topamax and that the Pamelor she was given today has a severe interaction. She was wondering if the provider was aware and if she still needs to fill the Pamelor.  Please advise Kash Aguilera at [phone]

## 2020-07-28 NOTE — PATIENT INSTRUCTIONS
Nortriptyline for Pain: Care Instructions Your Care Instructions Your doctor prescribed nortriptyline to help relieve your pain. Although it is often used to treat depression, it is also used for pain and to improve sleep. It has been found to be safe and effective over many years of use. The right dose of this medicine is different for each person. It is important for you and your doctor to find the dose that works best for you. This medicine may make you sleepy, so be sure to take it at bedtime. Follow your doctor's instructions exactly. Follow-up care is a key part of your treatment and safety. Be sure to make and go to all appointments, and call your doctor if you are having problems. It's also a good idea to know your test results and keep a list of the medicines you take. How can you care for yourself at home? · Start by taking the dose your doctor instructs at bedtime. Take this dose for the number of days that he or she tells you to. If your pain is not relieved after this time period, follow your doctor's directions on how to continue and how to slowly increase the dose. · Here are some points to remember about taking nortriptyline: ? You need to take nortriptyline every day for it to be effective. ? If you miss a dose, do not take a double dose to make up for it. Just take the next daily dose as prescribed by your doctor. ? Once your pain is relieved, do not increase to the next dose. Continue the same dose. ? If you have side effects on a particular dose, take the prior lower dose, and call your doctor for further instructions. ? Nortriptyline can cause dry mouth. To relieve this, try sugar-free gum, ice, or sugar-free sweets. If a dry mouth is still a problem, tell your doctor. ? It may take several weeks after you start nortriptyline for the medicine to work. ? Do not stop taking nortriptyline suddenly. ? Do not drink alcohol. It may increase drowsiness and dizziness. ? Remember to stand up slowly to prevent dizziness and falls after taking the medicine. When should you call for help? Call your doctor now or seek immediate medical care if: 
· Your heart rate becomes irregular. · You can feel your heart flutter in your chest or skip heartbeats. · You have problems urinating. · You are dizzy or lightheaded. · You are very sleepy. · You do not understand how to take your medicine. Watch closely for changes in your health, and be sure to contact your doctor if: 
· You do not get better as expected. Where can you learn more? Go to http://tanna-rasheeda.info/ Enter C006 in the search box to learn more about \"Nortriptyline for Pain: Care Instructions. \" Current as of: November 20, 2019               Content Version: 12.5 © 6529-1968 [employer]. Care instructions adapted under license by [employer] (which disclaims liability or warranty for this information). If you have questions about a medical condition or this instruction, always ask your healthcare professional. [address] any warranty or liability for your use of this information.

## 2020-07-28 NOTE — PROGRESS NOTES
NIKOLAS Marvin is a 75 y.o. female who was seen by synchronous (real-time) audio-video technology, patient's location home, [facility] with the patient's verbal consent-with the understanding that charges maybe billed for the visit. This visit was conducted using the doxy. me platform on 7/28/2020 for     Chief Complaint   Patient presents with    Leg Pain    Foot Pain       Additional Participants during visit: daughter    Abrahan Magaña is a 75 y. o. female with a history of morbid obesity with a BMI of 50+, lupus, and progressive back and left greater than right leg pain.  She finds it is hard to ambulate with the progressive numbness and weakness in her legs. She is a s/p L L4-5 decompression w/ a resection of a synovial mass on 2/28/20. She has had ongoing BLE edema since her surgery that hasn't improved. I saw her a month ago and ordered a BLE PVL, that was negative of any DVT. She was reporting some BLE numbness and reported that she hurt from the waist down. Today, since I saw her last she had a echo that showed a L EF of 55-60%. Her main complaint today is some BLE paresthesias and swelling from the knee down, She has an apt w/ vascular next wk. Stopping the Gabapentin did help a little with the swelling. The Topamax has helped slightly w/ her leg pain. She has used a wheelchair in public for some time now. As far as her back, She reports that she is doing well with improvement in her severe back and leg pain. She is pleased with the outcome of her surgery. . She has been seeing a pulmonologist for some SOB and is supposed to get some testing for her heart. She has some residual numbness and continues with generalized weakness in her legs. She has been using a walker for over a year now, so I don't think that will change overnight. She denies any bladder/bowel dysfunction, new weakness or neuropathic pain.     Medications: Topamax 25mg BID.  Gabapentin caused swelling.     PMHx: Lupus, has IVC filter in LLE, Pneumonia w/ low O2-seeing pulmonary supposed to use home O2 but doesn't, BMI 50,     Assessment & Plan:   Diagnoses and all orders for this visit:    1. Spinal stenosis of lumbar region with neurogenic claudication    2. Edema of both lower legs    Other orders  -     nortriptyline (PAMELOR) 10 mg capsule; Take 1 Cap by mouth nightly. This is a pt with more BLE neuropathy and swelling. We can't figure out where her swelling is coming from. She is seeing a vascular specialist 8/6/20.     > Pt was given information on back pain   > Continue Topamax  > Re-trial of Pamelor, had in past w/ Dr Gavi Adams w/benefit  > Pt 3mo w/ me VV  > Ms. Marvel Olivarez has a reminder for a \"due or due soon\" health maintenance. I have asked that she contact her primary care provider, LEVI Jean Baptiste, for follow-up on this health maintenance.  > We have informed patient to notify us for immediate appointment if he has any worsening neurogical symptoms or if an emergency situation presents, then call 911  >  has been reviewed and is appropriate        CPT Codes 91640-17545 for Established Patients may apply to this Telehealth Visit  Time-based coding, delete if not needed: I spent at least 15 minutes with this established patient, and >50% of the time was spent counseling and/or coordinating care regarding back and leg pain  Start Time: 1432  End Time: 1449    Subjective:   Micheal Vázquez was seen for Leg Pain and Foot Pain      Prior to Admission medications    Medication Sig Start Date End Date Taking? Authorizing Provider   nortriptyline (PAMELOR) 10 mg capsule Take 1 Cap by mouth nightly. 7/28/20  Yes Yuko Serrato NP   topiramate (Topamax) 25 mg tablet Take 1 tab in am and 1-2Tab in pm 7/7/20  Yes Yuko Serrato NP   furosemide (LASIX) 20 mg tablet Take 20 mg by mouth daily. Yes Provider, Historical   sertraline (Zoloft) 50 mg tablet Take 50 mg by mouth daily.    Yes Provider, Historical   acetaminophen (TYLENOL) 500 mg tablet Take 1,000 mg by mouth every six (6) hours as needed for Pain. Yes Provider, Historical   ibuprofen (MOTRIN) 200 mg tablet Take 800 mg by mouth every eight (8) hours as needed for Pain. Yes Provider, Historical   hydroxychloroquine (PLAQUENIL) 200 mg tablet Take 200 mg by mouth daily. Yes Provider, Historical   benazepril-hydrochlorothiazide (LOTENSIN HCT) 20-25 mg per tablet Take 1 Tab by mouth daily. Yes Provider, Historical   gabapentin (Neurontin) 300 mg capsule Take 1 cap by mouth in the morning and 1-2 at night as directed. 4/15/20 7/28/20  Shane Garcia NP   OXYGEN-AIR DELIVERY SYSTEMS 2 L by IntraNASal route continuous. Provider, Historical   predniSONE (DELTASONE) 5 mg tablet Take 1 Tab by mouth daily. 4/22/19 7/28/20  Juan Calderón MD   diazePAM (VALIUM) 5 mg tablet Take 1 tablet 30 minutes before diagnostic test - may repeat x 1  Indications: inducing of a relaxed easy state 4/22/19 7/28/20  Juan Calderón MD   oxyCODONE-acetaminophen (PERCOCET) 5-325 mg per tablet Take 1 tablet by mouth every four (4) hours as needed for Pain for up to 20 doses. 11/16/14 7/28/20  Kaylyn Chanel MD   nortriptyline (PAMELOR) 25 mg capsule Take 1 Cap by mouth two (2) times a day.  9/14/12 7/28/20  Ivan Garcia MD     Allergies   Allergen Reactions    Morphine Anxiety         Patient Active Problem List   Diagnosis Code    Hypertension I10    Lupus ([address] M32.9    Arthritis M19.90    Neuropathy G62.9    Obesity, morbid ([address] E66.01    Lumbar spinal stenosis M48.061    HNP (herniated nucleus pulposus), lumbar M51.26     Patient Active Problem List    Diagnosis Date Noted    Neuropathy 08/23/2011     Priority: 2 - Two    Lumbar spinal stenosis 02/27/2020    HNP (herniated nucleus pulposus), lumbar 02/27/2020    Obesity, morbid ([address] 04/22/2019    Hypertension     Lupus ([address]     Arthritis      Current Outpatient Medications   Medication Sig Dispense Refill    nortriptyline (PAMELOR) 10 mg capsule Take 1 Cap by mouth nightly. 60 Cap 1    topiramate (Topamax) 25 mg tablet Take 1 tab in am and 1-2Tab in pm 90 Tab 1    furosemide (LASIX) 20 mg tablet Take 20 mg by mouth daily.  sertraline (Zoloft) 50 mg tablet Take 50 mg by mouth daily.  acetaminophen (TYLENOL) 500 mg tablet Take 1,000 mg by mouth every six (6) hours as needed for Pain.  ibuprofen (MOTRIN) 200 mg tablet Take 800 mg by mouth every eight (8) hours as needed for Pain.  hydroxychloroquine (PLAQUENIL) 200 mg tablet Take 200 mg by mouth daily.  benazepril-hydrochlorothiazide (LOTENSIN HCT) 20-25 mg per tablet Take 1 Tab by mouth daily.  OXYGEN-AIR DELIVERY SYSTEMS 2 L by IntraNASal route continuous. Allergies   Allergen Reactions    Morphine Anxiety     Past Medical History:   Diagnosis Date    Arthritis     Bronchitis 01/01/2020    Hypertension     Indigestion     [address] [address]     Memory difficulty     Nausea & vomiting     Ringing of ears     sometimes    Thromboembolus [facility]) 2009     Past Surgical History:   Procedure Laterality Date    HX CHOLECYSTECTOMY  1988    HX COLONOSCOPY      HX ORTHOPAEDIC  April 09'    right knee replacement     HX ORTHOPAEDIC  Sept 09'    left knee replacement    HX VASCULAR ACCESS      IVC filter    REMOVAL GALLBLADDER            ROS  REVIEW OF SYSTEMS  Constitutional: Negative for fever, chills, or weight change. Respiratory: Negative for cough or shortness of breath. Cardiovascular: Negative for chest pain or palpitations. Gastrointestinal: Negative for incontinence, acid reflux, change in bowel habits, or constipation. Genitourinary: Negative for incontinence, dysuria and flank pain. Musculoskeletal: Positive for BLE pain. See HPI. Skin: Negative for rash. Neurological:BLE edema and paresthesias  radiculopathy. See HPI. Endo/Heme/Allergies: Negative. Psychiatric/Behavioral: Negative. Objective:     General: alert, cooperative, no distress   Mental  status: mental status: alert, oriented to person, place, and time, normal mood, behavior, speech, dress, motor activity, and thought processes   Resp: resp: normal effort and no respiratory distress   Neuro: neuro: no gross deficits   Skin: skin: no discoloration or lesions of concern on visible areas     Due to this being a TeleHealth evaluation, many elements of the physical examination are unable to be assessed. We discussed the expected course, resolution and complications of the diagnosis(es) in detail. Medication risks, benefits, costs, interactions, and alternatives were discussed as indicated. I advised her to contact the office if her condition worsens, changes or fails to improve as anticipated. She expressed understanding with the diagnosis(es) and plan. Pursuant to the emergency declaration under the [facility], [facility] waiver authority and the [employer] General Act, this Virtual  Visit was conducted, with patient's consent, to reduce the patient's risk of exposure to COVID-19 and provide continuity of care for an established patient. Services were provided through a video synchronous discussion virtually to substitute for in-person clinic visit.     Salvador Napoles NP Student

## 2020-07-29 NOTE — TELEPHONE ENCOUNTER
I called the pt's [address] and spoke with Bambi Ballard, the pharmacist. She was given the verbal order to fill the new medication, per the provider's review. The order was VORB and she has no questions or concerns for the provider at this time.

## 2020-07-29 NOTE — TELEPHONE ENCOUNTER
These medications can be given together in the doses that are Rx and she has taken them together before.  So, yes please fill them

## 2020-08-14 ENCOUNTER — TELEPHONE (OUTPATIENT)
Dept: PULMONOLOGY | Age: 75
End: 2020-08-14

## 2020-08-24 ENCOUNTER — VIRTUAL VISIT (OUTPATIENT)
Dept: PULMONOLOGY | Age: 75
End: 2020-08-24

## 2020-08-24 DIAGNOSIS — I27.20 PULMONARY HTN (HCC): Primary | ICD-10-CM

## 2020-08-24 DIAGNOSIS — G47.34 NOCTURNAL HYPOXEMIA: ICD-10-CM

## 2020-08-24 NOTE — PROGRESS NOTES
Araseli Leonardo is a 75 y.o. female who was seen by synchronous (real-time) audio-video technology on 8/24/2020. Consent: Araseli Leoanrdo, who was seen by synchronous (real-time) audio-video technology, and/or her healthcare decision maker, is aware that this patient-initiated, Telehealth encounter on 8/24/2020 is a billable service, with coverage as determined by her insurance carrier. She is aware that she may receive a bill and has provided verbal consent to proceed: Yes. Assessment & Plan:   Diagnoses and all orders for this visit:    1. Pulmonary HTN ([facility] [address]  -     PFT DLCO; Future  -     ANCA PANEL; Future  -     RHEUMATOID FACTOR, QL; Future  -     SCLERODERMA (SCL-70) AB, IGG; Future  -     SED RATE (ESR); Future  -     ANGIOTENSIN CONVERTING ENZYME; Future  -     C REACTIVE PROTEIN, QT; Future  -     CK; Future  -     SJOGREN'S ABS, SSA AND SSB; Future  -     HIV 1/2 AG/AB, 4TH GENERATION,W RFLX CONFIRM; Future  -     SPLIT CPAP/PSG; Future    2. Nocturnal hypoxemia  -     SPLIT CPAP/PSG; Future  -     NOVEL CORONAVIRUS (COVID-19); Future    overnight oxymetry reviewed with pt, will need to R/o HO/OHS as this will determine O2 alone vs PAP therapy. Echo results also reviewed with pt, workup for pulmonary HTN ordered as above. Will schedule PFT's with DLCO. Continue rest of medications as listed  Healthy weight discussion. RTC 3 months      I spent at least 40 minutes on this visit with this established patient. ([id number])    Subjective:   Araseli Leonardo is a 75 y.o. female who was seen for Results (Echo )    Pt was seen initially during a hospital admission for L4/5 laminectomy/discectomy and medial facetectomy. Pt was noted to be hypoxemic and was discharged on supplemental O2 to be used HS and with activity. Pt denies any dyspnea, no chest pain or fever. She does complain of persistent bipedal edema, improved but note resolved with Lasix.  Pt's back pain and gait problems have improved dramatically since surgery but she still uses a walker for ambulation. .    Other PMH includes Lupus diagnosed 20 years ago, maintained on Plaquenil. Prior to Admission medications    Medication Sig Start Date End Date Taking? Authorizing Provider   nortriptyline (PAMELOR) 10 mg capsule Take 1 Cap by mouth nightly. 7/28/20  Yes Cipriano Ramirez NP   topiramate (Topamax) 25 mg tablet Take 1 tab in am and 1-2Tab in pm 7/7/20  Yes Cipriano Ramirez NP   furosemide (LASIX) 20 mg tablet Take 20 mg by mouth daily. Yes Provider, Historical   sertraline (Zoloft) 50 mg tablet Take 50 mg by mouth daily. Yes Provider, Historical   acetaminophen (TYLENOL) 500 mg tablet Take 1,000 mg by mouth every six (6) hours as needed for Pain. Yes Provider, Historical   ibuprofen (MOTRIN) 200 mg tablet Take 800 mg by mouth every eight (8) hours as needed for Pain. Yes Provider, Historical   OXYGEN-AIR DELIVERY SYSTEMS 2 L by IntraNASal route continuous. Yes Provider, Historical   hydroxychloroquine (PLAQUENIL) 200 mg tablet Take 200 mg by mouth daily. Yes Provider, Historical   benazepril-hydrochlorothiazide (LOTENSIN HCT) 20-25 mg per tablet Take 1 Tab by mouth daily.    Yes Provider, Historical     Allergies   Allergen Reactions    Morphine Anxiety       Past Medical History:   Diagnosis Date    Arthritis     Bronchitis 01/01/2020    Hypertension     Indigestion     Lupus ([facility] [address]     Memory difficulty     Nausea & vomiting     Ringing of ears     sometimes    Thromboembolus [facility]) 2009     Past Surgical History:   Procedure Laterality Date    HX CHOLECYSTECTOMY  1988    HX COLONOSCOPY      HX ORTHOPAEDIC  April 09'    right knee replacement     HX ORTHOPAEDIC  Sept 09'    left knee replacement    HX VASCULAR ACCESS      IVC filter    REMOVAL GALLBLADDER       Family History   Problem Relation Age of Onset    Stroke Father     Cancer Father         prastate      Social History     Tobacco Use    Smoking status: Never Smoker    Smokeless tobacco: Never Used    Tobacco comment: heavy exposure to second hand smoke growing up   Substance Use Topics    Alcohol use: No       Review of Systems   Constitutional: Positive for malaise/fatigue. Negative for chills, diaphoresis, fever and weight loss. HENT: Negative for congestion, hearing loss, nosebleeds and sore throat. Eyes: Negative for blurred vision, double vision and photophobia. Respiratory: Positive for shortness of breath. Negative for cough, hemoptysis, sputum production and wheezing. Cardiovascular: Positive for leg swelling. Negative for chest pain, palpitations, orthopnea, claudication and PND. Gastrointestinal: Negative for abdominal pain, heartburn, melena, nausea and vomiting. Genitourinary: Negative for dysuria, flank pain, frequency, hematuria and urgency. Musculoskeletal: Positive for back pain and joint pain. Negative for falls, myalgias and neck pain. Skin: Negative for itching and rash. Neurological: Positive for sensory change. Negative for dizziness, tremors, seizures and headaches. Endo/Heme/Allergies: Negative for environmental allergies and polydipsia. Does not bruise/bleed easily. Psychiatric/Behavioral: Positive for depression. Negative for suicidal ideas. The patient has insomnia. All other systems reviewed and are negative. Objective:   Vital Signs: (As obtained by patient/caregiver at home)  There were no vitals taken for this visit.      [INSTRUCTIONS:  \"[x]\" Indicates a positive item  \"[]\" Indicates a negative item  -- DELETE ALL ITEMS NOT EXAMINED]    Constitutional: [x] Appears well-developed and well-nourished [x] No apparent distress      [x] Abnormal - Obese    Mental status: [x] Alert and awake  [x] Oriented to person/place/time [x] Able to follow commands    [] Abnormal -     Eyes:   EOM    [x]  Normal    [] Abnormal -   Sclera  [x]  Normal    [] Abnormal -          Discharge [x]  None visible [] Abnormal -     HENT: [x] Normocephalic, atraumatic  [] Abnormal -   [x] Mouth/Throat: Mucous membranes are moist    External Ears [x] Normal  [] Abnormal -    Neck: [x] No visualized mass [] Abnormal -     Pulmonary/Chest: [x] Respiratory effort normal   [x] No visualized signs of difficulty breathing or respiratory distress        [] Abnormal -      Musculoskeletal:   [x] Normal gait with no signs of ataxia         [x] Normal range of motion of neck        [x] Abnormal - bipedal edema    Neurological:        [x] No Facial Asymmetry (Cranial nerve 7 motor function) (limited exam due to video visit)          [x] No gaze palsy        [] Abnormal -          Skin:        [x] No significant exanthematous lesions or discoloration noted on facial skin         [] Abnormal -            Psychiatric:       [x] Normal Affect [] Abnormal -        [x] No Hallucinations    Other pertinent observable physical exam findings:-        We discussed the expected course, resolution and complications of the diagnosis(es) in detail. Medication risks, benefits, costs, interactions, and alternatives were discussed as indicated. I advised her to contact the office if her condition worsens, changes or fails to improve as anticipated. She expressed understanding with the diagnosis(es) and plan. Myesha Munoz is a 75 y.o. female who was evaluated by a video visit encounter for concerns as above. Patient identification was verified prior to start of the visit. A caregiver was present when appropriate. Due to this being a TeleHealth encounter (During DEKZJ-73 public health emergency), evaluation of the following organ systems was limited: Vitals/Constitutional/EENT/Resp/CV/GI//MS/Neuro/Skin/Heme-Lymph-Imm.   Pursuant to the emergency declaration under the [facility], [address] [place] [address] and the [employer] and [employer] General Act, this Virtual  Visit was conducted, with patient's (and/or legal guardian's) consent, to reduce the patient's risk of exposure to COVID-19 and provide necessary medical care. Services were provided through a video synchronous discussion virtually to substitute for in-person clinic visit. Patient was located at home and provider was located in the office.       Jagruti Figueredo MD

## 2020-08-31 ENCOUNTER — HOSPITAL ENCOUNTER (OUTPATIENT)
Dept: LAB | Age: 75
Discharge: HOME | End: 2020-08-31
Payer: MEDICARE

## 2020-08-31 DIAGNOSIS — I27.20 PULMONARY HTN (HCC): ICD-10-CM

## 2020-08-31 DIAGNOSIS — G47.34 NOCTURNAL HYPOXEMIA: ICD-10-CM

## 2020-08-31 LAB
CK SERPL-CCNC: 86 U/L (ref 26–192)
CRP SERPL-MCNC: 0.3 MG/DL (ref 0–0.3)
ERYTHROCYTE [SEDIMENTATION RATE] IN BLOOD: 6 MM/HR (ref 0–30)

## 2020-08-31 PROCEDURE — 82164 ANGIOTENSIN I ENZYME TEST: CPT

## 2020-08-31 PROCEDURE — 86431 RHEUMATOID FACTOR QUANT: CPT

## 2020-08-31 PROCEDURE — 36415 COLL VENOUS BLD VENIPUNCTURE: CPT

## 2020-08-31 PROCEDURE — 85652 RBC SED RATE AUTOMATED: CPT

## 2020-08-31 PROCEDURE — 86235 NUCLEAR ANTIGEN ANTIBODY: CPT

## 2020-08-31 PROCEDURE — 83520 IMMUNOASSAY QUANT NOS NONAB: CPT

## 2020-08-31 PROCEDURE — 87635 SARS-COV-2 COVID-19 AMP PRB: CPT

## 2020-08-31 PROCEDURE — 86140 C-REACTIVE PROTEIN: CPT

## 2020-08-31 PROCEDURE — 82550 ASSAY OF CK (CPK): CPT

## 2020-08-31 PROCEDURE — 87389 HIV-1 AG W/HIV-1&-2 AB AG IA: CPT

## 2020-09-01 LAB
ENA SCL70 AB SER-ACNC: 0.8 AI (ref 0–0.9)
ENA SS-A AB SER-ACNC: <0.2 AI (ref 0–0.9)
ENA SS-B AB SER-ACNC: <0.2 AI (ref 0–0.9)
RHEUMATOID FACT SERPL-ACNC: <10 IU/ML (ref 0–13.9)
SARS-COV-2, COV2NT: NOT DETECTED

## 2020-09-02 LAB
ACE SERPL-CCNC: 5 U/L (ref 14–82)
HIV 1+2 AB+HIV1 P24 AG SERPL QL IA: NONREACTIVE
HIV12 RESULT COMMENT, HHIVC: NORMAL

## 2020-09-03 LAB
C-ANCA TITR SER IF: ABNORMAL TITER
MYELOPEROXIDASE AB SER IA-ACNC: <9 U/ML (ref 0–9)
P-ANCA ATYPICAL TITR SER IF: ABNORMAL TITER
P-ANCA TITR SER IF: ABNORMAL TITER
PROTEINASE3 AB SER IA-ACNC: 15.8 U/ML (ref 0–3.5)

## 2020-09-11 ENCOUNTER — CLINICAL SUPPORT (OUTPATIENT)
Dept: PULMONOLOGY | Age: 75
End: 2020-09-11

## 2020-09-11 VITALS — WEIGHT: 263 LBS | HEIGHT: 62 IN | BODY MASS INDEX: 48.4 KG/M2 | TEMPERATURE: 98.2 F

## 2020-09-11 DIAGNOSIS — J40 BRONCHITIS: Primary | ICD-10-CM

## 2020-10-06 RX ORDER — TOPIRAMATE 25 MG/1
TABLET ORAL
Qty: 90 TAB | Refills: 0 | Status: SHIPPED | OUTPATIENT
Start: 2020-10-06 | End: 2020-11-23

## 2020-11-23 RX ORDER — TOPIRAMATE 25 MG/1
TABLET ORAL
Qty: 90 TAB | Refills: 0 | Status: SHIPPED | OUTPATIENT
Start: 2020-11-23 | End: 2021-04-25

## 2021-02-15 ENCOUNTER — VIRTUAL VISIT (OUTPATIENT)
Dept: PULMONOLOGY | Age: 76
End: 2021-02-15
Payer: MEDICARE

## 2021-02-15 DIAGNOSIS — I27.20 PULMONARY HTN (HCC): Primary | ICD-10-CM

## 2021-02-15 DIAGNOSIS — Z91.199 NONCOMPLIANCE: ICD-10-CM

## 2021-02-15 DIAGNOSIS — G47.34 NOCTURNAL HYPOXEMIA: ICD-10-CM

## 2021-02-15 DIAGNOSIS — R76.8 ANTINEUTROPHIL CYTOPLASMIC ANTIBODY (ANCA) POSITIVE: ICD-10-CM

## 2021-02-15 DIAGNOSIS — E66.01 MORBID OBESITY WITH BMI OF 45.0-49.9, ADULT (HCC): ICD-10-CM

## 2021-02-15 PROCEDURE — 99214 OFFICE O/P EST MOD 30 MIN: CPT | Performed by: INTERNAL MEDICINE

## 2021-02-15 PROCEDURE — G8400 PT W/DXA NO RESULTS DOC: HCPCS | Performed by: INTERNAL MEDICINE

## 2021-02-15 PROCEDURE — 1090F PRES/ABSN URINE INCON ASSESS: CPT | Performed by: INTERNAL MEDICINE

## 2021-02-15 PROCEDURE — G8428 CUR MEDS NOT DOCUMENT: HCPCS | Performed by: INTERNAL MEDICINE

## 2021-02-15 PROCEDURE — G8432 DEP SCR NOT DOC, RNG: HCPCS | Performed by: INTERNAL MEDICINE

## 2021-02-15 PROCEDURE — 1101F PT FALLS ASSESS-DOCD LE1/YR: CPT | Performed by: INTERNAL MEDICINE

## 2021-02-15 PROCEDURE — G8756 NO BP MEASURE DOC: HCPCS | Performed by: INTERNAL MEDICINE

## 2021-02-15 PROCEDURE — G8417 CALC BMI ABV UP PARAM F/U: HCPCS | Performed by: INTERNAL MEDICINE

## 2021-02-15 PROCEDURE — G8536 NO DOC ELDER MAL SCRN: HCPCS | Performed by: INTERNAL MEDICINE

## 2021-02-15 NOTE — PROGRESS NOTES
Marquez Chapman is a 67 y.o. female who was seen by synchronous (real-time) audio-video technology on 2/15/2021 for O2/Oxygen (labs 8/31/20.    )    Pt with nocturnal hypoxemia and pulmonary HTN possibly related to Lupus, followed by Dr. Marlin Perez. Serologies were also positive for MO-3 ANCA. Pt refused split night study ordered to R/o HO/OHS as etiology of pulmonary HTN. Supplemental O2 HS was ordered which pt has refused to use and is requesting to be discontinued despite counseling on reasons and possible benefits of this. She denies chest pain, cough, fever. She denies increased SOB but states she has rarely left the house during this pandemic and uses a walker all the time. No new respiratory symptoms are registered. Assessment & Plan:   Diagnoses and all orders for this visit:    1. Pulmonary HTN ([facility] [address]    2. Nocturnal hypoxemia    3. Antineutrophil cytoplasmic antibody (ANCA) positive    4. Morbid obesity with BMI of 45.0-49.9, [facility] ([facility]    5. Noncompliance        I spent at least 30 minutes on this visit with this established patient. Subjective:       Prior to Admission medications    Medication Sig Start Date End Date Taking? Authorizing Provider   sertraline (Zoloft) 50 mg tablet Take 50 mg by mouth daily. Yes Provider, Historical   acetaminophen (TYLENOL) 500 mg tablet Take 1,000 mg by mouth every six (6) hours as needed for Pain. Yes Provider, Historical   ibuprofen (MOTRIN) 200 mg tablet Take 800 mg by mouth every eight (8) hours as needed for Pain. Yes Provider, Historical   hydroxychloroquine (PLAQUENIL) 200 mg tablet Take 200 mg by mouth daily. Yes Provider, Historical   benazepril-hydrochlorothiazide (LOTENSIN HCT) 20-25 mg per tablet Take 1 Tab by mouth daily.    Yes Provider, Historical   topiramate (TOPAMAX) 25 mg tablet TAKE 1 TABLET BY MOUTH IN THE MORNING AND 1 TO 2 IN THE EVENING 11/23/20   Asiya Mcgee NP   nortriptyline (PAMELOR) 10 mg capsule TAKE 1 CAPSULE BY MOUTH NIGHTLY 11/23/20   Namita Zimmerman NP   furosemide (LASIX) 20 mg tablet Take 20 mg by mouth daily. Provider, Historical   OXYGEN-AIR DELIVERY SYSTEMS 2 L by IntraNASal route continuous. Provider, Historical     Current Outpatient Medications   Medication Sig Dispense Refill    sertraline (Zoloft) 50 mg tablet Take 50 mg by mouth daily.  acetaminophen (TYLENOL) 500 mg tablet Take 1,000 mg by mouth every six (6) hours as needed for Pain.  ibuprofen (MOTRIN) 200 mg tablet Take 800 mg by mouth every eight (8) hours as needed for Pain.  hydroxychloroquine (PLAQUENIL) 200 mg tablet Take 200 mg by mouth daily.  benazepril-hydrochlorothiazide (LOTENSIN HCT) 20-25 mg per tablet Take 1 Tab by mouth daily.  topiramate (TOPAMAX) 25 mg tablet TAKE 1 TABLET BY MOUTH IN THE MORNING AND 1 TO 2 IN THE EVENING 90 Tab 0    nortriptyline (PAMELOR) 10 mg capsule TAKE 1 CAPSULE BY MOUTH NIGHTLY 60 Cap 0    furosemide (LASIX) 20 mg tablet Take 20 mg by mouth daily.  OXYGEN-AIR DELIVERY SYSTEMS 2 L by IntraNASal route continuous.        Allergies   Allergen Reactions    Morphine Anxiety     Past Medical History:   Diagnosis Date    Arthritis     Bronchitis 01/01/2020    Hypertension     Indigestion     Lupus ([address]     Memory difficulty     Nausea & vomiting     Ringing of ears     sometimes    Thromboembolus ([address] 2009     Past Surgical History:   Procedure Laterality Date    HX CHOLECYSTECTOMY  1988    HX COLONOSCOPY      HX ORTHOPAEDIC  April 09'    right knee replacement     HX ORTHOPAEDIC  Sept 09'    left knee replacement    HX VASCULAR ACCESS      IVC filter    [place] REMOVAL GALLBLADDER       Family History   Problem Relation Age of Onset    Stroke Father     Cancer Father         prastate      Social History     Tobacco Use    Smoking status: Never Smoker    Smokeless tobacco: Never Used    Tobacco comment: heavy exposure to second hand smoke growing up   Substance Use Topics    Alcohol use: No       ROS    Objective:   No flowsheet data found. [INSTRUCTIONS:  \"[x]\" Indicates a positive item  \"[]\" Indicates a negative item  -- DELETE ALL ITEMS NOT EXAMINED]    Constitutional: [x] Appears well-developed and well-nourished [x] No apparent distress      [x] Abnormal - obese     Mental status: [x] Alert and awake  [x] Oriented to person/place/time [x] Able to follow commands    [] Abnormal -     Eyes:   EOM    [x]  Normal    [] Abnormal -   Sclera  [x]  Normal    [] Abnormal -          Discharge [x]  None visible   [] Abnormal -     HENT: [x] Normocephalic, atraumatic  [] Abnormal -   [x] Mouth/Throat: Mucous membranes are moist    External Ears [x] Normal  [] Abnormal -    Neck: [x] No visualized mass [] Abnormal -     Pulmonary/Chest: [x] Respiratory effort normal   [x] No visualized signs of difficulty breathing or respiratory distress        [] Abnormal -      Musculoskeletal:   [x] Normal gait with no signs of ataxia         [x] Normal range of motion of neck        [] Abnormal -     Neurological:        [x] No Facial Asymmetry (Cranial nerve 7 motor function) (limited exam due to video visit)          [x] No gaze palsy        [] Abnormal -          Skin:        [x] No significant exanthematous lesions or discoloration noted on facial skin         [] Abnormal -            Psychiatric:       [x] Normal Affect [] Abnormal -        [x] No Hallucinations    Other pertinent observable physical exam findings:-        We discussed the expected course, resolution and complications of the diagnosis(es) in detail. Medication risks, benefits, costs, interactions, and alternatives were discussed as indicated. I advised her to contact the office if her condition worsens, changes or fails to improve as anticipated. She expressed understanding with the diagnosis(es) and plan.        Karen White, who was evaluated through a patient-initiated, synchronous (real-time) audio-video encounter, and/or her healthcare decision maker, is aware that it is a billable service, with coverage as determined by her insurance carrier. She provided verbal consent to proceed: Yes, and patient identification was verified. It was conducted pursuant to the emergency declaration under the [facility], [address] [place] [address] authority and the [employer] and [employer] General Act. A caregiver was present when appropriate. Ability to conduct physical exam was limited. I was in the office. The patient was at home.       Marcie Diallo MD

## 2021-04-25 ENCOUNTER — APPOINTMENT (OUTPATIENT)
Dept: GENERAL RADIOLOGY | Age: 76
DRG: 871 | End: 2021-04-25
Attending: EMERGENCY MEDICINE
Payer: MEDICARE

## 2021-04-25 ENCOUNTER — HOSPITAL ENCOUNTER (INPATIENT)
Age: 76
LOS: 11 days | Discharge: HOME HEALTH | DRG: 871 | End: 2021-05-06
Attending: EMERGENCY MEDICINE | Admitting: INTERNAL MEDICINE
Payer: MEDICARE

## 2021-04-25 DIAGNOSIS — M32.9 SYSTEMIC LUPUS ERYTHEMATOSUS, UNSPECIFIED SLE TYPE, UNSPECIFIED ORGAN INVOLVEMENT STATUS (HCC): ICD-10-CM

## 2021-04-25 DIAGNOSIS — R09.02 HYPOXIA: ICD-10-CM

## 2021-04-25 DIAGNOSIS — I10 ESSENTIAL HYPERTENSION: ICD-10-CM

## 2021-04-25 DIAGNOSIS — E87.6 HYPOKALEMIA: ICD-10-CM

## 2021-04-25 DIAGNOSIS — J12.82 PNEUMONIA DUE TO COVID-19 VIRUS: Primary | ICD-10-CM

## 2021-04-25 DIAGNOSIS — U07.1 PNEUMONIA DUE TO COVID-19 VIRUS: Primary | ICD-10-CM

## 2021-04-25 LAB
ALBUMIN SERPL-MCNC: 2.7 G/DL (ref 3.4–5)
ALBUMIN/GLOB SERPL: 0.9 {RATIO} (ref 0.8–1.7)
ALP SERPL-CCNC: 63 U/L (ref 45–117)
ALT SERPL-CCNC: 33 U/L (ref 13–56)
ANION GAP SERPL CALC-SCNC: 3 MMOL/L (ref 3–18)
AST SERPL-CCNC: 39 U/L (ref 10–38)
BASOPHILS # BLD: 0 K/UL (ref 0–0.1)
BASOPHILS NFR BLD: 0 % (ref 0–2)
BILIRUB SERPL-MCNC: 0.5 MG/DL (ref 0.2–1)
BUN SERPL-MCNC: 23 MG/DL (ref 7–18)
BUN/CREAT SERPL: 58 (ref 12–20)
CALCIUM SERPL-MCNC: 8.4 MG/DL (ref 8.5–10.1)
CHLORIDE SERPL-SCNC: 101 MMOL/L (ref 100–111)
CO2 SERPL-SCNC: 38 MMOL/L (ref 21–32)
CREAT SERPL-MCNC: 0.4 MG/DL (ref 0.6–1.3)
CRP SERPL-MCNC: 14.5 MG/DL (ref 0–0.3)
D DIMER PPP FEU-MCNC: 0.79 UG/ML(FEU)
DIFFERENTIAL METHOD BLD: ABNORMAL
EOSINOPHIL # BLD: 0.1 K/UL (ref 0–0.4)
EOSINOPHIL NFR BLD: 1 % (ref 0–5)
ERYTHROCYTE [DISTWIDTH] IN BLOOD BY AUTOMATED COUNT: 13 % (ref 11.6–14.5)
GLOBULIN SER CALC-MCNC: 3 G/DL (ref 2–4)
GLUCOSE BLD STRIP.AUTO-MCNC: 153 MG/DL (ref 70–110)
GLUCOSE SERPL-MCNC: 106 MG/DL (ref 74–99)
HCT VFR BLD AUTO: 37.1 % (ref 35–45)
HGB BLD-MCNC: 12.1 G/DL (ref 12–16)
LACTATE BLD-SCNC: 1.07 MMOL/L (ref 0.4–2)
LYMPHOCYTES # BLD: 0.5 K/UL (ref 0.9–3.6)
LYMPHOCYTES NFR BLD: 12 % (ref 21–52)
MAGNESIUM SERPL-MCNC: 1.7 MG/DL (ref 1.6–2.6)
MCH RBC QN AUTO: 29.2 PG (ref 24–34)
MCHC RBC AUTO-ENTMCNC: 32.6 G/DL (ref 31–37)
MCV RBC AUTO: 89.4 FL (ref 74–97)
MONOCYTES # BLD: 0.4 K/UL (ref 0.05–1.2)
MONOCYTES NFR BLD: 10 % (ref 3–10)
NEUTS SEG # BLD: 3.3 K/UL (ref 1.8–8)
NEUTS SEG NFR BLD: 76 % (ref 40–73)
PLATELET # BLD AUTO: 166 K/UL (ref 135–420)
PMV BLD AUTO: 10.1 FL (ref 9.2–11.8)
POTASSIUM SERPL-SCNC: 2.8 MMOL/L (ref 3.5–5.5)
PROCALCITONIN SERPL-MCNC: <0.05 NG/ML
PROT SERPL-MCNC: 5.7 G/DL (ref 6.4–8.2)
RBC # BLD AUTO: 4.15 M/UL (ref 4.2–5.3)
SODIUM SERPL-SCNC: 142 MMOL/L (ref 136–145)
WBC # BLD AUTO: 4.4 K/UL (ref 4.6–13.2)

## 2021-04-25 PROCEDURE — 96375 TX/PRO/DX INJ NEW DRUG ADDON: CPT

## 2021-04-25 PROCEDURE — 74011250637 HC RX REV CODE- 250/637: Performed by: NURSE PRACTITIONER

## 2021-04-25 PROCEDURE — 85379 FIBRIN DEGRADATION QUANT: CPT

## 2021-04-25 PROCEDURE — APPSS60 APP SPLIT SHARED TIME 46-60 MINUTES: Performed by: NURSE PRACTITIONER

## 2021-04-25 PROCEDURE — 99223 1ST HOSP IP/OBS HIGH 75: CPT | Performed by: INTERNAL MEDICINE

## 2021-04-25 PROCEDURE — 84145 PROCALCITONIN (PCT): CPT

## 2021-04-25 PROCEDURE — 74011636637 HC RX REV CODE- 636/637: Performed by: NURSE PRACTITIONER

## 2021-04-25 PROCEDURE — 94761 N-INVAS EAR/PLS OXIMETRY MLT: CPT

## 2021-04-25 PROCEDURE — 96374 THER/PROPH/DIAG INJ IV PUSH: CPT

## 2021-04-25 PROCEDURE — 74011000250 HC RX REV CODE- 250: Performed by: NURSE PRACTITIONER

## 2021-04-25 PROCEDURE — 93005 ELECTROCARDIOGRAM TRACING: CPT

## 2021-04-25 PROCEDURE — 87040 BLOOD CULTURE FOR BACTERIA: CPT

## 2021-04-25 PROCEDURE — 99285 EMERGENCY DEPT VISIT HI MDM: CPT

## 2021-04-25 PROCEDURE — 74011000250 HC RX REV CODE- 250: Performed by: EMERGENCY MEDICINE

## 2021-04-25 PROCEDURE — 83605 ASSAY OF LACTIC ACID: CPT

## 2021-04-25 PROCEDURE — 74011250636 HC RX REV CODE- 250/636: Performed by: EMERGENCY MEDICINE

## 2021-04-25 PROCEDURE — 74011250636 HC RX REV CODE- 250/636: Performed by: NURSE PRACTITIONER

## 2021-04-25 PROCEDURE — 82962 GLUCOSE BLOOD TEST: CPT

## 2021-04-25 PROCEDURE — 85025 COMPLETE CBC W/AUTO DIFF WBC: CPT

## 2021-04-25 PROCEDURE — 86140 C-REACTIVE PROTEIN: CPT

## 2021-04-25 PROCEDURE — 65660000004 HC RM CVT STEPDOWN

## 2021-04-25 PROCEDURE — 77010033711 HC HIGH FLOW OXYGEN

## 2021-04-25 PROCEDURE — 96365 THER/PROPH/DIAG IV INF INIT: CPT

## 2021-04-25 PROCEDURE — 80053 COMPREHEN METABOLIC PANEL: CPT

## 2021-04-25 PROCEDURE — 83735 ASSAY OF MAGNESIUM: CPT

## 2021-04-25 PROCEDURE — 74011250637 HC RX REV CODE- 250/637: Performed by: INTERNAL MEDICINE

## 2021-04-25 PROCEDURE — 71045 X-RAY EXAM CHEST 1 VIEW: CPT

## 2021-04-25 RX ORDER — ACETAMINOPHEN 650 MG/1
650 SUPPOSITORY RECTAL
Status: DISCONTINUED | OUTPATIENT
Start: 2021-04-25 | End: 2021-05-06 | Stop reason: HOSPADM

## 2021-04-25 RX ORDER — MAGNESIUM SULFATE HEPTAHYDRATE 40 MG/ML
2 INJECTION, SOLUTION INTRAVENOUS ONCE
Status: COMPLETED | OUTPATIENT
Start: 2021-04-25 | End: 2021-04-25

## 2021-04-25 RX ORDER — ENOXAPARIN SODIUM 100 MG/ML
40 INJECTION SUBCUTANEOUS EVERY 24 HOURS
Status: DISCONTINUED | OUTPATIENT
Start: 2021-04-25 | End: 2021-05-06 | Stop reason: HOSPADM

## 2021-04-25 RX ORDER — ONDANSETRON 2 MG/ML
4 INJECTION INTRAMUSCULAR; INTRAVENOUS
Status: DISCONTINUED | OUTPATIENT
Start: 2021-04-25 | End: 2021-05-06 | Stop reason: HOSPADM

## 2021-04-25 RX ORDER — DEXTROSE 50 % IN WATER (D50W) INTRAVENOUS SYRINGE
25-50 AS NEEDED
Status: DISCONTINUED | OUTPATIENT
Start: 2021-04-25 | End: 2021-05-06 | Stop reason: HOSPADM

## 2021-04-25 RX ORDER — HYDROXYCHLOROQUINE SULFATE 200 MG/1
200 TABLET, FILM COATED ORAL 2 TIMES DAILY
Status: DISCONTINUED | OUTPATIENT
Start: 2021-04-25 | End: 2021-05-06 | Stop reason: HOSPADM

## 2021-04-25 RX ORDER — SODIUM CHLORIDE 0.9 % (FLUSH) 0.9 %
5-40 SYRINGE (ML) INJECTION AS NEEDED
Status: DISCONTINUED | OUTPATIENT
Start: 2021-04-25 | End: 2021-05-06 | Stop reason: HOSPADM

## 2021-04-25 RX ORDER — IPRATROPIUM BROMIDE AND ALBUTEROL SULFATE 2.5; .5 MG/3ML; MG/3ML
3 SOLUTION RESPIRATORY (INHALATION)
Status: DISCONTINUED | OUTPATIENT
Start: 2021-04-25 | End: 2021-04-26

## 2021-04-25 RX ORDER — INSULIN LISPRO 100 [IU]/ML
INJECTION, SOLUTION INTRAVENOUS; SUBCUTANEOUS
Status: DISCONTINUED | OUTPATIENT
Start: 2021-04-25 | End: 2021-05-06 | Stop reason: HOSPADM

## 2021-04-25 RX ORDER — POLYETHYLENE GLYCOL 3350 17 G/17G
17 POWDER, FOR SOLUTION ORAL DAILY PRN
Status: DISCONTINUED | OUTPATIENT
Start: 2021-04-25 | End: 2021-05-06 | Stop reason: HOSPADM

## 2021-04-25 RX ORDER — POTASSIUM CHLORIDE 20 MEQ/1
60 TABLET, EXTENDED RELEASE ORAL
Status: DISCONTINUED | OUTPATIENT
Start: 2021-04-25 | End: 2021-04-25 | Stop reason: ALTCHOICE

## 2021-04-25 RX ORDER — PANTOPRAZOLE SODIUM 40 MG/1
40 TABLET, DELAYED RELEASE ORAL
Status: DISCONTINUED | OUTPATIENT
Start: 2021-04-26 | End: 2021-05-06 | Stop reason: HOSPADM

## 2021-04-25 RX ORDER — DEXAMETHASONE SODIUM PHOSPHATE 4 MG/ML
10 INJECTION, SOLUTION INTRA-ARTICULAR; INTRALESIONAL; INTRAMUSCULAR; INTRAVENOUS; SOFT TISSUE EVERY 12 HOURS
Status: DISCONTINUED | OUTPATIENT
Start: 2021-04-25 | End: 2021-04-25

## 2021-04-25 RX ORDER — SERTRALINE HYDROCHLORIDE 50 MG/1
150 TABLET, FILM COATED ORAL DAILY
Status: DISCONTINUED | OUTPATIENT
Start: 2021-04-25 | End: 2021-05-06 | Stop reason: HOSPADM

## 2021-04-25 RX ORDER — PROMETHAZINE HYDROCHLORIDE 12.5 MG/1
12.5 TABLET ORAL
Status: DISCONTINUED | OUTPATIENT
Start: 2021-04-25 | End: 2021-04-30

## 2021-04-25 RX ORDER — GUAIFENESIN 600 MG/1
600 TABLET, EXTENDED RELEASE ORAL EVERY 12 HOURS
Status: DISCONTINUED | OUTPATIENT
Start: 2021-04-25 | End: 2021-05-06 | Stop reason: HOSPADM

## 2021-04-25 RX ORDER — MAGNESIUM SULFATE 100 %
4 CRYSTALS MISCELLANEOUS AS NEEDED
Status: DISCONTINUED | OUTPATIENT
Start: 2021-04-25 | End: 2021-05-06 | Stop reason: HOSPADM

## 2021-04-25 RX ORDER — DEXAMETHASONE SODIUM PHOSPHATE 4 MG/ML
6 INJECTION, SOLUTION INTRA-ARTICULAR; INTRALESIONAL; INTRAMUSCULAR; INTRAVENOUS; SOFT TISSUE
Status: COMPLETED | OUTPATIENT
Start: 2021-04-25 | End: 2021-04-25

## 2021-04-25 RX ORDER — HYDRALAZINE HYDROCHLORIDE 20 MG/ML
10 INJECTION INTRAMUSCULAR; INTRAVENOUS
Status: DISCONTINUED | OUTPATIENT
Start: 2021-04-25 | End: 2021-05-06 | Stop reason: HOSPADM

## 2021-04-25 RX ORDER — ACETAMINOPHEN 325 MG/1
650 TABLET ORAL
Status: DISCONTINUED | OUTPATIENT
Start: 2021-04-25 | End: 2021-05-06 | Stop reason: HOSPADM

## 2021-04-25 RX ORDER — OMEGA-3-ACID ETHYL ESTERS 1 G/1
1 CAPSULE, LIQUID FILLED ORAL 2 TIMES DAILY WITH MEALS
Status: DISCONTINUED | OUTPATIENT
Start: 2021-04-26 | End: 2021-05-06 | Stop reason: HOSPADM

## 2021-04-25 RX ORDER — ASCORBIC ACID 250 MG
500 TABLET ORAL 2 TIMES DAILY
Status: DISCONTINUED | OUTPATIENT
Start: 2021-04-25 | End: 2021-05-06 | Stop reason: HOSPADM

## 2021-04-25 RX ORDER — POTASSIUM CHLORIDE 7.45 MG/ML
10 INJECTION INTRAVENOUS
Status: COMPLETED | OUTPATIENT
Start: 2021-04-25 | End: 2021-04-25

## 2021-04-25 RX ORDER — POTASSIUM CHLORIDE 20 MEQ/1
40 TABLET, EXTENDED RELEASE ORAL
Status: DISPENSED | OUTPATIENT
Start: 2021-04-25 | End: 2021-04-26

## 2021-04-25 RX ORDER — CLONIDINE HYDROCHLORIDE 0.1 MG/1
0.1 TABLET ORAL 2 TIMES DAILY
Status: DISCONTINUED | OUTPATIENT
Start: 2021-04-25 | End: 2021-05-06 | Stop reason: HOSPADM

## 2021-04-25 RX ORDER — DEXAMETHASONE SODIUM PHOSPHATE 4 MG/ML
10 INJECTION, SOLUTION INTRA-ARTICULAR; INTRALESIONAL; INTRAMUSCULAR; INTRAVENOUS; SOFT TISSUE EVERY 12 HOURS
Status: COMPLETED | OUTPATIENT
Start: 2021-04-25 | End: 2021-04-30

## 2021-04-25 RX ORDER — DEXAMETHASONE SODIUM PHOSPHATE 4 MG/ML
10 INJECTION, SOLUTION INTRA-ARTICULAR; INTRALESIONAL; INTRAMUSCULAR; INTRAVENOUS; SOFT TISSUE EVERY 12 HOURS
Status: DISCONTINUED | OUTPATIENT
Start: 2021-04-25 | End: 2021-04-25 | Stop reason: RX

## 2021-04-25 RX ORDER — DEXAMETHASONE SODIUM PHOSPHATE 4 MG/ML
6 INJECTION, SOLUTION INTRA-ARTICULAR; INTRALESIONAL; INTRAMUSCULAR; INTRAVENOUS; SOFT TISSUE EVERY 12 HOURS
Status: DISCONTINUED | OUTPATIENT
Start: 2021-04-25 | End: 2021-04-25

## 2021-04-25 RX ORDER — SODIUM CHLORIDE 0.9 % (FLUSH) 0.9 %
5-40 SYRINGE (ML) INJECTION EVERY 8 HOURS
Status: DISCONTINUED | OUTPATIENT
Start: 2021-04-25 | End: 2021-05-06 | Stop reason: HOSPADM

## 2021-04-25 RX ADMIN — ONDANSETRON 4 MG: 2 INJECTION INTRAMUSCULAR; INTRAVENOUS at 18:19

## 2021-04-25 RX ADMIN — HYDROXYCHLOROQUINE SULFATE 200 MG: 200 TABLET ORAL at 23:35

## 2021-04-25 RX ADMIN — DEXAMETHASONE SODIUM PHOSPHATE 10 MG: 4 INJECTION, SOLUTION INTRAMUSCULAR; INTRAVENOUS at 20:33

## 2021-04-25 RX ADMIN — Medication 5 ML: at 22:00

## 2021-04-25 RX ADMIN — INSULIN LISPRO 2 UNITS: 100 INJECTION, SOLUTION INTRAVENOUS; SUBCUTANEOUS at 21:34

## 2021-04-25 RX ADMIN — Medication 500 MG: at 20:33

## 2021-04-25 RX ADMIN — CEFTRIAXONE SODIUM 2 G: 2 INJECTION, POWDER, FOR SOLUTION INTRAMUSCULAR; INTRAVENOUS at 15:08

## 2021-04-25 RX ADMIN — IPRATROPIUM BROMIDE AND ALBUTEROL SULFATE 3 ML: .5; 3 SOLUTION RESPIRATORY (INHALATION) at 18:07

## 2021-04-25 RX ADMIN — MAGNESIUM SULFATE HEPTAHYDRATE 2 G: 40 INJECTION, SOLUTION INTRAVENOUS at 18:07

## 2021-04-25 RX ADMIN — SODIUM CHLORIDE 500 ML: 900 INJECTION, SOLUTION INTRAVENOUS at 15:32

## 2021-04-25 RX ADMIN — CLONIDINE HYDROCHLORIDE 0.1 MG: 0.1 TABLET ORAL at 20:33

## 2021-04-25 RX ADMIN — DEXAMETHASONE SODIUM PHOSPHATE 6 MG: 4 INJECTION, SOLUTION INTRAMUSCULAR; INTRAVENOUS at 15:14

## 2021-04-25 RX ADMIN — POTASSIUM CHLORIDE 10 MEQ: 7.46 INJECTION, SOLUTION INTRAVENOUS at 14:38

## 2021-04-25 RX ADMIN — AZITHROMYCIN DIHYDRATE 500 MG: 500 INJECTION, POWDER, LYOPHILIZED, FOR SOLUTION INTRAVENOUS at 15:21

## 2021-04-25 RX ADMIN — SERTRALINE HYDROCHLORIDE 150 MG: 50 TABLET ORAL at 18:05

## 2021-04-25 RX ADMIN — ENOXAPARIN SODIUM 40 MG: 40 INJECTION SUBCUTANEOUS at 18:07

## 2021-04-25 RX ADMIN — POTASSIUM CHLORIDE 10 MEQ: 7.46 INJECTION, SOLUTION INTRAVENOUS at 15:20

## 2021-04-25 RX ADMIN — IPRATROPIUM BROMIDE AND ALBUTEROL SULFATE 3 ML: .5; 3 SOLUTION RESPIRATORY (INHALATION) at 20:36

## 2021-04-25 RX ADMIN — GUAIFENESIN 600 MG: 600 TABLET, EXTENDED RELEASE ORAL at 20:33

## 2021-04-25 RX ADMIN — IPRATROPIUM BROMIDE AND ALBUTEROL SULFATE 3 ML: .5; 3 SOLUTION RESPIRATORY (INHALATION) at 23:36

## 2021-04-25 NOTE — ED TRIAGE NOTES
Per the patient's daughter, Johnny Chris tested positive on the 14th. She's been complaining of generalized body aches, coughing up thick, yellow phlegm, vomited three days ago. I think a lot of this is anxiety. She takes Zoloft. Sometimes she needs Ativan, but we don't have any. \"

## 2021-04-25 NOTE — H&P
History & Physical    Patient: Sarmad Pedraza MRN: [id number]  CSN: [id number]    YOB: 1945  Age: 67 y.o. Sex: female      DOA: 4/25/2021    Chief Complaint:   Chief Complaint   Patient presents with    Flu Like Symptoms          HPI:     Sarmad Pedraza is a 67 y.o.  female with hx of lupus on Plaquenil, hypertension, thromboembolism s/p IVC filter, bronchitis and intermittent home oxygen use who presented to the ED today complaining of worsening myalgias, cough and dyspnea. Patient was diagnosed with Covid 11 days ago however did not show symptoms until 9 days ago. Patient was tested because other members of the household were positive. She began to develop myalgias, progressive cough and dyspnea. Family checked her pulse ox and her oxygen levels have been between 80s-90s. She has been using home oxygen intermittently but ran out today. Of note patient was resistant to coming to the hospital but she agreed because she was feeling worse and worse. No complaint of nausea, vomiting but has not been wanting to eat however she has been keeping up with fluids. Typically she is not very active but her activity has since declined since presentation of symptoms. Work-up in the ED, patient with low-grade temp of 99.1, normal POC lactic acid, WBC 4.4, D-dimer 0.79, potassium 2.8, BUN 23, creatinine 0.40, CRP 14.5, procalcitonin <0.05, chest x-ray showed mild interstitial edema versus interstitial infiltrate, small bilateral pleural effusions with overlying mid to lower lung patchy opacities. Blood cultures obtained x2. She was started on IV Zithromax and Rocephin. Patient was given 1 dose of IV Decadron 6 mg. She also received IV potassium replacement to total 20 mEq and 40 mEq orally. Pt is now on HFNC 65%. We are asked to admit for further management of care.     Past Medical History:   Diagnosis Date    Arthritis     Bronchitis 01/01/2020    Hypertension     Indigestion     Lupus ([facility] [address]     Memory difficulty     Nausea & vomiting     Ringing of ears     sometimes    Thromboembolus [facility]) 2009       Past Surgical History:   Procedure Laterality Date    HX CHOLECYSTECTOMY  1988    HX COLONOSCOPY      HX ORTHOPAEDIC  April 09'    right knee replacement     HX ORTHOPAEDIC  Sept 09'    left knee replacement    HX VASCULAR ACCESS      IVC filter    AR REMOVAL GALLBLADDER         Family History   Problem Relation Age of Onset    Stroke Father     Cancer Father         prastate        Social History     Socioeconomic History    Marital status:      Spouse name: Not on file    Number of children: Not on file    Years of education: Not on file    Highest education level: Not on file   Tobacco Use    Smoking status: Never Smoker    Smokeless tobacco: Never Used    Tobacco comment: heavy exposure to second hand smoke growing up   Substance and Sexual Activity    Alcohol use: No    Drug use: Never    Sexual activity: Never   Other Topics Concern       Prior to Admission medications    Medication Sig Start Date End Date Taking? Authorizing Provider   topiramate (TOPAMAX) 25 mg tablet TAKE 1 TABLET BY MOUTH IN THE MORNING AND 1 TO 2 IN THE EVENING 11/23/20   Salbador Dixon NP   nortriptyline (PAMELOR) 10 mg capsule TAKE 1 CAPSULE BY MOUTH NIGHTLY 11/23/20   Salbador Dixon NP   furosemide (LASIX) 20 mg tablet Take 20 mg by mouth daily. Provider, Historical   sertraline (Zoloft) 50 mg tablet Take 50 mg by mouth daily. Provider, Historical   acetaminophen (TYLENOL) 500 mg tablet Take 1,000 mg by mouth every six (6) hours as needed for Pain. Provider, Historical   ibuprofen (MOTRIN) 200 mg tablet Take 800 mg by mouth every eight (8) hours as needed for Pain. Provider, Historical   OXYGEN-AIR DELIVERY SYSTEMS 2 L by IntraNASal route continuous. Provider, Historical   hydroxychloroquine (PLAQUENIL) 200 mg tablet Take 200 mg by mouth daily. Provider, Historical   benazepril-hydrochlorothiazide (LOTENSIN HCT) 20-25 mg per tablet Take 1 Tab by mouth daily. Provider, Historical       Allergies   Allergen Reactions    Morphine Anxiety         Review of Systems-positive in bold  GENERAL: +body aches/myalgias. No fever, chills, malaise, weight changes  HEENT: No change in vision, no earache, tinnitus, sore throat or sinus congestion. NECK: No pain or stiffness. PULMONARY: + shortness of breath, cough or wheeze. Cardiovascular: no pnd or orthopnea, no CP  GASTROINTESTINAL: No abdominal pain, nausea, vomiting or diarrhea, melena or bright red blood per rectum. GENITOURINARY: No urinary frequency, urgency, hesitancy or dysuria. MUSCULOSKELETAL: No joint or muscle pain, no back pain, no recent trauma. DERMATOLOGIC: No rash, no itching, no lesions. ENDOCRINE: No polyuria, polydipsia, no heat or cold intolerance. No recent change in weight. HEMATOLOGICAL: No anemia or easy bruising or bleeding. NEUROLOGIC: No headache, seizures, numbness, tingling or weakness. Physical Exam:     Physical Exam:  Visit Vitals  /73   Pulse 94   Temp 99.1 °F (37.3 °C)   Resp 21   Ht 5' 2\" (1.575 m)   Wt 113.4 kg (250 lb)   SpO2 (!) 84%   BMI 45.73 kg/m²      O2 Device: None (Room air)    Temp (24hrs), Av.1 °F (37.3 °C), Min:99.1 °F (37.3 °C), Max:99.1 °F (37.3 °C)    No intake/output data recorded. No intake/output data recorded. General:  Alert, cooperative, mild distress, anxious              Head: Normocephalic, without obvious abnormality, atraumatic. Eyes:  Conjunctivae/corneas clear. PERRL, EOMs intact. Nose: Nares normal. No drainage or sinus tenderness. Neck: Supple, symmetrical, trachea midline, no adenopathy, thyroid: no enlargement, no carotid bruit and no JVD. Lungs:   Diminished bilaterally, non-productive cough observed. Heart:  Regular rate and rhythm, S1, S2 normal.     Abdomen: Soft, non-tender.  Bowel sounds hypoactive. Extremities: Extremities normal, atraumatic, no cyanosis or edema. Pulses: 2+ and symmetric all extremities. Skin:  No rashes or lesions   Neurologic: AAOx3, No focal motor or sensory deficit. Labs Reviewed: All lab results for the last 24 hours reviewed. Recent Results (from the past 24 hour(s))   METABOLIC PANEL, COMPREHENSIVE    Collection Time: 04/25/21 12:14 PM   Result Value Ref Range    Sodium 142 136 - 145 mmol/L    Potassium 2.8 (LL) 3.5 - 5.5 mmol/L    Chloride 101 100 - 111 mmol/L    CO2 38 (H) 21 - 32 mmol/L    Anion gap 3 3.0 - 18 mmol/L    Glucose 106 (H) 74 - 99 mg/dL    BUN 23 (H) 7.0 - 18 MG/DL    Creatinine 0.40 (L) 0.6 - 1.3 MG/DL    BUN/Creatinine ratio 58 (H) 12 - 20      GFR est AA >60 >60 ml/min/1.73m2    GFR est non-AA >60 >60 ml/min/1.73m2    Calcium 8.4 (L) 8.5 - 10.1 MG/DL    Bilirubin, total 0.5 0.2 - 1.0 MG/DL    ALT (SGPT) 33 13 - 56 U/L    AST (SGOT) 39 (H) 10 - 38 U/L    Alk. phosphatase 63 45 - 117 U/L    Protein, total 5.7 (L) 6.4 - 8.2 g/dL    Albumin 2.7 (L) 3.4 - 5.0 g/dL    Globulin 3.0 2.0 - 4.0 g/dL    A-G Ratio 0.9 0.8 - 1.7     CBC WITH AUTOMATED DIFF    Collection Time: 04/25/21 12:14 PM   Result Value Ref Range    WBC 4.4 (L) 4.6 - 13.2 K/uL    RBC 4.15 (L) 4.20 - 5.30 M/uL    HGB 12.1 12.0 - 16.0 g/dL    HCT 37.1 35.0 - 45.0 %    MCV 89.4 74.0 - 97.0 FL    MCH 29.2 24.0 - 34.0 PG    MCHC 32.6 31.0 - 37.0 g/dL    RDW 13.0 11.6 - 14.5 %    PLATELET 609 115 - 415 K/uL    MPV 10.1 9.2 - 11.8 FL    NEUTROPHILS 76 (H) 40 - 73 %    LYMPHOCYTES 12 (L) 21 - 52 %    MONOCYTES 10 3 - 10 %    EOSINOPHILS 1 0 - 5 %    BASOPHILS 0 0 - 2 %    ABS. NEUTROPHILS 3.3 1.8 - 8.0 K/UL    ABS. LYMPHOCYTES 0.5 (L) 0.9 - 3.6 K/UL    ABS. MONOCYTES 0.4 0.05 - 1.2 K/UL    ABS. EOSINOPHILS 0.1 0.0 - 0.4 K/UL    ABS.  BASOPHILS 0.0 0.0 - 0.1 K/UL    DF AUTOMATED     PROCALCITONIN    Collection Time: 04/25/21 12:14 PM   Result Value Ref Range    Procalcitonin <0.05 ng/mL C REACTIVE PROTEIN, QT    Collection Time: 04/25/21 12:14 PM   Result Value Ref Range    C-Reactive protein 14.5 (H) 0 - 0.3 mg/dL   D DIMER    Collection Time: 04/25/21 12:14 PM   Result Value Ref Range    D DIMER 0.79 (H) <0.46 ug/ml(FEU)   EKG, 12 LEAD, INITIAL    Collection Time: 04/25/21  2:06 PM   Result Value Ref Range    Ventricular Rate 86 BPM    Atrial Rate 86 BPM    QRS Duration 86 ms    Q-T Interval 410 ms    QTC Calculation (Bezet) 490 ms    Calculated R Axis -9 degrees    Calculated T Axis -104 degrees    Diagnosis       Accelerated Junctional rhythm  Minimal voltage criteria for LVH, may be normal variant  ST & T wave abnormality, consider lateral ischemia  Abnormal ECG  When compared with ECG of 01-JAN-2020 16:07,  Junctional rhythm has replaced Sinus rhythm  ST now depressed in Lateral leads     POC LACTIC ACID    Collection Time: 04/25/21  2:38 PM   Result Value Ref Range    Lactic Acid (POC) 1.07 0.40 - 2.00 mmol/L        XR Results (most recent):  Results from Hospital Encounter encounter on 04/25/21   XR CHEST SNGL V    Narrative CHEST RADIOGRAPH-1 VIEW    INDICATION: Cough    COMPARISON: Chest x-ray 2/28/2020    FINDINGS: Cardiac silhouette is stable. Atherosclerosis noted. Mild diffuse  interstitial prominence. Bilateral mid to lower lung patchy opacities. Probable  small bilateral pleural effusions. Lungs are hypoinflated. No evidence for  pneumothorax. No acute osseous finding. Impression 1. Mild interstitial edema versus interstitial infiltrate. 2. Small bilateral pleural effusions with overlying mid to lower lung patchy  opacities. These could represent atelectasis but superimposed infiltrate/edema  not excluded.            Procedures/imaging: see electronic medical records for all procedures/Xrays and details which were not copied into this note but were reviewed prior to creation of Plan      Assessment/Plan     58-year-old  female tested positive for Covid 11 days ago due to other family members in the house with Covid. Patient developed symptoms 2 days after testing positive for Covid, initially resisted coming to the ED ER but symptoms continue to worse. Patient complaining of myalgias, productive cough and dyspnea. She has a home oxygen that she uses intermittently but ran out today. In the ED patient noted with low-grade temp, normal POC lactic acid, normal procalcitonin, elevated CRP and D-dimer. Blood cultures obtained x2. She was started on IV antibiotics and admitted to stepdown. Assessment  1. COVID-19 pneumonia  2. Acute hypoxic respiratory failure  3. COVID-19  4. Hypokalemia  5. Hypomagnesemia  6. Hypertension  7. Lupus  8. Morbid obesity  9. Debility        Plan  1. ID consulted, appreciate assistance. Continue IV Zithromax and Rocephin. Recommends Decadron 10 mg IV BID x 10 days then taper. Follow blood cultures collected in ED. Obtain urine Legionella antigen and strep pneumo antigen. Obtain sputum culture if possible. Monitor daily inflammatory markers. Continue droplet plus isolation. Incentive spirometer. Mucinex BID. 2. Pulmonary consulted- spoke with Dr. [facility], greatly appreciate assistance. Continue HFNC. 3. Will monitor accuchecks ac/hs since pt will be on IV Decadron 10 mg BID. Correctional SSI. Place on PPI for SUP prophylaxis. 4. Potassium replaced in the ED, magnesium added on and 1.7 will give Mag Sulfate 2 grams IV x 1. Check labs in am.  5. Hold home BP med as pt was hypokalemic on arrival, also with borderline low BP. Hydralazine prn parameters. 6. Monitor vital signs, I/Os, weight per unit protocol  7. PT, OT eval and treat  8. Consult nutrition  9. Fall precautions  10. Consult CM to assist w/ dc planning-will need to assess home oxygen needs prior to discharge      High risk for deterioration with Covid-19 Pneumonia and on 25L/65% FiO2 on HFNC.       Diet: Cardiac  DVT/GI Prophylaxis: Lovenox and H2B/PPI  Code Status: FULL    Contact: Tesfaye Burgess [phone]- daughter was at bedside at the time of my assessment. All questions answered. Confirmed FULL code status. Discussed how Covid affects patients differently and her mother is high risk for deterioration given age, co-morbidities and now on HFNC. Advised if hypoxia worsens, next step is BiPAP and after that would be mechanical ventilation. Verbalized understanding. Discussed with patient at bedside about hospital admission and plan care. She understands and agrees. All questions answered. Disclaimer: Sections of this note are dictated using utilizing voice recognition software. Minor typographical errors may be present. If questions arise, please do not hesitate to contact me or call our department.         Annie Moore NP-C  [facility] OF THE [facility]  pager [phone]

## 2021-04-25 NOTE — ED PROVIDER NOTES
EMERGENCY DEPARTMENT HISTORY AND PHYSICAL EXAM    2:22 PM      Date: 4/25/2021  Patient Name: Mara Avila    History of Presenting Illness     Chief Complaint   Patient presents with    Flu Like Symptoms         History Provided By: Patient and Patient's Daughter  Location/Duration/Severity/Modifying factors   The patient is a 58-year-old female with a history of lupus on Plaquenil, tinnitus, hypertension, and bronchitis with previous home oxygen use, the presents emergency department with known Covid it was diagnosed 11 days ago with symptoms notable 9 days ago. The patient has not been vaccinated and lives in a household that ended up having Covid through work according to the patient's daughter who had that infection prior. The patient was tested only because every other household member came down with infection. Patient at that time did not have any symptoms however 2 days later started having myalgias and then has a progressive cough with dyspnea has been worsening to the point that she agreed to come to the hospital today. Given checking her pulse oximeter at home and her oxygen levels been hanging out in the high 80s into the 90s and they had some supplemental oxygen we just run out today which they are using intermittently. The patient has been resistant to coming to the hospital but today was feeling worse and worse decided to come to the hospital.  The patient is feeling anxious and notes that she feels like her symptoms are worsening. Patient denies any nausea or vomiting however has not been wanting to eat but is been keeping up with her fluids according to the patient's daughter. The patient is typically not very active but her activity continue to decline since the diagnosis of Covid. Patient has a history of a cholecystectomy and IVC filter. Patient is not a smoker, drinker, or drug user.           PCP: LEVI De La Rosa    Current Facility-Administered Medications   Medication Dose Route Frequency Provider Last Rate Last Admin    potassium chloride (K-DUR, KLOR-CON) SR tablet 40 mEq  40 mEq Oral NOW Quan Lynne MD        potassium chloride 10 mEq in 100 ml IVPB  10 mEq IntraVENous Q1H Quan Lynne MD        cefTRIAXone (ROCEPHIN) 2 g in sterile water (preservative free) 20 mL IV syringe  2 g IntraVENous Q24H Quan Lynne MD        azithromycin (ZITHROMAX) 500 mg in 0.9% sodium chloride 250 mL (VIAL-MATE)  500 mg IntraVENous Q24H Quan Lynne MD        dexamethasone (DECADRON) 4 mg/mL injection 6 mg  6 mg IntraVENous NOW Quan Lynne MD         Current Outpatient Medications   Medication Sig Dispense Refill    topiramate (TOPAMAX) 25 mg tablet TAKE 1 TABLET BY MOUTH IN THE MORNING AND 1 TO 2 IN THE EVENING 90 Tab 0    nortriptyline (PAMELOR) 10 mg capsule TAKE 1 CAPSULE BY MOUTH NIGHTLY 60 Cap 0    furosemide (LASIX) 20 mg tablet Take 20 mg by mouth daily.  sertraline (Zoloft) 50 mg tablet Take 50 mg by mouth daily.  acetaminophen (TYLENOL) 500 mg tablet Take 1,000 mg by mouth every six (6) hours as needed for Pain.  ibuprofen (MOTRIN) 200 mg tablet Take 800 mg by mouth every eight (8) hours as needed for Pain.  OXYGEN-AIR DELIVERY SYSTEMS 2 L by IntraNASal route continuous.  hydroxychloroquine (PLAQUENIL) 200 mg tablet Take 200 mg by mouth daily.  benazepril-hydrochlorothiazide (LOTENSIN HCT) 20-25 mg per tablet Take 1 Tab by mouth daily.          Past History     Past Medical History:  Past Medical History:   Diagnosis Date    Arthritis     Bronchitis 01/01/2020    Hypertension     Indigestion     Lupus ([facility] [address]     Memory difficulty     Nausea & vomiting     Ringing of ears     sometimes    Thromboembolus [facility]) 2009       Past Surgical History:  Past Surgical History:   Procedure Laterality Date    HX CHOLECYSTECTOMY  1988    HX COLONOSCOPY      HX ORTHOPAEDIC  April 09'    right knee replacement [facility] ORTHOPAEDIC  Sept 09'    left knee replacement    HX VASCULAR ACCESS      IVC filter    AR REMOVAL GALLBLADDER         Family History:  Family History   Problem Relation Age of Onset    Stroke Father     Cancer Father         ania        Social History:  Social History     Tobacco Use    Smoking status: Never Smoker    Smokeless tobacco: Never Used    Tobacco comment: heavy exposure to second hand smoke growing up   Substance Use Topics    Alcohol use: No    Drug use: Never       Allergies: Allergies   Allergen Reactions    Morphine Anxiety         Review of Systems       Review of Systems   Constitutional: Positive for activity change, chills and fatigue. Negative for fever. HENT: Negative for congestion and rhinorrhea. Eyes: Negative for visual disturbance. Respiratory: Positive for shortness of breath. Cardiovascular: Negative for chest pain and palpitations. Gastrointestinal: Negative for abdominal pain, diarrhea, nausea and vomiting. Genitourinary: Negative for dysuria and hematuria. Musculoskeletal: Positive for myalgias. Negative for back pain. Skin: Negative for rash. Neurological: Negative for dizziness, weakness and light-headedness. All other systems reviewed and are negative. Physical Exam     Visit Vitals  /73   Pulse 94   Temp 99.1 °F (37.3 °C)   Resp 21   Ht 5' 2\" (1.575 m)   Wt 113.4 kg (250 lb)   SpO2 (!) 84%   BMI 45.73 kg/m²         Physical Exam  Vitals signs and nursing note reviewed. Constitutional:       General: She is not in acute distress. Appearance: She is well-developed. Comments: In a wheelchair, pale   HENT:      Head: Normocephalic and atraumatic. Right Ear: External ear normal.      Left Ear: External ear normal.      Nose: Nose normal.   Eyes:      General: No scleral icterus. Conjunctiva/sclera: Conjunctivae normal.      Pupils: Pupils are equal, round, and reactive to light.    Neck:      Musculoskeletal: Normal range of motion and neck supple. Thyroid: No thyromegaly. Vascular: No JVD. Trachea: No tracheal deviation. Cardiovascular:      Rate and Rhythm: Normal rate and regular rhythm. Heart sounds: Normal heart sounds. No murmur. No friction rub. No gallop. Pulmonary:      Breath sounds: Rhonchi and rales present. Comments: Intermittent cough  Chest:      Chest wall: No tenderness. Abdominal:      General: Bowel sounds are normal. There is no distension. Palpations: Abdomen is soft. Tenderness: There is no abdominal tenderness. There is no guarding or rebound. Musculoskeletal: Normal range of motion. General: No tenderness. Lymphadenopathy:      Cervical: No cervical adenopathy. Skin:     General: Skin is dry. Comments: Cool   Neurological:      Mental Status: She is alert and oriented to person, place, and time. Cranial Nerves: No cranial nerve deficit. Coordination: Coordination normal.      Comments: No sensory loss, Gait normal, Motor 5/5   Psychiatric:      Comments: Supportive and insightful daughter at the bedside           Diagnostic Study Results     Labs -  Recent Results (from the past 12 hour(s))   METABOLIC PANEL, COMPREHENSIVE    Collection Time: 04/25/21 12:14 PM   Result Value Ref Range    Sodium 142 136 - 145 mmol/L    Potassium 2.8 (LL) 3.5 - 5.5 mmol/L    Chloride 101 100 - 111 mmol/L    CO2 38 (H) 21 - 32 mmol/L    Anion gap 3 3.0 - 18 mmol/L    Glucose 106 (H) 74 - 99 mg/dL    BUN 23 (H) 7.0 - 18 MG/DL    Creatinine 0.40 (L) 0.6 - 1.3 MG/DL    BUN/Creatinine ratio 58 (H) 12 - 20      GFR est AA >60 >60 ml/min/1.73m2    GFR est non-AA >60 >60 ml/min/1.73m2    Calcium 8.4 (L) 8.5 - 10.1 MG/DL    Bilirubin, total 0.5 0.2 - 1.0 MG/DL    ALT (SGPT) 33 13 - 56 U/L    AST (SGOT) 39 (H) 10 - 38 U/L    Alk.  phosphatase 63 45 - 117 U/L    Protein, total 5.7 (L) 6.4 - 8.2 g/dL    Albumin 2.7 (L) 3.4 - 5.0 g/dL    Globulin 3.0 2.0 - 4.0 g/dL    A-G Ratio 0.9 0.8 - 1.7     CBC WITH AUTOMATED DIFF    Collection Time: 04/25/21 12:14 PM   Result Value Ref Range    WBC 4.4 (L) 4.6 - 13.2 K/uL    RBC 4.15 (L) 4.20 - 5.30 M/uL    HGB 12.1 12.0 - 16.0 g/dL    HCT 37.1 35.0 - 45.0 %    MCV 89.4 74.0 - 97.0 FL    MCH 29.2 24.0 - 34.0 PG    MCHC 32.6 31.0 - 37.0 g/dL    RDW 13.0 11.6 - 14.5 %    PLATELET 567 529 - 045 K/uL    MPV 10.1 9.2 - 11.8 FL    NEUTROPHILS 76 (H) 40 - 73 %    LYMPHOCYTES 12 (L) 21 - 52 %    MONOCYTES 10 3 - 10 %    EOSINOPHILS 1 0 - 5 %    BASOPHILS 0 0 - 2 %    ABS. NEUTROPHILS 3.3 1.8 - 8.0 K/UL    ABS. LYMPHOCYTES 0.5 (L) 0.9 - 3.6 K/UL    ABS. MONOCYTES 0.4 0.05 - 1.2 K/UL    ABS. EOSINOPHILS 0.1 0.0 - 0.4 K/UL    ABS. BASOPHILS 0.0 0.0 - 0.1 K/UL    DF AUTOMATED     PROCALCITONIN    Collection Time: 04/25/21 12:14 PM   Result Value Ref Range    Procalcitonin <0.05 ng/mL   C REACTIVE PROTEIN, QT    Collection Time: 04/25/21 12:14 PM   Result Value Ref Range    C-Reactive protein 14.5 (H) 0 - 0.3 mg/dL   D DIMER    Collection Time: 04/25/21 12:14 PM   Result Value Ref Range    D DIMER 0.79 (H) <0.46 ug/ml(FEU)       Radiologic Studies -   XR CHEST SNGL V   Final Result   1. Mild interstitial edema versus interstitial infiltrate. 2. Small bilateral pleural effusions with overlying mid to lower lung patchy   opacities. These could represent atelectasis but superimposed infiltrate/edema   not excluded. Medical Decision Making   I am the first provider for this patient. I reviewed the vital signs, available nursing notes, past medical history, past surgical history, family history and social history. Vital Signs-Reviewed the patient's vital signs.       EKG: Poor baseline, sinus rhythm at 86, LVH, no STEMI, interpreted by me    Records Reviewed: Nursing Notes, Old Medical Records, Previous Radiology Studies and Previous Laboratory Studies (Time of Review: 2:22 PM)    ED Course: Progress Notes, Reevaluation, and Consults:     Patient is doing well on high flow nasal cannula and covering is improved and she is more alert. I discussed with the patient and the family that she will need to be admitted to the hospital and the Decadron has been started. I will defer other therapies to the inpatient team.    I discussed the case with the hospitalist and will admit the patient for further care. The hospitalist asked me to consult the pulmonologist and that was done and they will come see the patient. Provider Notes (Medical Decision Making):   MDM  Number of Diagnoses or Management Options  Diagnosis management comments: Patient is a 59-year-old female with a history of lupus on Plaquenil as well as IVC filter the presents emergency department with approximate 10 days of increasing symptoms of Covid with a diagnosis being made 11 days ago after family members came down with infection. At that time she was not symptomatic and now is becoming progressively more symptomatic. Patient on arrival had a reassuring pulse ox however my evaluation appeared dusky and had a spot check of saturation around 60%. Patient was taken to a bed and put on a monitor and found to have hypoxia and started on high flow nasal cannula. We will start the patient on Decadron, she is likely outside of the window of benefit from remdesivir, empiric antibiotics, inflammatory markers, hydrate only as tolerated, and plan to admit the patient for further care. Partha Ibanez DO 2:28 PM          Procedures    Critical Care Time: Critical Care Time:  The services I provided to this patient were to treat and/or prevent clinically significant deterioration that could result in the failure of one or more body systems and/or organ systems due to pneumonia with hypoxia and hypokalemia    Services included the following:  -reviewing nursing notes and old charts  -vital sign assessments  -direct patient care  -medication orders and management  -interpreting and reviewing diagnostic studies/labs  -re-evaluations  -documentation time    Aggregate critical care time was 61 minutes, which includes only time during which I was engaged in work directly related to the patient's care as described above, whether I was at bedside or elsewhere in the Emergency Department. It did not include time spent performing other reported procedures or the services of residents, students, nurses, or advance practice providers. Theodora DO Jarrett 3:15 PM        Diagnosis     Clinical Impression:   1. Pneumonia due to COVID-19 virus    2. Hypoxia    3. Systemic lupus erythematosus, unspecified SLE type, unspecified organ involvement status ([facility] [address]    4. Hypokalemia        Disposition: Admit     Follow-up Information    None          Patient's Medications   Start Taking    No medications on file   Continue Taking    ACETAMINOPHEN (TYLENOL) 500 MG TABLET    Take 1,000 mg by mouth every six (6) hours as needed for Pain. BENAZEPRIL-HYDROCHLOROTHIAZIDE (LOTENSIN HCT) 20-25 MG PER TABLET    Take 1 Tab by mouth daily. FUROSEMIDE (LASIX) 20 MG TABLET    Take 20 mg by mouth daily. HYDROXYCHLOROQUINE (PLAQUENIL) 200 MG TABLET    Take 200 mg by mouth daily. IBUPROFEN (MOTRIN) 200 MG TABLET    Take 800 mg by mouth every eight (8) hours as needed for Pain. NORTRIPTYLINE (PAMELOR) 10 MG CAPSULE    TAKE 1 CAPSULE BY MOUTH NIGHTLY    OXYGEN-AIR DELIVERY SYSTEMS    2 L by IntraNASal route continuous. SERTRALINE (ZOLOFT) 50 MG TABLET    Take 50 mg by mouth daily. TOPIRAMATE (TOPAMAX) 25 MG TABLET    TAKE 1 TABLET BY MOUTH IN THE MORNING AND 1 TO 2 IN THE EVENING   These Medications have changed    No medications on file   Stop Taking    No medications on file     Disclaimer: Sections of this note are dictated using utilizing voice recognition software. Minor typographical errors may be present. If questions arise, please do not hesitate to contact me or call our department.

## 2021-04-25 NOTE — ED NOTES
Patient brought to room 2, triage nurse states her O2 was 59% on RA  code sepsis called on the patient    Patient is on O2 at home when needed    Patient placed on O2 at 4L, placed in a gown and on the cardiac monitor

## 2021-04-26 LAB
ANION GAP SERPL CALC-SCNC: 5 MMOL/L (ref 3–18)
ATRIAL RATE: 86 BPM
BUN SERPL-MCNC: 23 MG/DL (ref 7–18)
BUN/CREAT SERPL: 56 (ref 12–20)
CALCIUM SERPL-MCNC: 8.3 MG/DL (ref 8.5–10.1)
CALCULATED R AXIS, ECG10: -9 DEGREES
CALCULATED T AXIS, ECG11: -104 DEGREES
CHLORIDE SERPL-SCNC: 103 MMOL/L (ref 100–111)
CO2 SERPL-SCNC: 33 MMOL/L (ref 21–32)
CREAT SERPL-MCNC: 0.41 MG/DL (ref 0.6–1.3)
CRP SERPL-MCNC: 13.2 MG/DL (ref 0–0.3)
D DIMER PPP FEU-MCNC: 0.53 UG/ML(FEU)
DIAGNOSIS, 93000: NORMAL
FERRITIN SERPL-MCNC: 245 NG/ML (ref 8–388)
FIBRINOGEN PPP-MCNC: 612 MG/DL (ref 210–451)
GLUCOSE BLD STRIP.AUTO-MCNC: 132 MG/DL (ref 70–110)
GLUCOSE BLD STRIP.AUTO-MCNC: 135 MG/DL (ref 70–110)
GLUCOSE BLD STRIP.AUTO-MCNC: 136 MG/DL (ref 70–110)
GLUCOSE BLD STRIP.AUTO-MCNC: 161 MG/DL (ref 70–110)
GLUCOSE SERPL-MCNC: 139 MG/DL (ref 74–99)
LDH SERPL L TO P-CCNC: 280 U/L (ref 81–234)
MAGNESIUM SERPL-MCNC: 2.3 MG/DL (ref 1.6–2.6)
MAGNESIUM SERPL-MCNC: 2.4 MG/DL (ref 1.6–2.6)
PHOSPHATE SERPL-MCNC: 2.5 MG/DL (ref 2.5–4.9)
POTASSIUM SERPL-SCNC: 3.3 MMOL/L (ref 3.5–5.5)
Q-T INTERVAL, ECG07: 410 MS
QRS DURATION, ECG06: 86 MS
QTC CALCULATION (BEZET), ECG08: 490 MS
SODIUM SERPL-SCNC: 141 MMOL/L (ref 136–145)
TROPONIN I SERPL-MCNC: 0.14 NG/ML (ref 0–0.04)
VENTRICULAR RATE, ECG03: 86 BPM

## 2021-04-26 PROCEDURE — 92610 EVALUATE SWALLOWING FUNCTION: CPT

## 2021-04-26 PROCEDURE — 74011250637 HC RX REV CODE- 250/637: Performed by: INTERNAL MEDICINE

## 2021-04-26 PROCEDURE — 74011250636 HC RX REV CODE- 250/636: Performed by: NURSE PRACTITIONER

## 2021-04-26 PROCEDURE — 85384 FIBRINOGEN ACTIVITY: CPT

## 2021-04-26 PROCEDURE — 36415 COLL VENOUS BLD VENIPUNCTURE: CPT

## 2021-04-26 PROCEDURE — 85379 FIBRIN DEGRADATION QUANT: CPT

## 2021-04-26 PROCEDURE — 84484 ASSAY OF TROPONIN QUANT: CPT

## 2021-04-26 PROCEDURE — 74011636637 HC RX REV CODE- 636/637: Performed by: NURSE PRACTITIONER

## 2021-04-26 PROCEDURE — 84100 ASSAY OF PHOSPHORUS: CPT

## 2021-04-26 PROCEDURE — 99223 1ST HOSP IP/OBS HIGH 75: CPT | Performed by: INTERNAL MEDICINE

## 2021-04-26 PROCEDURE — 74011000250 HC RX REV CODE- 250: Performed by: NURSE PRACTITIONER

## 2021-04-26 PROCEDURE — 83735 ASSAY OF MAGNESIUM: CPT

## 2021-04-26 PROCEDURE — 82728 ASSAY OF FERRITIN: CPT

## 2021-04-26 PROCEDURE — 92526 ORAL FUNCTION THERAPY: CPT

## 2021-04-26 PROCEDURE — 97162 PT EVAL MOD COMPLEX 30 MIN: CPT

## 2021-04-26 PROCEDURE — 97166 OT EVAL MOD COMPLEX 45 MIN: CPT

## 2021-04-26 PROCEDURE — 82962 GLUCOSE BLOOD TEST: CPT

## 2021-04-26 PROCEDURE — 77030040392 HC DRSG OPTIFOAM MDII -A

## 2021-04-26 PROCEDURE — 94640 AIRWAY INHALATION TREATMENT: CPT

## 2021-04-26 PROCEDURE — 2709999900 HC NON-CHARGEABLE SUPPLY

## 2021-04-26 PROCEDURE — 83615 LACTATE (LD) (LDH) ENZYME: CPT

## 2021-04-26 PROCEDURE — 80048 BASIC METABOLIC PNL TOTAL CA: CPT

## 2021-04-26 PROCEDURE — 77030038269 HC DRN EXT URIN PURWCK BARD -A

## 2021-04-26 PROCEDURE — 97530 THERAPEUTIC ACTIVITIES: CPT

## 2021-04-26 PROCEDURE — 74011250637 HC RX REV CODE- 250/637: Performed by: NURSE PRACTITIONER

## 2021-04-26 PROCEDURE — 65660000000 HC RM CCU STEPDOWN

## 2021-04-26 PROCEDURE — 77010033711 HC HIGH FLOW OXYGEN

## 2021-04-26 PROCEDURE — 74011250637 HC RX REV CODE- 250/637: Performed by: HOSPITALIST

## 2021-04-26 PROCEDURE — 86140 C-REACTIVE PROTEIN: CPT

## 2021-04-26 RX ORDER — LANOLIN ALCOHOL/MO/W.PET/CERES
12 CREAM (GRAM) TOPICAL
Status: DISCONTINUED | OUTPATIENT
Start: 2021-04-26 | End: 2021-05-06 | Stop reason: HOSPADM

## 2021-04-26 RX ORDER — LORAZEPAM 0.5 MG/1
0.5 TABLET ORAL ONCE
Status: COMPLETED | OUTPATIENT
Start: 2021-04-26 | End: 2021-04-26

## 2021-04-26 RX ADMIN — INSULIN LISPRO 2 UNITS: 100 INJECTION, SOLUTION INTRAVENOUS; SUBCUTANEOUS at 08:35

## 2021-04-26 RX ADMIN — GUAIFENESIN 600 MG: 600 TABLET, EXTENDED RELEASE ORAL at 08:33

## 2021-04-26 RX ADMIN — CLONIDINE HYDROCHLORIDE 0.1 MG: 0.1 TABLET ORAL at 18:12

## 2021-04-26 RX ADMIN — GUAIFENESIN 600 MG: 600 TABLET, EXTENDED RELEASE ORAL at 21:05

## 2021-04-26 RX ADMIN — Medication 10 ML: at 21:05

## 2021-04-26 RX ADMIN — CLONIDINE HYDROCHLORIDE 0.1 MG: 0.1 TABLET ORAL at 08:32

## 2021-04-26 RX ADMIN — IPRATROPIUM BROMIDE AND ALBUTEROL SULFATE 3 ML: .5; 3 SOLUTION RESPIRATORY (INHALATION) at 04:00

## 2021-04-26 RX ADMIN — PANTOPRAZOLE SODIUM 40 MG: 40 TABLET, DELAYED RELEASE ORAL at 08:33

## 2021-04-26 RX ADMIN — OMEGA-3-ACID ETHYL ESTERS 1000 MG: 1 CAPSULE, LIQUID FILLED ORAL at 08:33

## 2021-04-26 RX ADMIN — ENOXAPARIN SODIUM 40 MG: 40 INJECTION SUBCUTANEOUS at 16:15

## 2021-04-26 RX ADMIN — HYDROXYCHLOROQUINE SULFATE 200 MG: 200 TABLET ORAL at 08:33

## 2021-04-26 RX ADMIN — CEFTRIAXONE SODIUM 2 G: 2 INJECTION, POWDER, FOR SOLUTION INTRAMUSCULAR; INTRAVENOUS at 13:06

## 2021-04-26 RX ADMIN — Medication 500 MG: at 18:11

## 2021-04-26 RX ADMIN — Medication 10 ML: at 13:11

## 2021-04-26 RX ADMIN — Medication 10 ML: at 06:13

## 2021-04-26 RX ADMIN — IPRATROPIUM BROMIDE AND ALBUTEROL SULFATE 3 ML: .5; 3 SOLUTION RESPIRATORY (INHALATION) at 08:33

## 2021-04-26 RX ADMIN — AZITHROMYCIN DIHYDRATE 500 MG: 500 INJECTION, POWDER, LYOPHILIZED, FOR SOLUTION INTRAVENOUS at 13:07

## 2021-04-26 RX ADMIN — SERTRALINE HYDROCHLORIDE 150 MG: 50 TABLET ORAL at 08:32

## 2021-04-26 RX ADMIN — IPRATROPIUM BROMIDE AND ALBUTEROL 1 PUFF: 20; 100 SPRAY, METERED RESPIRATORY (INHALATION) at 14:00

## 2021-04-26 RX ADMIN — Medication 500 MG: at 08:33

## 2021-04-26 RX ADMIN — HYDROXYCHLOROQUINE SULFATE 200 MG: 200 TABLET ORAL at 18:12

## 2021-04-26 RX ADMIN — LORAZEPAM 0.5 MG: 0.5 TABLET ORAL at 09:59

## 2021-04-26 RX ADMIN — DEXAMETHASONE SODIUM PHOSPHATE 10 MG: 4 INJECTION, SOLUTION INTRAMUSCULAR; INTRAVENOUS at 21:05

## 2021-04-26 RX ADMIN — Medication 12 MG: at 01:43

## 2021-04-26 RX ADMIN — DEXAMETHASONE SODIUM PHOSPHATE 10 MG: 4 INJECTION, SOLUTION INTRAMUSCULAR; INTRAVENOUS at 08:32

## 2021-04-26 NOTE — ED NOTES
Patient alert and oriented x 4. Patient complaining of a cough. Family at bedside. Patient denies any chest pain, vomiting, diarrhea, nausea.

## 2021-04-26 NOTE — PROGRESS NOTES
Problem: Self Care Deficits Care Plan (Adult)  Goal: *Acute Goals and Plan of Care (Insert Text)  Description: Occupational Therapy Goals  Initiated 4/26/2021 within 7 day(s). 1.  Patient will perform bed mobility in preparation for selfcare with modified independence. 2.  Patient will perform upper body dressing with modified independence. 3.  Patient will perform lower body dressing with supervision/set-up using AE prn.  4.  Patient will perform toilet transfers with supervision/set-up. 5.  Patient will perform all aspects of toileting with supervision/set-up. 6.  Patient will participate in upper extremity therapeutic exercise/activities with modified independence for 8 minutes. 7.  Patient will utilize energy conservation techniques during functional activities with verbal cues. Prior Level of Function: Patient needed assist LB self-care and used a RW for functional mobility PTA. Outcome: Progressing Towards Goal       OCCUPATIONAL THERAPY EVALUATION    Patient: Patti Lao (77 y.o. female)  Date: 4/26/2021  Primary Diagnosis: Pneumonia due to COVID-19 virus [U07.1, J12.82]  Hypoxia [R09.02]  Precautions: Fall, Skin    ASSESSMENT :  Patient cleared to participate in OT evaluation by RN. Upon entering the room, patient was supine in bed with 25L HFNC donned, alert, and agreeable to participate in OT evaluation with infectious disease MD present. Patient educated on energy conservation techniques, pursed lip breathing, and self pacing during daily activities. Patient verbalized understanding but would benefit from energy conservation handout next session.  During the evaluation, the patient presented with decreased BUE strength, however BUEs present to be still functional. Patient stand by assist for bed mobility, and left side lying per request of MD. Based on the objective data described below, the patient presented with decreased independence, decreased strength, decreased endurance, decreased functional balance, and decreased functional mobility, which impede pt's function with basic self-care/ADL tasks. Patient will benefit from skilled intervention to address the above impairments. Patient's rehabilitation potential is considered to be Fair  Factors which may influence rehabilitation potential include:   []             None noted  [x]             Mental ability/status  [x]             Medical condition  [x]             Home/family situation and support systems  [x]             Safety awareness  []             Pain tolerance/management  []             Other:      PLAN :  Recommendations and Planned Interventions:   [x]               Self Care Training                  [x]      Therapeutic Activities  [x]               Functional Mobility Training   []      Cognitive Retraining  [x]               Therapeutic Exercises           [x]      Endurance Activities  [x]               Balance Training                    []      Neuromuscular Re-Education  []               Visual/Perceptual Training     [x]      Home Safety Training  [x]               Patient Education                   [x]      Family Training/Education  []               Other (comment):    Frequency/Duration: Patient will be followed by occupational therapy 1-2 times per day/4-7 days per week to address goals. Discharge Recommendations: Rehab  Further Equipment Recommendations for Discharge: Patient has all DME; possible wheelchair for community reintegration     SUBJECTIVE:   Patient stated i'm just so tired.  I walked, got to the commode and il get to that chair next time    OBJECTIVE DATA SUMMARY:     Past Medical History:   Diagnosis Date    Arthritis     Bronchitis 01/01/2020    Hypertension     Indigestion     Lupus ([address]     Memory difficulty     Nausea & vomiting     Ringing of ears     sometimes    Thromboembolus ([address] 2009     Past Surgical History:   Procedure Laterality Date    HX CHOLECYSTECTOMY  1988    HX COLONOSCOPY      HX ORTHOPAEDIC  April 09'    right knee replacement     HX ORTHOPAEDIC  Sept 09'    left knee replacement    HX VASCULAR ACCESS      IVC filter    AL REMOVAL GALLBLADDER       Barriers to Learning/Limitations: None  Compensate with: visual, verbal, tactile, kinesthetic cues/model    Home Situation:   Home Situation  Home Environment: Private residence  # Steps to Enter: 0  Wheelchair Ramp: Yes  One/Two Story Residence: One story  Living Alone: No  Support Systems: Child(carina), Family member(s)  Patient Expects to be Discharged to[de-identified] Private residence  Current DME Used/Available at Home: Baylee Boss, [address] [facility] [email or web address] chair  Tub or Shower Type: Tub/Shower combination  [x]  Right hand dominant   []  Left hand dominant    Cognitive/Behavioral Status:  Neurologic State: Alert  Orientation Level: Oriented X4  Cognition: Follows commands  Safety/Judgement: Fall prevention    Skin: Intact  Edema: None noted    Vision/Perceptual:    Acuity: Within Defined Limits      Coordination: BUE     Fine Motor Skills-Upper: Left Intact; Right Intact    Gross Motor Skills-Upper: Left Intact; Right Intact    Balance:  Sitting: Intact  Standing: Impaired; With support    Strength: BUE    Strength: Generally decreased, functional                Tone & Sensation: BUE    Tone: Normal  Sensation: Intact                      Range of Motion: BUE    AROM: Within functional limits                         Functional Mobility and Transfers for ADLs:  Bed Mobility:  Rolling: Stand-by assistance  Supine to Sit: Stand-by assistance; Additional time;Bed Modified(long sit)  Sit to Supine: Stand-by assistance     Transfers:  Sit to Stand: Minimum assistance  Stand to Sit: Minimum assistance  Stand Pivot Transfers: Minimum assistance                              ADL Assessment:   Feeding: Setup    Oral Facial Hygiene/Grooming: Setup    Bathing: Moderate assistance    Upper Body Dressing: Stand-by assistance    Lower Body Dressing:  Moderate assistance    Toileting: Moderate assistance                ADL Intervention:       Grooming  Grooming Assistance: Set-up; Stand-by assistance  Washing Face: Stand-by assistance                             Cognitive Retraining  Safety/Judgement: Fall prevention    Pain:  Pain level pre-treatment: 0/10   Pain level post-treatment: 0/10   Pain Intervention(s): Medication (see MAR); Response to intervention: Nurse notified, See doc flow    Activity Tolerance:   Patient demonstrated decreased activity tolerance during OT evaluation. Please refer to the flowsheet for vital signs taken during this treatment. After treatment:   [] Patient left in no apparent distress sitting up in chair  [x] Patient left in no apparent distress in bed  [x] Call bell left within reach  [x] Nursing notified  [] Caregiver present  [x] Bed alarm activated    COMMUNICATION/EDUCATION:   [x] Role of Occupational Therapy in the acute care setting  [x] Home safety education was provided and the patient/caregiver indicated understanding. [x] Patient/family have participated as able in goal setting and plan of care. [x] Patient/family agree to work toward stated goals and plan of care. [] Patient understands intent and goals of therapy, but is neutral about his/her participation. [] Patient is unable to participate in goal setting and plan of care. Thank you for this referral.  Montse Antunez, OTR/L  Time Calculation: 10 mins    Eval Complexity: History: MEDIUM Complexity : Expanded review of history including physical, cognitive and psychosocial  history ; Examination: MEDIUM Complexity : 3-5 performance deficits relating to physical, cognitive , or psychosocial skils that result in activity limitations and / or participation restrictions; Decision Making:MEDIUM Complexity : Patient may present with comorbidities that affect occupational performnce.  Miniml to moderate modification of tasks or assistance (eg, physical or verbal ) with assesment(s) is necessary to enable patient to complete evaluation

## 2021-04-26 NOTE — ROUTINE PROCESS
2236 - Patient called to say she wanted her HOB more elevated. Upon assessment, patient stated she felt like she was having a panic attack. Sats dropped from 95% to 90%. Interventions included deep breathing, removing blankets, and turning on the [facility]. Patient states she felt better. Currently satting 95%. Medications administered and breathing treatment started. Will continue to monitor.

## 2021-04-26 NOTE — ROUTINE PROCESS
0203 Patient arrived on the unit from ER. No distress noted. Respirtory therapist at bedside. Patient switched from NRB to Hi Flow Vapo therm 25L 65% Sats 90-92%. Assumed care of patient from off going nurse. Patient resting in bed. No distress noted. Call bell within reach, siderails up x 3, bed in lowest position, and patient instructed to use call bell for assistance. Will continue to monitor. Primary Nurse Jovanni Goldsmith RN and Mindy Lorenzo RN performed a dual skin assessment on this patient Impairment noted- see wound doc flow sheet. Francisco score is 16 
 
0710 Bedside and Verbal shift change report given to Chelsey Mckenna/Brandon ENCISO (oncoming nurse) by Jovanni Goldsmith RN(offgoing nurse).  Report included the following information SBAR, Intake/Output, MAR, Recent Results and Cardiac Rhythm SR.

## 2021-04-26 NOTE — CONSULTS
Comprehensive Nutrition Assessment    Type and Reason for Visit: Initial, Consult    Nutrition Recommendations/Plan:   - Add supplements: Ensure Enlive TID. - Check BMP, Mg and Phos. Nutrition Assessment:  Decreased appetite. K 2.8 yesterday. Malnutrition Assessment:  Malnutrition Status: At risk for malnutrition (specify)(weight loss PTA, poor appetite)      Nutrition History and Allergies: Past medical history of lupus, hypertension, thromboembolism s/p IVC filter, bronchitis on home oxygen admitted with hypoxia due to COVID pneumonia. NKFA. Decreased appetite PTA. Weight history: 275 lb (7/15/20), 262 lb (9/11/20), 250 lb (4/25/21) with 25 lb, 9% weight loss x 9 months PTA.      Estimated Daily Nutrient Needs:  Energy (kcal): 1684-2824; Weight Used for Energy Requirements: Admission  Protein (g): ; Weight Used for Protein Requirements: Admission  Fluid (ml/day): 6813-5489; Method Used for Fluid Requirements: 1 ml/kcal    Nutrition Related Findings:  Last BM 4/25      Wounds:    Partial thickness(sacral erythema)       Current Nutrition Therapies:  DIET CARDIAC Regular    Anthropometric Measures:  · Height:  5' 2\" (157.5 cm)  · Current Body Wt:  117.8 kg (259 lb 11.2 oz)   · Admission Body Wt:  250 lb    · Usual Body Wt:  124.7 kg (275 lb)     · Ideal Body Wt:  110 lbs:  236.1 %  · BMI Category:  Obese class 3 (BMI 40.0 or greater)       Nutrition Diagnosis:   · Unintended weight loss related to early satiety as evidenced by weight loss(25 lb, 9% weight loss x 9 months)      Nutrition Interventions:   Food and/or Nutrient Delivery: Continue current diet, Start oral nutrition supplement  Nutrition Education and Counseling: Education not indicated  Coordination of Nutrition Care: Continue to monitor while inpatient    Goals:  PO nutrition intake will meet >75% of patient estimated nutritional needs within the next 7 days       Nutrition Monitoring and Evaluation:   Behavioral-Environmental Outcomes: None identified  Food/Nutrient Intake Outcomes: Diet advancement/tolerance, Food and nutrient intake, Supplement intake  Physical Signs/Symptoms Outcomes: Biochemical data, GI status, Nutrition focused physical findings, Meal time behavior    Discharge Planning:     Too soon to determine     Electronically signed by Merlyn Chand RD, [address]  on 4/26/2021 at 2:32 PM    Contact: [phone]

## 2021-04-26 NOTE — ED NOTES
TRANSFER - OUT REPORT:    Verbal report given to David Syed RN(name) on Daniel Thomas  being transferred to (unit) for routine progression of care       Report consisted of patients Situation, Background, Assessment and   Recommendations(SBAR). Information from the following report(s) SBAR, Kardex, ED Summary, Intake/Output, MAR, Accordion and Recent Results was reviewed with the receiving nurse. Lines:   Peripheral IV 04/25/21 Left Antecubital (Active)   Site Assessment Clean, dry, & intact 04/25/21 1218   Phlebitis Assessment 0 04/25/21 1218   Infiltration Assessment 0 04/25/21 1218   Dressing Status Clean, dry, & intact 04/25/21 1218   Dressing Type Transparent 04/25/21 1218   Hub Color/Line Status Pink;Flushed;Patent 04/25/21 1218   Action Taken Blood drawn 04/25/21 1218       Peripheral IV 04/25/21 Right Hand (Active)   Site Assessment Clean, dry, & intact 04/25/21 1542   Phlebitis Assessment 0 04/25/21 1542   Infiltration Assessment 0 04/25/21 1542   Dressing Status Clean, dry, & intact 04/25/21 1542        Opportunity for questions and clarification was provided.       Patient transported with:   Monitor  O2 @ 25 liters  Registered Nurse

## 2021-04-26 NOTE — PROGRESS NOTES
Reason for Admission:  Pneumonia due to COVID-19 virus [U07.1, J12.82]  Hypoxia [R09.02]                 RUR Score:    12           Plan for utilizing home health:    No                     Likelihood of Readmission:   LOW                         Transition of Care Plan:              Initial assessment completed with patient's daughter Wisam Milton. Cognitive status of patient: unable to assess. Pt on droplet plus isolation and she did not  her phone when CM called    Face sheet information confirmed:  yes. The patient's daughters Paul Doss and Tawana  participate in her discharge plan and to receive any needed information. This patient lives in a duplex home with daughter. Patient is not able to navigate steps as needed. Prior to hospitalization, patient was considered to be independent with ADLs/IADLS : no . If not independent,  patient needs assist with : bathing, food preparation and cooking    Patient has a current ACP document on file: no      Healthcare Decision Maker:     Click here to complete [address] including selection of the Healthcare Decision Maker Relationship (ie \"Primary\")    The daughter will be available to transport patient home upon discharge. The patient already has Walker, W/C, and 2L oxygen supplied by [employer] equipment available in the home. Patient is not currently active with home health. Patient has stayed in a skilled nursing facility or rehab. Was  stay within last 60 days : no. This patient is on dialysis :no  Currently, the discharge plan is to be determined. Discussed ptot recommendations with pt's daughters Tawana and Paul Doss. Per Paul Doss, pt went to rehab before but it did not go well. Both Anil Goodrichel wants to talk about it before making the decision. CM will call them back tomorrow. The patient states that she can obtain her medications from the pharmacy, and take her medications as directed.     Patient's current insurance is [place] [employer]. Care Management Interventions  PCP Verified by CM: Yes  Palliative Care Criteria Met (RRAT>21 & CHF Dx)?: No  Mode of Transport at Discharge: Other (see comment)  Transition of Care Consult (CM Consult): Discharge Planning  Physical Therapy Consult: Yes  Occupational Therapy Consult: Yes  Current Support Network:  Other(patient and her children live together)  Confirm Follow Up Transport: Family  Discharge Location  Discharge Placement: Unable to determine at this time        SARAH Eduardo RN  Care Management  Pager: [phone]

## 2021-04-26 NOTE — ROUTINE PROCESS
TRANSFER - IN REPORT: 
 
Verbal report received from [address] (name) on Roderick Pretty  being received from ED (unit) for routine progression of care Report consisted of patients Situation, Background, Assessment and  
Recommendations(SBAR). Information from the following report(s) ED Summary was reviewed with the receiving nurse. Opportunity for questions and clarification was provided. Assessment completed upon patients arrival to unit and care assumed.

## 2021-04-26 NOTE — CONSULTS
Infectious Disease Consultation Note        Reason: Acute hypoxic respiratory failure, severe COVID-19 pneumonia    Current abx Prior abx   Ceftriaxone, azithromycin since 4/25/2021      Lines:       Assessment :     67 y.o.  female with hx of SLE on Plaquenil, hypertension, thromboembolism s/p IVC filter, bronchitis and intermittent home oxygen use who presented to the ED on 4/25/21 complaining of worsening myalgias, cough and dyspnea. Positive Covid test 11 days prior to admission, symptoms 9 days prior to admission      Clinical presentation consistent with acute hypoxic respiratory failure-present on admission due to severe COVID-19 pneumonia    Looking at the duration of symptoms, high oxygen requirement, patient will not benefit from antiviral therapy. Hence initiated on high-dose steroids    Patient seems to be clinically responding to steroid therapy    Low procalcitonin argues against bacterial pneumonia    Recommendations:    1. Discontinue ceftriaxone. Discontinue azithromycin after tomorrow's dose  2. Recommend Decadron 10 mg IV every 12 hours for 5 days followed by 10 mg IV every 24 hours for 5 days  3. Titrate oxygen as tolerated  4. Follow-up pulmonary recommendations  5. Monitor CRP, D-dimer, procalcitonin    Thank you for consultation request. Above plan was discussed in details with patient,  and dr Mary Ann Diaz. Please call me if any further questions or concerns. Will continue to participate in the care of this patient. HPI:     67 y.o.  female with hx of SLE on Plaquenil, hypertension, thromboembolism s/p IVC filter, bronchitis and intermittent home oxygen use who presented to the ED on 4/25/21 complaining of worsening myalgias, cough and dyspnea. Patient was diagnosed with Covid 11 days ago however did not show symptoms until 9 days PTA. Patient was tested because other members of the household were positive.   She began to develop myalgias, progressive cough and dyspnea. Family checked her pulse ox and her oxygen levels have been between 80s-90s. She had been using home oxygen intermittently but ran out yesterday. Of note patient was resistant to coming to the hospital but she agreed because she was feeling worse and worse. Work-up in the ED, patient with low-grade temp of 99.1, normal POC lactic acid, WBC 4.4, D-dimer 0.79, potassium 2.8, BUN 23, creatinine 0.40, CRP 14.5, procalcitonin <0.05, chest x-ray showed mild interstitial edema versus interstitial infiltrate, small bilateral pleural effusions with overlying mid to lower lung patchy opacities. Blood cultures obtained x2. She was started on IV Zithromax and Rocephin. Patient was given 1 dose of IV Decadron 6 mg. She was placed on high flow oxygen at 65 L. I was consulted for further recommendations. I recommended to initiate high-dose Decadron. I am seeing patient for further management    Patient states she feels better today compared to day of admission. She was on high flow 25 L when I evaluated her. She denies any chest pain, abdominal pain. Past Medical History:   Diagnosis Date    Arthritis     Bronchitis 01/01/2020    Hypertension     Indigestion     Lupus ([facility] [address]     Memory difficulty     Nausea & vomiting     Ringing of ears     sometimes    Thromboembolus [facility]) 2009       Past Surgical History:   Procedure Laterality Date    HX CHOLECYSTECTOMY  1988    HX COLONOSCOPY      HX ORTHOPAEDIC  April 09'    right knee replacement     HX ORTHOPAEDIC  Sept 09'    left knee replacement    HX VASCULAR ACCESS      IVC filter    OR REMOVAL GALLBLADDER         home Medication List   Complaint critical no Details   sertraline (Zoloft) 50 mg tablet Take 150 mg by mouth daily. acetaminophen (TYLENOL) 500 mg tablet Take 1,000 mg by mouth every six (6) hours as needed for Pain. ibuprofen (MOTRIN) 200 mg tablet Take 800 mg by mouth two (2) times a day.       OXYGEN-AIR DELIVERY SYSTEMS 2 L by IntraNASal route continuous. hydroxychloroquine (PLAQUENIL) 200 mg tablet Take 200 mg by mouth two (2) times a day. benazepril-hydrochlorothiazide (LOTENSIN HCT) 20-25 mg per tablet Take 1 Tab by mouth daily.          Movement:    Current Facility-Administered Medications   Medication Dose Route Frequency    melatonin tablet 12 mg  12 mg Oral QHS PRN    LORazepam (ATIVAN) tablet 0.5 mg  0.5 mg Oral ONCE    cefTRIAXone (ROCEPHIN) 2 g in sterile water (preservative free) 20 mL IV syringe  2 g IntraVENous Q24H    azithromycin (ZITHROMAX) 500 mg in 0.9% sodium chloride 250 mL (VIAL-MATE)  500 mg IntraVENous Q24H    sodium chloride (NS) flush 5-40 mL  5-40 mL IntraVENous Q8H    sodium chloride (NS) flush 5-40 mL  5-40 mL IntraVENous PRN    acetaminophen (TYLENOL) tablet 650 mg  650 mg Oral Q6H PRN    Or    acetaminophen (TYLENOL) suppository 650 mg  650 mg Rectal Q6H PRN    polyethylene glycol (MIRALAX) packet 17 g  17 g Oral DAILY PRN    promethazine (PHENERGAN) tablet 12.5 mg  12.5 mg Oral Q6H PRN    Or    ondansetron (ZOFRAN) injection 4 mg  4 mg IntraVENous Q6H PRN    enoxaparin (LOVENOX) injection 40 mg  40 mg SubCUTAneous Q24H    guaiFENesin ER (MUCINEX) tablet 600 mg  600 mg Oral Q12H    insulin lispro (HUMALOG) injection   SubCUTAneous AC&HS    glucose chewable tablet 16 g  4 Tab Oral PRN    glucagon (GLUCAGEN) injection 1 mg  1 mg IntraMUSCular PRN    dextrose (D50W) injection syrg 12.5-25 g  25-50 mL IntraVENous PRN    dexamethasone (DECADRON) 4 mg/mL injection 10 mg  10 mg IntraVENous Q12H    albuterol-ipratropium (DUO-NEB) 2.5 MG-0.5 MG/3 ML  3 mL Nebulization Q4H RT    sertraline (ZOLOFT) tablet 150 mg  150 mg Oral DAILY    hydrOXYchloroQUINE (PLAQUENIL) tablet 200 mg  200 mg Oral BID    hydrALAZINE (APRESOLINE) 20 mg/mL injection 10 mg  10 mg IntraVENous Q6H PRN    pantoprazole (PROTONIX) tablet 40 mg  40 mg Oral ACB    ascorbic acid (vitamin C) (VITAMIN C) tablet 500 mg  500 mg Oral BID    omega-3 acid ethyl esters (LOVAZA) capsule 1,000 mg  1 g Oral BID WITH MEALS    cloNIDine HCL (CATAPRES) tablet 0.1 mg  0.1 mg Oral BID       Allergies: Morphine    Family History   Problem Relation Age of Onset    Stroke Father     Cancer Father         prastate      Social History     Socioeconomic History    Marital status:      Spouse name: Not on file    Number of children: Not on file    Years of education: Not on file    Highest education level: Not on file   Occupational History    Not on file   Social Needs    Financial resource strain: Not on file    Food insecurity     Worry: Not on file     Inability: Not on file    Transportation needs     Medical: Not on file     Non-medical: Not on file   Tobacco Use    Smoking status: Never Smoker    Smokeless tobacco: Never Used    Tobacco comment: heavy exposure to second hand smoke growing up   Substance and Sexual Activity    Alcohol use: No    Drug use: Never    Sexual activity: Never   Lifestyle    Physical activity     Days per week: Not on file     Minutes per session: Not on file    Stress: Not on file   Relationships    Social connections     Talks on phone: Not on file     Gets together: Not on file     Attends [employer] service: Not on file     Active member of club or organization: Not on file     Attends meetings of clubs or organizations: Not on file     Relationship status: Not on file    Intimate partner violence     Fear of current or ex partner: Not on file     Emotionally abused: Not on file     Physically abused: Not on file     Forced sexual activity: Not on file   Other Topics Concern     Service Not Asked    Blood Transfusions Not Asked    Caffeine Concern Not Asked    Occupational Exposure Not Asked   [place] Hazards Not Asked    Sleep Concern Not Asked    Stress Concern Not Asked    Weight Concern Not Asked    Special Diet Not Asked    Back Care Not Asked    Exercise Not Asked    Bike Helmet Not Asked    [facility] Not Asked    Self-Exams Not Asked   Social History Narrative    Not on file     Social History     Tobacco Use   Smoking Status Never Smoker   Smokeless Tobacco Never Used   Tobacco Comment    heavy exposure to second hand smoke growing up        Temp (24hrs), Av.9 °F (37.2 °C), Min:98.6 °F (37 °C), Max:99.4 °F (37.4 °C)    Visit Vitals  /70   Pulse 75   Temp 98.6 °F (37 °C)   Resp 22   Ht 5' 2\" (1.575 m)   Wt 117.8 kg (259 lb 12.8 oz)   SpO2 92%   Breastfeeding No   BMI 47.52 kg/m²       ROS: 12 point ROS obtained in details. Pertinent positives as mentioned in HPI,   otherwise negative    Physical Exam:    Vitals signs and nursing note reviewed. Constitutional:    Sitting on bed, alert, high flow oxygen in place, in no apparent distress  HENT:      Head: Normocephalic. Eyes:      Conjunctiva/sclera: Conjunctivae normal.      Neck:      Musculoskeletal: Normal range of motion and neck supple. Cardiovascular:      Rate and Rhythm: Normal rate and regular rhythm on monitor  Chest:      Bilateral chest movements equal.  Auscultation deferred due to Covid positive  Abdominal:      General: There is no distension. Palpations: Abdomen is soft. Tenderness: There is no abdominal tenderness. There is no rebound. Musculoskeletal: Normal range of motion. General: No tenderness. Skin:     General: Skin is warm and dry. Findings: No rash. Neurological:      Mental Status: Alert     Cranial Nerves: No cranial nerve deficit. Motor: No abnormal muscle tone.       Coordination: Coordination normal.   Psychiatric:          Appropriate mood and affect    Labs: Results:   Chemistry Recent Labs     21  1214   *      K 2.8*      CO2 38*   BUN 23*   CREA 0.40*   CA 8.4*   AGAP 3   BUCR 58*   AP 63   TP 5.7*   ALB 2.7*   GLOB 3.0   AGRAT 0.9      CBC w/Diff Recent Labs     21  1214   WBC 4.4*   RBC 4.15*   HGB 12.1   HCT 37.1      GRANS 76*   LYMPH 12*   EOS 1      Microbiology Recent Labs     04/25/21  1445 04/25/21  1430   CULT NO GROWTH AFTER 15 HOURS NO GROWTH AFTER 15 HOURS          RADIOLOGY:    All available imaging studies/reports in [facility] for this admission were reviewed      Disclaimer: Sections of this note are dictated utilizing voice recognition software, which may have resulted in some phonetic based errors in grammar and contents. Even though attempts were made to correct all the mistakes, some may have been missed, and remained in the body of the document. If questions arise, please contact our department.     Dr. Thomas Mosley, Infectious Disease Specialist  [phone]  April 26, 2021  9:52 AM

## 2021-04-26 NOTE — PROGRESS NOTES
Problem: Risk for Spread of Infection  Goal: Prevent transmission of infectious organism to others  Description: Prevent the transmission of infectious organisms to other patients, staff members, and visitors. Outcome: Progressing Towards Goal     Problem: Patient Education:  Go to Education Activity  Goal: Patient/Family Education  Outcome: Progressing Towards Goal     Problem: Falls - Risk of  Goal: *Absence of Falls  Description: Document Lety Ibrahim Fall Risk and appropriate interventions in the flowsheet. Outcome: Progressing Towards Goal  Note: Fall Risk Interventions:  Mobility Interventions: OT consult for ADLs, Patient to call before getting OOB, Bed/chair exit alarm, Utilize walker, cane, or other assistive device         Medication Interventions: Patient to call before getting OOB, Bed/chair exit alarm, Teach patient to arise slowly    Elimination Interventions: Patient to call for help with toileting needs, Bed/chair exit alarm, Call light in reach, Toilet paper/wipes in reach              Problem: Patient Education: Go to Patient Education Activity  Goal: Patient/Family Education  Outcome: Progressing Towards Goal     Problem: Pressure Injury - Risk of  Goal: *Prevention of pressure injury  Description: Document Francisco Scale and appropriate interventions in the flowsheet.   Outcome: Progressing Towards Goal  Note: Pressure Injury Interventions:  Sensory Interventions: Assess changes in LOC, Discuss PT/OT consult with provider    Moisture Interventions: Absorbent underpads, Apply protective barrier, creams and emollients, Check for incontinence Q2 hours and as needed, Minimize layers    Activity Interventions: Increase time out of bed, PT/OT evaluation    Mobility Interventions: HOB 30 degrees or less, Pressure redistribution bed/mattress (bed type), PT/OT evaluation    Nutrition Interventions: Document food/fluid/supplement intake    Friction and Shear Interventions: Lift team/patient mobility team, Minimize layers                Problem: Patient Education: Go to Patient Education Activity  Goal: Patient/Family Education  Outcome: Progressing Towards Goal     Problem: Airway Clearance - Ineffective  Goal: Achieve or maintain patent airway  Outcome: Progressing Towards Goal     Problem: Gas Exchange - Impaired  Goal: Absence of hypoxia  Outcome: Progressing Towards Goal  Goal: Promote optimal lung function  Outcome: Progressing Towards Goal     Problem: Breathing Pattern - Ineffective  Goal: Ability to achieve and maintain a regular respiratory rate  Outcome: Progressing Towards Goal     Problem:  Body Temperature -  Risk of, Imbalanced  Goal: Ability to maintain a body temperature within defined limits  Outcome: Progressing Towards Goal  Goal: Will regain or maintain usual level of consciousness  Outcome: Progressing Towards Goal  Goal: Complications related to the disease process, condition or treatment will be avoided or minimized  Outcome: Progressing Towards Goal     Problem: Isolation Precautions - Risk of Spread of Infection  Goal: Prevent transmission of infectious organism to others  Outcome: Progressing Towards Goal     Problem: Nutrition Deficits  Goal: Optimize nutrtional status  Outcome: Progressing Towards Goal     Problem: Risk for Fluid Volume Deficit  Goal: Maintain normal heart rhythm  Outcome: Progressing Towards Goal  Goal: Maintain absence of muscle cramping  Outcome: Progressing Towards Goal  Goal: Maintain normal serum potassium, sodium, calcium, phosphorus, and pH  Outcome: Progressing Towards Goal     Problem: Loneliness or Risk for Loneliness  Goal: Demonstrate positive use of time alone when socialization is not possible  Outcome: Progressing Towards Goal     Problem: Fatigue  Goal: Verbalize increase energy and improved vitality  Outcome: Progressing Towards Goal     Problem: Patient Education: Go to Patient Education Activity  Goal: Patient/Family Education  Outcome: Progressing Towards Goal     Problem: Patient Education: Go to Patient Education Activity  Goal: Patient/Family Education  Outcome: Progressing Towards Goal     Problem: Pneumonia: Day 1  Goal: Off Pathway (Use only if patient is Off Pathway)  Outcome: Progressing Towards Goal  Goal: Activity/Safety  Outcome: Progressing Towards Goal  Goal: Consults, if ordered  Outcome: Progressing Towards Goal  Goal: Diagnostic Test/Procedures  Outcome: Progressing Towards Goal  Goal: Nutrition/Diet  Outcome: Progressing Towards Goal  Goal: Medications  Outcome: Progressing Towards Goal  Goal: Respiratory  Outcome: Progressing Towards Goal  Goal: Treatments/Interventions/Procedures  Outcome: Progressing Towards Goal  Goal: Psychosocial  Outcome: Progressing Towards Goal  Goal: *Oxygen saturation within defined limits  Outcome: Progressing Towards Goal  Goal: *Influenza vaccine administered (October-March)  Outcome: Progressing Towards Goal  Goal: *Pneumoccocal vaccine administered  Outcome: Progressing Towards Goal  Goal: *Hemodynamically stable  Outcome: Progressing Towards Goal  Goal: *Demonstrates progressive activity  Outcome: Progressing Towards Goal  Goal: *Tolerating diet  Outcome: Progressing Towards Goal     Problem: Pneumonia: Day 2  Goal: Off Pathway (Use only if patient is Off Pathway)  Outcome: Progressing Towards Goal  Goal: Activity/Safety  Outcome: Progressing Towards Goal  Goal: Consults, if ordered  Outcome: Progressing Towards Goal  Goal: Diagnostic Test/Procedures  Outcome: Progressing Towards Goal  Goal: Nutrition/Diet  Outcome: Progressing Towards Goal  Goal: Discharge Planning  Outcome: Progressing Towards Goal  Goal: Medications  Outcome: Progressing Towards Goal  Goal: Respiratory  Outcome: Progressing Towards Goal  Goal: Treatments/Interventions/Procedures  Outcome: Progressing Towards Goal  Goal: Psychosocial  Outcome: Progressing Towards Goal  Goal: *Oxygen saturation within defined limits  Outcome: Progressing Towards Goal  Goal: *Hemodynamically stable  Outcome: Progressing Towards Goal  Goal: *Demonstrates progressive activity  Outcome: Progressing Towards Goal  Goal: *Tolerating diet  Outcome: Progressing Towards Goal  Goal: *Optimal pain control at patient's stated goal  Outcome: Progressing Towards Goal     Problem: Pneumonia: Day 3  Goal: Off Pathway (Use only if patient is Off Pathway)  Outcome: Progressing Towards Goal  Goal: Activity/Safety  Outcome: Progressing Towards Goal  Goal: Consults, if ordered  Outcome: Progressing Towards Goal  Goal: Diagnostic Test/Procedures  Outcome: Progressing Towards Goal  Goal: Nutrition/Diet  Outcome: Progressing Towards Goal  Goal: Discharge Planning  Outcome: Progressing Towards Goal  Goal: Medications  Outcome: Progressing Towards Goal  Goal: Respiratory  Outcome: Progressing Towards Goal  Goal: Treatments/Interventions/Procedures  Outcome: Progressing Towards Goal  Goal: Psychosocial  Outcome: Progressing Towards Goal  Goal: *Oxygen saturation within defined limits  Outcome: Progressing Towards Goal  Goal: *Hemodynamically stable  Outcome: Progressing Towards Goal  Goal: *Demonstrates progressive activity  Outcome: Progressing Towards Goal  Goal: *Tolerating diet  Outcome: Progressing Towards Goal  Goal: *Describes available resources and support systems  Outcome: Progressing Towards Goal  Goal: *Optimal pain control at patient's stated goal  Outcome: Progressing Towards Goal     Problem: Pneumonia: Day 4  Goal: Off Pathway (Use only if patient is Off Pathway)  Outcome: Progressing Towards Goal  Goal: Activity/Safety  Outcome: Progressing Towards Goal  Goal: Nutrition/Diet  Outcome: Progressing Towards Goal  Goal: Discharge Planning  Outcome: Progressing Towards Goal  Goal: Medications  Outcome: Progressing Towards Goal  Goal: Respiratory  Outcome: Progressing Towards Goal  Goal: Treatments/Interventions/Procedures  Outcome: Progressing Towards Goal  Goal: Psychosocial  Outcome: Progressing Towards Goal     Problem: Pneumonia: Discharge Outcomes  Goal: *Demonstrates progressive activity  Outcome: Progressing Towards Goal  Goal: *Describes follow-up/return visits to physicians  Outcome: Progressing Towards Goal  Goal: *Tolerating diet  Outcome: Progressing Towards Goal  Goal: *Verbalizes name, dosage, time, side effects, and number of days to continue medications  Outcome: Progressing Towards Goal  Goal: *Influenza immunization  Outcome: Progressing Towards Goal  Goal: *Pneumococcal immunization  Outcome: Progressing Towards Goal  Goal: *Respiratory status at baseline  Outcome: Progressing Towards Goal  Goal: *Vital signs within defined limits  Outcome: Progressing Towards Goal  Goal: *Describes available resources and support systems  Outcome: Progressing Towards Goal  Goal: *Optimal pain control at patient's stated goal  Outcome: Progressing Towards Goal

## 2021-04-26 NOTE — PROGRESS NOTES
0940: Spoke to MD about pt's anxiety. MD returned page and gave verbal order of one time dose of 0.5mg ativan PO. Bedside and Verbal shift change report given to Alejandra Gallego RN (oncoming nurse) by Jigar Whitaker RN (offgoing nurse). Report included the following information SBAR, Kardex, MAR, Recent Results and Cardiac Rhythm NSR. 07-Feb-2020 10:25

## 2021-04-26 NOTE — PROGRESS NOTES
Problem: Dysphagia (Adult)  Goal: *Acute Goals and Plan of Care (Insert Text)  Description:     Patient will:  1. Tolerate PO trials with 0 s/s overt distress in 4/5 trials  2. Utilize compensatory swallow strategies/maneuvers (decrease bite/sip, size/rate, alt. liq/sol) with min cues in 4/5 trials  3. Utilize comp strategies to improve respiration to swallow coordination to reduce aspiration risk and improve activity tolerance for sustained nutrition/ hydration given min-mod cues in 4/5 trials. Rec:     Reg solid with thin liquids  Aspiration precautions  HOB >45 during po intake, remain >30 for 30-45 minutes after po   Small bites/sips; alternate liquid/solid with slow feeding rate   Oral care TID  Meds per pt preference     Outcome: Progressing Towards Goal    SPEECH LANGUAGE PATHOLOGY BEDSIDE SWALLOW EVALUATION/TREATMENT    Patient: Bruna Joseph (39 y.o. female)  Date: 4/26/2021  Primary Diagnosis: Pneumonia due to COVID-19 virus [U07.1, J12.82]  Hypoxia [R09.02]        Precautions: aspiration     PLOF: As per H&P    ASSESSMENT :  Based on the objective data described below, the patient presents with oropharyngeal swallow fxn essentially WFL. Strength, ROM, and coordination of the orofacial musculature were all found to [facility] Pt tolerated reg solid, puree, and thin liquids +/- straw consecutive swallows without any overt s/sx of aspiration. Mastication was appropriate with timely a-p transit. Positive oral clearance observed across all trials. Suspect decreased breathing/swallowing coordination in setting of COVID PNA diagnosis. She exhibited increased work of breathing with phonatory tasks as well as with PO. Pt safe for initiation of reg solid diet with thin liquids, aspiration precautions, oral care TID, and meds as tolerated.      TREATMENT :  Skilled therapy initiated; Educated pt on aspiration precautions and importance of compensatory swallow techniques to decrease aspiration risk (decrease rate of intake & sip/bite size, upright @HOB for all po intake and ~30 minutes after po); verbalized comprehension. Will continue to monitor swallow fxn to ensure safety. Patient will benefit from skilled intervention to address the above impairments. Patient's rehabilitation potential is considered to be Good  Factors which may influence rehabilitation potential include:   []            None noted  []            Mental ability/status  [x]            Medical condition  []            Home/family situation and support systems  []            Safety awareness  []            Pain tolerance/management  []            Other:      PLAN :  Recommendations and Planned Interventions: See above  Frequency/Duration: Patient will be followed by speech-language pathology 1-2 times per day/3-5 days per week to address goals. Discharge Recommendations: To Be Determined     SUBJECTIVE:   Patient stated Len Bullock would I have trouble swallowing? .     OBJECTIVE:     Past Medical History:   Diagnosis Date    Arthritis     Bronchitis 01/01/2020    Hypertension     Indigestion     Lupus ([facility] [address]     Memory difficulty     Nausea & vomiting     Ringing of ears     sometimes    Thromboembolus ([facility] [address] 2009     Past Surgical History:   Procedure Laterality Date    HX CHOLECYSTECTOMY  1988    HX COLONOSCOPY      HX ORTHOPAEDIC  April 09'    right knee replacement     HX ORTHOPAEDIC  Sept 09'    left knee replacement    HX VASCULAR ACCESS      IVC filter    [place] REMOVAL GALLBLADDER       Prior Level of Function/Home Situation: see below  Home Situation  Home Environment: Private residence  # Steps to Enter: 0  One/Two Story Residence: One story  Living Alone: No  Support Systems: Child(carina)  Patient Expects to be Discharged to[de-identified] Private residence  Current DME Used/Available at Home: Walker, rolling    Diet prior to admission: reg solid with thin  Current Diet:  reg soldi with thin      Cognitive and Communication Status:  Neurologic State: Alert  Orientation Level: Oriented X4  Cognition: Follows commands  Oral Assessment:  Oral Assessment  Labial: No impairment  Dentition: Natural;Intact  Oral Hygiene: adequate  Lingual: No impairment  Velum: No impairment  Mandible: No impairment  P.O. Trials:  Patient Position: 55 at [address]  Vocal quality prior to P.O.: Breathy  Consistency Presented: Thin liquid; Solid;Puree  How Presented: Self-fed/presented;Cup/sip;Spoon;Straw;Successive swallows  Bolus Acceptance: No impairment  Bolus Formation/Control: No impairment  Propulsion: No impairment  Oral Residue: None  Initiation of Swallow: No impairment  Laryngeal Elevation: Functional  Aspiration Signs/Symptoms: None  Pharyngeal Phase Characteristics: No impairment, issues, or problems   Effective Modifications: None  Cues for Modifications: None     Oral Phase Severity: No impairment  Pharyngeal Phase Severity : No impairment    PAIN:  Start of Eval: 0  End of Eval: 0     After treatment:   []            Patient left in no apparent distress sitting up in chair  [x]            Patient left in no apparent distress in bed  [x]            Call bell left within reach  [x]            Nursing notified  []            Family present  []            Caregiver present  []            Bed alarm activated    COMMUNICATION/EDUCATION:   [x]            Aspiration precautions; swallow safety; compensatory techniques. [x]            Patient/family have participated as able in goal setting and plan of care. []            Patient/family agree to work toward stated goals and plan of care. []            Patient understands intent and goals of therapy; neutral about participation. []            Patient unable to participate in goal setting/plan of care; educ ongoing with interdisciplinary staff  [x]         Posted safety precautions in patient's room.     Thank you for this referral.    Esteban Downing M.S. CCC-SLP/L  Speech-Language Pathologist

## 2021-04-26 NOTE — PROGRESS NOTES
Problem: Mobility Impaired (Adult and Pediatric)  Goal: *Acute Goals and Plan of Care (Insert Text)  Description: Physical Therapy Goals  Initiated 4/26/2021 and to be accomplished within 7 day(s)  1. Patient will move from supine to sit and sit to supine  in bed with modified independence. 2.  Patient will transfer from bed to chair and chair to bed with modified independence using the least restrictive device. 3.  Patient will perform sit to stand with modified independence. 4.  Patient will ambulate with modified independence for 100 feet with the least restrictive device. PLOF: Patient reports she was independent with self care and functional mobility using RW. Outcome: Progressing Towards Goal     PHYSICAL THERAPY EVALUATION    Patient: Tavares Hopper (11 y.o. female)  Date: 4/26/2021  Primary Diagnosis: Pneumonia due to COVID-19 virus [U07.1, J12.82]  Hypoxia [R09.02]        Precautions:   Fall, Skin      ASSESSMENT :  Based on the objective data described below, the patient presents with decreased strength, endurance, balance, resulting in decreased independence with functional mobility. Patient consents to PT evaluation and received supine in bed on 25L HFNC. Educated patient on role of PT in acute setting, activity pacing, and breathing technique. Patient performs supine to sit transfer with SBA HOB elevated, sit<>stand with min A, and SPT from bed to commode with min A. O2 lowest 87% during activity and improves to 90's with rest break and breathing cues. Patient uses RW to transfer back to bed from [facility]. Patient requests to sit at EOB to eat lunch and encouraged to do so for all meals. Instructed to use call bell and wait for assistance from nursing with mobility, she verbalizes understanding. Nurse informed of patients position and performance. Patient will benefit from skilled intervention to address the above impairments.   Patient's rehabilitation potential is considered to be Good  Factors which may influence rehabilitation potential include:   []         None noted  []         Mental ability/status  [x]         Medical condition  []         Home/family situation and support systems  []         Safety awareness  []         Pain tolerance/management  []         Other:      PLAN :  Recommendations and Planned Interventions:   [x]           Bed Mobility Training             [x]    Neuromuscular Re-Education  [x]           Transfer Training                   []    Orthotic/Prosthetic Training  [x]           Gait Training                          []    Modalities  [x]           Therapeutic Exercises           []    Edema Management/Control  [x]           Therapeutic Activities            [x]    Family Training/Education  [x]           Patient Education  []           Other (comment):    Frequency/Duration: Patient will be followed by physical therapy 1-2 times per day/4-7 days per week to address goals. Discharge Recommendations: Rehab  Further Equipment Recommendations for Discharge: N/A     SUBJECTIVE:   Patient stated It feels good to sit up.     OBJECTIVE DATA SUMMARY:     Past Medical History:   Diagnosis Date    Arthritis     Bronchitis 01/01/2020    Hypertension     Indigestion     Lupus ([facility] [address]     Memory difficulty     Nausea & vomiting     Ringing of ears     sometimes    Thromboembolus ([facility] [address] 2009     Past Surgical History:   Procedure Laterality Date    HX CHOLECYSTECTOMY  1988    HX COLONOSCOPY      HX ORTHOPAEDIC  April 09'    right knee replacement     HX ORTHOPAEDIC  Sept 09'    left knee replacement    HX VASCULAR ACCESS      IVC filter    [place] REMOVAL GALLBLADDER       Barriers to Learning/Limitations: None  Compensate with: Visual Cues and Verbal Cues  Home Situation:  Home Situation  Home Environment: Private residence  # Steps to Enter: 0  Wheelchair Ramp: Yes  One/Two Story Residence: One story  Living Alone: No  Support Systems: Child(carina), Family member(s)  Patient Expects to be Discharged to[de-identified] Private residence  Current DME Used/Available at Home: Antonina Harrington Shower chair  Critical Behavior:  Neurologic State: Alert  Orientation Level: Oriented X4  Cognition: Follows commands  Safety/Judgement: Fall prevention; Awareness of environment  Psychosocial  Patient Behaviors: Calm; Cooperative  Purposeful Interaction: Yes  Pt Identified Daily Priority: Clinical issues (comment)  Caritas Process: Nurture loving kindness;Establish trust;Teaching/learning; Attend basic human needs; Open life/death unknowns  Caring Interventions: Therapeutic modalities; Reassure  Reassure: Therapeutic listening; Informing;Caring rounds  Therapeutic Modalities: Intentional therapeutic touch; Deep breathing        Skin Integrity: Other (comment)(blanchable to reddness to sacrum)  Skin Integumentary  Skin Integrity: Other (comment)(blanchable to reddness to sacrum)     Strength BLE:    Strength: Generally decreased, functional           Tone & Sensation BLE:   Tone: Normal  Sensation: Impaired(tingling along left lower leg)         Range Of Motion BLE:  AROM: Generally decreased, functional          Functional Mobility:  Bed Mobility:     Supine to Sit: Stand-by assistance; Additional time;Bed Modified        Transfers:  Sit to Stand: Minimum assistance  Stand to Sit: Minimum assistance  Stand Pivot Transfers: Minimum assistance          Balance:   Sitting: Intact  Standing: Impaired; With support       Ambulation/Gait Training:  Distance (ft): 3 Feet (ft)  Assistive Device: Walker, rolling  Ambulation - Level of Assistance: Minimal assistance  Gait Abnormalities: Decreased step clearance  Base of Support: Widened  Step Length: Right shortened;Left shortened       Pain:  Pain level pre-treatment: 0/10   Pain level post-treatment: 0/10   Pain Intervention(s) : Medication (see MAR);  Rest, Ice, Repositioning  Response to intervention: Nurse notified, See doc flow    Activity Tolerance:   Fair  Please refer to the flowsheet for vital signs taken during this treatment. After treatment:   [x]         Patient left in no apparent distress sitting EOB  []         Patient left in no apparent distress in bed  [x]         Call bell left within reach  [x]         Nursing notified  []         Caregiver present  [x]         Bed alarm activated  []         SCDs applied    COMMUNICATION/EDUCATION:   [x]         Role of Physical Therapy in the acute care setting. [x]         Fall prevention education was provided and the patient/caregiver indicated understanding. [x]         Patient/family have participated as able in goal setting and plan of care. [x]         Patient/family agree to work toward stated goals and plan of care. []         Patient understands intent and goals of therapy, but is neutral about his/her participation. []         Patient is unable to participate in goal setting/plan of care: ongoing with therapy staff.  []         Other:     Thank you for this referral.  Gama Almeida, PT   Time Calculation: 54 mins      Eval Complexity: History: MEDIUM  Complexity : 1-2 comorbidities / personal factors will impact the outcome/ POC Exam:MEDIUM Complexity : 3 Standardized tests and measures addressing body structure, function, activity limitation and / or participation in recreation  Presentation: MEDIUM Complexity : Evolving with changing characteristics  Clinical Decision Making:Medium Complexity    Overall Complexity:MEDIUM

## 2021-04-26 NOTE — CONSULTS
[facility]  Pulmonary, Critical Care, and Sleep Medicine    Initial Patient Consult    Name: Daniel Thomas MRN: [id number]   : 1945 Hospital: [address] [place] [address]   Date: 2021        IMPRESSION:   · Hypoxic respiratory failure secondary to COVID-19 pneumonia  · COVID-19 pneumonia  · History of nocturnal hypoxemia-FTs with mild restrictive ventilatory defect, Reduced diffusion capacity indicating a decrease in alveolar surface area for gas exchange   Mild obstruction suspected   · Pulmonary hypertension-group 1 and 3  · History of positive ANCA, ID-3,-Lupus on Plaquenil as outpatient  · Suspected obstructive sleep apnea/OHS on CPAP   · History of IVC filter placement      RECOMMENDATIONS:   · Oxygen-continue supplementation to maintain saturation more than 92%. Currently needing high flow 65% FiO2 25 L  · Discussed prone positioning with patient but she does not think she can do it because of her body habitus  · BIPAP/CPAP at nighttime would be helpful  · Bronchial hygiene protocol  · Bronchodilators-continue Combivent Respimat  · Steroids-continue Decadron for treatment of COVID-19 pneumonia  · Antibiotics-currently on Rocephin and Zithromax will defer to ID to de-escalate  · Can continue Plaquenil, not need to hold  · Aspiration precautions  · Adjuvant therapy and follow-up of inflammatory markers per ID  · Need for further diagnostics-echocardiogram  · Out patient testing- PFT, 6 min walk, Polysomnogram  · Assess home Oxygen needs at discharge  · OT, PT, OOB and ambulate  · Healthy weight  · Will Follow  · DVT, PUD prophylaxis     Subjective: This patient has been seen and evaluated at the request of Gabo Brewer for hypoxic respiratory failure and COVID-19 pneumonia. Patient is a 67 y.o. female who states she has had flulike symptoms for last week or more.  Patient was diagnosed with Covid 11 days-2021 ago however did not show symptoms until 9 days ago.    Patient was tested because other members of the household were positive. She began to develop myalgias, progressive cough and dyspnea. Patient was monitoring herself closely at home and she was also using her old oxygen tank for last few days as her saturation was in the low 80s. However, she started becoming more short winded and required more oxygen so they decided to came to the emergency room. Patient denies any chest pain abdominal pain. She had nausea and vomiting episode x1 yesterday as well as some loose bowel movement. She states that after coming to the hospital this has subsided  Patient is currently on high flow nasal cannula. Her past medical history includes hypertension, lupus, cognitive impairment, knee replacement and IVC filter placement. Denies smoking cigarettes or drinking any alcohol. Patient is followed by Dr. Marissa Rahman in clinic -pt with nocturnal hypoxemia and pulmonary HTN possibly related to Lupus, followed by Dr. Fariba Nick. Serologies were also positive for MD-3 ANCA. Pt refused split night study ordered to R/o HO/OHS as etiology of pulmonary HTN. Supplemental O2 HS was ordered which pt has refused to use and was asking Dr. Marissa Rahman to discontinue the O2 but eventually agreed to keep it    I have reviewed all of the available data including the patient's previous history external records and radiological imaging available for review. In addition applicable cardiology and other lab data were also reviewed.     Past Medical History:   Diagnosis Date    Arthritis     Bronchitis 01/01/2020    Hypertension     Indigestion     Lupus ([facility] [address]     Memory difficulty     Nausea & vomiting     Ringing of ears     sometimes    Thromboembolus [facility]) 2009      Past Surgical History:   Procedure Laterality Date   Hinton Michael CHOLECYSTECTOMY  1988    HX COLONOSCOPY      HX ORTHOPAEDIC  April 09'    right knee replacement     HX ORTHOPAEDIC  Sept 09'    left knee replacement    HX VASCULAR ACCESS      IVC filter    [facility] REMOVAL GALLBLAD[facility]        Prior to Admission medications    Medication Sig Start Date End Date Taking? Authorizing Provider   sertraline (Zoloft) 50 mg tablet Take 150 mg by mouth daily. Yes Provider, Historical   acetaminophen (TYLENOL) 500 mg tablet Take 1,000 mg by mouth every six (6) hours as needed for Pain. Yes Provider, Historical   ibuprofen (MOTRIN) 200 mg tablet Take 800 mg by mouth two (2) times a day. Yes Provider, Historical   OXYGEN-AIR DELIVERY SYSTEMS 2 L by IntraNASal route continuous. Yes Provider, Historical   hydroxychloroquine (PLAQUENIL) 200 mg tablet Take 200 mg by mouth two (2) times a day. Yes Provider, Historical   benazepril-hydrochlorothiazide (LOTENSIN HCT) 20-25 mg per tablet Take 1 Tab by mouth daily.    Yes Provider, Historical     Allergies   Allergen Reactions    Morphine Anxiety      Social History     Tobacco Use    Smoking status: Never Smoker    Smokeless tobacco: Never Used    Tobacco comment: heavy exposure to second hand smoke growing up   Substance Use Topics    Alcohol use: No      Family History   Problem Relation Age of Onset    Stroke Father     Cancer Father         prastate       Current Facility-Administered Medications   Medication Dose Route Frequency    ipratropium-albuterol (COMBIVENT RESPIMAT) 20 mcg-100 mcg inhalation spray  1 Puff Inhalation Q6H RT    cefTRIAXone (ROCEPHIN) 2 g in sterile water (preservative free) 20 mL IV syringe  2 g IntraVENous Q24H    azithromycin (ZITHROMAX) 500 mg in 0.9% sodium chloride 250 mL (VIAL-MATE)  500 mg IntraVENous Q24H    sodium chloride (NS) flush 5-40 mL  5-40 mL IntraVENous Q8H    enoxaparin (LOVENOX) injection 40 mg  40 mg SubCUTAneous Q24H    guaiFENesin ER (MUCINEX) tablet 600 mg  600 mg Oral Q12H    insulin lispro (HUMALOG) injection   SubCUTAneous AC&HS    dexamethasone (DECADRON) 4 mg/mL injection 10 mg  10 mg IntraVENous Q12H    sertraline (ZOLOFT) tablet 150 mg  150 mg Oral DAILY    hydrOXYchloroQUINE (PLAQUENIL) tablet 200 mg  200 mg Oral BID    pantoprazole (PROTONIX) tablet 40 mg  40 mg Oral ACB    ascorbic acid (vitamin C) (VITAMIN C) tablet 500 mg  500 mg Oral BID    omega-3 acid ethyl esters (LOVAZA) capsule 1,000 mg  1 g Oral BID WITH MEALS    cloNIDine HCL (CATAPRES) tablet 0.1 mg  0.1 mg Oral BID       Review of Systems:  A comprehensive review of systems was negative except for that written in the HPI. Objective:   Vital Signs:    Visit Vitals  BP (!) 135/98   Pulse 75   Temp 98.2 °F (36.8 °C)   Resp 22   Ht 5' 2\" (1.575 m)   Wt 117.8 kg (259 lb 12.8 oz)   SpO2 92%   Breastfeeding No   BMI 47.52 kg/m²       O2 Device: Heated, Hi flow nasal cannula   O2 Flow Rate (L/min): 25 l/min   Temp (24hrs), Av.8 °F (37.1 °C), Min:98.2 °F (36.8 °C), Max:99.4 °F (37.4 °C)       Intake/Output:   Last shift:      No intake/output data recorded. Last 3 shifts:  1901 -  0700  In: 240 [P.O.:240]  Out: -     Intake/Output Summary (Last 24 hours) at 2021 1306  Last data filed at 2021 0300  Gross per 24 hour   Intake 240 ml   Output    Net 240 ml      Physical Exam:   General:  Alert, cooperative, no distress, appears stated age. Head:  Normocephalic, without obvious abnormality, atraumatic. Eyes:  Conjunctivae/corneas clear. PERRL, EOMs intact. Nose: Nares normal. Septum midline. Mucosa normal. No drainage or sinus tenderness. Throat: Lips, mucosa, and tongue normal. Teeth and gums normal.   Neck: Supple, symmetrical, trachea midline, no adenopathy, thyroid: no enlargment/tenderness/nodules, no carotid bruit and no JVD. Back:   Symmetric, no curvature. ROM normal.   Lungs:   Clear to auscultation bilaterally. Chest wall:  No tenderness or deformity. Heart:  Regular rate and rhythm, S1, S2 normal, no murmur, click, rub or gallop. Abdomen:   Soft, non-tender. Bowel sounds normal. No masses,  No organomegaly.    Extremities: Extremities normal, atraumatic, no cyanosis or edema. Pulses: 2+ and symmetric all extremities.    Skin: Skin color, texture, turgor normal. No rashes or lesions   Lymph nodes: Cervical, supraclavicular, and axillary nodes normal.   Neurologic: Grossly nonfocal     Data review:     Recent Results (from the past 24 hour(s))   EKG, 12 LEAD, INITIAL    Collection Time: 04/25/21  2:06 PM   Result Value Ref Range    Ventricular Rate 86 BPM    Atrial Rate 86 BPM    QRS Duration 86 ms    Q-T Interval 410 ms    QTC Calculation (Bezet) 490 ms    Calculated R Axis -9 degrees    Calculated T Axis -104 degrees    Diagnosis       Accelerated Junctional rhythm  Minimal voltage criteria for LVH, may be normal variant  ST & T wave abnormality, consider lateral ischemia  Abnormal ECG  When compared with ECG of 01-JAN-2020 16:07,  Junctional rhythm has replaced Sinus rhythm  ST now depressed in Lateral leads     CULTURE, BLOOD    Collection Time: 04/25/21  2:30 PM    Specimen: Blood   Result Value Ref Range    Special Requests: NO SPECIAL REQUESTS      Culture result: NO GROWTH AFTER 15 HOURS     POC LACTIC ACID    Collection Time: 04/25/21  2:38 PM   Result Value Ref Range    Lactic Acid (POC) 1.07 0.40 - 2.00 mmol/L   CULTURE, BLOOD    Collection Time: 04/25/21  2:45 PM    Specimen: Blood   Result Value Ref Range    Special Requests: NO SPECIAL REQUESTS      Culture result: NO GROWTH AFTER 15 HOURS     GLUCOSE, POC    Collection Time: 04/25/21  9:33 PM   Result Value Ref Range    Glucose (POC) 153 (H) 70 - 110 mg/dL   FIBRINOGEN    Collection Time: 04/26/21  4:30 AM   Result Value Ref Range    Fibrinogen 612 (H) 210 - 451 mg/dL   C REACTIVE PROTEIN, QT    Collection Time: 04/26/21  4:30 AM   Result Value Ref Range    C-Reactive protein 13.2 (H) 0 - 0.3 mg/dL   LD    Collection Time: 04/26/21  4:30 AM   Result Value Ref Range     (H) 81 - 234 U/L   FERRITIN    Collection Time: 04/26/21  4:30 AM   Result Value Ref Range Ferritin 245 8 - 388 NG/ML   D DIMER    Collection Time: 04/26/21  4:30 AM   Result Value Ref Range    D DIMER 0.53 (H) <0.46 ug/ml(FEU)   TROPONIN I    Collection Time: 04/26/21  4:30 AM   Result Value Ref Range    Troponin-I, QT 0.14 (H) 0.0 - 0.045 NG/ML   MAGNESIUM    Collection Time: 04/26/21  4:30 AM   Result Value Ref Range    Magnesium 2.3 1.6 - 2.6 mg/dL   GLUCOSE, POC    Collection Time: 04/26/21  8:30 AM   Result Value Ref Range    Glucose (POC) 161 (H) 70 - 110 mg/dL   GLUCOSE, POC    Collection Time: 04/26/21 12:01 PM   Result Value Ref Range    Glucose (POC) 135 (H) 70 - 110 mg/dL       Imaging:  I have personally reviewed the patients radiographs and have reviewed the reports:  XR Results (most recent):  Results from Hospital Encounter encounter on 04/25/21   XR CHEST SNGL V    Narrative CHEST RADIOGRAPH-1 VIEW    INDICATION: Cough    COMPARISON: Chest x-ray 2/28/2020    FINDINGS: Cardiac silhouette is stable. Atherosclerosis noted. Mild diffuse  interstitial prominence. Bilateral mid to lower lung patchy opacities. Probable  small bilateral pleural effusions. Lungs are hypoinflated. No evidence for  pneumothorax. No acute osseous finding. Impression 1. Mild interstitial edema versus interstitial infiltrate. 2. Small bilateral pleural effusions with overlying mid to lower lung patchy  opacities. These could represent atelectasis but superimposed infiltrate/edema  not excluded. CT Results (most recent):  Results from Hospital Encounter encounter on 02/27/20   CTA CHEST W OR W WO CONT    Narrative EXAM:  CTA Chest with Contrast         CLINICAL INDICATION:  Shortness of breath and low O2 saturation. COMPARISON:  No prior CTA chest.  No VQ scan. Chest x-ray dated 02/24/20. CT  abdomen-pelvis 02/12/09. TECHNIQUE:    - Helical volumetric sections of the chest are obtained with CT pulmonary  angiogram protocol.   Subsequently, sagittal and coronal multiplanar  reconstruction images are obtained. Maximum intensity projection images are  generated to better delineate the pulmonary vasculature, differentiate between  the pulmonary arteries and veins and to increase sensitivity to pulmonary  emboli.    - IV contrast 100 mL Isovue-370.  - Radiation dose optimization techniques are utilized as appropriate to the  exam, with combination of automated exposure control, adjustment of the mA  and/or kV according to patient's size (Including appropriate matching for  site-specific examinations), or use of iterative reconstruction technique. FINDINGS:     Pulmonary Arteries:  No convincing evidence for pulmonary embolism is detected. Lung, Pleura, Airways: Atelectatic changes at the lung bases bilaterally. No  dominant mass or discrete suspicious lung nodules. No definite airspace  opacities. Mediastinum, Clotilde:  No adenopathy. Aorta:  No evidence of aortic dissection. Apparent dilation of the ascending  aorta measuring about 4.3 cm. Base of Neck:  No acute findings. Axillae:  Unremarkable. Esophagus:  Unremarkable. Abdomen:  Both kidneys demonstrate multiple exophytic hypodensities. .  The  largest of the exophytic hypodensity is observed in the left kidney mid  interpolar region with an internal septation, measuring up to about 4.4 x 3.9  cm. Skeletal Structures:  No acute findings. Impression IMPRESSION:         1. No convincing evidence of pulmonary embolism. 2.  Atelectatic changes in both lungs especially at the lung bases. 3.  Multiple renal hypodensities. At least one of the renal cyst appears to  show a nonsimple feature, i.e. septation, not as apparent on the previous study. Recommend follow-up dedicated CT of the abdomen without and with contrast for  further delineation.     4.  Mild dilation of the ascending aorta.               07/15/20   ECHO ADULT COMPLETE 07/15/2020 7/15/2020    Narrative · Normal cavity size, wall thickness and systolic function (ejection   fraction normal). Estimated left ventricular ejection fraction is 55 -   60%. Visually measured ejection fraction. No regional wall motion   abnormality noted. Inconclusive left ventricular diastolic function. · Mildly dilated right atrium. · Mild tricuspid valve regurgitation is present. · Mild pulmonary hypertension. Pulmonary arterial systolic pressure is 40   mmHg. · Mildly dilated aortic root; diameter is 3.6 cm. Signed by: Maryam Gordon MD         Complex decision making was made in the evaluation and management plans during this consultation. More than 50% of time was spent in counseling and coordination of care including review of data and discussion with other team members.          Vladimir Chaudhry MD  Pulmonary & Critical Care

## 2021-04-27 LAB
ANION GAP SERPL CALC-SCNC: 3 MMOL/L (ref 3–18)
BUN SERPL-MCNC: 24 MG/DL (ref 7–18)
BUN/CREAT SERPL: 56 (ref 12–20)
CALCIUM SERPL-MCNC: 8.6 MG/DL (ref 8.5–10.1)
CHLORIDE SERPL-SCNC: 104 MMOL/L (ref 100–111)
CO2 SERPL-SCNC: 34 MMOL/L (ref 21–32)
CREAT SERPL-MCNC: 0.43 MG/DL (ref 0.6–1.3)
CRP SERPL-MCNC: 7.1 MG/DL (ref 0–0.3)
D DIMER PPP FEU-MCNC: 0.35 UG/ML(FEU)
GLUCOSE BLD STRIP.AUTO-MCNC: 114 MG/DL (ref 70–110)
GLUCOSE BLD STRIP.AUTO-MCNC: 119 MG/DL (ref 70–110)
GLUCOSE BLD STRIP.AUTO-MCNC: 138 MG/DL (ref 70–110)
GLUCOSE BLD STRIP.AUTO-MCNC: 153 MG/DL (ref 70–110)
GLUCOSE SERPL-MCNC: 132 MG/DL (ref 74–99)
MAGNESIUM SERPL-MCNC: 2.4 MG/DL (ref 1.6–2.6)
PHOSPHATE SERPL-MCNC: 2.3 MG/DL (ref 2.5–4.9)
POTASSIUM SERPL-SCNC: 3.6 MMOL/L (ref 3.5–5.5)
PROCALCITONIN SERPL-MCNC: <0.05 NG/ML
SODIUM SERPL-SCNC: 141 MMOL/L (ref 136–145)

## 2021-04-27 PROCEDURE — 87449 NOS EACH ORGANISM AG IA: CPT

## 2021-04-27 PROCEDURE — 74011000250 HC RX REV CODE- 250: Performed by: NURSE PRACTITIONER

## 2021-04-27 PROCEDURE — 80048 BASIC METABOLIC PNL TOTAL CA: CPT

## 2021-04-27 PROCEDURE — 84145 PROCALCITONIN (PCT): CPT

## 2021-04-27 PROCEDURE — 82962 GLUCOSE BLOOD TEST: CPT

## 2021-04-27 PROCEDURE — 65660000000 HC RM CCU STEPDOWN

## 2021-04-27 PROCEDURE — 77010033711 HC HIGH FLOW OXYGEN

## 2021-04-27 PROCEDURE — 94762 N-INVAS EAR/PLS OXIMTRY CONT: CPT

## 2021-04-27 PROCEDURE — 83735 ASSAY OF MAGNESIUM: CPT

## 2021-04-27 PROCEDURE — 74011636637 HC RX REV CODE- 636/637: Performed by: NURSE PRACTITIONER

## 2021-04-27 PROCEDURE — 86140 C-REACTIVE PROTEIN: CPT

## 2021-04-27 PROCEDURE — 85379 FIBRIN DEGRADATION QUANT: CPT

## 2021-04-27 PROCEDURE — 74011250637 HC RX REV CODE- 250/637: Performed by: NURSE PRACTITIONER

## 2021-04-27 PROCEDURE — 74011250637 HC RX REV CODE- 250/637: Performed by: INTERNAL MEDICINE

## 2021-04-27 PROCEDURE — 74011250637 HC RX REV CODE- 250/637: Performed by: HOSPITALIST

## 2021-04-27 PROCEDURE — 99233 SBSQ HOSP IP/OBS HIGH 50: CPT | Performed by: INTERNAL MEDICINE

## 2021-04-27 PROCEDURE — 36415 COLL VENOUS BLD VENIPUNCTURE: CPT

## 2021-04-27 PROCEDURE — 84100 ASSAY OF PHOSPHORUS: CPT

## 2021-04-27 PROCEDURE — APPSS45 APP SPLIT SHARED TIME 31-45 MINUTES: Performed by: PHYSICIAN ASSISTANT

## 2021-04-27 PROCEDURE — 74011250636 HC RX REV CODE- 250/636: Performed by: NURSE PRACTITIONER

## 2021-04-27 RX ORDER — DEXAMETHASONE SODIUM PHOSPHATE 4 MG/ML
10 INJECTION, SOLUTION INTRA-ARTICULAR; INTRALESIONAL; INTRAMUSCULAR; INTRAVENOUS; SOFT TISSUE EVERY 24 HOURS
Status: COMPLETED | OUTPATIENT
Start: 2021-05-01 | End: 2021-05-05

## 2021-04-27 RX ADMIN — Medication 500 MG: at 09:20

## 2021-04-27 RX ADMIN — AZITHROMYCIN DIHYDRATE 500 MG: 500 INJECTION, POWDER, LYOPHILIZED, FOR SOLUTION INTRAVENOUS at 13:50

## 2021-04-27 RX ADMIN — Medication 500 MG: at 17:47

## 2021-04-27 RX ADMIN — CLONIDINE HYDROCHLORIDE 0.1 MG: 0.1 TABLET ORAL at 17:46

## 2021-04-27 RX ADMIN — IPRATROPIUM BROMIDE AND ALBUTEROL 1 PUFF: 20; 100 SPRAY, METERED RESPIRATORY (INHALATION) at 13:50

## 2021-04-27 RX ADMIN — HYDROXYCHLOROQUINE SULFATE 200 MG: 200 TABLET ORAL at 09:21

## 2021-04-27 RX ADMIN — INSULIN LISPRO 2 UNITS: 100 INJECTION, SOLUTION INTRAVENOUS; SUBCUTANEOUS at 09:17

## 2021-04-27 RX ADMIN — Medication 10 ML: at 13:50

## 2021-04-27 RX ADMIN — DEXAMETHASONE SODIUM PHOSPHATE 10 MG: 4 INJECTION, SOLUTION INTRAMUSCULAR; INTRAVENOUS at 21:10

## 2021-04-27 RX ADMIN — HYDROXYCHLOROQUINE SULFATE 200 MG: 200 TABLET ORAL at 17:46

## 2021-04-27 RX ADMIN — Medication 10 ML: at 21:11

## 2021-04-27 RX ADMIN — CLONIDINE HYDROCHLORIDE 0.1 MG: 0.1 TABLET ORAL at 09:20

## 2021-04-27 RX ADMIN — CEFTRIAXONE SODIUM 2 G: 2 INJECTION, POWDER, FOR SOLUTION INTRAMUSCULAR; INTRAVENOUS at 13:50

## 2021-04-27 RX ADMIN — Medication 10 ML: at 06:00

## 2021-04-27 RX ADMIN — GUAIFENESIN 600 MG: 600 TABLET, EXTENDED RELEASE ORAL at 21:11

## 2021-04-27 RX ADMIN — GUAIFENESIN 600 MG: 600 TABLET, EXTENDED RELEASE ORAL at 09:21

## 2021-04-27 RX ADMIN — ENOXAPARIN SODIUM 40 MG: 40 INJECTION SUBCUTANEOUS at 16:48

## 2021-04-27 RX ADMIN — SERTRALINE HYDROCHLORIDE 150 MG: 50 TABLET ORAL at 09:20

## 2021-04-27 RX ADMIN — ACETAMINOPHEN 650 MG: 325 TABLET ORAL at 17:46

## 2021-04-27 RX ADMIN — Medication 12 MG: at 22:58

## 2021-04-27 RX ADMIN — DEXAMETHASONE SODIUM PHOSPHATE 10 MG: 4 INJECTION, SOLUTION INTRAMUSCULAR; INTRAVENOUS at 09:20

## 2021-04-27 RX ADMIN — IPRATROPIUM BROMIDE AND ALBUTEROL 1 PUFF: 20; 100 SPRAY, METERED RESPIRATORY (INHALATION) at 09:22

## 2021-04-27 RX ADMIN — PANTOPRAZOLE SODIUM 40 MG: 40 TABLET, DELAYED RELEASE ORAL at 09:21

## 2021-04-27 NOTE — PROGRESS NOTES
Progress Note      Patient: Roderick Lyons               Sex: female          DOA: 4/25/2021       YOB: 1945      Age:  67 y.o.        LOS:  LOS: 1 day             CHIEF COMPLAINT:  COVID pneumonia with acute respiratory failure    Subjective:     Patient feels a little better than yesterday  On Hi Flow oxygen    Objective:      Visit Vitals  /64 (BP 1 Location: Left arm, BP Patient Position: At rest)   Pulse 62   Temp 98.3 °F (36.8 °C)   Resp 20   Ht 5' 2\" (1.575 m)   Wt 117.8 kg (259 lb 12.8 oz)   SpO2 99%   Breastfeeding No   BMI 47.52 kg/m²       Physical Exam:  Gen:  Patient is in no distress. No complaint  HEENT and Neck:  PERRL, No cervical tenderness or masses  Lungs:  Clear bilaterally. No rales, no wheeze. Normal effort. Heart:  Regular Rate and Rhythm. No murmur or gallop. No JVD. No edema. Abdomen:  Soft, non tender, no masses, no Hepatosplenomegally  Extremities:  No digital clubbing, no cyanosis  Neuro:  Alert and oriented, Normal affect, Cranial nerves intact, No sensory deficits        Lab/Data Reviewed:  BMP:   Lab Results   Component Value Date/Time     04/26/2021 03:47 PM    K 3.3 (L) 04/26/2021 03:47 PM     04/26/2021 03:47 PM    CO2 33 (H) 04/26/2021 03:47 PM    AGAP 5 04/26/2021 03:47 PM     (H) 04/26/2021 03:47 PM    BUN 23 (H) 04/26/2021 03:47 PM    CREA 0.41 (L) 04/26/2021 03:47 PM    GFRAA >60 04/26/2021 03:47 PM    GFRNA >60 04/26/2021 03:47 PM           Assessment/Plan     Active Problems:    Pneumonia due to COVID-19 virus (4/25/2021)      Hypoxia (4/25/2021)    Acute respiratory failure    Plan:  Continue Decadron  Continue respiratory treatments  Supplemental oxygen  DVT prophylaxis.

## 2021-04-27 NOTE — PROGRESS NOTES
[employer] [facility]. Pulmonary, Critical Care, and Sleep Medicine    Name: Kaushik Crawford MRN: [id number]   : 1945 Hospital: [facility]   Date: 2021  Admission Date: 2021     Chart and notes reviewed. Data reviewed. I have evaluated all findings. [x]I have reviewed the flowsheet and previous days notes. Interval HPI:Patient is a 67 y.o. female with a past medical history of HTN, lupus, cognitive impairment, knee replacement, and IVC filter replacement, who states she has had flulike symptoms for last week or more. Patient was diagnosed with Covid 11 days-2021 ago however did not show symptoms until 9 days ago.    Patient was tested because other members of the household were positive.  She began to develop myalgias, progressive cough and dyspnea.    Patient was monitoring herself closely at home and she was also using her old oxygen tank for last few days as her saturation was in the low 80s.  However, she started becoming more short winded and required more oxygen so they decided to came to the emergency room.    Patient denies any chest pain abdominal pain.    She had nausea and vomiting episode x1 yesterday as well as some loose bowel movement.    She states that after coming to the hospital this has subsided  Patient is currently on high flow nasal cannula.    Denies smoking cigarettes or drinking any alcohol. Patient is followed by Dr. Radha Downing in clinic -pt with nocturnal hypoxemia and pulmonary HTN possibly related to Lupus, followed by Dr. Fariba Nick. Serologies were also positive for WI-3 ANCA. Pt refused split night study ordered to R/o HO/OHS as etiology of pulmonary HTN. Supplemental O2 HS was ordered which pt has refused to use and was asking Dr. Marissa Rahman to discontinue the O2 but eventually agreed to keep it     21  Patient was seen and examined at bedside today. Patient still on HFNC 25L 65% with O2 sats 98%.  She states she feels better sitting up. She denies any SOB, chest pain, nausea, vomiting, abdominal pain. She is asking whether she is getting better or not. All questions answered to the best of my ability. ROS:Pertinent items are noted in HPI. Events and notes from last 24 hours reviewed.    Patient Active Problem List   Diagnosis Code    Hypertension I10    Lupus ([facility] [address] M32.9    Arthritis M19.90    Neuropathy G62.9    Obesity, morbid ([facility] [address] E66.01    Lumbar spinal stenosis M48.061    HNP (herniated nucleus pulposus), lumbar M51.26    Pneumonia due to COVID-19 virus U07.1, J12.82    Hypoxia R09.02       Vital Signs:  Visit Vitals  /74 (BP 1 Location: Left lower arm, BP Patient Position: At rest)   Pulse 60   Temp 98.2 °F (36.8 °C)   Resp 22   Ht 5' 2\" (1.575 m)   Wt 116.4 kg (256 lb 9.6 oz)   SpO2 96%   Breastfeeding No   BMI 46.93 kg/m²       O2 Device: Hi flow nasal cannula, Heated   O2 Flow Rate (L/min): 25 l/min   Temp (24hrs), Av.1 °F (36.7 °C), Min:97.6 °F (36.4 °C), Max:98.3 °F (36.8 °C)       Intake/Output:   Last shift:       0701 -  1900  In: 450 [P.O.:450]  Out: -   Last 3 shifts:  190 -  0700  In: 1200 [P.O.:1200]  Out: 300 [Urine:300]    Intake/Output Summary (Last 24 hours) at 2021 1515  Last data filed at 2021 1350  Gross per 24 hour   Intake 1410 ml   Output 300 ml   Net 1110 ml      Current Facility-Administered Medications   Medication Dose Route Frequency    ipratropium-albuterol (COMBIVENT RESPIMAT) 20 mcg-100 mcg inhalation spray  1 Puff Inhalation Q6H RT    cefTRIAXone (ROCEPHIN) 2 g in sterile water (preservative free) 20 mL IV syringe  2 g IntraVENous Q24H    azithromycin (ZITHROMAX) 500 mg in 0.9% sodium chloride 250 mL (VIAL-MATE)  500 mg IntraVENous Q24H    sodium chloride (NS) flush 5-40 mL  5-40 mL IntraVENous Q8H    enoxaparin (LOVENOX) injection 40 mg  40 mg SubCUTAneous Q24H    guaiFENesin ER (MUCINEX) tablet 600 mg  600 mg Oral Q12H    insulin lispro (HUMALOG) injection   SubCUTAneous AC&HS    dexamethasone (DECADRON) 4 mg/mL injection 10 mg  10 mg IntraVENous Q12H    sertraline (ZOLOFT) tablet 150 mg  150 mg Oral DAILY    hydrOXYchloroQUINE (PLAQUENIL) tablet 200 mg  200 mg Oral BID    pantoprazole (PROTONIX) tablet 40 mg  40 mg Oral ACB    ascorbic acid (vitamin C) (VITAMIN C) tablet 500 mg  500 mg Oral BID    omega-3 acid ethyl esters (LOVAZA) capsule 1,000 mg  1 g Oral BID WITH MEALS    cloNIDine HCL (CATAPRES) tablet 0.1 mg  0.1 mg Oral BID         Telemetry: [x]Sinus []A-flutter []Paced    []A-fib []Multiple PVCs                  Physical Exam:      General:  Alert, cooperative, no distress, appears stated age. Head:  Normocephalic, without obvious abnormality, atraumatic. Eyes:  Conjunctivae/corneas clear. PERRL, EOMs intact. Nose: Nares normal. Septum midline. Mucosa normal. No drainage or sinus tenderness. Throat: Lips, mucosa, and tongue normal. Teeth and gums normal.   Neck: Supple, symmetrical, trachea midline, no adenopathy, thyroid: no enlargment/tenderness/nodules, no carotid bruit and no JVD. Back:   Symmetric, no curvature. ROM normal.   Lungs:   Clear to auscultation bilaterally. Chest wall:  No tenderness or deformity. Heart:  Regular rate and rhythm, S1, S2 normal, no murmur, click, rub or gallop. Abdomen:   Obese. Soft, non-tender. Bowel sounds normal. No masses,  No organomegaly. Extremities: Extremities normal, atraumatic, no cyanosis or edema. Pulses: 2+ and symmetric all extremities.    Skin: Skin color, texture, turgor normal. No rashes or lesions   Lymph nodes: Cervical, supraclavicular, and axillary nodes normal.   Neurologic: Grossly nonfocal         DATA:  MAR reviewed and pertinent medications noted or modified as needed    Labs:  Recent Labs     04/25/21  1214   WBC 4.4*   HGB 12.1   HCT 37.1        Recent Labs     04/27/21  0513 04/26/21  1547 04/26/21  0430 04/25/21  1214  141  --  142   K 3.6 3.3*  --  2.8*    103  --  101   CO2 34* 33*  --  38*   * 139*  --  106*   BUN 24* 23*  --  23*   CREA 0.43* 0.41*  --  0.40*   CA 8.6 8.3*  --  8.4*   MG 2.4 2.4 2.3 1.7   PHOS 2.3* 2.5  --   --    ALB  --   --   --  2.7*   ALT  --   --   --  33     No results for input(s): PH, PCO2, PO2, HCO3, FIO2 in the last 72 hours. No results for input(s): FIO2I, IFO2, HCO3I, IHCO3, HCOPOC, PCO2I, PCOPOC, IPHI, PHI, PHPOC, PO2I, PO2POC in the last 72 hours. No lab exists for component: IPOC2    Imaging:  [x]   I have personally reviewed the patients radiographs and reports  XR Results (most recent):  Results from Hospital Encounter encounter on 04/25/21   XR CHEST SNGL V    Narrative CHEST RADIOGRAPH-1 VIEW    INDICATION: Cough    COMPARISON: Chest x-ray 2/28/2020    FINDINGS: Cardiac silhouette is stable. Atherosclerosis noted. Mild diffuse  interstitial prominence. Bilateral mid to lower lung patchy opacities. Probable  small bilateral pleural effusions. Lungs are hypoinflated. No evidence for  pneumothorax. No acute osseous finding. Impression 1. Mild interstitial edema versus interstitial infiltrate. 2. Small bilateral pleural effusions with overlying mid to lower lung patchy  opacities. These could represent atelectasis but superimposed infiltrate/edema  not excluded. CT Results (most recent):  Results from Hospital Encounter encounter on 02/27/20   CTA CHEST W OR W WO CONT    Narrative EXAM:  CTA Chest with Contrast         CLINICAL INDICATION:  Shortness of breath and low O2 saturation. COMPARISON:  No prior CTA chest.  No VQ scan. Chest x-ray dated 02/24/20. CT  abdomen-pelvis 02/12/09. TECHNIQUE:    - Helical volumetric sections of the chest are obtained with CT pulmonary  angiogram protocol. Subsequently, sagittal and coronal multiplanar  reconstruction images are obtained.   Maximum intensity projection images are  generated to better delineate the pulmonary vasculature, differentiate between  the pulmonary arteries and veins and to increase sensitivity to pulmonary  emboli.    - IV contrast 100 mL Isovue-370.  - Radiation dose optimization techniques are utilized as appropriate to the  exam, with combination of automated exposure control, adjustment of the mA  and/or kV according to patient's size (Including appropriate matching for  site-specific examinations), or use of iterative reconstruction technique. FINDINGS:     Pulmonary Arteries:  No convincing evidence for pulmonary embolism is detected. Lung, Pleura, Airways: Atelectatic changes at the lung bases bilaterally. No  dominant mass or discrete suspicious lung nodules. No definite airspace  opacities. Mediastinum, Clotilde:  No adenopathy. Aorta:  No evidence of aortic dissection. Apparent dilation of the ascending  aorta measuring about 4.3 cm. Base of Neck:  No acute findings. Axillae:  Unremarkable. Esophagus:  Unremarkable. Abdomen:  Both kidneys demonstrate multiple exophytic hypodensities. .  The  largest of the exophytic hypodensity is observed in the left kidney mid  interpolar region with an internal septation, measuring up to about 4.4 x 3.9  cm. Skeletal Structures:  No acute findings. Impression IMPRESSION:         1. No convincing evidence of pulmonary embolism. 2.  Atelectatic changes in both lungs especially at the lung bases. 3.  Multiple renal hypodensities. At least one of the renal cyst appears to  show a nonsimple feature, i.e. septation, not as apparent on the previous study. Recommend follow-up dedicated CT of the abdomen without and with contrast for  further delineation. 4.  Mild dilation of the ascending aorta.               IMPRESSION:   · Hypoxic respiratory failure secondary to COVID-19 pneumonia  · COVID-19 pneumonia  · History of nocturnal hypoxemia-FTs with mild restrictive ventilatory defect, Reduced diffusion capacity indicating a decrease in alveolar surface area for gas exchange   Mild obstruction suspected   · Pulmonary hypertension-group 1 and 3  · History of positive ANCA, DE-3,-Lupus on Plaquenil as outpatient  · Suspected obstructive sleep apnea/OHS on CPAP   · History of IVC filter placement     Patient Active Problem List   Diagnosis Code    Hypertension I10    Lupus ([facility] [address] M32.9    Arthritis M19.90    Neuropathy G62.9    Obesity, morbid ([facility] [address] E66.01    Lumbar spinal stenosis M48.061    HNP (herniated nucleus pulposus), lumbar M51.26    Pneumonia due to COVID-19 virus U07.1, J12.82    Hypoxia R09.02        RECOMMENDATIONS:   · Oxygen-continue supplementation to maintain saturation more than 92%.   Currently needing high flow 65% FiO2 25 L  · Discussed prone positioning with patient but she does not think she can do it because of her body habitus  · BIPAP/CPAP at nighttime would be helpful  · Bronchial hygiene protocol  · Bronchodilators-continue Combivent Respimat  · Steroids-continue Decadron for treatment of COVID-19 pneumonia  · Antibiotics-currently  Zithromax will defer to ID to de-escalate  · Can continue Plaquenil, not need to hold  · Aspiration precautions  · Adjuvant therapy and follow-up of inflammatory markers per ID  · Need for further diagnostics-echocardiogram  · Out patient testing- PFT, 6 min walk, Polysomnogram  · Assess home Oxygen needs at discharge  · OT, PT, OOB and ambulate  · Healthy weight  · Will Follow  · DVT, PUD prophylaxis     Mónica Parnell MD  04/27/21  Pulmonary, [address] [place] [address] [facility]

## 2021-04-27 NOTE — PROGRESS NOTES
Infectious Disease progress Note        Reason: Acute hypoxic respiratory failure, severe COVID-19 pneumonia    Current abx Prior abx   Ceftriaxone, azithromycin since 4/25/2021      Lines:       Assessment :     67 y.o.  female with hx of SLE on Plaquenil, hypertension, thromboembolism s/p IVC filter, bronchitis and intermittent home oxygen use who presented to the ED on 4/25/21 complaining of worsening myalgias, cough and dyspnea. Positive Covid test 11 days prior to admission, symptoms 9 days prior to admission      Clinical presentation consistent with acute hypoxic respiratory failure-present on admission due to severe COVID-19 pneumonia    Looking at the duration of symptoms, high oxygen requirement, patient will not benefit from antiviral therapy. Hence initiated on high-dose steroids    Patient seems to be clinically responding to steroid therapy    Low procalcitonin argues against bacterial pneumonia    Recommendations:    1. Discontinue ceftriaxone and azithromycin   2. Recommend Decadron 10 mg IV every 12 hours for 5 days  followed by 10 mg IV every 24 hours for 5 days  3. Titrate oxygen as tolerated  4. Follow-up pulmonary recommendations      Will sign off. F/u prn. thanks     Above plan was discussed in details with patient. Please call me if any further questions or concerns. HPI:      Patient states she feels better today compared to day of admission. Wondering when she can go home. She denies any chest pain, abdominal pain. home Medication List   Complaint critical no Details   sertraline (Zoloft) 50 mg tablet Take 150 mg by mouth daily. acetaminophen (TYLENOL) 500 mg tablet Take 1,000 mg by mouth every six (6) hours as needed for Pain. ibuprofen (MOTRIN) 200 mg tablet Take 800 mg by mouth two (2) times a day. OXYGEN-AIR DELIVERY SYSTEMS 2 L by IntraNASal route continuous.       hydroxychloroquine (PLAQUENIL) 200 mg tablet Take 200 mg by mouth two (2) times a day. benazepril-hydrochlorothiazide (LOTENSIN HCT) 20-25 mg per tablet Take 1 Tab by mouth daily.          Movement:    Current Facility-Administered Medications   Medication Dose Route Frequency    melatonin tablet 12 mg  12 mg Oral QHS PRN    ipratropium-albuterol (COMBIVENT RESPIMAT) 20 mcg-100 mcg inhalation spray  1 Puff Inhalation Q6H RT    cefTRIAXone (ROCEPHIN) 2 g in sterile water (preservative free) 20 mL IV syringe  2 g IntraVENous Q24H    azithromycin (ZITHROMAX) 500 mg in 0.9% sodium chloride 250 mL (VIAL-MATE)  500 mg IntraVENous Q24H    sodium chloride (NS) flush 5-40 mL  5-40 mL IntraVENous Q8H    sodium chloride (NS) flush 5-40 mL  5-40 mL IntraVENous PRN    acetaminophen (TYLENOL) tablet 650 mg  650 mg Oral Q6H PRN    Or    acetaminophen (TYLENOL) suppository 650 mg  650 mg Rectal Q6H PRN    polyethylene glycol (MIRALAX) packet 17 g  17 g Oral DAILY PRN    promethazine (PHENERGAN) tablet 12.5 mg  12.5 mg Oral Q6H PRN    Or    ondansetron (ZOFRAN) injection 4 mg  4 mg IntraVENous Q6H PRN    enoxaparin (LOVENOX) injection 40 mg  40 mg SubCUTAneous Q24H    guaiFENesin ER (MUCINEX) tablet 600 mg  600 mg Oral Q12H    insulin lispro (HUMALOG) injection   SubCUTAneous AC&HS    glucose chewable tablet 16 g  4 Tab Oral PRN    glucagon (GLUCAGEN) injection 1 mg  1 mg IntraMUSCular PRN    dextrose (D50W) injection syrg 12.5-25 g  25-50 mL IntraVENous PRN    dexamethasone (DECADRON) 4 mg/mL injection 10 mg  10 mg IntraVENous Q12H    sertraline (ZOLOFT) tablet 150 mg  150 mg Oral DAILY    hydrOXYchloroQUINE (PLAQUENIL) tablet 200 mg  200 mg Oral BID    hydrALAZINE (APRESOLINE) 20 mg/mL injection 10 mg  10 mg IntraVENous Q6H PRN    pantoprazole (PROTONIX) tablet 40 mg  40 mg Oral ACB    ascorbic acid (vitamin C) (VITAMIN C) tablet 500 mg  500 mg Oral BID    omega-3 acid ethyl esters (LOVAZA) capsule 1,000 mg  1 g Oral BID WITH MEALS    cloNIDine HCL (CATAPRES) tablet 0.1 mg 0.1 mg Oral BID       Allergies: Morphine    Temp (24hrs), Av.1 °F (36.7 °C), Min:97.6 °F (36.4 °C), Max:98.3 °F (36.8 °C)    Visit Vitals  BP (!) 143/87 (BP 1 Location: Left upper arm, BP Patient Position: At rest)   Pulse (!) 55   Temp 97.6 °F (36.4 °C)   Resp 19   Ht 5' 2\" (1.575 m)   Wt 116.4 kg (256 lb 9.6 oz)   SpO2 94%   Breastfeeding No   BMI 46.93 kg/m²       ROS: 12 point ROS obtained in details. Pertinent positives as mentioned in HPI,   otherwise negative    Physical Exam:    Vitals signs and nursing note reviewed. Constitutional:    Sitting on bed, alert, high flow oxygen in place, in no apparent distress  HENT:      Head: Normocephalic. Eyes:      Conjunctiva/sclera: Conjunctivae normal.      Neck:      Musculoskeletal: Normal range of motion and neck supple. Cardiovascular:      Rate and Rhythm: Normal rate and regular rhythm on monitor  Chest:      Bilateral chest movements equal.  Auscultation deferred due to Covid positive  Abdominal:      General: There is no distension. Palpations: Abdomen is soft. Tenderness: There is no abdominal tenderness. There is no rebound. Musculoskeletal: Normal range of motion. General: No tenderness. Skin:     General: Skin is warm and dry. Findings: No rash. Neurological:      Mental Status: Alert     Cranial Nerves: No cranial nerve deficit. Motor: No abnormal muscle tone.       Coordination: Coordination normal.   Psychiatric:          Appropriate mood and affect    Labs: Results:   Chemistry Recent Labs     21  0513 21  1547 21  1214   * 139* 106*    141 142   K 3.6 3.3* 2.8*    103 101   CO2 34* 33* 38*   BUN 24* 23* 23*   CREA 0.43* 0.41* 0.40*   CA 8.6 8.3* 8.4*   AGAP 3 5 3   BUCR 56* 56* 58*   AP  --   --  63   TP  --   --  5.7*   ALB  --   --  2.7*   GLOB  --   --  3.0   AGRAT  --   --  0.9      CBC w/Diff Recent Labs     21  1214   WBC 4.4*   RBC 4.15*   HGB 12.1   HCT 37.1    GRANS 76*   LYMPH 12*   EOS 1      Microbiology Recent Labs     04/25/21  1445 04/25/21  1430   CULT NO GROWTH 2 DAYS NO GROWTH 2 DAYS          RADIOLOGY:    All available imaging studies/reports in [facility] for this admission were reviewed      Disclaimer: Sections of this note are dictated utilizing voice recognition software, which may have resulted in some phonetic based errors in grammar and contents. Even though attempts were made to correct all the mistakes, some may have been missed, and remained in the body of the document. If questions arise, please contact our department.     Dr. Sonali Maria, Infectious Disease Specialist  [phone]  April 27, 2021  9:52 AM

## 2021-04-27 NOTE — PROGRESS NOTES
I will be out of the Hospital on the morning of 4/27/21. While I am out of the Hospital, Dr Carlos Melendez will be cover this patient. I will be back and will see the patient and provide for their care on my return. In the meantime, please call Dr Carlos Melendez on 4/27/21 if you have questions.

## 2021-04-27 NOTE — ROUTINE PROCESS
Bedside and Verbal shift change report given to Ishmael Melendez RN (oncoming nurse) by Wilmer Higgins RN   (offgoing nurse). Report included the following information SBAR, Kardex, MAR and Cardiac Rhythm Sinus Jeremias/sinus rhythm.

## 2021-04-27 NOTE — ROUTINE PROCESS
2200 Pt provided with walker, tablet,  And night gown brought in by daughter. At bedside. 0400 Collected urine specimen, labeled and awaiting transport to lab. 
 
0720 Bedside shift change report given to Jovanna Wiseman (oncoming nurse) by Fani Springer (offgoing nurse). Report included the following information SBAR, Intake/Output, MAR, Recent Results and Cardiac Rhythm NSR/sinus khalif.

## 2021-04-27 NOTE — PROGRESS NOTES
Physician Progress Note      Oanh Starr  CSN #:                  [id number]  :                       1945  ADMIT DATE:       2021 12:10 PM  [address] [place] DATE:  RESPONDING  PROVIDER #:        Kriss Byrd MD          QUERY TEXT:    Dear Hospitalist  Pt admitted with [phone] and noted to have 3/4 (SIRS, other signs of sepsis). If possible, please document in progress notes and discharge summary if you are evaluating and/or treating: The medical record reflects the following:  Risk Factors: Acute Hypoxic respiratory failure secondary to COVID-19 pneumonia  Clinical Indicators: EDVS Pul 99,bp 80/36 RR27 WBCs 4.4 , EDRNPN Patient brought to room 2, triage nurse states her O2 was 59% on RA code sepsis called on the patient crp 13.2    Treatment: IV rocephin  IV Zithromax ,high flow 65% FiO2 25 L ,BIPAP/CPAP at nighttime  Thank you  Elpidoi Tran RN     Options provided:  -- Sepsis with associated acute resp. failure present on admission due to COVID-19 pneumonia  -- Sepsis not present on admission due to COVID-19 infection  -- Sepsis present on admission due to COVID-19 pneumonia  -- Sepsis not present on admission due to COVID-19 pneumonia  -- Covid-19 infection without sepsis  -- Other - I will add my own diagnosis  -- Disagree - Not applicable / Not valid  -- Disagree - Clinically unable to determine / Unknown  -- Refer to Clinical Documentation Reviewer    PROVIDER RESPONSE TEXT:    This patient has sepsis with associated acute resp. failure which was present on admission due to COVID-19 pneumonia . Query created by:  Enrike Wilkinson on 2021 7:14 PM      Electronically signed by:  Kriss Byrd MD 2021 3:49 PM

## 2021-04-27 NOTE — PROGRESS NOTES
Problem: Risk for Spread of Infection  Goal: Prevent transmission of infectious organism to others  Description: Prevent the transmission of infectious organisms to other patients, staff members, and visitors. 4/26/2021 2359 by Kim Nick  Outcome: Progressing Towards Goal  4/26/2021 2357 by Kim Nick  Outcome: Progressing Towards Goal     Problem: Patient Education:  Go to Education Activity  Goal: Patient/Family Education  4/26/2021 2359 by Kim Nick  Outcome: Progressing Towards Goal  4/26/2021 2357 by Kim Nick  Outcome: Progressing Towards Goal     Problem: Falls - Risk of  Goal: *Absence of Falls  Description: Document Kemi Holm Fall Risk and appropriate interventions in the flowsheet.   4/26/2021 2359 by Sanya COTTER  Outcome: Progressing Towards Goal  Note: Fall Risk Interventions:  Mobility Interventions: Assess mobility with egress test, Bed/chair exit alarm, Communicate number of staff needed for ambulation/transfer, Patient to call before getting OOB, PT Consult for mobility concerns, PT Consult for assist device competence, Utilize walker, cane, or other assistive device         Medication Interventions: Assess postural VS orthostatic hypotension, Bed/chair exit alarm, Evaluate medications/consider consulting pharmacy, Patient to call before getting OOB, Teach patient to arise slowly    Elimination Interventions: Bed/chair exit alarm, Call light in reach, Patient to call for help with toileting needs, Stay With Me (per policy), Toilet paper/wipes in reach, Toileting schedule/hourly rounds           4/26/2021 2357 by Sanya COTTER  Outcome: Progressing Towards Goal  Note: Fall Risk Interventions:  Mobility Interventions: Assess mobility with egress test, Bed/chair exit alarm, Communicate number of staff needed for ambulation/transfer, Patient to call before getting OOB, PT Consult for mobility concerns, PT Consult for assist device competence, Utilize walker, cane, or other assistive device         Medication Interventions: Assess postural VS orthostatic hypotension, Bed/chair exit alarm, Evaluate medications/consider consulting pharmacy, Patient to call before getting OOB, Teach patient to arise slowly    Elimination Interventions: Bed/chair exit alarm, Call light in reach, Patient to call for help with toileting needs, Stay With Me (per policy), Toilet paper/wipes in reach, Toileting schedule/hourly rounds              Problem: Patient Education: Go to Patient Education Activity  Goal: Patient/Family Education  2021 by Odalis Collins  Outcome: Progressing Towards Goal  2021 by Odalis Collins  Outcome: Progressing Towards Goal     Problem: Pressure Injury - Risk of  Goal: *Prevention of pressure injury  Description: Document Francisco Scale and appropriate interventions in the flowsheet. 2021 by Scarlette Apgar E  Outcome: Progressing Towards Goal  Note: Pressure Injury Interventions:  Sensory Interventions: Assess changes in LOC, Assess need for specialty bed, Avoid rigorous massage over bony prominences, Check visual cues for pain, Discuss PT/OT consult with provider, Keep linens dry and wrinkle-free, Maintain/enhance activity level, Minimize linen layers, Monitor skin under medical devices, Pressure redistribution bed/mattress (bed type), Turn and reposition approx.  every two hours (pillows and wedges if needed)    Moisture Interventions: Absorbent underpads, Apply protective barrier, creams and emollients, Assess need for specialty bed, Check for incontinence Q2 hours and as needed, Internal/External urinary devices, Limit adult briefs, Maintain skin hydration (lotion/cream), Minimize layers, Moisture barrier, Offer toileting Q_hr    Activity Interventions: Assess need for specialty bed, Increase time out of bed, Pressure redistribution bed/mattress(bed type), PT/OT evaluation    Mobility Interventions: Assess need for specialty bed, Float heels, HOB 30 degrees or less, Pressure redistribution bed/mattress (bed type), PT/OT evaluation, Turn and reposition approx. every two hours(pillow and wedges)    Nutrition Interventions: Document food/fluid/supplement intake, Discuss nutritional consult with provider, Offer support with meals,snacks and hydration    Friction and Shear Interventions: Apply protective barrier, creams and emollients, Feet elevated on foot rest, Foam dressings/transparent film/skin sealants, HOB 30 degrees or less, Lift sheet, Lift team/patient mobility team, Minimize layers             4/26/2021 2357 by Mima Peraza  Outcome: Progressing Towards Goal  Note: Pressure Injury Interventions:  Sensory Interventions: Assess changes in LOC, Assess need for specialty bed, Avoid rigorous massage over bony prominences, Check visual cues for pain, Discuss PT/OT consult with provider, Keep linens dry and wrinkle-free, Maintain/enhance activity level, Minimize linen layers, Monitor skin under medical devices, Pressure redistribution bed/mattress (bed type), Turn and reposition approx. every two hours (pillows and wedges if needed)    Moisture Interventions: Absorbent underpads, Apply protective barrier, creams and emollients, Assess need for specialty bed, Check for incontinence Q2 hours and as needed, Internal/External urinary devices, Limit adult briefs, Maintain skin hydration (lotion/cream), Minimize layers, Moisture barrier, Offer toileting Q_hr    Activity Interventions: Assess need for specialty bed, Increase time out of bed, Pressure redistribution bed/mattress(bed type), PT/OT evaluation    Mobility Interventions: Assess need for specialty bed, Float heels, HOB 30 degrees or less, Pressure redistribution bed/mattress (bed type), PT/OT evaluation, Turn and reposition approx.  every two hours(pillow and wedges)    Nutrition Interventions: Document food/fluid/supplement intake, Discuss nutritional consult with provider, Offer support with meals,snacks and hydration    Friction and Shear Interventions: Apply protective barrier, creams and emollients, Feet elevated on foot rest, Foam dressings/transparent film/skin sealants, HOB 30 degrees or less, Lift sheet, Lift team/patient mobility team, Minimize layers                Problem: Patient Education: Go to Patient Education Activity  Goal: Patient/Family Education  4/26/2021 2359 by Katiana Awad  Outcome: Progressing Towards Goal  4/26/2021 2357 by Katiana Awad  Outcome: Progressing Towards Goal     Problem: Airway Clearance - Ineffective  Goal: Achieve or maintain patent airway  4/26/2021 2359 by Geronimo COTTER  Outcome: Progressing Towards Goal  4/26/2021 2357 by Katiana Awad  Outcome: Progressing Towards Goal     Problem: Gas Exchange - Impaired  Goal: Absence of hypoxia  4/26/2021 2359 by Katiana Awad  Outcome: Progressing Towards Goal  4/26/2021 2357 by Katiana Awad  Outcome: Progressing Towards Goal  Goal: Promote optimal lung function  4/26/2021 2359 by Geronimo COTTER  Outcome: Progressing Towards Goal  4/26/2021 2357 by Katiana Awad  Outcome: Progressing Towards Goal     Problem: Breathing Pattern - Ineffective  Goal: Ability to achieve and maintain a regular respiratory rate  4/26/2021 2359 by Geronimo COTTER  Outcome: Progressing Towards Goal  4/26/2021 2357 by Geronimo COTTER  Outcome: Progressing Towards Goal     Problem:  Body Temperature -  Risk of, Imbalanced  Goal: Ability to maintain a body temperature within defined limits  4/26/2021 2359 by Geronimo COTTER  Outcome: Progressing Towards Goal  4/26/2021 2357 by Katiana Awad  Outcome: Progressing Towards Goal  Goal: Will regain or maintain usual level of consciousness  4/26/2021 2359 by Katiana Awad  Outcome: Progressing Towards Goal  4/26/2021 2357 by Katiana Awad  Outcome: Progressing Towards Goal  Goal: Complications related to the disease process, condition or treatment will be avoided or minimized  4/26/2021 2359 by Katiana Awad  Outcome: Progressing Towards Goal  4/26/2021 2357 by Tory COTTER  Outcome: Progressing Towards Goal     Problem: Isolation Precautions - Risk of Spread of Infection  Goal: Prevent transmission of infectious organism to others  4/26/2021 2359 by Robbie Goltz  Outcome: Progressing Towards Goal  4/26/2021 2357 by Robbie Goltz  Outcome: Progressing Towards Goal     Problem: Nutrition Deficits  Goal: Optimize nutrtional status  4/26/2021 2359 by Tory COTTER  Outcome: Progressing Towards Goal  4/26/2021 2357 by Tory COTTER  Outcome: Progressing Towards Goal     Problem: Risk for Fluid Volume Deficit  Goal: Maintain normal heart rhythm  4/26/2021 2359 by Tory COTTER  Outcome: Progressing Towards Goal  4/26/2021 2357 by Robbie Goltz  Outcome: Progressing Towards Goal  Goal: Maintain absence of muscle cramping  Outcome: Progressing Towards Goal  Goal: Maintain normal serum potassium, sodium, calcium, phosphorus, and pH  4/26/2021 2359 by Tory COTTER  Outcome: Progressing Towards Goal  4/26/2021 2357 by Tory COTTER  Outcome: Progressing Towards Goal     Problem: Loneliness or Risk for Loneliness  Goal: Demonstrate positive use of time alone when socialization is not possible  4/26/2021 2359 by Tory COTTER  Outcome: Progressing Towards Goal  4/26/2021 2357 by Robbie Goltz  Outcome: Progressing Towards Goal     Problem: Fatigue  Goal: Verbalize increase energy and improved vitality  4/26/2021 2359 by Tory COTTER  Outcome: Progressing Towards Goal  4/26/2021 2357 by Robbie Goltz  Outcome: Progressing Towards Goal     Problem: Patient Education: Go to Patient Education Activity  Goal: Patient/Family Education  4/26/2021 2359 by Robbie Goltz  Outcome: Progressing Towards Goal  4/26/2021 2357 by Robbie Goltz  Outcome: Progressing Towards Goal     Problem: Pneumonia: Day 1  Goal: Off Pathway (Use only if patient is Off Pathway)  Outcome: Progressing Towards Goal  Goal: Activity/Safety  4/26/2021 2359 by Tory Sosa E  Outcome: Progressing Towards Goal  [id number] by Odalis Collins  Outcome: Progressing Towards Goal  Goal: Consults, if ordered  20219 by Odalis Collins  Outcome: Progressing Towards Goal  2021 by Odalis Collins  Outcome: Progressing Towards Goal  Goal: Diagnostic Test/Procedures  2021 2359 by Odalis Collins  Outcome: Progressing Towards Goal  2021 by Odalis Collins  Outcome: Progressing Towards Goal  Goal: Nutrition/Diet  2021 by Odalis Collins  Outcome: Progressing Towards Goal  2021 by Odalis Collins  Outcome: Progressing Towards Goal  Goal: Medications  2021 by Odalis Collins  Outcome: Progressing Towards Goal  2021 by Odalis Collins  Outcome: Progressing Towards Goal  Goal: Respiratory  20219 by Odalis Collins  Outcome: Progressing Towards Goal  2021 by Odalis Collins  Outcome: Progressing Towards Goal  Goal: Treatments/Interventions/Procedures  2021 by Odalis Collins  Outcome: Progressing Towards Goal  2021 by Odalis Collins  Outcome: Progressing Towards Goal  Goal: Psychosocial  2021 by Odalis Collins  Outcome: Progressing Towards Goal  2021 by Odalis Collins  Outcome: Progressing Towards Goal  Goal: *Oxygen saturation within defined limits  2021 by Odalis Collins  Outcome: Progressing Towards Goal  2021 by Odalis Collins  Outcome: Progressing Towards Goal  Goal: *Influenza vaccine administered (October-March)  Outcome: Progressing Towards Goal  Goal: *Pneumoccocal vaccine administered  20219 by Odalis Collins  Outcome: Progressing Towards Goal  2021 by Odalis Collins  Outcome: Progressing Towards Goal  Goal: *Hemodynamically stable  2021 by Odalis Collins  Outcome: Progressing Towards Goal  2021 by Jacquelyn Apgar E  Outcome: Progressing Towards Goal  Goal: *Demonstrates progressive activity  2021 by Odalis Collins  Outcome: Progressing Towards Goal  4/26/2021 2357 by Mima Peraza  Outcome: Progressing Towards Goal  Goal: *Tolerating diet  4/26/2021 2359 by Mima Peraza  Outcome: Progressing Towards Goal  4/26/2021 2357 by Kreg Cam E  Outcome: Progressing Towards Goal     Problem: Pain  Goal: *Control of Pain  4/26/2021 2359 by Kreg Cam E  Outcome: Progressing Towards Goal  4/26/2021 2357 by Mima Peraza  Outcome: Progressing Towards Goal  Goal: *PALLIATIVE CARE:  Alleviation of Pain  4/26/2021 2359 by Kreg Cam E  Outcome: Progressing Towards Goal  4/26/2021 2357 by Kreg Cam E  Outcome: Progressing Towards Goal     Problem: Patient Education: Go to Patient Education Activity  Goal: Patient/Family Education  4/26/2021 2359 by Mima Peraza  Outcome: Progressing Towards Goal  4/26/2021 2357 by Mima Karmen  Outcome: Progressing Towards Goal     Problem: Patient Education: Go to Patient Education Activity  Goal: Patient/Family Education  4/26/2021 2359 by Mima Peraza  Outcome: Progressing Towards Goal  4/26/2021 2357 by Mima Karmen  Outcome: Progressing Towards Goal     Problem: Patient Education: Go to Patient Education Activity  Goal: Patient/Family Education  4/26/2021 2359 by Mima Peraza  Outcome: Progressing Towards Goal  4/26/2021 2357 by Mima Karmen  Outcome: Progressing Towards Goal     Problem: Patient Education: Go to Patient Education Activity  Goal: Patient/Family Education  4/26/2021 2359 by Mima Peraza  Outcome: Progressing Towards Goal  4/26/2021 2357 by Mimamadison Peraza  Outcome: Progressing Towards Goal     Problem: Nutrition Deficit  Goal: *Optimize nutritional status  4/26/2021 2359 by Kreg Cam E  Outcome: Progressing Towards Goal  4/26/2021 2357 by Kreg Cam E  Outcome: Progressing Towards Goal

## 2021-04-27 NOTE — PROGRESS NOTES
Problem: Risk for Spread of Infection  Goal: Prevent transmission of infectious organism to others  Description: Prevent the transmission of infectious organisms to other patients, staff members, and visitors. Outcome: Progressing Towards Goal       Problem: Falls - Risk of  Goal: *Absence of Falls  Description: Document Oumar Coker Fall Risk and appropriate interventions in the flowsheet. Outcome: Progressing Towards Goal  Note: Fall Risk Interventions:  Mobility Interventions: Assess mobility with egress test, Bed/chair exit alarm, Patient to call before getting OOB, PT Consult for mobility concerns, Utilize walker, cane, or other assistive device    Medication Interventions: Bed/chair exit alarm, Patient to call before getting OOB, Teach patient to arise slowly    Elimination Interventions: Bed/chair exit alarm, Call light in reach, Patient to call for help with toileting needs, Toileting schedule/hourly rounds          Problem: Pressure Injury - Risk of  Goal: *Prevention of pressure injury  Description: Document Francisco Scale and appropriate interventions in the flowsheet.   Outcome: Progressing Towards Goal  Note: Pressure Injury Interventions:  Sensory Interventions: Assess changes in LOC, Avoid rigorous massage over bony prominences, Check visual cues for pain, Keep linens dry and wrinkle-free, Minimize linen layers, Pressure redistribution bed/mattress (bed type)    Moisture Interventions: Absorbent underpads, Internal/External urinary devices, Minimize layers, Moisture barrier, Check for incontinence Q2 hours and as needed    Activity Interventions: PT/OT evaluation, Pressure redistribution bed/mattress(bed type)    Mobility Interventions: HOB 30 degrees or less, Pressure redistribution bed/mattress (bed type), PT/OT evaluation    Nutrition Interventions: Document food/fluid/supplement intake    Friction and Shear Interventions: Apply protective barrier, creams and emollients, Foam dressings/transparent film/skin sealants, HOB 30 degrees or less, Lift sheet, Minimize layers         Problem: Gas Exchange - Impaired  Goal: Absence of hypoxia  Outcome: Progressing Towards Goal     Problem: Airway Clearance - Ineffective  Goal: Achieve or maintain patent airway  Outcome: Progressing Towards Goal       Problem: Patient Education: Go to Patient Education Activity  Goal: Patient/Family Education  Outcome: Progressing Towards Goal

## 2021-04-27 NOTE — PROGRESS NOTES
I will be out of the Hospital on the morning of 4/27/21. While I am out of the Hospital, Dr Tavon Florez will be cover this patient. I will be back and will see the patient and provide for their care on my return. In the meantime, please call Dr Tavon Florez on 4/27/21 if you have questions.

## 2021-04-27 NOTE — PROGRESS NOTES
conducted an initial consultation and Spiritual Assessment for Marin Lawson, who is a 67 y.o.,female. Patient's Primary Language is: [place]. According to the patient's EMR Faith Affiliation is: [facility].     The reason the Patient came to the hospital is:   Patient Active Problem List    Diagnosis Date Noted    Neuropathy 08/23/2011     Priority: 2 - Two    Pneumonia due to COVID-19 virus 04/25/2021    Hypoxia 04/25/2021    Lumbar spinal stenosis 02/27/2020    HNP (herniated nucleus pulposus), lumbar 02/27/2020    Obesity, morbid ([facility] [address] 04/22/2019    Hypertension     Lupus ([facility] [address]     Arthritis         The  provided the following Interventions:  Initiated a relationship of care and support. Explored issues of yesi, belief, spirituality and Jain/ritual needs while hospitalized. Listened empathically. Provided chaplaincy education. Provided information about Spiritual Care Services. Offered prayer and assurance of continued prayers on patient's behalf. Chart reviewed. The following outcomes where achieved:  Patient shared limited information about both their medical narrative and spiritual journey/beliefs.  confirmed Patient's Faith Affiliation. Patient processed feeling about current hospitalization. Patient expressed gratitude for 's visit. Assessment:  Patient does not have any Jain/cultural needs that will affect patient's preferences in health care. There are no spiritual or Jain issues which require intervention at this time. Plan:  Chaplains will continue to follow and will provide pastoral care on an as needed/requested basis.  recommends bedside caregivers page  on duty if patient shows signs of acute spiritual or emotional distress.     [facility] [address]   [phone]

## 2021-04-27 NOTE — PROGRESS NOTES
Progress Note      Patient: Herlinda Posada               Sex: female          DOA: 4/25/2021       YOB: 1945      Age:  67 y.o.        LOS:  LOS: 2 days             CHIEF COMPLAINT:  COVID pneumonia with acute respiratory failure    Subjective:     Patient feels more SOB today    Objective:      Visit Vitals  /76 (BP 1 Location: Left lower arm, BP Patient Position: At rest)   Pulse (!) 57   Temp 97.9 °F (36.6 °C)   Resp 19   Ht 5' 2\" (1.575 m)   Wt 116.4 kg (256 lb 9.6 oz)   SpO2 95%   Breastfeeding No   BMI 46.93 kg/m²       Physical Exam:  Gen:  Patient is in no distress. No complaint  HEENT and Neck:  PERRL, No cervical tenderness or masses  Lungs:  Clear bilaterally. No rales, no wheeze. Normal effort. Heart:  Regular Rate and Rhythm. No murmur or gallop. No JVD. No edema. Abdomen:  Soft, non tender, no masses, no Hepatosplenomegally  Extremities:  No digital clubbing, no cyanosis  Neuro:  Alert and oriented, Normal affect, Cranial nerves intact, No sensory deficits        Lab/Data Reviewed:  BMP:   Lab Results   Component Value Date/Time     04/27/2021 05:13 AM    K 3.6 04/27/2021 05:13 AM     04/27/2021 05:13 AM    CO2 34 (H) 04/27/2021 05:13 AM    AGAP 3 04/27/2021 05:13 AM     (H) 04/27/2021 05:13 AM    BUN 24 (H) 04/27/2021 05:13 AM    CREA 0.43 (L) 04/27/2021 05:13 AM    GFRAA >60 04/27/2021 05:13 AM    GFRNA >60 04/27/2021 05:13 AM         Assessment/Plan     Active Problems:    Pneumonia due to COVID-19 virus (4/25/2021)      Hypoxia (4/25/2021)        Plan:  Continue with decadron  Continue with IV antibiotics  Supplemental oxygen  DVT prophylaxis.

## 2021-04-27 NOTE — PROGRESS NOTES
[facility]. Pulmonary, Critical Care, and Sleep Medicine    Name: Daniel Thomas MRN: [id number]   : 1945 Hospital: [facility]   Date: 2021  Admission Date: 2021     Chart and notes reviewed. Data reviewed. I have evaluated all findings. [x]I have reviewed the flowsheet and previous days notes. []The patient is unable to give any meaningful history or review of systems because the patient is:  []Intubated []Sedated   []Unresponsive      []The patient is critically ill on      []Mechanical ventilation []Pressors   []BiPAP []         Interval HPI:Patient is a 67 y.o. female with a past medical history of HTN, lupus, cognitive impairment, knee replacement, and IVC filter replacement, who states she has had flulike symptoms for last week or more. Patient was diagnosed with Covid 11 days-2021 ago however did not show symptoms until 9 days ago.    Patient was tested because other members of the household were positive.  She began to develop myalgias, progressive cough and dyspnea.    Patient was monitoring herself closely at home and she was also using her old oxygen tank for last few days as her saturation was in the low 80s.  However, she started becoming more short winded and required more oxygen so they decided to came to the emergency room.    Patient denies any chest pain abdominal pain.    She had nausea and vomiting episode x1 yesterday as well as some loose bowel movement.    She states that after coming to the hospital this has subsided  Patient is currently on high flow nasal cannula.    Denies smoking cigarettes or drinking any alcohol. Patient is followed by Dr. Ester Arias in clinic -pt with nocturnal hypoxemia and pulmonary HTN possibly related to Lupus, followed by Dr. Blaze Guerin. Serologies were also positive for IA-3 ANCA. Pt refused split night study ordered to R/o HO/OHS as etiology of pulmonary HTN.  Supplemental O2 HS was ordered which pt has refused to use and was asking Dr. Zhanna Barksdale to discontinue the O2 but eventually agreed to keep it     21  Patient was seen and examined at bedside today. Patient still on HFNC 25L 65% with O2 sats 98%. She states she feels better sitting up. She denies any SOB, chest pain, nausea, vomiting, abdominal pain. She is asking whether she is getting better or not. All questions answered to the best of my ability. ROS:Pertinent items are noted in HPI. Events and notes from last 24 hours reviewed. Care plan discussed on multidisciplinary rounds. Patient Active Problem List   Diagnosis Code    Hypertension I10    Lupus ([facility] [place] [address] M32.9    Arthritis M19.90    Neuropathy G62.9    Obesity, morbid ([facility] [place] [address] E66.01    Lumbar spinal stenosis M48.061    HNP (herniated nucleus pulposus), lumbar M51.26    Pneumonia due to COVID-19 virus U07.1, J12.82    Hypoxia R09.02       Vital Signs:  Visit Vitals  BP (!) 143/87 (BP 1 Location: Left upper arm, BP Patient Position: At rest)   Pulse (!) 55   Temp 97.6 °F (36.4 °C)   Resp 19   Ht 5' 2\" (1.575 m)   Wt 116.4 kg (256 lb 9.6 oz)   SpO2 99%   Breastfeeding No   BMI 46.93 kg/m²       O2 Device: Hi flow nasal cannula, Heated, Humidifier(Vapotherm)   O2 Flow Rate (L/min): 25 l/min   Temp (24hrs), Av.1 °F (36.7 °C), Min:97.6 °F (36.4 °C), Max:98.3 °F (36.8 °C)       Intake/Output:   Last shift:      No intake/output data recorded.   Last 3 shifts:  1901 -  0700  In: 1200 [P.O.:1200]  Out: 300 [Urine:300]    Intake/Output Summary (Last 24 hours) at 2021 1052  Last data filed at 2021 0400  Gross per 24 hour   Intake 960 ml   Output 300 ml   Net 660 ml        Ventilator Settings:                Current Facility-Administered Medications   Medication Dose Route Frequency    ipratropium-albuterol (COMBIVENT RESPIMAT) 20 mcg-100 mcg inhalation spray  1 Puff Inhalation Q6H RT    cefTRIAXone (ROCEPHIN) 2 g in sterile water (preservative free) 20 mL IV syringe  2 g IntraVENous Q24H    azithromycin (ZITHROMAX) 500 mg in 0.9% sodium chloride 250 mL (VIAL-MATE)  500 mg IntraVENous Q24H    sodium chloride (NS) flush 5-40 mL  5-40 mL IntraVENous Q8H    enoxaparin (LOVENOX) injection 40 mg  40 mg SubCUTAneous Q24H    guaiFENesin ER (MUCINEX) tablet 600 mg  600 mg Oral Q12H    insulin lispro (HUMALOG) injection   SubCUTAneous AC&HS    dexamethasone (DECADRON) 4 mg/mL injection 10 mg  10 mg IntraVENous Q12H    sertraline (ZOLOFT) tablet 150 mg  150 mg Oral DAILY    hydrOXYchloroQUINE (PLAQUENIL) tablet 200 mg  200 mg Oral BID    pantoprazole (PROTONIX) tablet 40 mg  40 mg Oral ACB    ascorbic acid (vitamin C) (VITAMIN C) tablet 500 mg  500 mg Oral BID    omega-3 acid ethyl esters (LOVAZA) capsule 1,000 mg  1 g Oral BID WITH MEALS    cloNIDine HCL (CATAPRES) tablet 0.1 mg  0.1 mg Oral BID         Telemetry: [x]Sinus []A-flutter []Paced    []A-fib []Multiple PVCs                  Physical Exam:      General:  Alert, cooperative, no distress, appears stated age. Head:  Normocephalic, without obvious abnormality, atraumatic. Eyes:  Conjunctivae/corneas clear. PERRL, EOMs intact. Nose: Nares normal. Septum midline. Mucosa normal. No drainage or sinus tenderness. Throat: Lips, mucosa, and tongue normal. Teeth and gums normal.   Neck: Supple, symmetrical, trachea midline, no adenopathy, thyroid: no enlargment/tenderness/nodules, no carotid bruit and no JVD. Back:   Symmetric, no curvature. ROM normal.   Lungs:   Clear to auscultation bilaterally. Chest wall:  No tenderness or deformity. Heart:  Regular rate and rhythm, S1, S2 normal, no murmur, click, rub or gallop. Abdomen:   Obese. Soft, non-tender. Bowel sounds normal. No masses,  No organomegaly. Extremities: Extremities normal, atraumatic, no cyanosis or edema. Pulses: 2+ and symmetric all extremities.    Skin: Skin color, texture, turgor normal. No rashes or lesions   Lymph nodes: Cervical, supraclavicular, and axillary nodes normal.   Neurologic: Grossly nonfocal         DATA:  MAR reviewed and pertinent medications noted or modified as needed    Labs:  Recent Labs     04/25/21  1214   WBC 4.4*   HGB 12.1   HCT 37.1        Recent Labs     04/27/21  0513 04/26/21  1547 04/26/21  0430 04/25/21  1214    141  --  142   K 3.6 3.3*  --  2.8*    103  --  101   CO2 34* 33*  --  38*   * 139*  --  106*   BUN 24* 23*  --  23*   CREA 0.43* 0.41*  --  0.40*   CA 8.6 8.3*  --  8.4*   MG 2.4 2.4 2.3 1.7   PHOS 2.3* 2.5  --   --    ALB  --   --   --  2.7*   ALT  --   --   --  33     No results for input(s): PH, PCO2, PO2, HCO3, FIO2 in the last 72 hours. No results for input(s): FIO2I, IFO2, HCO3I, IHCO3, HCOPOC, PCO2I, PCOPOC, IPHI, PHI, PHPOC, PO2I, PO2POC in the last 72 hours. No lab exists for component: IPOC2    Imaging:  [x]   I have personally reviewed the patients radiographs and reports  XR Results (most recent):  Results from Hospital Encounter encounter on 04/25/21   XR CHEST SNGL V    Narrative CHEST RADIOGRAPH-1 VIEW    INDICATION: Cough    COMPARISON: Chest x-ray 2/28/2020    FINDINGS: Cardiac silhouette is stable. Atherosclerosis noted. Mild diffuse  interstitial prominence. Bilateral mid to lower lung patchy opacities. Probable  small bilateral pleural effusions. Lungs are hypoinflated. No evidence for  pneumothorax. No acute osseous finding. Impression 1. Mild interstitial edema versus interstitial infiltrate. 2. Small bilateral pleural effusions with overlying mid to lower lung patchy  opacities. These could represent atelectasis but superimposed infiltrate/edema  not excluded. CT Results (most recent):  Results from Hospital Encounter encounter on 02/27/20   CTA CHEST W OR W WO CONT    Narrative EXAM:  CTA Chest with Contrast         CLINICAL INDICATION:  Shortness of breath and low O2 saturation.             COMPARISON:  No prior CTA chest.  No VQ scan. Chest x-ray dated 02/24/20. CT  abdomen-pelvis 02/12/09. TECHNIQUE:    - Helical volumetric sections of the chest are obtained with CT pulmonary  angiogram protocol. Subsequently, sagittal and coronal multiplanar  reconstruction images are obtained. Maximum intensity projection images are  generated to better delineate the pulmonary vasculature, differentiate between  the pulmonary arteries and veins and to increase sensitivity to pulmonary  emboli.    - IV contrast 100 mL Isovue-370.  - Radiation dose optimization techniques are utilized as appropriate to the  exam, with combination of automated exposure control, adjustment of the mA  and/or kV according to patient's size (Including appropriate matching for  site-specific examinations), or use of iterative reconstruction technique. FINDINGS:     Pulmonary Arteries:  No convincing evidence for pulmonary embolism is detected. Lung, Pleura, Airways: Atelectatic changes at the lung bases bilaterally. No  dominant mass or discrete suspicious lung nodules. No definite airspace  opacities. Mediastinum, Clotilde:  No adenopathy. Aorta:  No evidence of aortic dissection. Apparent dilation of the ascending  aorta measuring about 4.3 cm. Base of Neck:  No acute findings. Axillae:  Unremarkable. Esophagus:  Unremarkable. Abdomen:  Both kidneys demonstrate multiple exophytic hypodensities. .  The  largest of the exophytic hypodensity is observed in the left kidney mid  interpolar region with an internal septation, measuring up to about 4.4 x 3.9  cm. Skeletal Structures:  No acute findings. Impression IMPRESSION:         1. No convincing evidence of pulmonary embolism. 2.  Atelectatic changes in both lungs especially at the lung bases. 3.  Multiple renal hypodensities.   At least one of the renal cyst appears to  show a nonsimple feature, i.e. septation, not as apparent on the previous study. Recommend follow-up dedicated CT of the abdomen without and with contrast for  further delineation. 4.  Mild dilation of the ascending aorta. 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   Currently needing high flow 65% FiO2 25 L  · Discussed prone positioning with patient but she does not think she can do it because of her body habitus  · BIPAP/CPAP at nighttime would be helpful  · Bronchial hygiene protocol  · Bronchodilators-continue Combivent Respimat  · Steroids-continue Decadron for treatment of COVID-19 pneumonia  · Antibiotics-currently on Rocephin and Zithromax will defer to ID to de-escalate  · Can continue Plaquenil, not need to hold  · Aspiration precautions  · Adjuvant therapy and follow-up of inflammatory markers per ID  · Need for further diagnostics-echocardiogram  · Out patient testing- PFT, 6 min walk, Polysomnogram  · Assess home Oxygen needs at discharge  · OT, PT, OOB and ambulate  · Healthy weight  · Will Follow  · DVT, PUD prophylaxis     Best practice :    Glycemic control  [facility] Vent patients- VAP bundle, aim to keep peak plateau pressure [id number] H2O  Sress ulcer prophylaxis. DVT prophylaxis. Need for Lines, castro assessed. Restraints need. Palliative care evaluation.         Chevy David PA-C  04/27/21  Pulmonary, Critical Care Medicine  [facility]

## 2021-04-28 LAB
ANION GAP SERPL CALC-SCNC: 4 MMOL/L (ref 3–18)
BUN SERPL-MCNC: 28 MG/DL (ref 7–18)
BUN/CREAT SERPL: 72 (ref 12–20)
CALCIUM SERPL-MCNC: 8.5 MG/DL (ref 8.5–10.1)
CHLORIDE SERPL-SCNC: 104 MMOL/L (ref 100–111)
CO2 SERPL-SCNC: 33 MMOL/L (ref 21–32)
CREAT SERPL-MCNC: 0.39 MG/DL (ref 0.6–1.3)
GLUCOSE BLD STRIP.AUTO-MCNC: 113 MG/DL (ref 70–110)
GLUCOSE BLD STRIP.AUTO-MCNC: 121 MG/DL (ref 70–110)
GLUCOSE BLD STRIP.AUTO-MCNC: 124 MG/DL (ref 70–110)
GLUCOSE BLD STRIP.AUTO-MCNC: 138 MG/DL (ref 70–110)
GLUCOSE SERPL-MCNC: 127 MG/DL (ref 74–99)
L PNEUMO AG UR QL IA: NEGATIVE
MAGNESIUM SERPL-MCNC: 2 MG/DL (ref 1.6–2.6)
PHOSPHATE SERPL-MCNC: 2.4 MG/DL (ref 2.5–4.9)
POTASSIUM SERPL-SCNC: 3.2 MMOL/L (ref 3.5–5.5)
S PNEUM AG UR QL: NEGATIVE
SODIUM SERPL-SCNC: 141 MMOL/L (ref 136–145)

## 2021-04-28 PROCEDURE — 65660000000 HC RM CCU STEPDOWN

## 2021-04-28 PROCEDURE — 99233 SBSQ HOSP IP/OBS HIGH 50: CPT | Performed by: INTERNAL MEDICINE

## 2021-04-28 PROCEDURE — 94762 N-INVAS EAR/PLS OXIMTRY CONT: CPT

## 2021-04-28 PROCEDURE — 74011250637 HC RX REV CODE- 250/637: Performed by: HOSPITALIST

## 2021-04-28 PROCEDURE — 36415 COLL VENOUS BLD VENIPUNCTURE: CPT

## 2021-04-28 PROCEDURE — 97535 SELF CARE MNGMENT TRAINING: CPT

## 2021-04-28 PROCEDURE — 97530 THERAPEUTIC ACTIVITIES: CPT

## 2021-04-28 PROCEDURE — 80048 BASIC METABOLIC PNL TOTAL CA: CPT

## 2021-04-28 PROCEDURE — 74011250636 HC RX REV CODE- 250/636: Performed by: NURSE PRACTITIONER

## 2021-04-28 PROCEDURE — 82962 GLUCOSE BLOOD TEST: CPT

## 2021-04-28 PROCEDURE — 94640 AIRWAY INHALATION TREATMENT: CPT

## 2021-04-28 PROCEDURE — 74011250637 HC RX REV CODE- 250/637: Performed by: NURSE PRACTITIONER

## 2021-04-28 PROCEDURE — 74011250637 HC RX REV CODE- 250/637: Performed by: INTERNAL MEDICINE

## 2021-04-28 PROCEDURE — 77010033711 HC HIGH FLOW OXYGEN

## 2021-04-28 PROCEDURE — 83735 ASSAY OF MAGNESIUM: CPT

## 2021-04-28 PROCEDURE — 84100 ASSAY OF PHOSPHORUS: CPT

## 2021-04-28 PROCEDURE — 74011250636 HC RX REV CODE- 250/636: Performed by: INTERNAL MEDICINE

## 2021-04-28 RX ADMIN — Medication 500 MG: at 17:28

## 2021-04-28 RX ADMIN — DEXAMETHASONE SODIUM PHOSPHATE 10 MG: 4 INJECTION, SOLUTION INTRAMUSCULAR; INTRAVENOUS at 11:12

## 2021-04-28 RX ADMIN — SERTRALINE HYDROCHLORIDE 150 MG: 50 TABLET ORAL at 11:13

## 2021-04-28 RX ADMIN — IPRATROPIUM BROMIDE AND ALBUTEROL 1 PUFF: 20; 100 SPRAY, METERED RESPIRATORY (INHALATION) at 09:05

## 2021-04-28 RX ADMIN — GUAIFENESIN 600 MG: 600 TABLET, EXTENDED RELEASE ORAL at 11:13

## 2021-04-28 RX ADMIN — CLONIDINE HYDROCHLORIDE 0.1 MG: 0.1 TABLET ORAL at 17:28

## 2021-04-28 RX ADMIN — GUAIFENESIN 600 MG: 600 TABLET, EXTENDED RELEASE ORAL at 22:19

## 2021-04-28 RX ADMIN — PANTOPRAZOLE SODIUM 40 MG: 40 TABLET, DELAYED RELEASE ORAL at 11:13

## 2021-04-28 RX ADMIN — DEXAMETHASONE SODIUM PHOSPHATE 10 MG: 4 INJECTION, SOLUTION INTRAMUSCULAR; INTRAVENOUS at 22:19

## 2021-04-28 RX ADMIN — Medication 10 ML: at 22:19

## 2021-04-28 RX ADMIN — Medication 500 MG: at 11:13

## 2021-04-28 RX ADMIN — IPRATROPIUM BROMIDE AND ALBUTEROL 1 PUFF: 20; 100 SPRAY, METERED RESPIRATORY (INHALATION) at 20:30

## 2021-04-28 RX ADMIN — IPRATROPIUM BROMIDE AND ALBUTEROL 1 PUFF: 20; 100 SPRAY, METERED RESPIRATORY (INHALATION) at 01:00

## 2021-04-28 RX ADMIN — HYDROXYCHLOROQUINE SULFATE 200 MG: 200 TABLET ORAL at 17:28

## 2021-04-28 RX ADMIN — IPRATROPIUM BROMIDE AND ALBUTEROL 1 PUFF: 20; 100 SPRAY, METERED RESPIRATORY (INHALATION) at 14:00

## 2021-04-28 RX ADMIN — CLONIDINE HYDROCHLORIDE 0.1 MG: 0.1 TABLET ORAL at 11:13

## 2021-04-28 RX ADMIN — Medication 10 ML: at 05:30

## 2021-04-28 RX ADMIN — ENOXAPARIN SODIUM 40 MG: 40 INJECTION SUBCUTANEOUS at 17:28

## 2021-04-28 RX ADMIN — HYDROXYCHLOROQUINE SULFATE 200 MG: 200 TABLET ORAL at 11:13

## 2021-04-28 NOTE — PROGRESS NOTES
Problem: Mobility Impaired (Adult and Pediatric)  Goal: *Acute Goals and Plan of Care (Insert Text)  Description: Physical Therapy Goals  Initiated 4/26/2021 and to be accomplished within 7 day(s)  1. Patient will move from supine to sit and sit to supine  in bed with modified independence. 2.  Patient will transfer from bed to chair and chair to bed with modified independence using the least restrictive device. 3.  Patient will perform sit to stand with modified independence. 4.  Patient will ambulate with modified independence for 100 feet with the least restrictive device. PLOF: Patient reports she was independent with self care and functional mobility using RW. Outcome: Progressing Towards Goal     PHYSICAL THERAPY TREATMENT    Patient: Iraida Walker (25 y.o. female)  Date: 4/28/2021  Diagnosis: Pneumonia due to COVID-19 virus [U07.1, J12.82]  Hypoxia [R09.02] <principal problem not specified>       Precautions: Fall, Skin  PLOF:     ASSESSMENT:  Pt seen with OT to maximize functional mobility and safety. Pt is on 25L via HFNC this date and oxygen monitored throughout session. When sitting EOB pt noted to be 80-85% and required increased time and verbal cues for pursed lip breathing. Pt was able to stand x 3 trials with min A using Rw and required verbal cues for correct hand placement for not pulling back on walker. Pt also completed SPT bed <> BSC with CGA as well as max A for pericare in standing. Pt tolerated standing no longer than 1 min at at time this date and required CGA for controlled sitting back onto bed. At this time, pt to benefit from ongoing PT to address generalized weakness and decreased activity tolerance and functional mobility. Recommend rehab at this time but pt may be able to return home with [facility] pending progress with therapy, will continue to follow per POC.    Progression toward goals:   [x]      Improving appropriately and progressing toward goals  [] Improving slowly and progressing toward goals  []      Not making progress toward goals and plan of care will be adjusted     PLAN:  Patient continues to benefit from skilled intervention to address the above impairments. Continue treatment per established plan of care. Discharge Recommendations:  Rehab vs HH pending progress   Further Equipment Recommendations for Discharge:  N/A     SUBJECTIVE:   Patient stated I need to sit on the potty chair.     OBJECTIVE DATA SUMMARY:   Critical Behavior:  Neurologic State: Alert, Appropriate for age  Orientation Level: Oriented X4  Cognition: Follows commands  Safety/Judgement: Fall prevention  Functional Mobility Training:  Bed Mobility:     Supine to Sit: Stand-by assistance  Sit to Supine: Stand-by assistance; Additional time            Transfers:  Sit to Stand: Minimum assistance  Stand to Sit: Minimum assistance       Balance:  Sitting: Intact  Standing: Impaired; Without support     Ambulation/Gait Training:  Distance (ft): 3 Feet (ft)  Assistive Device: Walker, rolling  Ambulation - Level of Assistance: Minimal assistance        Gait Abnormalities: Decreased step clearance    Step Length: Right shortened;Left shortened        Pain:  Pain level pre-treatment: 0/10  Pain level post-treatment: 0/10   Pain Intervention(s): Medication (see MAR); Rest, Repositioning   Response to intervention: Nurse notified      Activity Tolerance:   Good    Please refer to the flowsheet for vital signs taken during this treatment. After treatment:   [] Patient left in no apparent distress sitting up in chair  [x] Patient left in no apparent distress in bed  [x] Call bell left within reach  [x] Nursing notified  [] Caregiver present  [x] Bed alarm activated  [] SCDs applied      COMMUNICATION/EDUCATION:   [x]         Role of Physical Therapy in the acute care setting. [x]         Fall prevention education was provided and the patient/caregiver indicated understanding.   [x] Patient/family have participated as able in working toward goals and plan of care. [x]         Patient/family agree to work toward stated goals and plan of care. []         Patient understands intent and goals of therapy, but is neutral about his/her participation.   []         Patient is unable to participate in stated goals/plan of care: ongoing with therapy staff.  []         Other:        Koby Body   Time Calculation: 41 mins

## 2021-04-28 NOTE — PROGRESS NOTES
Problem: Falls - Risk of  Goal: *Absence of Falls  Description: Document Lois Tovar Fall Risk and appropriate interventions in the flowsheet. Outcome: Progressing Towards Goal  Note: Fall Risk Interventions:  Mobility Interventions: Bed/chair exit alarm         Medication Interventions: Bed/chair exit alarm, Teach patient to arise slowly    Elimination Interventions: Bed/chair exit alarm, Call light in reach              Problem: Pressure Injury - Risk of  Goal: *Prevention of pressure injury  Description: Document Francisco Scale and appropriate interventions in the flowsheet. Outcome: Progressing Towards Goal  Note: Pressure Injury Interventions:  Sensory Interventions: Turn and reposition approx. every two hours (pillows and wedges if needed)    Moisture Interventions: Absorbent underpads    Activity Interventions: PT/OT evaluation, Pressure redistribution bed/mattress(bed type)    Mobility Interventions: Turn and reposition approx.  every two hours(pillow and wedges), Pressure redistribution bed/mattress (bed type)    Nutrition Interventions: Document food/fluid/supplement intake, Offer support with meals,snacks and hydration, Discuss nutritional consult with provider    Friction and Shear Interventions: Apply protective barrier, creams and emollients, Lift sheet

## 2021-04-28 NOTE — PROGRESS NOTES
Progress Note      Patient: Nathalia Avalos               Sex: female          DOA: 4/25/2021       YOB: 1945      Age:  67 y.o.        LOS:  LOS: 3 days             CHIEF COMPLAINT:  COVID pneumonia with acute respiratory failure    Subjective:     Patient is awake and interacting  Some SOB  Feels okay    Objective:      Visit Vitals  BP (!) 129/94 (BP 1 Location: Left lower arm, BP Patient Position: At rest)   Pulse (!) 56   Temp 97.8 °F (36.6 °C)   Resp 17   Ht 5' 2\" (1.575 m)   Wt 115 kg (253 lb 9.6 oz)   SpO2 94%   Breastfeeding No   BMI 46.38 kg/m²       Physical Exam:  Gen:  Patient is in no distress. No complaint  HEENT and Neck:  PERRL, No cervical tenderness or masses  Lungs:  Clear bilaterally. No rales, no wheeze. Normal effort. Heart:  Regular Rate and Rhythm. No murmur or gallop. No JVD. No edema.   Abdomen:  Soft, non tender, no masses, no Hepatosplenomegally  Extremities:  No digital clubbing, no cyanosis  Neuro:  Alert and oriented, Normal affect, Cranial nerves intact, No sensory deficits        Lab/Data Reviewed:  BMP:   Lab Results   Component Value Date/Time     04/28/2021 03:30 AM    K 3.2 (L) 04/28/2021 03:30 AM     04/28/2021 03:30 AM    CO2 33 (H) 04/28/2021 03:30 AM    AGAP 4 04/28/2021 03:30 AM     (H) 04/28/2021 03:30 AM    BUN 28 (H) 04/28/2021 03:30 AM    CREA 0.39 (L) 04/28/2021 03:30 AM    GFRAA >60 04/28/2021 03:30 AM    GFRNA >60 04/28/2021 03:30 AM     Assessment/Plan     Active Problems:    Pneumonia due to COVID-19 virus (4/25/2021)      Hypoxia (4/25/2021)    Acute respiratory failure    Plan:  Continue with Decadron  Continue supplemental oxygen  Antibiotics discontinued  ID consulted originally  DVT prophylaxis:  Lovenox

## 2021-04-28 NOTE — PROGRESS NOTES
Problem: Self Care Deficits Care Plan (Adult)  Goal: *Acute Goals and Plan of Care (Insert Text)  Description: Occupational Therapy Goals  Initiated 4/26/2021 within 7 day(s). 1.  Patient will perform bed mobility in preparation for selfcare with modified independence. 2.  Patient will perform upper body dressing with modified independence. 3.  Patient will perform lower body dressing with supervision/set-up using AE prn.  4.  Patient will perform toilet transfers with supervision/set-up. 5.  Patient will perform all aspects of toileting with supervision/set-up. 6.  Patient will participate in upper extremity therapeutic exercise/activities with modified independence for 8 minutes. 7.  Patient will utilize energy conservation techniques during functional activities with verbal cues. Prior Level of Function: Patient needed assist LB self-care and used a RW for functional mobility PTA. Outcome: Progressing Towards Goal   OCCUPATIONAL THERAPY TREATMENT    Patient: Cecil Franks (5 y.o. female)  Date: 4/28/2021  Diagnosis: Pneumonia due to COVID-19 virus [U07.1, J12.82]  Hypoxia [R09.02] <principal problem not specified>       Precautions: Fall, Skin  PLOF: Patient needed assist LB self-care and used a RW for functional mobility PTA. Chart, occupational therapy assessment, plan of care, and goals were reviewed. ASSESSMENT:  Pt cleared by RN for OT tx at this time. PT co tx to maximize pt safety and participation. Pt presented supine in bed upon therapist arrival, on 25L HFNC, and agreeable for participation. Pt maneuvered  to EOB from supine with SBA requiring increased time 2/2 fatigue. Once sitting EOB pt's desat to 80-85% requiring ~ 2 mins to recover to 90% with vc's for pursed lip breathing technique. She engaged in LB dressing task donning socks with bend forward method requiring CGA 2/2 decreased balance.  Pt performed STS transfers x 3 trials with RW w/ MIN A and mod vc's for proper hand placement in prep for [facility] transfers. Pt reports she had to have BM requesting BSC and performed SPT from bed with CGA. She required MAX A for winifred area hygiene with B UE support. Pt returned back to sitting EOB with CGA. Pt able to return self back to supine SBA with additional time. She was left with HOB elevated, bed alarm active, 25L HFNC intact, and all needs left within reach. Progression toward goals:  []          Improving appropriately and progressing toward goals  [x]          Improving slowly and progressing toward goals  []          Not making progress toward goals and plan of care will be adjusted     PLAN:  Patient continues to benefit from skilled intervention to address the above impairments. Continue treatment per established plan of care. Discharge Recommendations:  HH vs Rehab pending progress  Further Equipment Recommendations for Discharge:  TBA      SUBJECTIVE:   Patient stated  I really want to sit on that bedside commode. \"     OBJECTIVE DATA SUMMARY:   Cognitive/Behavioral Status:  Neurologic State: Alert, Appropriate for age  Orientation Level: Oriented X4  Cognition: Follows commands  Safety/Judgement: Fall prevention    Functional Mobility and Transfers for ADLs:   Bed Mobility:     Supine to Sit: Stand-by assistance  Sit to Supine: Stand-by assistance; Additional time      Transfers:  Sit to Stand: Minimum assistance  Stand to Sit: Minimum assistance    Balance:  Sitting: Intact  Standing: Impaired; Without support    ADL Intervention:       Lower Body Dressing Assistance  Socks: Contact guard assistance  Leg Crossed Method Used: No  Position Performed: Bending forward method;Seated edge of bed  Cues: Verbal cues provided    Toileting  Bowel Hygiene: Maximum assistance  Cues: Verbal cues provided  Adaptive Equipment: Walker       Pain:  Pain level pre-treatment: 0/10   Pain level post-treatment: 0/10    Activity Tolerance:    Fair   Please refer to the flowsheet for vital signs taken during this treatment. After treatment:   []  Patient left in no apparent distress sitting up in chair  [x]  Patient left in no apparent distress in bed  [x]  Call bell left within reach  [x]  Nursing notified  []  Caregiver present  [x]  Bed alarm activated    COMMUNICATION/EDUCATION:   [x] Role of Occupational Therapy in the acute care setting  [] Home safety education was provided and the patient/caregiver indicated understanding. [x] Patient/family have participated as able in working towards goals and plan of care. [x] Patient/family agree to work toward stated goals and plan of care. [] Patient understands intent and goals of therapy, but is neutral about his/her participation. [] Patient is unable to participate in goal setting and plan of care.       Thank you for this referral.  DAYANNA Lancaster  Time Calculation: 41 mins

## 2021-04-28 NOTE — PROGRESS NOTES
Problem: Risk for Spread of Infection  Goal: Prevent transmission of infectious organism to others  Description: Prevent the transmission of infectious organisms to other patients, staff members, and visitors. Outcome: Progressing Towards Goal     Problem: Patient Education:  Go to Education Activity  Goal: Patient/Family Education  Outcome: Progressing Towards Goal     Problem: Falls - Risk of  Goal: *Absence of Falls  Description: Document Sofie Meng Fall Risk and appropriate interventions in the flowsheet. Outcome: Progressing Towards Goal  Note: Fall Risk Interventions:  Mobility Interventions: Assess mobility with egress test, Bed/chair exit alarm, Communicate number of staff needed for ambulation/transfer, OT consult for ADLs, Patient to call before getting OOB         Medication Interventions: Bed/chair exit alarm, Evaluate medications/consider consulting pharmacy, Patient to call before getting OOB, Teach patient to arise slowly    Elimination Interventions: Call light in reach, Bed/chair exit alarm, Patient to call for help with toileting needs, Stay With Me (per policy), Toilet paper/wipes in reach, Toileting schedule/hourly rounds              Problem: Patient Education: Go to Patient Education Activity  Goal: Patient/Family Education  Outcome: Progressing Towards Goal     Problem: Pressure Injury - Risk of  Goal: *Prevention of pressure injury  Description: Document Francisco Scale and appropriate interventions in the flowsheet. Outcome: Progressing Towards Goal  Note: Pressure Injury Interventions:  Sensory Interventions: Assess need for specialty bed, Assess changes in LOC, Discuss PT/OT consult with provider, Float heels, Keep linens dry and wrinkle-free, Maintain/enhance activity level, Minimize linen layers, Monitor skin under medical devices, Pressure redistribution bed/mattress (bed type), Turn and reposition approx.  every two hours (pillows and wedges if needed)    Moisture Interventions: Absorbent underpads, Apply protective barrier, creams and emollients, Check for incontinence Q2 hours and as needed, Assess need for specialty bed, Internal/External urinary devices, Maintain skin hydration (lotion/cream), Limit adult briefs, Minimize layers, Moisture barrier    Activity Interventions: Assess need for specialty bed, Pressure redistribution bed/mattress(bed type), PT/OT evaluation    Mobility Interventions: Assess need for specialty bed, Float heels, HOB 30 degrees or less, Pressure redistribution bed/mattress (bed type), Turn and reposition approx. every two hours(pillow and wedges), PT/OT evaluation    Nutrition Interventions: Document food/fluid/supplement intake, Offer support with meals,snacks and hydration, Discuss nutritional consult with provider    Friction and Shear Interventions: Apply protective barrier, creams and emollients, Feet elevated on foot rest, HOB 30 degrees or less, Foam dressings/transparent film/skin sealants, Lift team/patient mobility team, Minimize layers, Transferring/repositioning devices                Problem: Airway Clearance - Ineffective  Goal: Achieve or maintain patent airway  Outcome: Progressing Towards Goal     Problem: Gas Exchange - Impaired  Goal: Absence of hypoxia  Outcome: Progressing Towards Goal  Goal: Promote optimal lung function  Outcome: Progressing Towards Goal     Problem: Breathing Pattern - Ineffective  Goal: Ability to achieve and maintain a regular respiratory rate  Outcome: Progressing Towards Goal     Problem:  Body Temperature -  Risk of, Imbalanced  Goal: Ability to maintain a body temperature within defined limits  Outcome: Progressing Towards Goal  Goal: Will regain or maintain usual level of consciousness  Outcome: Progressing Towards Goal  Goal: Complications related to the disease process, condition or treatment will be avoided or minimized  Outcome: Progressing Towards Goal     Problem: Isolation Precautions - Risk of Spread of Infection  Goal: Prevent transmission of infectious organism to others  Outcome: Progressing Towards Goal     Problem: Nutrition Deficits  Goal: Optimize nutrtional status  Outcome: Progressing Towards Goal     Problem: Loneliness or Risk for Loneliness  Goal: Demonstrate positive use of time alone when socialization is not possible  Outcome: Progressing Towards Goal     Problem: Fatigue  Goal: Verbalize increase energy and improved vitality  Outcome: Progressing Towards Goal     Problem: Patient Education: Go to Patient Education Activity  Goal: Patient/Family Education  Outcome: Progressing Towards Goal

## 2021-04-28 NOTE — ROUTINE PROCESS
Bedside and Verbal shift change report given to Maren Phalen RN (oncoming nurse) by Natividad Whitfield RN  (offgoing nurse). Report included the following information SBAR, Intake/Output, MAR, Recent Results and Cardiac Rhythm SR/SB.

## 2021-04-28 NOTE — PROGRESS NOTES
[facility]. Pulmonary, Critical Care, and Sleep Medicine    Name: Radha Portillo MRN: [id number]   : 1945 Hospital: [facility]   Date: 2021  Admission Date: 2021     Chart and notes reviewed. Data reviewed. I have evaluated all findings. [x]I have reviewed the flowsheet and previous days notes. []The patient is unable to give any meaningful history or review of systems because the patient is:  []Intubated []Sedated   []Unresponsive      []The patient is critically ill on      []Mechanical ventilation []Pressors   []BiPAP []         Interval HPI:Patient is a 78 y. o. female with a past medical history of HTN, lupus, cognitive impairment, knee replacement, and IVC filter replacement, who states she has had flulike symptoms for last week or more. Patient was diagnosed with Covid 11 days-2021 ago however did not show symptoms until 9 days ago.    Patient was tested because other members of the household were positive.  She began to develop myalgias, progressive cough and dyspnea.    Patient was monitoring herself closely at home and she was also using her old oxygen tank for last few days as her saturation was in the low 80s.  However, she started becoming more short winded and required more oxygen so they decided to came to the emergency room.    Patient denies any chest pain abdominal pain.    She had nausea and vomiting episode x1 yesterday as well as some loose bowel movement.    She states that after coming to the hospital this has subsided  Patient is currently on high flow nasal cannula.    Denies smoking cigarettes or drinking any alcohol.   Patient is followed by Dr. Bo Chase in clinic -pt with nocturnal hypoxemia and pulmonary HTN possibly related to Lupus, followed by Dr. [place] Mays. 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 Pt refused split night study ordered to R/o HO/OHS as etiology of pulmonary HTN.  Supplemental O2 HS was ordered which pt has refused to use and was asking Dr. Jesus Fong to discontinue the O2 but eventually agreed to keep it    Subjective 21  Patient was seen and examined at bedside today. Patient on HFNC 25L 60% with O2 sats 98%. Plan to wean O2 today. She denies any SOB, chest pain, nausea, vomiting, abdominal pain. She states she feels better today. ROS:Pertinent items are noted in HPI. Events and notes from last 24 hours reviewed. Care plan discussed on multidisciplinary rounds. Patient Active Problem List   Diagnosis Code    Hypertension I10    Lupus ([facility] M32.9    Arthritis M19.90    Neuropathy G62.9    Obesity, morbid ([facility] [address] E66.01    Lumbar spinal stenosis M48.061    HNP (herniated nucleus pulposus), lumbar M51.26    Pneumonia due to COVID-19 virus U07.1, J12.82    Hypoxia R09.02       Vital Signs:  Visit Vitals  /88   Pulse (!) 53   Temp 97.9 °F (36.6 °C)   Resp 16   Ht 5' 2\" (1.575 m)   Wt 115 kg (253 lb 9.6 oz)   SpO2 96%   Breastfeeding No   BMI 46.38 kg/m²       O2 Device: Hi flow nasal cannula, Heated, Humidifier(Vapotherm)   O2 Flow Rate (L/min): 25 l/min   Temp (24hrs), Av.1 °F (36.7 °C), Min:97.9 °F (36.6 °C), Max:98.2 °F (36.8 °C)       Intake/Output:   Last shift:      No intake/output data recorded.   Last 3 shifts:  1901 -  0700  In: 2320 [P.O.:2070; I.V.:250]  Out: 600 [Urine:600]    Intake/Output Summary (Last 24 hours) at 2021 1042  Last data filed at 2021 0000  Gross per 24 hour   Intake 1160 ml   Output 300 ml   Net 860 ml        Ventilator Settings:                Current Facility-Administered Medications   Medication Dose Route Frequency    [START ON 2021] dexamethasone (DECADRON) 4 mg/mL injection 10 mg  10 mg IntraVENous Q24H    ipratropium-albuterol (COMBIVENT RESPIMAT) 20 mcg-100 mcg inhalation spray  1 Puff Inhalation Q6H RT    sodium chloride (NS) flush 5-40 mL  5-40 mL IntraVENous Q8H    enoxaparin (LOVENOX) injection 40 mg  40 mg SubCUTAneous Q24H    guaiFENesin ER (MUCINEX) tablet 600 mg  600 mg Oral Q12H    insulin lispro (HUMALOG) injection   SubCUTAneous AC&HS    dexamethasone (DECADRON) 4 mg/mL injection 10 mg  10 mg IntraVENous Q12H    sertraline (ZOLOFT) tablet 150 mg  150 mg Oral DAILY    hydrOXYchloroQUINE (PLAQUENIL) tablet 200 mg  200 mg Oral BID    pantoprazole (PROTONIX) tablet 40 mg  40 mg Oral ACB    ascorbic acid (vitamin C) (VITAMIN C) tablet 500 mg  500 mg Oral BID    omega-3 acid ethyl esters (LOVAZA) capsule 1,000 mg  1 g Oral BID WITH MEALS    cloNIDine HCL (CATAPRES) tablet 0.1 mg  0.1 mg Oral BID         Telemetry: [x]Sinus []A-flutter []Paced    []A-fib []Multiple PVCs                  Physical Exam:      General:  Alert, cooperative, no distress, appears stated age. Head:  Normocephalic, without obvious abnormality, atraumatic. Eyes:  Conjunctivae/corneas clear. PERRL, EOMs intact. Nose: Nares normal. Septum midline. Mucosa normal. No drainage or sinus tenderness. Throat: Lips, mucosa, and tongue normal. Teeth and gums normal.   Neck: Supple, symmetrical, trachea midline, no adenopathy, thyroid: no enlargment/tenderness/nodules, no carotid bruit and no JVD. Back:   Symmetric, no curvature. ROM normal.   Lungs:   Clear to auscultation bilaterally. Chest wall:  No tenderness or deformity. Heart:  Regular rate and rhythm, S1, S2 normal, no murmur, click, rub or gallop. Abdomen:   Obese. Soft, non-tender. Bowel sounds normal. No masses,  No organomegaly. Extremities: Extremities normal, atraumatic, no cyanosis or edema. Pulses: 2+ and symmetric all extremities.    Skin: Skin color, texture, turgor normal. No rashes or lesions   Lymph nodes: Cervical, supraclavicular, and axillary nodes normal.   Neurologic: Grossly nonfocal         DATA:  MAR reviewed and pertinent medications noted or modified as needed    Labs:  Recent Labs     04/25/21  1214   WBC 4.4*   HGB 12.1   HCT 37.1   PLT 166     Recent Labs     04/28/21  0330 04/27/21  0513 04/26/21  1547 04/25/21  1214 04/25/21  1214    141 141  --  142   K 3.2* 3.6 3.3*  --  2.8*    104 103  --  101   CO2 33* 34* 33*  --  38*   * 132* 139*  --  106*   BUN 28* 24* 23*  --  23*   CREA 0.39* 0.43* 0.41*  --  0.40*   CA 8.5 8.6 8.3*  --  8.4*   MG 2.0 2.4 2.4   < > 1.7   PHOS 2.4* 2.3* 2.5  --   --    ALB  --   --   --   --  2.7*   ALT  --   --   --   --  33    < > = values in this interval not displayed. No results for input(s): PH, PCO2, PO2, HCO3, FIO2 in the last 72 hours. No results for input(s): FIO2I, IFO2, HCO3I, IHCO3, HCOPOC, PCO2I, PCOPOC, IPHI, PHI, PHPOC, PO2I, PO2POC in the last 72 hours. No lab exists for component: IPOC2    Imaging:  [x]   I have personally reviewed the patients radiographs and reports  XR Results (most recent):  Results from Hospital Encounter encounter on 04/25/21   XR CHEST SNGL V    Narrative CHEST RADIOGRAPH-1 VIEW    INDICATION: Cough    COMPARISON: Chest x-ray 2/28/2020    FINDINGS: Cardiac silhouette is stable. Atherosclerosis noted. Mild diffuse  interstitial prominence. Bilateral mid to lower lung patchy opacities. Probable  small bilateral pleural effusions. Lungs are hypoinflated. No evidence for  pneumothorax. No acute osseous finding. Impression 1. Mild interstitial edema versus interstitial infiltrate. 2. Small bilateral pleural effusions with overlying mid to lower lung patchy  opacities. These could represent atelectasis but superimposed infiltrate/edema  not excluded. CT Results (most recent):  Results from Hospital Encounter encounter on 02/27/20   CTA CHEST W OR W WO CONT    Narrative EXAM:  CTA Chest with Contrast         CLINICAL INDICATION:  Shortness of breath and low O2 saturation. COMPARISON:  No prior CTA chest.  No VQ scan. Chest x-ray dated 02/24/20. CT  abdomen-pelvis 02/12/09.             TECHNIQUE:    - Helical volumetric sections of the chest are obtained with CT pulmonary  angiogram protocol. Subsequently, sagittal and coronal multiplanar  reconstruction images are obtained. Maximum intensity projection images are  generated to better delineate the pulmonary vasculature, differentiate between  the pulmonary arteries and veins and to increase sensitivity to pulmonary  emboli.    - IV contrast 100 mL Isovue-370.  - Radiation dose optimization techniques are utilized as appropriate to the  exam, with combination of automated exposure control, adjustment of the mA  and/or kV according to patient's size (Including appropriate matching for  site-specific examinations), or use of iterative reconstruction technique. FINDINGS:     Pulmonary Arteries:  No convincing evidence for pulmonary embolism is detected. Lung, Pleura, Airways: Atelectatic changes at the lung bases bilaterally. No  dominant mass or discrete suspicious lung nodules. No definite airspace  opacities. Mediastinum, Clotilde:  No adenopathy. Aorta:  No evidence of aortic dissection. Apparent dilation of the ascending  aorta measuring about 4.3 cm. Base of Neck:  No acute findings. Axillae:  Unremarkable. Esophagus:  Unremarkable. Abdomen:  Both kidneys demonstrate multiple exophytic hypodensities. .  The  largest of the exophytic hypodensity is observed in the left kidney mid  interpolar region with an internal septation, measuring up to about 4.4 x 3.9  cm. Skeletal Structures:  No acute findings. Impression IMPRESSION:         1. No convincing evidence of pulmonary embolism. 2.  Atelectatic changes in both lungs especially at the lung bases. 3.  Multiple renal hypodensities. At least one of the renal cyst appears to  show a nonsimple feature, i.e. septation, not as apparent on the previous study. Recommend follow-up dedicated CT of the abdomen without and with contrast for  further delineation.     4.  Mild dilation of the ascending aorta. 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  Currently needing high flow 60% FiO2 25 L  · Discussed prone positioning with patient but she does not think she can do it because of her body habitus  · BIPAP/CPAP at nighttime would be helpful  · Bronchial hygiene protocol  · Bronchodilators-continue Combivent Respimat  · Steroids-continue Decadron for treatment of COVID-19 pneumonia  · Antibiotics-currently  Zithromax will defer to ID to de-escalate  · Can continue Plaquenil, not need to hold  · Aspiration precautions  · Adjuvant therapy and follow-up of inflammatory markers per ID  · Need for further diagnostics-echocardiogram  · Out patient testing- PFT, 6 min walk, Polysomnogram  · Assess home Oxygen needs at discharge  · OT, PT, OOB and ambulate  · Healthy weight  · Will Follow  · DVT, PUD prophylaxis     Best practice :    Glycemic control  [facility] Vent patients- VAP bundle, aim to keep peak plateau pressure [id number] H2O  Sress ulcer prophylaxis. DVT prophylaxis. Need for Lines, castro assessed. Restraints need.   Palliative care evaluation.       Davian Renee PA-C  04/28/21  Pulmonary, Critical Care Medicine  [facility]

## 2021-04-28 NOTE — PROGRESS NOTES
PT/OT recommending home health vs rehab. Discussed with pt's daughter Albert Section. She stated the family decided on home health and pt will have family assistance at home. She signed verbal FOC for [facility] - INPATIENT.         NABEEL WeinsteinN RN  Care Management  Pager: [phone]

## 2021-04-29 LAB
ANION GAP SERPL CALC-SCNC: 1 MMOL/L (ref 3–18)
BUN SERPL-MCNC: 31 MG/DL (ref 7–18)
BUN/CREAT SERPL: 86 (ref 12–20)
CALCIUM SERPL-MCNC: 8.7 MG/DL (ref 8.5–10.1)
CHLORIDE SERPL-SCNC: 103 MMOL/L (ref 100–111)
CO2 SERPL-SCNC: 36 MMOL/L (ref 21–32)
CREAT SERPL-MCNC: 0.36 MG/DL (ref 0.6–1.3)
GLUCOSE BLD STRIP.AUTO-MCNC: 108 MG/DL (ref 70–110)
GLUCOSE BLD STRIP.AUTO-MCNC: 127 MG/DL (ref 70–110)
GLUCOSE BLD STRIP.AUTO-MCNC: 133 MG/DL (ref 70–110)
GLUCOSE BLD STRIP.AUTO-MCNC: 134 MG/DL (ref 70–110)
GLUCOSE SERPL-MCNC: 127 MG/DL (ref 74–99)
MAGNESIUM SERPL-MCNC: 2.1 MG/DL (ref 1.6–2.6)
PHOSPHATE SERPL-MCNC: 2.8 MG/DL (ref 2.5–4.9)
POTASSIUM SERPL-SCNC: 3.8 MMOL/L (ref 3.5–5.5)
SODIUM SERPL-SCNC: 140 MMOL/L (ref 136–145)

## 2021-04-29 PROCEDURE — 99233 SBSQ HOSP IP/OBS HIGH 50: CPT | Performed by: INTERNAL MEDICINE

## 2021-04-29 PROCEDURE — 74011250637 HC RX REV CODE- 250/637: Performed by: INTERNAL MEDICINE

## 2021-04-29 PROCEDURE — 74011250636 HC RX REV CODE- 250/636: Performed by: INTERNAL MEDICINE

## 2021-04-29 PROCEDURE — 65660000000 HC RM CCU STEPDOWN

## 2021-04-29 PROCEDURE — 94762 N-INVAS EAR/PLS OXIMTRY CONT: CPT

## 2021-04-29 PROCEDURE — APPSS45 APP SPLIT SHARED TIME 31-45 MINUTES: Performed by: PHYSICIAN ASSISTANT

## 2021-04-29 PROCEDURE — 83735 ASSAY OF MAGNESIUM: CPT

## 2021-04-29 PROCEDURE — 2709999900 HC NON-CHARGEABLE SUPPLY

## 2021-04-29 PROCEDURE — 80048 BASIC METABOLIC PNL TOTAL CA: CPT

## 2021-04-29 PROCEDURE — 82962 GLUCOSE BLOOD TEST: CPT

## 2021-04-29 PROCEDURE — 74011250637 HC RX REV CODE- 250/637: Performed by: HOSPITALIST

## 2021-04-29 PROCEDURE — 74011250636 HC RX REV CODE- 250/636: Performed by: NURSE PRACTITIONER

## 2021-04-29 PROCEDURE — 74011250637 HC RX REV CODE- 250/637: Performed by: NURSE PRACTITIONER

## 2021-04-29 PROCEDURE — 84100 ASSAY OF PHOSPHORUS: CPT

## 2021-04-29 PROCEDURE — 92526 ORAL FUNCTION THERAPY: CPT

## 2021-04-29 PROCEDURE — 77030027138 HC INCENT SPIROMETER -A

## 2021-04-29 PROCEDURE — 36415 COLL VENOUS BLD VENIPUNCTURE: CPT

## 2021-04-29 RX ORDER — LORAZEPAM 2 MG/ML
0.5 INJECTION INTRAMUSCULAR
Status: COMPLETED | OUTPATIENT
Start: 2021-04-29 | End: 2021-04-30

## 2021-04-29 RX ADMIN — IPRATROPIUM BROMIDE AND ALBUTEROL 1 PUFF: 20; 100 SPRAY, METERED RESPIRATORY (INHALATION) at 16:07

## 2021-04-29 RX ADMIN — DEXAMETHASONE SODIUM PHOSPHATE 10 MG: 4 INJECTION, SOLUTION INTRAMUSCULAR; INTRAVENOUS at 21:48

## 2021-04-29 RX ADMIN — GUAIFENESIN 600 MG: 600 TABLET, EXTENDED RELEASE ORAL at 21:48

## 2021-04-29 RX ADMIN — IPRATROPIUM BROMIDE AND ALBUTEROL 1 PUFF: 20; 100 SPRAY, METERED RESPIRATORY (INHALATION) at 08:00

## 2021-04-29 RX ADMIN — Medication 500 MG: at 17:27

## 2021-04-29 RX ADMIN — Medication 500 MG: at 08:39

## 2021-04-29 RX ADMIN — SERTRALINE HYDROCHLORIDE 150 MG: 50 TABLET ORAL at 08:39

## 2021-04-29 RX ADMIN — PANTOPRAZOLE SODIUM 40 MG: 40 TABLET, DELAYED RELEASE ORAL at 08:40

## 2021-04-29 RX ADMIN — GUAIFENESIN 600 MG: 600 TABLET, EXTENDED RELEASE ORAL at 08:39

## 2021-04-29 RX ADMIN — Medication 10 ML: at 06:00

## 2021-04-29 RX ADMIN — Medication 10 ML: at 21:48

## 2021-04-29 RX ADMIN — CLONIDINE HYDROCHLORIDE 0.1 MG: 0.1 TABLET ORAL at 17:27

## 2021-04-29 RX ADMIN — DEXAMETHASONE SODIUM PHOSPHATE 10 MG: 4 INJECTION, SOLUTION INTRAMUSCULAR; INTRAVENOUS at 08:39

## 2021-04-29 RX ADMIN — LORAZEPAM 0.5 MG: 2 INJECTION INTRAMUSCULAR; INTRAVENOUS at 06:40

## 2021-04-29 RX ADMIN — ENOXAPARIN SODIUM 40 MG: 40 INJECTION SUBCUTANEOUS at 16:03

## 2021-04-29 RX ADMIN — HYDROXYCHLOROQUINE SULFATE 200 MG: 200 TABLET ORAL at 17:28

## 2021-04-29 RX ADMIN — CLONIDINE HYDROCHLORIDE 0.1 MG: 0.1 TABLET ORAL at 08:40

## 2021-04-29 RX ADMIN — Medication 10 ML: at 14:05

## 2021-04-29 RX ADMIN — HYDROXYCHLOROQUINE SULFATE 200 MG: 200 TABLET ORAL at 08:40

## 2021-04-29 RX ADMIN — IPRATROPIUM BROMIDE AND ALBUTEROL 1 PUFF: 20; 100 SPRAY, METERED RESPIRATORY (INHALATION) at 22:38

## 2021-04-29 NOTE — PROGRESS NOTES
Problem: Risk for Spread of Infection  Goal: Prevent transmission of infectious organism to others  Description: Prevent the transmission of infectious organisms to other patients, staff members, and visitors. Outcome: Progressing Towards Goal     Problem: Patient Education:  Go to Education Activity  Goal: Patient/Family Education  Outcome: Progressing Towards Goal     Problem: Falls - Risk of  Goal: *Absence of Falls  Description: Document David Sites Fall Risk and appropriate interventions in the flowsheet. Outcome: Progressing Towards Goal  Note: Fall Risk Interventions:  Mobility Interventions: Bed/chair exit alarm, Patient to call before getting OOB         Medication Interventions: Bed/chair exit alarm, Assess postural VS orthostatic hypotension, Patient to call before getting OOB, Teach patient to arise slowly    Elimination Interventions: Bed/chair exit alarm, Call light in reach, Toilet paper/wipes in reach              Problem: Patient Education: Go to Patient Education Activity  Goal: Patient/Family Education  Outcome: Progressing Towards Goal     Problem: Pressure Injury - Risk of  Goal: *Prevention of pressure injury  Description: Document Francisco Scale and appropriate interventions in the flowsheet.   Outcome: Progressing Towards Goal  Note: Pressure Injury Interventions:  Sensory Interventions: Assess changes in LOC, Minimize linen layers    Moisture Interventions: Minimize layers, Offer toileting Q_hr    Activity Interventions: Assess need for specialty bed    Mobility Interventions: HOB 30 degrees or less    Nutrition Interventions: Document food/fluid/supplement intake    Friction and Shear Interventions: Apply protective barrier, creams and emollients                Problem: Patient Education: Go to Patient Education Activity  Goal: Patient/Family Education  Outcome: Progressing Towards Goal     Problem: Airway Clearance - Ineffective  Goal: Achieve or maintain patent airway  Outcome: Progressing Towards Goal     Problem: Gas Exchange - Impaired  Goal: Absence of hypoxia  Outcome: Progressing Towards Goal  Goal: Promote optimal lung function  Outcome: Progressing Towards Goal     Problem: Breathing Pattern - Ineffective  Goal: Ability to achieve and maintain a regular respiratory rate  Outcome: Progressing Towards Goal     Problem:  Body Temperature -  Risk of, Imbalanced  Goal: Ability to maintain a body temperature within defined limits  Outcome: Progressing Towards Goal  Goal: Will regain or maintain usual level of consciousness  Outcome: Progressing Towards Goal  Goal: Complications related to the disease process, condition or treatment will be avoided or minimized  Outcome: Progressing Towards Goal     Problem: Isolation Precautions - Risk of Spread of Infection  Goal: Prevent transmission of infectious organism to others  Outcome: Progressing Towards Goal     Problem: Nutrition Deficits  Goal: Optimize nutrtional status  Outcome: Progressing Towards Goal     Problem: Risk for Fluid Volume Deficit  Goal: Maintain normal heart rhythm  Outcome: Progressing Towards Goal  Goal: Maintain absence of muscle cramping  Outcome: Progressing Towards Goal  Goal: Maintain normal serum potassium, sodium, calcium, phosphorus, and pH  Outcome: Progressing Towards Goal     Problem: Loneliness or Risk for Loneliness  Goal: Demonstrate positive use of time alone when socialization is not possible  Outcome: Progressing Towards Goal     Problem: Fatigue  Goal: Verbalize increase energy and improved vitality  Outcome: Progressing Towards Goal     Problem: Patient Education: Go to Patient Education Activity  Goal: Patient/Family Education  Outcome: Progressing Towards Goal     Problem: Patient Education: Go to Patient Education Activity  Goal: Patient/Family Education  Outcome: Progressing Towards Goal     Problem: Pneumonia: Day 1  Goal: Off Pathway (Use only if patient is Off Pathway)  Outcome: Progressing Towards Goal  Goal: Activity/Safety  Outcome: Progressing Towards Goal  Goal: Consults, if ordered  Outcome: Progressing Towards Goal  Goal: Diagnostic Test/Procedures  Outcome: Progressing Towards Goal  Goal: Nutrition/Diet  Outcome: Progressing Towards Goal  Goal: Medications  Outcome: Progressing Towards Goal  Goal: Respiratory  Outcome: Progressing Towards Goal  Goal: Treatments/Interventions/Procedures  Outcome: Progressing Towards Goal  Goal: Psychosocial  Outcome: Progressing Towards Goal  Goal: *Oxygen saturation within defined limits  Outcome: Progressing Towards Goal  Goal: *Influenza vaccine administered (October-March)  Outcome: Progressing Towards Goal  Goal: *Pneumoccocal vaccine administered  Outcome: Progressing Towards Goal  Goal: *Hemodynamically stable  Outcome: Progressing Towards Goal  Goal: *Demonstrates progressive activity  Outcome: Progressing Towards Goal  Goal: *Tolerating diet  Outcome: Progressing Towards Goal     Problem: Pneumonia: Day 2  Goal: Off Pathway (Use only if patient is Off Pathway)  Outcome: Progressing Towards Goal  Goal: Activity/Safety  Outcome: Progressing Towards Goal  Goal: Consults, if ordered  Outcome: Progressing Towards Goal  Goal: Diagnostic Test/Procedures  Outcome: Progressing Towards Goal  Goal: Nutrition/Diet  Outcome: Progressing Towards Goal  Goal: Discharge Planning  Outcome: Progressing Towards Goal  Goal: Medications  Outcome: Progressing Towards Goal  Goal: Respiratory  Outcome: Progressing Towards Goal  Goal: Treatments/Interventions/Procedures  Outcome: Progressing Towards Goal  Goal: Psychosocial  Outcome: Progressing Towards Goal  Goal: *Oxygen saturation within defined limits  Outcome: Progressing Towards Goal  Goal: *Hemodynamically stable  Outcome: Progressing Towards Goal  Goal: *Demonstrates progressive activity  Outcome: Progressing Towards Goal  Goal: *Tolerating diet  Outcome: Progressing Towards Goal  Goal: *Optimal pain control at patient's stated goal  Outcome: Progressing Towards Goal     Problem: Pneumonia: Day 3  Goal: Off Pathway (Use only if patient is Off Pathway)  Outcome: Progressing Towards Goal  Goal: Activity/Safety  Outcome: Progressing Towards Goal  Goal: Consults, if ordered  Outcome: Progressing Towards Goal  Goal: Diagnostic Test/Procedures  Outcome: Progressing Towards Goal  Goal: Nutrition/Diet  Outcome: Progressing Towards Goal  Goal: Discharge Planning  Outcome: Progressing Towards Goal  Goal: Medications  Outcome: Progressing Towards Goal  Goal: Respiratory  Outcome: Progressing Towards Goal  Goal: Treatments/Interventions/Procedures  Outcome: Progressing Towards Goal  Goal: Psychosocial  Outcome: Progressing Towards Goal  Goal: *Oxygen saturation within defined limits  Outcome: Progressing Towards Goal  Goal: *Hemodynamically stable  Outcome: Progressing Towards Goal  Goal: *Demonstrates progressive activity  Outcome: Progressing Towards Goal  Goal: *Tolerating diet  Outcome: Progressing Towards Goal  Goal: *Describes available resources and support systems  Outcome: Progressing Towards Goal  Goal: *Optimal pain control at patient's stated goal  Outcome: Progressing Towards Goal     Problem: Pneumonia: Day 4  Goal: Off Pathway (Use only if patient is Off Pathway)  Outcome: Progressing Towards Goal  Goal: Activity/Safety  Outcome: Progressing Towards Goal  Goal: Nutrition/Diet  Outcome: Progressing Towards Goal  Goal: Discharge Planning  Outcome: Progressing Towards Goal  Goal: Medications  Outcome: Progressing Towards Goal  Goal: Respiratory  Outcome: Progressing Towards Goal  Goal: Treatments/Interventions/Procedures  Outcome: Progressing Towards Goal  Goal: Psychosocial  Outcome: Progressing Towards Goal     Problem: Pneumonia: Discharge Outcomes  Goal: *Demonstrates progressive activity  Outcome: Progressing Towards Goal  Goal: *Describes follow-up/return visits to physicians  Outcome: Progressing Towards Goal  Goal: *Tolerating diet  Outcome: Progressing Towards Goal  Goal: *Verbalizes name, dosage, time, side effects, and number of days to continue medications  Outcome: Progressing Towards Goal  Goal: *Influenza immunization  Outcome: Progressing Towards Goal  Goal: *Pneumococcal immunization  Outcome: Progressing Towards Goal  Goal: *Respiratory status at baseline  Outcome: Progressing Towards Goal  Goal: *Vital signs within defined limits  Outcome: Progressing Towards Goal  Goal: *Describes available resources and support systems  Outcome: Progressing Towards Goal  Goal: *Optimal pain control at patient's stated goal  Outcome: Progressing Towards Goal

## 2021-04-29 NOTE — PROGRESS NOTES
Progress Note      Patient: Radha Portillo               Sex: female          DOA: 4/25/2021       YOB: 1945      Age:  67 y.o.        LOS:  LOS: 4 days             CHIEF COMPLAINT:  COVID pneumonia with acute respiratory failure    Subjective:     Patient is awake and talking  Requiring hi flow oxygen    Objective:      Visit Vitals  BP (!) 126/92 (BP 1 Location: Left lower arm, BP Patient Position: At rest)   Pulse 60   Temp 97.6 °F (36.4 °C)   Resp 16   Ht 5' 2\" (1.575 m)   Wt 115.7 kg (255 lb)   SpO2 94%   Breastfeeding No   BMI 46.64 kg/m²       Physical Exam:  Gen:  Patient is in no distress. No complaint  HEENT and Neck:  PERRL, No cervical tenderness or masses  Lungs:  Clear bilaterally. No rales, no wheeze. Normal effort. Heart:  Regular Rate and Rhythm. No murmur or gallop. No JVD. No edema. Abdomen:  Soft, non tender, no masses, no Hepatosplenomegally  Extremities:  No digital clubbing, no cyanosis  Neuro:  Alert and oriented, Normal affect, Cranial nerves intact, No sensory deficits        Lab/Data Reviewed:  BMP:   Lab Results   Component Value Date/Time     04/29/2021 03:15 AM    K 3.8 04/29/2021 03:15 AM     04/29/2021 03:15 AM    CO2 36 (H) 04/29/2021 03:15 AM    AGAP 1 (L) 04/29/2021 03:15 AM     (H) 04/29/2021 03:15 AM    BUN 31 (H) 04/29/2021 03:15 AM    CREA 0.36 (L) 04/29/2021 03:15 AM    GFRAA >60 04/29/2021 03:15 AM    GFRNA >60 04/29/2021 03:15 AM         Assessment/Plan     Active Problems:    Pneumonia due to COVID-19 virus (4/25/2021)      Hypoxia (4/25/2021)    Acute respiratory failure on hi flow oxygen    Plan:  Continue with breathing treatments  No long on antibiotics  Continue decadron. Mobilize when able.

## 2021-04-29 NOTE — PROGRESS NOTES
[facility]. Pulmonary, Critical Care, and Sleep Medicine    Name: Patti Lao MRN: [id number]   : 1945 Hospital: [address] [place] [address] Dr   Date: 2021  Admission Date: 2021     Chart and notes reviewed. Data reviewed. I have evaluated all findings. [x]I have reviewed the flowsheet and previous days notes. Interval HPI:Patient is a 78 y. o. female with a past medical history of HTN, lupus, cognitive impairment, knee replacement, and IVC filter replacement, who states she has had flulike symptoms for last week or more. Patient was diagnosed with Covid 11 days-2021 ago however did not show symptoms until 9 days ago.    Patient was tested because other members of the household were positive.  She began to develop myalgias, progressive cough and dyspnea.    Patient was monitoring herself closely at home and she was also using her old oxygen tank for last few days as her saturation was in the low 80s.  However, she started becoming more short winded and required more oxygen so they decided to came to the emergency room.    Patient denies any chest pain abdominal pain.    She had nausea and vomiting episode x1 yesterday as well as some loose bowel movement.    She states that after coming to the hospital this has subsided  Patient is currently on high flow nasal cannula.    Denies smoking cigarettes or drinking any alcohol.   Patient is followed by Dr. Wen Russo in clinic -pt with nocturnal hypoxemia and pulmonary HTN possibly related to Lupus, followed by Dr. Alicia Avery. Serologies were also positive for MA-3 ANCA. Pt refused split night study ordered to R/o HO/OHS as etiology of pulmonary HTN. Supplemental O2 HS was ordered which pt has refused to use and was asking Dr. Linus Fitch to discontinue the O2 but eventually agreed to keep it    Subjective 21  Patient was seen and examined at bedside today.   She was sitting up on the bed today and states she feels mildly anxious today. Patient weaned on HFNC to 25L 50% with O2 sats 96%  Plan to wean O2 today. She denies any SOB, chest pain, nausea, vomiting, abdominal pain. ROS:A comprehensive review of systems was negative.     Patient Active Problem List   Diagnosis Code    Hypertension I10    Lupus ([facility] [address] M32.9    Arthritis M19.90    Neuropathy G62.9    Obesity, morbid ([facility] [address] E66.01    Lumbar spinal stenosis M48.061    HNP (herniated nucleus pulposus), lumbar M51.26    Pneumonia due to COVID-19 virus U07.1, J12.82    Hypoxia R09.02       Vital Signs:  Visit Vitals  BP (!) 126/92 (BP 1 Location: Left lower arm, BP Patient Position: At rest)   Pulse 60   Temp 97.6 °F (36.4 °C)   Resp 16   Ht 5' 2\" (1.575 m)   Wt 115.7 kg (255 lb)   SpO2 94%   Breastfeeding No   BMI 46.64 kg/m²       O2 Device: Heated, Hi flow nasal cannula   O2 Flow Rate (L/min): 25 l/min   Temp (24hrs), Av.6 °F (36.4 °C), Min:97.3 °F (36.3 °C), Max:98 °F (36.7 °C)       Intake/Output:   Last shift:       07 -  1900  In: 473 [P.O.:473]  Out: -   Last 3 shifts:  190 -  07  In: 360 [P.O.:360]  Out: 300 [Urine:300]    Intake/Output Summary (Last 24 hours) at 2021 1421  Last data filed at 2021 0800  Gross per 24 hour   Intake 473 ml   Output    Net 473 ml      Current Facility-Administered Medications   Medication Dose Route Frequency    [START ON 2021] dexamethasone (DECADRON) 4 mg/mL injection 10 mg  10 mg IntraVENous Q24H    ipratropium-albuterol (COMBIVENT RESPIMAT) 20 mcg-100 mcg inhalation spray  1 Puff Inhalation Q6H RT    sodium chloride (NS) flush 5-40 mL  5-40 mL IntraVENous Q8H    enoxaparin (LOVENOX) injection 40 mg  40 mg SubCUTAneous Q24H    guaiFENesin ER (MUCINEX) tablet 600 mg  600 mg Oral Q12H    insulin lispro (HUMALOG) injection   SubCUTAneous AC&HS    dexamethasone (DECADRON) 4 mg/mL injection 10 mg  10 mg IntraVENous Q12H    sertraline (ZOLOFT) tablet 150 mg  150 mg Oral DAILY    hydrOXYchloroQUINE (PLAQUENIL) tablet 200 mg  200 mg Oral BID    pantoprazole (PROTONIX) tablet 40 mg  40 mg Oral ACB    ascorbic acid (vitamin C) (VITAMIN C) tablet 500 mg  500 mg Oral BID    omega-3 acid ethyl esters (LOVAZA) capsule 1,000 mg  1 g Oral BID WITH MEALS    cloNIDine HCL (CATAPRES) tablet 0.1 mg  0.1 mg Oral BID         Telemetry: [x]Sinus []A-flutter []Paced    []A-fib []Multiple PVCs                  Physical Exam:      General:  Alert, cooperative, no distress, appears stated age. Head:  Normocephalic, without obvious abnormality, atraumatic. Eyes:  Conjunctivae/corneas clear. PERRL, EOMs intact. Nose: Nares normal. Septum midline. Mucosa normal. No drainage or sinus tenderness. Throat: Lips, mucosa, and tongue normal. Teeth and gums normal.   Neck: Supple, symmetrical, trachea midline, no adenopathy, thyroid: no enlargment/tenderness/nodules, no carotid bruit and no JVD. Back:   Symmetric, no curvature. ROM normal.   Lungs:   Clear to auscultation bilaterally. Chest wall:  No tenderness or deformity. Heart:  Regular rate and rhythm, S1, S2 normal, no murmur, click, rub or gallop. Abdomen:   Obese. Soft, non-tender. Bowel sounds normal. No masses,  No organomegaly. Extremities: Extremities normal, atraumatic, no cyanosis or edema. Pulses: 2+ and symmetric all extremities. Skin: Skin color, texture, turgor normal. No rashes or lesions   Lymph nodes: Cervical, supraclavicular, and axillary nodes normal.   Neurologic: Grossly nonfocal           DATA:  MAR reviewed and pertinent medications noted or modified as needed    Labs:  No results for input(s): WBC, HGB, HCT, PLT, HGBEXT, HCTEXT, PLTEXT, HGBEXT, HCTEXT, PLTEXT in the last 72 hours.   Recent Labs     04/29/21  0315 04/28/21  0330 04/27/21  0513    141 141   K 3.8 3.2* 3.6    104 104   CO2 36* 33* 34*   * 127* 132*   BUN 31* 28* 24*   CREA 0.36* 0.39* 0.43*   CA 8.7 8.5 8.6   MG 2.1 2.0 2. 4   PHOS 2.8 2.4* 2.3*     No results for input(s): PH, PCO2, PO2, HCO3, FIO2 in the last 72 hours. No results for input(s): FIO2I, IFO2, HCO3I, IHCO3, HCOPOC, PCO2I, PCOPOC, IPHI, PHI, PHPOC, PO2I, PO2POC in the last 72 hours. No lab exists for component: IPOC2    Imaging:  [x]   I have personally reviewed the patients radiographs and reports  XR Results (most recent):  Results from Hospital Encounter encounter on 04/25/21   XR CHEST SNGL V    Narrative CHEST RADIOGRAPH-1 VIEW    INDICATION: Cough    COMPARISON: Chest x-ray 2/28/2020    FINDINGS: Cardiac silhouette is stable. Atherosclerosis noted. Mild diffuse  interstitial prominence. Bilateral mid to lower lung patchy opacities. Probable  small bilateral pleural effusions. Lungs are hypoinflated. No evidence for  pneumothorax. No acute osseous finding. Impression 1. Mild interstitial edema versus interstitial infiltrate. 2. Small bilateral pleural effusions with overlying mid to lower lung patchy  opacities. These could represent atelectasis but superimposed infiltrate/edema  not excluded. CT Results (most recent):  Results from Hospital Encounter encounter on 02/27/20   CTA CHEST W OR W WO CONT    Narrative EXAM:  CTA Chest with Contrast         CLINICAL INDICATION:  Shortness of breath and low O2 saturation. COMPARISON:  No prior CTA chest.  No VQ scan. Chest x-ray dated 02/24/20. CT  abdomen-pelvis 02/12/09. TECHNIQUE:    - Helical volumetric sections of the chest are obtained with CT pulmonary  angiogram protocol. Subsequently, sagittal and coronal multiplanar  reconstruction images are obtained.   Maximum intensity projection images are  generated to better delineate the pulmonary vasculature, differentiate between  the pulmonary arteries and veins and to increase sensitivity to pulmonary  emboli.    - IV contrast 100 mL Isovue-370.  - Radiation dose optimization techniques are utilized as appropriate to the  exam, with combination of automated exposure control, adjustment of the mA  and/or kV according to patient's size (Including appropriate matching for  site-specific examinations), or use of iterative reconstruction technique. FINDINGS:     Pulmonary Arteries:  No convincing evidence for pulmonary embolism is detected. Lung, Pleura, Airways: Atelectatic changes at the lung bases bilaterally. No  dominant mass or discrete suspicious lung nodules. No definite airspace  opacities. Mediastinum, Clotilde:  No adenopathy. Aorta:  No evidence of aortic dissection. Apparent dilation of the ascending  aorta measuring about 4.3 cm. Base of Neck:  No acute findings. Axillae:  Unremarkable. Esophagus:  Unremarkable. Abdomen:  Both kidneys demonstrate multiple exophytic hypodensities. .  The  largest of the exophytic hypodensity is observed in the left kidney mid  interpolar region with an internal septation, measuring up to about 4.4 x 3.9  cm. Skeletal Structures:  No acute findings. Impression IMPRESSION:         1. No convincing evidence of pulmonary embolism. 2.  Atelectatic changes in both lungs especially at the lung bases. 3.  Multiple renal hypodensities. At least one of the renal cyst appears to  show a nonsimple feature, i.e. septation, not as apparent on the previous study. Recommend follow-up dedicated CT of the abdomen without and with contrast for  further delineation. 4.  Mild dilation of the ascending aorta.               IMPRESSION:   · Hypoxic respiratory failure secondary to COVID-19 pneumonia-slow improvement in oxygenation remains on high flow  · COVID-19 pneumonia  · History of nocturnal hypoxemia-FTs with mild restrictive ventilatory defect, Reduced diffusion capacity indicating a decrease in alveolar surface area for gas exchange   Mild obstruction suspected   · Pulmonary hypertension-group 1 and 3  · History of positive ANCA, MD-3,-Lupus on Plaquenil as outpatient  · Suspected obstructive sleep apnea/OHS on CPAP   · History of IVC filter placement     Patient Active Problem List   Diagnosis Code    Hypertension I10    Lupus ([facility] [address] M32.9    Arthritis M19.90    Neuropathy G62.9    Obesity, morbid ([facility] [address] E66.01    Lumbar spinal stenosis M48.061    HNP (herniated nucleus pulposus), lumbar M51.26    Pneumonia due to COVID-19 virus U07.1, J12.82    Hypoxia R09.02        RECOMMENDATIONS:   · Oxygen-continue supplementation to maintain saturation more than 92%.  Currently needing high flow 50% FiO2 25 L  · Discussed prone positioning with patient but she does not think she can do it because of her body habitus  · BIPAP/CPAP at nighttime would be helpful  · Bronchial hygiene protocol  · Bronchodilators-continue Combivent Respimat  · Steroids-continue Decadron for treatment of COVID-19 pneumonia  · Can continue Plaquenil, not need to hold  · Aspiration precautions  · Adjuvant therapy and follow-up of inflammatory markers per ID  · Need for further diagnostics-echocardiogram  · Out patient testing- PFT, 6 min walk, Polysomnogram  · Assess home Oxygen needs at discharge  · OT, PT, OOB and ambulate  · Healthy weight  · Will Follow  · DVT, PUD prophylaxis       Mayelin Najera MD  04/29/21  Pulmonary, Critical Care Medicine  [facility]

## 2021-04-29 NOTE — ROUTINE PROCESS
1905: Verbal shift change report given to Jean-Claude Estrada RN (oncoming nurse) by Maryjo Eldridge RN (offgoing nurse). Report included the following information SBAR, Kardex, ED Summary and Cardiac Rhythm Sinus David Lu. 0600: Patient having anxiety attack. She states that she was unable to sleep due to noise. Offered patient a wet cloth for forehead. Will continue to monitor    0615: Patient still exhibiting signs of anxiety attack. Will page MD on call for dose of ativan. 5: Spoke with Dr. Thelma Wiseman regarding patient's anxiety. MD stated orders will be placed. 0443: Verbal shift change report given to Macho Perez RN (oncoming nurse) by Jean-Claude Estrada RN (offgoing nurse). Report included the following information SBAR, Kardex, ED Summary and Cardiac Rhythm Sinus David Lu.

## 2021-04-29 NOTE — PROGRESS NOTES
[facility]. Pulmonary, Critical Care, and Sleep Medicine    Name: Ana Maria Martinez MRN: [id number]   : 1945 Hospital: [address] [place] [address] Dr   Date: 2021  Admission Date: 2021     Chart and notes reviewed. Data reviewed. I have evaluated all findings. [x]I have reviewed the flowsheet and previous days notes. []The patient is unable to give any meaningful history or review of systems because the patient is:  []Intubated []Sedated   []Unresponsive      []The patient is critically ill on      []Mechanical ventilation []Pressors   []BiPAP []         Interval HPI:Patient is a 78 y. o. female with a past medical history of HTN, lupus, cognitive impairment, knee replacement, and IVC filter replacement, who states she has had flulike symptoms for last week or more. Patient was diagnosed with Covid 11 days-2021 ago however did not show symptoms until 9 days ago.    Patient was tested because other members of the household were positive.  She began to develop myalgias, progressive cough and dyspnea.    Patient was monitoring herself closely at home and she was also using her old oxygen tank for last few days as her saturation was in the low 80s.  However, she started becoming more short winded and required more oxygen so they decided to came to the emergency room.    Patient denies any chest pain abdominal pain.    She had nausea and vomiting episode x1 yesterday as well as some loose bowel movement.    She states that after coming to the hospital this has subsided  Patient is currently on high flow nasal cannula.    Denies smoking cigarettes or drinking any alcohol.   Patient is followed by Dr. Anny Ordonez in clinic -pt with nocturnal hypoxemia and pulmonary HTN possibly related to Lupus, followed by Dr. Elza Garcia. 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 Pt refused split night study ordered to R/o HO/OHS as etiology of pulmonary HTN.  Supplemental O2 HS was ordered which pt has refused to use and was asking Dr. Wallace Colon to discontinue the O2 but eventually agreed to keep it    Subjective 21  Patient was seen and examined at bedside today. She was sitting up on the bed today and states she feels mildly anxious today. Patient weaned on HFNC to 25L 50% with O2 sats 96%  Plan to wean O2 today. She denies any SOB, chest pain, nausea, vomiting, abdominal pain. ROS:A comprehensive review of systems was negative. Events and notes from last 24 hours reviewed. Care plan discussed on multidisciplinary rounds.   Patient Active Problem List   Diagnosis Code    Hypertension I10    Lupus ([facility] M32.9    Arthritis M19.90    Neuropathy G62.9    Obesity, morbid ([facility] E66.01    Lumbar spinal stenosis M48.061    HNP (herniated nucleus pulposus), lumbar M51.26    Pneumonia due to COVID-19 virus U07.1, J12.82    Hypoxia R09.02       Vital Signs:  Visit Vitals  BP (!) 143/78 (BP 1 Location: Left lower arm, BP Patient Position: At rest)   Pulse (!) 54   Temp 97.6 °F (36.4 °C)   Resp 16   Ht 5' 2\" (1.575 m)   Wt 115.7 kg (255 lb)   SpO2 97%   Breastfeeding No   BMI 46.64 kg/m²       O2 Device: Heated, Hi flow nasal cannula   O2 Flow Rate (L/min): 25 l/min   Temp (24hrs), Av.7 °F (36.5 °C), Min:97.3 °F (36.3 °C), Max:98 °F (36.7 °C)       Intake/Output:   Last shift:      701 - 1900  In: 473 [P.O.:473]  Out: -   Last 3 shifts: 1901 -  07  In: 360 [P.O.:360]  Out: 300 [Urine:300]    Intake/Output Summary (Last 24 hours) at 2021 1040  Last data filed at 2021 0800  Gross per 24 hour   Intake 473 ml   Output    Net 473 ml        Ventilator Settings:                Current Facility-Administered Medications   Medication Dose Route Frequency    [START ON 2021] dexamethasone (DECADRON) 4 mg/mL injection 10 mg  10 mg IntraVENous Q24H    ipratropium-albuterol (COMBIVENT RESPIMAT) 20 mcg-100 mcg inhalation spray  1 Puff Inhalation Q6H RT  sodium chloride (NS) flush 5-40 mL  5-40 mL IntraVENous Q8H    enoxaparin (LOVENOX) injection 40 mg  40 mg SubCUTAneous Q24H    guaiFENesin ER (MUCINEX) tablet 600 mg  600 mg Oral Q12H    insulin lispro (HUMALOG) injection   SubCUTAneous AC&HS    dexamethasone (DECADRON) 4 mg/mL injection 10 mg  10 mg IntraVENous Q12H    sertraline (ZOLOFT) tablet 150 mg  150 mg Oral DAILY    hydrOXYchloroQUINE (PLAQUENIL) tablet 200 mg  200 mg Oral BID    pantoprazole (PROTONIX) tablet 40 mg  40 mg Oral ACB    ascorbic acid (vitamin C) (VITAMIN C) tablet 500 mg  500 mg Oral BID    omega-3 acid ethyl esters (LOVAZA) capsule 1,000 mg  1 g Oral BID WITH MEALS    cloNIDine HCL (CATAPRES) tablet 0.1 mg  0.1 mg Oral BID         Telemetry: [x]Sinus []A-flutter []Paced    []A-fib []Multiple PVCs                  Physical Exam:      General:  Alert, cooperative, no distress, appears stated age. Head:  Normocephalic, without obvious abnormality, atraumatic. Eyes:  Conjunctivae/corneas clear. PERRL, EOMs intact. Nose: Nares normal. Septum midline. Mucosa normal. No drainage or sinus tenderness. Throat: Lips, mucosa, and tongue normal. Teeth and gums normal.   Neck: Supple, symmetrical, trachea midline, no adenopathy, thyroid: no enlargment/tenderness/nodules, no carotid bruit and no JVD. Back:   Symmetric, no curvature. ROM normal.   Lungs:   Clear to auscultation bilaterally. Chest wall:  No tenderness or deformity. Heart:  Regular rate and rhythm, S1, S2 normal, no murmur, click, rub or gallop. Abdomen:   Obese. Soft, non-tender. Bowel sounds normal. No masses,  No organomegaly. Extremities: Extremities normal, atraumatic, no cyanosis or edema. Pulses: 2+ and symmetric all extremities.    Skin: Skin color, texture, turgor normal. No rashes or lesions   Lymph nodes: Cervical, supraclavicular, and axillary nodes normal.   Neurologic: Grossly nonfocal           DATA:  MAR reviewed and pertinent medications noted or modified as needed    Labs:  No results for input(s): WBC, HGB, HCT, PLT, HGBEXT, HCTEXT, PLTEXT in the last 72 hours. Recent Labs     04/29/21  0315 04/28/21  0330 04/27/21  0513    141 141   K 3.8 3.2* 3.6    104 104   CO2 36* 33* 34*   * 127* 132*   BUN 31* 28* 24*   CREA 0.36* 0.39* 0.43*   CA 8.7 8.5 8.6   MG 2.1 2.0 2.4   PHOS 2.8 2.4* 2.3*     No results for input(s): PH, PCO2, PO2, HCO3, FIO2 in the last 72 hours. No results for input(s): FIO2I, IFO2, HCO3I, IHCO3, HCOPOC, PCO2I, PCOPOC, IPHI, PHI, PHPOC, PO2I, PO2POC in the last 72 hours. No lab exists for component: IPOC2    Imaging:  [x]   I have personally reviewed the patients radiographs and reports  XR Results (most recent):  Results from Hospital Encounter encounter on 04/25/21   XR CHEST SNGL V    Narrative CHEST RADIOGRAPH-1 VIEW    INDICATION: Cough    COMPARISON: Chest x-ray 2/28/2020    FINDINGS: Cardiac silhouette is stable. Atherosclerosis noted. Mild diffuse  interstitial prominence. Bilateral mid to lower lung patchy opacities. Probable  small bilateral pleural effusions. Lungs are hypoinflated. No evidence for  pneumothorax. No acute osseous finding. Impression 1. Mild interstitial edema versus interstitial infiltrate. 2. Small bilateral pleural effusions with overlying mid to lower lung patchy  opacities. These could represent atelectasis but superimposed infiltrate/edema  not excluded. CT Results (most recent):  Results from Hospital Encounter encounter on 02/27/20   CTA CHEST W OR W WO CONT    Narrative EXAM:  CTA Chest with Contrast         CLINICAL INDICATION:  Shortness of breath and low O2 saturation. COMPARISON:  No prior CTA chest.  No VQ scan. Chest x-ray dated 02/24/20. CT  abdomen-pelvis 02/12/09. TECHNIQUE:    - Helical volumetric sections of the chest are obtained with CT pulmonary  angiogram protocol.   Subsequently, sagittal and coronal multiplanar  reconstruction images are obtained. Maximum intensity projection images are  generated to better delineate the pulmonary vasculature, differentiate between  the pulmonary arteries and veins and to increase sensitivity to pulmonary  emboli.    - IV contrast 100 mL Isovue-370.  - Radiation dose optimization techniques are utilized as appropriate to the  exam, with combination of automated exposure control, adjustment of the mA  and/or kV according to patient's size (Including appropriate matching for  site-specific examinations), or use of iterative reconstruction technique. FINDINGS:     Pulmonary Arteries:  No convincing evidence for pulmonary embolism is detected. Lung, Pleura, Airways: Atelectatic changes at the lung bases bilaterally. No  dominant mass or discrete suspicious lung nodules. No definite airspace  opacities. Mediastinum, Clotilde:  No adenopathy. Aorta:  No evidence of aortic dissection. Apparent dilation of the ascending  aorta measuring about 4.3 cm. Base of Neck:  No acute findings. Axillae:  Unremarkable. Esophagus:  Unremarkable. Abdomen:  Both kidneys demonstrate multiple exophytic hypodensities. .  The  largest of the exophytic hypodensity is observed in the left kidney mid  interpolar region with an internal septation, measuring up to about 4.4 x 3.9  cm. Skeletal Structures:  No acute findings. Impression IMPRESSION:         1. No convincing evidence of pulmonary embolism. 2.  Atelectatic changes in both lungs especially at the lung bases. 3.  Multiple renal hypodensities. At least one of the renal cyst appears to  show a nonsimple feature, i.e. septation, not as apparent on the previous study. Recommend follow-up dedicated CT of the abdomen without and with contrast for  further delineation. 4.  Mild dilation of the ascending aorta.               IMPRESSION:   · Hypoxic respiratory failure secondary to COVID-19 pneumonia  · COVID-19 pneumonia  · History of nocturnal hypoxemia-FTs with mild restrictive ventilatory defect, Reduced diffusion capacity indicating a decrease in alveolar surface area for gas exchange   Mild obstruction suspected   · Pulmonary hypertension-group 1 and 3  · History of positive ANCA, AR-3,-Lupus on Plaquenil as outpatient  · Suspected obstructive sleep apnea/OHS on CPAP   · History of IVC filter placement     Patient Active Problem List   Diagnosis Code    Hypertension I10    Lupus ([facility] M32.9    Arthritis M19.90    Neuropathy G62.9    Obesity, morbid ([facility] E66.01    Lumbar spinal stenosis M48.061    HNP (herniated nucleus pulposus), lumbar M51.26    Pneumonia due to COVID-19 virus U07.1, J12.82    Hypoxia R09.02        RECOMMENDATIONS:   · Oxygen-continue supplementation to maintain saturation more than 92%.  Currently needing high flow 50% FiO2 25 L  · Discussed prone positioning with patient but she does not think she can do it because of her body habitus  · BIPAP/CPAP at nighttime would be helpful  · Bronchial hygiene protocol  · Bronchodilators-continue Combivent Respimat  · Steroids-continue Decadron for treatment of COVID-19 pneumonia  · Antibiotics-currently  Zithromax will defer to ID to de-escalate  · Can continue Plaquenil, not need to hold  · Aspiration precautions  · Adjuvant therapy and follow-up of inflammatory markers per ID  · Need for further diagnostics-echocardiogram  · Out patient testing- PFT, 6 min walk, Polysomnogram  · Assess home Oxygen needs at discharge  · OT, PT, OOB and ambulate  · Healthy weight  · Will Follow  · DVT, PUD prophylaxis     Best practice :    Glycemic control  [facility] Vent patients- VAP bundle, aim to keep peak plateau pressure 45-82QB H2O  Sress ulcer prophylaxis. DVT prophylaxis. Need for Lines, castro assessed. Restraints need. Palliative care evaluation.         Adilene Garcia PA-C  04/29/21  Pulmonary, [address] [place] [address] [facility]

## 2021-04-29 NOTE — PROGRESS NOTES
Problem: Dysphagia (Adult)  Goal: *Acute Goals and Plan of Care (Insert Text)  Description: Patient will:  1. Tolerate PO trials with 0 s/s overt distress in 4/5 trials-met  2. Utilize compensatory swallow strategies/maneuvers (decrease bite/sip, size/rate, alt. liq/sol) with min cues in 4/5 trials-met  3. Utilize comp strategies to improve respiration to swallow coordination to reduce aspiration risk and improve activity tolerance for sustained nutrition/ hydration given min-mod cues in 4/5 trials-met    Rec:     Reg solid with thin liquids  Aspiration precautions  HOB >45 during po intake, remain >30 for 30-45 minutes after po   Small bites/sips; alternate liquid/solid with slow feeding rate   Oral care TID  Meds per pt preference    Outcome: Resolved/Met    [facility]    Patient: Luis Perez (89 y.o. female)  Date: 4/29/2021  Diagnosis: Pneumonia due to COVID-19 virus [U07.1, J12.82]  Hypoxia [R09.02]   Precautions: Aspiration, Fall, Skin  PLOF: As per H&P     ASSESSMENT:  Pt seen for follow up dysphagia treatment seated on side of bed with regular/thin liquid lunch meal.  Pt denying dysphagia with fair appetite. Tolerating thin liquids + straw via consecutive swallows with no overt s/sx aspiration and no changes to vitals. Tolerating regular solids with positive mastication/clearance. Educated with regard to aspiration risk/precautions, breathing/swallowing coordination and importance of slow rate of intake with rest breaks as needed. Pt able to verbalize understanding. Will sign off as pt currently tolerating safest, least restrictive diet. Available for re-consult as indicated. Progression toward goals:  [x]         Goals met/approximated  []         Not making progress/Not appropriate - will d/c POC     PLAN:  Recommendations and Planned Interventions:  Maximum therapeutic gains met; safest, least restrictive diet achieved.  D/C ST intervention at this time. Discharge Recommendations:  Goals met      SUBJECTIVE:   Patient stated It's not home cooking    OBJECTIVE:   Cognitive and Communication Status:  Neurologic State: Alert  Orientation Level: Oriented X4  Cognition: Follows commands  Perception: Appears intact  Perseveration: No perseveration noted  Safety/Judgement: Fall prevention  Dysphagia Treatment:  Oral Assessment:  Oral Assessment  Labial: No impairment  Dentition: Natural, Intact  Oral Hygiene: adequate  Lingual: No impairment  Velum: No impairment  Mandible: No impairment  P.O. Trials:   Patient Position: [facility] at [facility]   Vocal quality prior to P.O.: Breathy   Consistency Presented:  Thin liquid, Solid    How Presented: Self-fed/presented, Cup/sip, Spoon, Straw, Successive swallows   Bolus Acceptance: No impairment   Bolus Formation/Control: No impairment   Propulsion: No impairment   Oral Residue: None   Initiation of Swallow: No impairment   Laryngeal Elevation: Functional   Aspiration Signs/Symptoms: None   Pharyngeal Phase Characteristics: No impairment, issues, or problems    Effective Modifications: None   Cues for Modifications: None   Oral Phase Severity: No impairment   Pharyngeal Phase Severity : No impairment     PAIN:  Start of Tx: 0  End of Tx: 0     After treatment:   []              Patient left in no apparent distress sitting up in chair  [x]              Patient left in no apparent distress in bed  [x]              Call bell left within reach  [x]              Nursing notified  []              Caregiver present  []              Bed alarm activated      COMMUNICATION/EDUCATION:   [x] Aspiration precautions; swallow safety; compensatory techniques  [x]        Patient/family able to participate in training and education   []  Patient unable to participate in education; education ongoing with staff  [] Patient understands goals/intent of therapy; neutral about participation     Thank you for this referral,  Monserrat Matos M.SOtis, [id number] [facility]  Speech-Language Pathologist

## 2021-04-30 LAB
ANION GAP SERPL CALC-SCNC: 3 MMOL/L (ref 3–18)
BASOPHILS # BLD: 0 K/UL (ref 0–0.1)
BASOPHILS NFR BLD: 0 % (ref 0–2)
BUN SERPL-MCNC: 31 MG/DL (ref 7–18)
BUN/CREAT SERPL: 76 (ref 12–20)
CALCIUM SERPL-MCNC: 8.5 MG/DL (ref 8.5–10.1)
CHLORIDE SERPL-SCNC: 101 MMOL/L (ref 100–111)
CO2 SERPL-SCNC: 36 MMOL/L (ref 21–32)
CREAT SERPL-MCNC: 0.41 MG/DL (ref 0.6–1.3)
DIFFERENTIAL METHOD BLD: ABNORMAL
EOSINOPHIL # BLD: 0 K/UL (ref 0–0.4)
EOSINOPHIL NFR BLD: 0 % (ref 0–5)
ERYTHROCYTE [DISTWIDTH] IN BLOOD BY AUTOMATED COUNT: 12.6 % (ref 11.6–14.5)
GLUCOSE BLD STRIP.AUTO-MCNC: 101 MG/DL (ref 70–110)
GLUCOSE BLD STRIP.AUTO-MCNC: 108 MG/DL (ref 70–110)
GLUCOSE BLD STRIP.AUTO-MCNC: 116 MG/DL (ref 70–110)
GLUCOSE BLD STRIP.AUTO-MCNC: 134 MG/DL (ref 70–110)
GLUCOSE SERPL-MCNC: 124 MG/DL (ref 74–99)
HCT VFR BLD AUTO: 39.4 % (ref 35–45)
HGB BLD-MCNC: 13 G/DL (ref 12–16)
LYMPHOCYTES # BLD: 0.6 K/UL (ref 0.9–3.6)
LYMPHOCYTES NFR BLD: 10 % (ref 21–52)
MCH RBC QN AUTO: 29 PG (ref 24–34)
MCHC RBC AUTO-ENTMCNC: 33 G/DL (ref 31–37)
MCV RBC AUTO: 87.9 FL (ref 74–97)
MONOCYTES # BLD: 0.4 K/UL (ref 0.05–1.2)
MONOCYTES NFR BLD: 7 % (ref 3–10)
NEUTS SEG # BLD: 5.1 K/UL (ref 1.8–8)
NEUTS SEG NFR BLD: 81 % (ref 40–73)
PHOSPHATE SERPL-MCNC: 2.9 MG/DL (ref 2.5–4.9)
PLATELET # BLD AUTO: 335 K/UL (ref 135–420)
PMV BLD AUTO: 10.3 FL (ref 9.2–11.8)
POTASSIUM SERPL-SCNC: 3.9 MMOL/L (ref 3.5–5.5)
RBC # BLD AUTO: 4.48 M/UL (ref 4.2–5.3)
SODIUM SERPL-SCNC: 140 MMOL/L (ref 136–145)
WBC # BLD AUTO: 6.3 K/UL (ref 4.6–13.2)

## 2021-04-30 PROCEDURE — 80048 BASIC METABOLIC PNL TOTAL CA: CPT

## 2021-04-30 PROCEDURE — 97110 THERAPEUTIC EXERCISES: CPT

## 2021-04-30 PROCEDURE — 97530 THERAPEUTIC ACTIVITIES: CPT

## 2021-04-30 PROCEDURE — 74011250636 HC RX REV CODE- 250/636: Performed by: INTERNAL MEDICINE

## 2021-04-30 PROCEDURE — 99233 SBSQ HOSP IP/OBS HIGH 50: CPT | Performed by: INTERNAL MEDICINE

## 2021-04-30 PROCEDURE — 74011250637 HC RX REV CODE- 250/637: Performed by: NURSE PRACTITIONER

## 2021-04-30 PROCEDURE — APPSS45 APP SPLIT SHARED TIME 31-45 MINUTES: Performed by: NURSE PRACTITIONER

## 2021-04-30 PROCEDURE — 99232 SBSQ HOSP IP/OBS MODERATE 35: CPT | Performed by: HOSPITALIST

## 2021-04-30 PROCEDURE — 36415 COLL VENOUS BLD VENIPUNCTURE: CPT

## 2021-04-30 PROCEDURE — 74011250637 HC RX REV CODE- 250/637: Performed by: INTERNAL MEDICINE

## 2021-04-30 PROCEDURE — 85025 COMPLETE CBC W/AUTO DIFF WBC: CPT

## 2021-04-30 PROCEDURE — 94640 AIRWAY INHALATION TREATMENT: CPT

## 2021-04-30 PROCEDURE — 74011250636 HC RX REV CODE- 250/636: Performed by: NURSE PRACTITIONER

## 2021-04-30 PROCEDURE — 82962 GLUCOSE BLOOD TEST: CPT

## 2021-04-30 PROCEDURE — 65660000000 HC RM CCU STEPDOWN

## 2021-04-30 PROCEDURE — 74011250637 HC RX REV CODE- 250/637: Performed by: HOSPITALIST

## 2021-04-30 PROCEDURE — 84100 ASSAY OF PHOSPHORUS: CPT

## 2021-04-30 RX ADMIN — ENOXAPARIN SODIUM 40 MG: 40 INJECTION SUBCUTANEOUS at 16:13

## 2021-04-30 RX ADMIN — IPRATROPIUM BROMIDE AND ALBUTEROL 1 PUFF: 20; 100 SPRAY, METERED RESPIRATORY (INHALATION) at 16:15

## 2021-04-30 RX ADMIN — GUAIFENESIN 600 MG: 600 TABLET, EXTENDED RELEASE ORAL at 08:06

## 2021-04-30 RX ADMIN — Medication 10 ML: at 06:00

## 2021-04-30 RX ADMIN — GUAIFENESIN 600 MG: 600 TABLET, EXTENDED RELEASE ORAL at 20:45

## 2021-04-30 RX ADMIN — Medication 10 ML: at 22:26

## 2021-04-30 RX ADMIN — LORAZEPAM 0.5 MG: 2 INJECTION INTRAMUSCULAR; INTRAVENOUS at 23:17

## 2021-04-30 RX ADMIN — IPRATROPIUM BROMIDE AND ALBUTEROL 1 PUFF: 20; 100 SPRAY, METERED RESPIRATORY (INHALATION) at 20:52

## 2021-04-30 RX ADMIN — SERTRALINE HYDROCHLORIDE 150 MG: 50 TABLET ORAL at 08:05

## 2021-04-30 RX ADMIN — IPRATROPIUM BROMIDE AND ALBUTEROL 1 PUFF: 20; 100 SPRAY, METERED RESPIRATORY (INHALATION) at 02:29

## 2021-04-30 RX ADMIN — IPRATROPIUM BROMIDE AND ALBUTEROL 1 PUFF: 20; 100 SPRAY, METERED RESPIRATORY (INHALATION) at 08:00

## 2021-04-30 RX ADMIN — HYDROXYCHLOROQUINE SULFATE 200 MG: 200 TABLET ORAL at 17:17

## 2021-04-30 RX ADMIN — Medication 10 ML: at 14:35

## 2021-04-30 RX ADMIN — Medication 500 MG: at 17:17

## 2021-04-30 RX ADMIN — Medication 12 MG: at 23:35

## 2021-04-30 RX ADMIN — CLONIDINE HYDROCHLORIDE 0.1 MG: 0.1 TABLET ORAL at 17:17

## 2021-04-30 RX ADMIN — HYDROXYCHLOROQUINE SULFATE 200 MG: 200 TABLET ORAL at 08:06

## 2021-04-30 RX ADMIN — OMEGA-3-ACID ETHYL ESTERS 1000 MG: 1 CAPSULE, LIQUID FILLED ORAL at 08:06

## 2021-04-30 RX ADMIN — CLONIDINE HYDROCHLORIDE 0.1 MG: 0.1 TABLET ORAL at 08:06

## 2021-04-30 RX ADMIN — Medication 500 MG: at 08:06

## 2021-04-30 RX ADMIN — DEXAMETHASONE SODIUM PHOSPHATE 10 MG: 4 INJECTION, SOLUTION INTRAMUSCULAR; INTRAVENOUS at 08:05

## 2021-04-30 RX ADMIN — DEXAMETHASONE SODIUM PHOSPHATE 10 MG: 4 INJECTION, SOLUTION INTRAMUSCULAR; INTRAVENOUS at 20:45

## 2021-04-30 NOTE — PROGRESS NOTES
[facility]. Pulmonary, Critical Care, and Sleep Medicine    Name: Aretha Louis MRN: [id number]   : 1945 Hospital: [facility]   Date: 2021  Admission Date: 2021     Chart and notes reviewed. Data reviewed. I have evaluated all findings. [x]I have reviewed the flowsheet and previous days notes. Interval HPI:Patient is a 78 y. o. female with a past medical history of HTN, lupus, cognitive impairment, knee replacement, and IVC filter replacement, who states she has had flulike symptoms for last week or more. Patient was diagnosed with Covid 11 days-2021 ago however did not show symptoms until 9 days ago.    Patient was tested because other members of the household were positive.  She began to develop myalgias, progressive cough and dyspnea.    Patient was monitoring herself closely at home and she was also using her old oxygen tank for last few days as her saturation was in the low 80s.  However, she started becoming more short winded and required more oxygen so they decided to came to the emergency room.    Patient denies any chest pain abdominal pain.    She had nausea and vomiting episode x1 yesterday as well as some loose bowel movement.    She states that after coming to the hospital this has subsided  Patient is currently on high flow nasal cannula.    Denies smoking cigarettes or drinking any alcohol.   Patient is followed by Dr. Dina Oshea in clinic -pt with nocturnal hypoxemia and pulmonary HTN possibly related to Lupus, followed by Dr. Marva Charles. Serologies were also positive for FL-3 ANCA. Pt refused split night study ordered to R/o HO/OHS as etiology of pulmonary HTN. Supplemental O2 HS was ordered which pt has refused to use and was asking Dr. Lainey Meyer to discontinue the O2 but eventually agreed to keep it    Subjective 21     Patient was seen and examined at bedside today.   She was sitting up on the bed today and states she feels mildly anxious today. Patient weaned on HFNC to 30 L 40% FiO2  She denies any SOB, chest pain, nausea, vomiting, abdominal pain. ROS:A comprehensive review of systems was negative. Patient Active Problem List   Diagnosis Code    Hypertension I10    Lupus ([facility] [address] M32.9    Arthritis M19.90    Neuropathy G62.9    Obesity, morbid ([facility] [address] E66.01    Lumbar spinal stenosis M48.061    HNP (herniated nucleus pulposus), lumbar M51.26    Pneumonia due to COVID-19 virus U07.1, J12.82    Hypoxia R09.02       Vital Signs:  Visit Vitals  BP (!) 157/78 (BP 1 Location: Left lower arm, BP Patient Position: At rest)   Pulse 65   Temp 97.6 °F (36.4 °C)   Resp 14   Ht 5' 2\" (1.575 m)   Wt 118.8 kg (262 lb)   SpO2 94%   Breastfeeding No   BMI 47.92 kg/m²       O2 Device: Hi flow nasal cannula   O2 Flow Rate (L/min): 30 l/min   Temp (24hrs), Av.5 °F (36.4 °C), Min:96.8 °F (36 °C), Max:98.1 °F (36.7 °C)       Intake/Output:   Last shift:      No intake/output data recorded.   Last 3 shifts:  1901 -  0700  In: 473 [P.O.:473]  Out: -   No intake or output data in the 24 hours ending 21 1352   Current Facility-Administered Medications   Medication Dose Route Frequency    [START ON 2021] dexamethasone (DECADRON) 4 mg/mL injection 10 mg  10 mg IntraVENous Q24H    ipratropium-albuterol (COMBIVENT RESPIMAT) 20 mcg-100 mcg inhalation spray  1 Puff Inhalation Q6H RT    sodium chloride (NS) flush 5-40 mL  5-40 mL IntraVENous Q8H    enoxaparin (LOVENOX) injection 40 mg  40 mg SubCUTAneous Q24H    guaiFENesin ER (MUCINEX) tablet 600 mg  600 mg Oral Q12H    insulin lispro (HUMALOG) injection   SubCUTAneous AC&HS    dexamethasone (DECADRON) 4 mg/mL injection 10 mg  10 mg IntraVENous Q12H    sertraline (ZOLOFT) tablet 150 mg  150 mg Oral DAILY    hydrOXYchloroQUINE (PLAQUENIL) tablet 200 mg  200 mg Oral BID    pantoprazole (PROTONIX) tablet 40 mg  40 mg Oral ACB    ascorbic acid (vitamin C) (VITAMIN C) tablet 500 mg  500 mg Oral BID    omega-3 acid ethyl esters (LOVAZA) capsule 1,000 mg  1 g Oral BID WITH MEALS    cloNIDine HCL (CATAPRES) tablet 0.1 mg  0.1 mg Oral BID         Telemetry: [x]Sinus []A-flutter []Paced    []A-fib []Multiple PVCs                  Physical Exam:      General:  Alert, cooperative, no distress, appears stated age. Head:  Normocephalic, without obvious abnormality, atraumatic. Eyes:  Conjunctivae/corneas clear. PERRL, EOMs intact. Nose: Nares normal. Septum midline. Mucosa normal. No drainage or sinus tenderness. Throat: Lips, mucosa, and tongue normal. Teeth and gums normal.   Neck: Supple, symmetrical, trachea midline, no adenopathy, thyroid: no enlargment/tenderness/nodules, no carotid bruit and no JVD. Back:   Symmetric, no curvature. ROM normal.   Lungs:   Clear to auscultation bilaterally. Chest wall:  No tenderness or deformity. Heart:  Regular rate and rhythm, S1, S2 normal, no murmur, click, rub or gallop. Abdomen:   Obese. Soft, non-tender. Bowel sounds normal. No masses,  No organomegaly. Extremities: Extremities normal, atraumatic, no cyanosis or edema. Pulses: 2+ and symmetric all extremities. Skin: Skin color, texture, turgor normal. No rashes or lesions   Lymph nodes: Cervical, supraclavicular, and axillary nodes normal.   Neurologic: Grossly nonfocal           DATA:  MAR reviewed and pertinent medications noted or modified as needed    Labs:  Recent Labs     04/30/21  0328   WBC 6.3   HGB 13.0   HCT 39.4        Recent Labs     04/30/21  0328 04/29/21  0315 04/28/21  0330    140 141   K 3.9 3.8 3.2*    103 104   CO2 36* 36* 33*   * 127* 127*   BUN 31* 31* 28*   CREA 0.41* 0.36* 0.39*   CA 8.5 8.7 8.5   MG  --  2.1 2.0   PHOS 2.9 2.8 2.4*     No results for input(s): PH, PCO2, PO2, HCO3, FIO2 in the last 72 hours.   No results for input(s): FIO2I, IFO2, HCO3I, IHCO3, HCOPOC, PCO2I, PCOPOC, IPHI, PHI, PHPOC, PO2I, PO2POC in the last 72 hours. No lab exists for component: IPOC2    Imaging:  [x]   I have personally reviewed the patients radiographs and reports  XR Results (most recent):  Results from Hospital Encounter encounter on 04/25/21   XR CHEST SNGL V    Narrative CHEST RADIOGRAPH-1 VIEW    INDICATION: Cough    COMPARISON: Chest x-ray 2/28/2020    FINDINGS: Cardiac silhouette is stable. Atherosclerosis noted. Mild diffuse  interstitial prominence. Bilateral mid to lower lung patchy opacities. Probable  small bilateral pleural effusions. Lungs are hypoinflated. No evidence for  pneumothorax. No acute osseous finding. Impression 1. Mild interstitial edema versus interstitial infiltrate. 2. Small bilateral pleural effusions with overlying mid to lower lung patchy  opacities. These could represent atelectasis but superimposed infiltrate/edema  not excluded. CT Results (most recent):  Results from Hospital Encounter encounter on 02/27/20   CTA CHEST W OR W WO CONT    Narrative EXAM:  CTA Chest with Contrast         CLINICAL INDICATION:  Shortness of breath and low O2 saturation. COMPARISON:  No prior CTA chest.  No VQ scan. Chest x-ray dated 02/24/20. CT  abdomen-pelvis 02/12/09. TECHNIQUE:    - Helical volumetric sections of the chest are obtained with CT pulmonary  angiogram protocol. Subsequently, sagittal and coronal multiplanar  reconstruction images are obtained.   Maximum intensity projection images are  generated to better delineate the pulmonary vasculature, differentiate between  the pulmonary arteries and veins and to increase sensitivity to pulmonary  emboli.    - IV contrast 100 mL Isovue-370.  - Radiation dose optimization techniques are utilized as appropriate to the  exam, with combination of automated exposure control, adjustment of the mA  and/or kV according to patient's size (Including appropriate matching for  site-specific examinations), or use of iterative reconstruction technique. FINDINGS:     Pulmonary Arteries:  No convincing evidence for pulmonary embolism is detected. Lung, Pleura, Airways: Atelectatic changes at the lung bases bilaterally. No  dominant mass or discrete suspicious lung nodules. No definite airspace  opacities. Mediastinum, Clotilde:  No adenopathy. Aorta:  No evidence of aortic dissection. Apparent dilation of the ascending  aorta measuring about 4.3 cm. Base of Neck:  No acute findings. Axillae:  Unremarkable. Esophagus:  Unremarkable. Abdomen:  Both kidneys demonstrate multiple exophytic hypodensities. .  The  largest of the exophytic hypodensity is observed in the left kidney mid  interpolar region with an internal septation, measuring up to about 4.4 x 3.9  cm. Skeletal Structures:  No acute findings. Impression IMPRESSION:         1. No convincing evidence of pulmonary embolism. 2.  Atelectatic changes in both lungs especially at the lung bases. 3.  Multiple renal hypodensities. At least one of the renal cyst appears to  show a nonsimple feature, i.e. septation, not as apparent on the previous study. Recommend follow-up dedicated CT of the abdomen without and with contrast for  further delineation. 4.  Mild dilation of the ascending aorta.               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,-Lupus on Plaquenil as outpatient  · Suspected obstructive sleep apnea/OHS on CPAP   · History of IVC filter placement     Patient Active Problem List   Diagnosis Code    Hypertension I10    Lupus ([facility] [address] M32.9    Arthritis M19.90    Neuropathy G62.9    Obesity, morbid ([facility] [address] E66.01    Lumbar spinal stenosis M48.061    HNP (herniated nucleus pulposus), lumbar M51.26    Pneumonia due to COVID-19 virus U07.1, J12.82    Hypoxia R09.02        RECOMMENDATIONS:   · Oxygen-continue supplementation to maintain saturation more than 92%.  Currently needing high flow 30 L 40%  · Discussed prone positioning with patient but she does not think she can do it because of her body habitus  · BIPAP/CPAP at nighttime would be helpful  · Bronchial hygiene protocol  · Bronchodilators-continue Combivent Respimat  · Steroids-continue Decadron for treatment of COVID-19 pneumonia-completed 5 days of 10 mg twice daily now down to 10 mg daily dosing  · Can continue Plaquenil,  · Aspiration precautions  · Adjuvant therapy and follow-up of inflammatory markers per ID  · Need for further diagnostics-echocardiogram  · Out patient testing- PFT, 6 min walk, Polysomnogram  · Assess home Oxygen needs at discharge  · OT, PT, OOB and ambulate  · Healthy weight  · Will Follow  · DVT, PUD prophylaxis       Abi Bear MD  04/30/21  Pulmonary, Critical Care Medicine  [facility]

## 2021-04-30 NOTE — PROGRESS NOTES
Problem: Risk for Spread of Infection  Goal: Prevent transmission of infectious organism to others  Description: Prevent the transmission of infectious organisms to other patients, staff members, and visitors. Outcome: Progressing Towards Goal     Problem: Patient Education:  Go to Education Activity  Goal: Patient/Family Education  Outcome: Progressing Towards Goal     Problem: Falls - Risk of  Goal: *Absence of Falls  Description: Document Cheryl Spare Fall Risk and appropriate interventions in the flowsheet. Outcome: Progressing Towards Goal  Note: Fall Risk Interventions:  Mobility Interventions: Bed/chair exit alarm, Patient to call before getting OOB         Medication Interventions: Bed/chair exit alarm, Patient to call before getting OOB    Elimination Interventions: Bed/chair exit alarm, Call light in reach, Toilet paper/wipes in reach              Problem: Patient Education: Go to Patient Education Activity  Goal: Patient/Family Education  Outcome: Progressing Towards Goal     Problem: Pressure Injury - Risk of  Goal: *Prevention of pressure injury  Description: Document Francisco Scale and appropriate interventions in the flowsheet.   Outcome: Progressing Towards Goal  Note: Pressure Injury Interventions:  Sensory Interventions: Assess changes in LOC, Check visual cues for pain, Minimize linen layers    Moisture Interventions: Apply protective barrier, creams and emollients    Activity Interventions: Increase time out of bed    Mobility Interventions: Assess need for specialty bed, HOB 30 degrees or less    Nutrition Interventions: Document food/fluid/supplement intake    Friction and Shear Interventions: Apply protective barrier, creams and emollients, HOB 30 degrees or less, Minimize layers                Problem: Patient Education: Go to Patient Education Activity  Goal: Patient/Family Education  Outcome: Progressing Towards Goal     Problem: Airway Clearance - Ineffective  Goal: Achieve or maintain patent airway  Outcome: Progressing Towards Goal     Problem: Gas Exchange - Impaired  Goal: Absence of hypoxia  Outcome: Progressing Towards Goal  Goal: Promote optimal lung function  Outcome: Progressing Towards Goal     Problem: Breathing Pattern - Ineffective  Goal: Ability to achieve and maintain a regular respiratory rate  Outcome: Progressing Towards Goal     Problem:  Body Temperature -  Risk of, Imbalanced  Goal: Ability to maintain a body temperature within defined limits  Outcome: Progressing Towards Goal  Goal: Will regain or maintain usual level of consciousness  Outcome: Progressing Towards Goal  Goal: Complications related to the disease process, condition or treatment will be avoided or minimized  Outcome: Progressing Towards Goal     Problem: Isolation Precautions - Risk of Spread of Infection  Goal: Prevent transmission of infectious organism to others  Outcome: Progressing Towards Goal     Problem: Nutrition Deficits  Goal: Optimize nutrtional status  Outcome: Progressing Towards Goal     Problem: Risk for Fluid Volume Deficit  Goal: Maintain normal heart rhythm  Outcome: Progressing Towards Goal  Goal: Maintain absence of muscle cramping  Outcome: Progressing Towards Goal  Goal: Maintain normal serum potassium, sodium, calcium, phosphorus, and pH  Outcome: Progressing Towards Goal     Problem: Loneliness or Risk for Loneliness  Goal: Demonstrate positive use of time alone when socialization is not possible  Outcome: Progressing Towards Goal     Problem: Fatigue  Goal: Verbalize increase energy and improved vitality  Outcome: Progressing Towards Goal     Problem: Patient Education: Go to Patient Education Activity  Goal: Patient/Family Education  Outcome: Progressing Towards Goal     Problem: Patient Education: Go to Patient Education Activity  Goal: Patient/Family Education  Outcome: Progressing Towards Goal     Problem: Pneumonia: Day 1  Goal: Off Pathway (Use only if patient is Off Pathway)  Outcome: Progressing Towards Goal  Goal: Activity/Safety  Outcome: Progressing Towards Goal  Goal: Consults, if ordered  Outcome: Progressing Towards Goal  Goal: Diagnostic Test/Procedures  Outcome: Progressing Towards Goal  Goal: Nutrition/Diet  Outcome: Progressing Towards Goal  Goal: Medications  Outcome: Progressing Towards Goal  Goal: Respiratory  Outcome: Progressing Towards Goal  Goal: Treatments/Interventions/Procedures  Outcome: Progressing Towards Goal  Goal: Psychosocial  Outcome: Progressing Towards Goal  Goal: *Oxygen saturation within defined limits  Outcome: Progressing Towards Goal  Goal: *Influenza vaccine administered (October-March)  Outcome: Progressing Towards Goal  Goal: *Pneumoccocal vaccine administered  Outcome: Progressing Towards Goal  Goal: *Hemodynamically stable  Outcome: Progressing Towards Goal  Goal: *Demonstrates progressive activity  Outcome: Progressing Towards Goal  Goal: *Tolerating diet  Outcome: Progressing Towards Goal     Problem: Pneumonia: Day 2  Goal: Off Pathway (Use only if patient is Off Pathway)  Outcome: Progressing Towards Goal  Goal: Activity/Safety  Outcome: Progressing Towards Goal  Goal: Consults, if ordered  Outcome: Progressing Towards Goal  Goal: Diagnostic Test/Procedures  Outcome: Progressing Towards Goal  Goal: Nutrition/Diet  Outcome: Progressing Towards Goal  Goal: Discharge Planning  Outcome: Progressing Towards Goal  Goal: Medications  Outcome: Progressing Towards Goal  Goal: Respiratory  Outcome: Progressing Towards Goal  Goal: Treatments/Interventions/Procedures  Outcome: Progressing Towards Goal  Goal: Psychosocial  Outcome: Progressing Towards Goal  Goal: *Oxygen saturation within defined limits  Outcome: Progressing Towards Goal  Goal: *Hemodynamically stable  Outcome: Progressing Towards Goal  Goal: *Demonstrates progressive activity  Outcome: Progressing Towards Goal  Goal: *Tolerating diet  Outcome: Progressing Towards Goal  Goal: *Optimal pain control at patient's stated goal  Outcome: Progressing Towards Goal     Problem: Pneumonia: Day 3  Goal: Off Pathway (Use only if patient is Off Pathway)  Outcome: Progressing Towards Goal  Goal: Activity/Safety  Outcome: Progressing Towards Goal  Goal: Consults, if ordered  Outcome: Progressing Towards Goal  Goal: Diagnostic Test/Procedures  Outcome: Progressing Towards Goal  Goal: Nutrition/Diet  Outcome: Progressing Towards Goal  Goal: Discharge Planning  Outcome: Progressing Towards Goal  Goal: Medications  Outcome: Progressing Towards Goal  Goal: Respiratory  Outcome: Progressing Towards Goal  Goal: Treatments/Interventions/Procedures  Outcome: Progressing Towards Goal  Goal: Psychosocial  Outcome: Progressing Towards Goal  Goal: *Oxygen saturation within defined limits  Outcome: Progressing Towards Goal  Goal: *Hemodynamically stable  Outcome: Progressing Towards Goal  Goal: *Demonstrates progressive activity  Outcome: Progressing Towards Goal  Goal: *Tolerating diet  Outcome: Progressing Towards Goal  Goal: *Describes available resources and support systems  Outcome: Progressing Towards Goal  Goal: *Optimal pain control at patient's stated goal  Outcome: Progressing Towards Goal     Problem: Pneumonia: Day 4  Goal: Off Pathway (Use only if patient is Off Pathway)  Outcome: Progressing Towards Goal  Goal: Activity/Safety  Outcome: Progressing Towards Goal  Goal: Nutrition/Diet  Outcome: Progressing Towards Goal  Goal: Discharge Planning  Outcome: Progressing Towards Goal  Goal: Medications  Outcome: Progressing Towards Goal  Goal: Respiratory  Outcome: Progressing Towards Goal  Goal: Treatments/Interventions/Procedures  Outcome: Progressing Towards Goal  Goal: Psychosocial  Outcome: Progressing Towards Goal     Problem: Pneumonia: Discharge Outcomes  Goal: *Demonstrates progressive activity  Outcome: Progressing Towards Goal  Goal: *Describes follow-up/return visits to physicians  Outcome: Progressing Towards Goal  Goal: *Tolerating diet  Outcome: Progressing Towards Goal  Goal: *Verbalizes name, dosage, time, side effects, and number of days to continue medications  Outcome: Progressing Towards Goal  Goal: *Influenza immunization  Outcome: Progressing Towards Goal  Goal: *Pneumococcal immunization  Outcome: Progressing Towards Goal  Goal: *Respiratory status at baseline  Outcome: Progressing Towards Goal  Goal: *Vital signs within defined limits  Outcome: Progressing Towards Goal  Goal: *Describes available resources and support systems  Outcome: Progressing Towards Goal  Goal: *Optimal pain control at patient's stated goal  Outcome: Progressing Towards Goal

## 2021-04-30 NOTE — PROGRESS NOTES
04/30/21 1055   Oxygen Therapy   O2 Sat (%) 90 %   O2 Device Hi flow nasal cannula   O2 Flow Rate (L/min) 30 l/min   O2 Temperature 333 °F (167.2 °C)   FIO2 (%) 40 %

## 2021-04-30 NOTE — PROGRESS NOTES
Problem: Mobility Impaired (Adult and Pediatric)  Goal: *Acute Goals and Plan of Care (Insert Text)  Description: Physical Therapy Goals  Initiated 4/26/2021 and to be accomplished within 7 day(s)  1. Patient will move from supine to sit and sit to supine  in bed with modified independence. 2.  Patient will transfer from bed to chair and chair to bed with modified independence using the least restrictive device. 3.  Patient will perform sit to stand with modified independence. 4.  Patient will ambulate with modified independence for 100 feet with the least restrictive device. PLOF: Patient reports she was independent with self care and functional mobility using RW. Outcome: Progressing Towards Goal     PHYSICAL THERAPY TREATMENT    Patient: Herlinda Posada (53 y.o. female)  Date: 4/30/2021  Diagnosis: Pneumonia due to COVID-19 virus [U07.1, J12.82]  Hypoxia [R09.02] <principal problem not specified>       Precautions: Fall, Skin    ASSESSMENT:  RN cleared for PT to work with pt. Pt found long-sitting in bed, on 30l HFNC, in NAD, observed to look tired but willing to work with PT. She reports fatigue. Pt slowly performed supine-sit with Daisy and additional time. She required frequent rest breaks throughout entire PT session. SPO2 down to 88% while sitting up but up to 91% after PLB. She stood with Daisy and RW and amb 5 ft of side steps in a slow manner. Once sitting back on EOB,she performed exercises per flow sheet. Pt stated she needed to use the [facility]. Pt up standing again with HHA and Daisy and SPT to sit on BSC. RN in room briefly to give pt medications. Pt on BSC for ~8 minutes with successful BM and urine. She stood and required dependence for pericare. Spo2 down to 82% but up to 88% by end of transfer. She returned back sitting on EOB, unsteady without UE support. She scooted up along EOB and returned back supine with Daisy.  Pt was left with HOB elevated, call bell nearby, on 30L HFNC, in NAD.   Progression toward goals:   []      Improving appropriately and progressing toward goals  [x]      Improving slowly and progressing toward goals  []      Not making progress toward goals and plan of care will be adjusted     PLAN:  Patient continues to benefit from skilled intervention to address the above impairments. Continue treatment per established plan of care. Discharge Recommendations:  Rehab  Further Equipment Recommendations for Discharge:  N/A     SUBJECTIVE:   Patient stated I can probably do it better myself.     OBJECTIVE DATA SUMMARY:   Critical Behavior:  Neurologic State: Alert  Orientation Level: Oriented X4  Cognition: Follows commands  Safety/Judgement: Fall prevention  Functional Mobility Training:  Bed Mobility:     Supine to Sit: Minimum assistance; Additional time  Sit to Supine: Minimum assistance; Additional time    Transfers:  Sit to Stand: Minimum assistance  Stand to Sit: Minimum assistance       Balance:  Sitting: Intact; With support  Standing: Impaired; With support  Standing - Static: Fair  Standing - Dynamic : Fair;Occasional   Ambulation/Gait Training:  Distance (ft): 5 Feet (ft)  Assistive Device: Walker, rolling  Ambulation - Level of Assistance: Minimal assistance        Gait Abnormalities: Decreased step clearance        Base of Support: Widened;Center of gravity altered        Step Length: Right shortened;Left shortened   Therapeutic Exercises:   Pt performed exercises per flow sheet sitting on EOB      EXERCISE   Sets   Reps   Active Active Assist   Passive Self ROM   Comments   Ankle Pumps    [] [] [] []    Quad Sets/Glut Sets    [] [] [] [] Hold for 5 secs   Hamstring Sets   [] [] [] []    Short Arc Quads   [] [] [] []    Heel Slides   [] [] [] []    Straight Leg Raises   [] [] [] []    Hip Add   [] [] [] [] Hold for 5 secs, w/ pillow squeeze   Long Arc Quads 2 15 [x] [] [] []    Seated Marching 2 15 [] [] [] []    Standing Marching   [] [] [] []       [] [] [] [] Pain:  Pain level pre-treatment: 0/10  Pain level post-treatment: 0/10   Pain Intervention(s): Medication (see MAR); Rest, Ice, Repositioning   Response to intervention: Nurse notified, See doc flow    Activity Tolerance:   Pt tolerated mobility fair  Please refer to the flowsheet for vital signs taken during this treatment. After treatment:   [] Patient left in no apparent distress sitting up in chair  [x] Patient left in no apparent distress in bed  [x] Call bell left within reach  [x] Nursing notified  [] Caregiver present  [] Bed alarm activated  [] SCDs applied      COMMUNICATION/EDUCATION:   [x]         Role of Physical Therapy in the acute care setting. [x]         Fall prevention education was provided and the patient/caregiver indicated understanding. [x]         Patient/family have participated as able in working toward goals and plan of care. []         Patient/family agree to work toward stated goals and plan of care. []         Patient understands intent and goals of therapy, but is neutral about his/her participation.   []         Patient is unable to participate in stated goals/plan of care: ongoing with therapy staff.  []         Other:        Michaela Eli   Time Calculation: 44 mins

## 2021-04-30 NOTE — ROUTINE PROCESS
1905: Verbal shift change report given to Tee Jensen RN (oncoming nurse) by Bethany Martin RN (offgoing nurse). Report included the following information SBAR, Kardex, ED Summary and Cardiac Rhythm Sinus Claria Fonder to NSR.    0705:  Verbal shift change report given to Naila Leyva and Carlo Lee RN (oncoming nurse) by Tee Jensen RN (offgoing nurse). Report included the following information SBAR, Kardex, ED Summary and Cardiac Rhythm Sinus Claria Fonder to NSR.

## 2021-04-30 NOTE — ROUTINE PROCESS
Bedside and Verbal shift change report given to Shea Figueroa RN (oncoming nurse) by Arnaldo Mota RN (offgoing nurse). Report included the following information SBAR, Kardex, MAR, Med Rec Status and Cardiac Rhythm NSR.

## 2021-04-30 NOTE — PROGRESS NOTES
04/30/21 0232   Oxygen Therapy   O2 Sat (%) 94 %   Pulse via Oximetry 64 beats per minute   O2 Device Heated; Hi flow nasal cannula   O2 Flow Rate (L/min) 30 l/min   O2 Temperature 91.4 °F (33 °C)   FIO2 (%) 35 %

## 2021-04-30 NOTE — PROGRESS NOTES
04/29/21 2242   Oxygen Therapy   O2 Sat (%) 100 %   Pulse via Oximetry 54 beats per minute   O2 Device Heated; Hi flow nasal cannula  (vapotherm)   O2 Flow Rate (L/min) 30 l/min   O2 Temperature 91.4 °F (33 °C)   FIO2 (%) 40 %     Weaned O2 to 40%, will continue to wean and monitor

## 2021-04-30 NOTE — PROGRESS NOTES
[facility]  Progress Note    Patient: Marin Lawson Age: 67 y.o. : 1945 MR#: [id number] SSN: [id number]  Date: 2021     Subjective:   Alert and oriented x3-4. On HFNC 30L. Some SOB while at rest. Non productive cough. Some relief with her symptoms. Assessment/Plan:   1. COVID-19 Pneumonia   2. Acute respiratory failure with hypoxia secondary to COVID-19 pneumonia   3. History of nocturnal hypoxemia  4. Pulmonary HTN  5. HO on CPAP  6. History of IVC filter    Plan  1. Continue to monitor inflammatory markers. 2. Monitor oxygen demand. Currently on HFNC 40L. Continue decadron. Continue bronchodilators. abx therapy completed and discontinued on   3. Monitor metabolic panel and renal function   4. PT/OT eval and treat   5. Assess for home oxygen needs  6. Updated daughter Bautista Hair [phone] on plan of care     Additional Notes:      Case discussed with:  [x]Patient  [x]Family  [x]Nursing  []Case Management  DVT Prophylaxis:  [x]Lovenox  []Hep SQ  []SCDs  []Coumadin   []On Heparin gtt    Objective:   VS:   Visit Vitals  BP (!) 157/78 (BP 1 Location: Left lower arm, BP Patient Position: At rest)   Pulse (!) 56   Temp 97.6 °F (36.4 °C)   Resp 22   Ht 5' 2\" (1.575 m)   Wt 118.8 kg (262 lb)   SpO2 90%   Breastfeeding No   BMI 47.92 kg/m²      Tmax/24hrs: Temp (24hrs), Av.5 °F (36.4 °C), Min:96.8 °F (36 °C), Max:98.1 °F (36.7 °C)  No intake or output data in the 24 hours ending 21 1140    General:  Alert, NAD  Cardiovascular:  RRR  Pulmonary:  BLL diminished.  BUL clear   GI:  +BS in all four quadrants, soft, non-tender  Extremities:  No edema; 2+ dorsalis pedis pulses bilaterally  Neuro: alert and oriented x4         Labs:    Recent Results (from the past 24 hour(s))   GLUCOSE, POC    Collection Time: 21  4:05 PM   Result Value Ref Range    Glucose (POC) 127 (H) 70 - 110 mg/dL   GLUCOSE, POC    Collection Time: 21  9:14 PM Result Value Ref Range    Glucose (POC) 108 70 - 867 mg/dL   METABOLIC PANEL, BASIC    Collection Time: 04/30/21  3:28 AM   Result Value Ref Range    Sodium 140 136 - 145 mmol/L    Potassium 3.9 3.5 - 5.5 mmol/L    Chloride 101 100 - 111 mmol/L    CO2 36 (H) 21 - 32 mmol/L    Anion gap 3 3.0 - 18 mmol/L    Glucose 124 (H) 74 - 99 mg/dL    BUN 31 (H) 7.0 - 18 MG/DL    Creatinine 0.41 (L) 0.6 - 1.3 MG/DL    BUN/Creatinine ratio 76 (H) 12 - 20      GFR est AA >60 >60 ml/min/1.73m2    GFR est non-AA >60 >60 ml/min/1.73m2    Calcium 8.5 8.5 - 10.1 MG/DL   PHOSPHORUS    Collection Time: 04/30/21  3:28 AM   Result Value Ref Range    Phosphorus 2.9 2.5 - 4.9 MG/DL   CBC WITH AUTOMATED DIFF    Collection Time: 04/30/21  3:28 AM   Result Value Ref Range    WBC 6.3 4.6 - 13.2 K/uL    RBC 4.48 4.20 - 5.30 M/uL    HGB 13.0 12.0 - 16.0 g/dL    HCT 39.4 35.0 - 45.0 %    MCV 87.9 74.0 - 97.0 FL    MCH 29.0 24.0 - 34.0 PG    MCHC 33.0 31.0 - 37.0 g/dL    RDW 12.6 11.6 - 14.5 %    PLATELET 180 007 - 620 K/uL    MPV 10.3 9.2 - 11.8 FL    NEUTROPHILS 81 (H) 40 - 73 %    LYMPHOCYTES 10 (L) 21 - 52 %    MONOCYTES 7 3 - 10 %    EOSINOPHILS 0 0 - 5 %    BASOPHILS 0 0 - 2 %    ABS. NEUTROPHILS 5.1 1.8 - 8.0 K/UL    ABS. LYMPHOCYTES 0.6 (L) 0.9 - 3.6 K/UL    ABS. MONOCYTES 0.4 0.05 - 1.2 K/UL    ABS. EOSINOPHILS 0.0 0.0 - 0.4 K/UL    ABS.  BASOPHILS 0.0 0.0 - 0.1 K/UL    DF AUTOMATED     GLUCOSE, POC    Collection Time: 04/30/21  7:58 AM   Result Value Ref Range    Glucose (POC) 108 70 - 110 mg/dL   GLUCOSE, POC    Collection Time: 04/30/21 11:33 AM   Result Value Ref Range    Glucose (POC) 101 70 - 110 mg/dL       Signed By: Ludin Cyr NP     April 30, 2021

## 2021-05-01 LAB
ALBUMIN SERPL-MCNC: 2.6 G/DL (ref 3.4–5)
ALBUMIN/GLOB SERPL: 0.9 {RATIO} (ref 0.8–1.7)
ALP SERPL-CCNC: 67 U/L (ref 45–117)
ALT SERPL-CCNC: 27 U/L (ref 13–56)
ANION GAP SERPL CALC-SCNC: 3 MMOL/L (ref 3–18)
AST SERPL-CCNC: 19 U/L (ref 10–38)
BACTERIA SPEC CULT: NORMAL
BACTERIA SPEC CULT: NORMAL
BASOPHILS # BLD: 0 K/UL (ref 0–0.1)
BASOPHILS NFR BLD: 0 % (ref 0–2)
BILIRUB SERPL-MCNC: 0.4 MG/DL (ref 0.2–1)
BUN SERPL-MCNC: 30 MG/DL (ref 7–18)
BUN/CREAT SERPL: 71 (ref 12–20)
CALCIUM SERPL-MCNC: 8.6 MG/DL (ref 8.5–10.1)
CHLORIDE SERPL-SCNC: 102 MMOL/L (ref 100–111)
CO2 SERPL-SCNC: 34 MMOL/L (ref 21–32)
CREAT SERPL-MCNC: 0.42 MG/DL (ref 0.6–1.3)
CRP SERPL-MCNC: 0.9 MG/DL (ref 0–0.3)
DIFFERENTIAL METHOD BLD: ABNORMAL
EOSINOPHIL # BLD: 0 K/UL (ref 0–0.4)
EOSINOPHIL NFR BLD: 0 % (ref 0–5)
ERYTHROCYTE [DISTWIDTH] IN BLOOD BY AUTOMATED COUNT: 12.6 % (ref 11.6–14.5)
GLOBULIN SER CALC-MCNC: 2.8 G/DL (ref 2–4)
GLUCOSE BLD STRIP.AUTO-MCNC: 103 MG/DL (ref 70–110)
GLUCOSE BLD STRIP.AUTO-MCNC: 75 MG/DL (ref 70–110)
GLUCOSE BLD STRIP.AUTO-MCNC: 86 MG/DL (ref 70–110)
GLUCOSE BLD STRIP.AUTO-MCNC: 93 MG/DL (ref 70–110)
GLUCOSE SERPL-MCNC: 135 MG/DL (ref 74–99)
HCT VFR BLD AUTO: 39.5 % (ref 35–45)
HGB BLD-MCNC: 13 G/DL (ref 12–16)
LDH SERPL L TO P-CCNC: 249 U/L (ref 81–234)
LYMPHOCYTES # BLD: 0.6 K/UL (ref 0.9–3.6)
LYMPHOCYTES NFR BLD: 9 % (ref 21–52)
MAGNESIUM SERPL-MCNC: 2 MG/DL (ref 1.6–2.6)
MCH RBC QN AUTO: 28.8 PG (ref 24–34)
MCHC RBC AUTO-ENTMCNC: 32.9 G/DL (ref 31–37)
MCV RBC AUTO: 87.4 FL (ref 74–97)
MONOCYTES # BLD: 0.5 K/UL (ref 0.05–1.2)
MONOCYTES NFR BLD: 7 % (ref 3–10)
NEUTS SEG # BLD: 6 K/UL (ref 1.8–8)
NEUTS SEG NFR BLD: 82 % (ref 40–73)
PHOSPHATE SERPL-MCNC: 3.6 MG/DL (ref 2.5–4.9)
PLATELET # BLD AUTO: 342 K/UL (ref 135–420)
PMV BLD AUTO: 10.4 FL (ref 9.2–11.8)
POTASSIUM SERPL-SCNC: 4.7 MMOL/L (ref 3.5–5.5)
PROCALCITONIN SERPL-MCNC: <0.05 NG/ML
PROT SERPL-MCNC: 5.4 G/DL (ref 6.4–8.2)
RBC # BLD AUTO: 4.52 M/UL (ref 4.2–5.3)
SERVICE CMNT-IMP: NORMAL
SERVICE CMNT-IMP: NORMAL
SODIUM SERPL-SCNC: 139 MMOL/L (ref 136–145)
WBC # BLD AUTO: 7.3 K/UL (ref 4.6–13.2)

## 2021-05-01 PROCEDURE — 99232 SBSQ HOSP IP/OBS MODERATE 35: CPT | Performed by: INTERNAL MEDICINE

## 2021-05-01 PROCEDURE — 94762 N-INVAS EAR/PLS OXIMTRY CONT: CPT

## 2021-05-01 PROCEDURE — APPSS45 APP SPLIT SHARED TIME 31-45 MINUTES: Performed by: NURSE PRACTITIONER

## 2021-05-01 PROCEDURE — 85025 COMPLETE CBC W/AUTO DIFF WBC: CPT

## 2021-05-01 PROCEDURE — 74011250637 HC RX REV CODE- 250/637: Performed by: HOSPITALIST

## 2021-05-01 PROCEDURE — 74011250636 HC RX REV CODE- 250/636: Performed by: INTERNAL MEDICINE

## 2021-05-01 PROCEDURE — 84145 PROCALCITONIN (PCT): CPT

## 2021-05-01 PROCEDURE — 83735 ASSAY OF MAGNESIUM: CPT

## 2021-05-01 PROCEDURE — 80053 COMPREHEN METABOLIC PANEL: CPT

## 2021-05-01 PROCEDURE — 83615 LACTATE (LD) (LDH) ENZYME: CPT

## 2021-05-01 PROCEDURE — 74011250637 HC RX REV CODE- 250/637: Performed by: NURSE PRACTITIONER

## 2021-05-01 PROCEDURE — 84100 ASSAY OF PHOSPHORUS: CPT

## 2021-05-01 PROCEDURE — 77010033711 HC HIGH FLOW OXYGEN

## 2021-05-01 PROCEDURE — 36415 COLL VENOUS BLD VENIPUNCTURE: CPT

## 2021-05-01 PROCEDURE — 86140 C-REACTIVE PROTEIN: CPT

## 2021-05-01 PROCEDURE — 74011250637 HC RX REV CODE- 250/637: Performed by: INTERNAL MEDICINE

## 2021-05-01 PROCEDURE — 74011250636 HC RX REV CODE- 250/636: Performed by: NURSE PRACTITIONER

## 2021-05-01 PROCEDURE — 99232 SBSQ HOSP IP/OBS MODERATE 35: CPT | Performed by: HOSPITALIST

## 2021-05-01 PROCEDURE — 94640 AIRWAY INHALATION TREATMENT: CPT

## 2021-05-01 PROCEDURE — 65660000000 HC RM CCU STEPDOWN

## 2021-05-01 PROCEDURE — 82962 GLUCOSE BLOOD TEST: CPT

## 2021-05-01 RX ADMIN — DEXAMETHASONE SODIUM PHOSPHATE 10 MG: 4 INJECTION, SOLUTION INTRAMUSCULAR; INTRAVENOUS at 20:18

## 2021-05-01 RX ADMIN — GUAIFENESIN 600 MG: 600 TABLET, EXTENDED RELEASE ORAL at 20:18

## 2021-05-01 RX ADMIN — Medication 10 ML: at 06:19

## 2021-05-01 RX ADMIN — IPRATROPIUM BROMIDE AND ALBUTEROL 1 PUFF: 20; 100 SPRAY, METERED RESPIRATORY (INHALATION) at 17:09

## 2021-05-01 RX ADMIN — PANTOPRAZOLE SODIUM 40 MG: 40 TABLET, DELAYED RELEASE ORAL at 08:41

## 2021-05-01 RX ADMIN — Medication 12 MG: at 23:48

## 2021-05-01 RX ADMIN — Medication 500 MG: at 08:41

## 2021-05-01 RX ADMIN — ENOXAPARIN SODIUM 40 MG: 40 INJECTION SUBCUTANEOUS at 17:09

## 2021-05-01 RX ADMIN — IPRATROPIUM BROMIDE AND ALBUTEROL 1 PUFF: 20; 100 SPRAY, METERED RESPIRATORY (INHALATION) at 20:19

## 2021-05-01 RX ADMIN — SERTRALINE HYDROCHLORIDE 150 MG: 50 TABLET ORAL at 08:41

## 2021-05-01 RX ADMIN — IPRATROPIUM BROMIDE AND ALBUTEROL 1 PUFF: 20; 100 SPRAY, METERED RESPIRATORY (INHALATION) at 01:06

## 2021-05-01 RX ADMIN — Medication 10 ML: at 21:10

## 2021-05-01 RX ADMIN — GUAIFENESIN 600 MG: 600 TABLET, EXTENDED RELEASE ORAL at 08:41

## 2021-05-01 RX ADMIN — HYDROXYCHLOROQUINE SULFATE 200 MG: 200 TABLET ORAL at 08:41

## 2021-05-01 RX ADMIN — IPRATROPIUM BROMIDE AND ALBUTEROL 1 PUFF: 20; 100 SPRAY, METERED RESPIRATORY (INHALATION) at 08:00

## 2021-05-01 RX ADMIN — Medication 10 ML: at 14:00

## 2021-05-01 RX ADMIN — CLONIDINE HYDROCHLORIDE 0.1 MG: 0.1 TABLET ORAL at 17:09

## 2021-05-01 RX ADMIN — Medication 500 MG: at 17:09

## 2021-05-01 RX ADMIN — HYDROXYCHLOROQUINE SULFATE 200 MG: 200 TABLET ORAL at 17:10

## 2021-05-01 RX ADMIN — CLONIDINE HYDROCHLORIDE 0.1 MG: 0.1 TABLET ORAL at 08:42

## 2021-05-01 NOTE — PROGRESS NOTES
MEWS 3: No action required at this time. Will continue to monitor patient and recheck VS within 1 hour.      05/01/21 1933   Vital Signs   Temp 97.6 °F (36.4 °C)   Temp Source Axillary   Pulse (Heart Rate) 67   Heart Rate Source Monitor   Resp Rate 22   O2 Sat (%) 95 %   Level of Consciousness Alert (0)   BP 92/66   MAP (Calculated) 75   BP 1 Method Automatic   BP 1 Location Left lower arm   BP Patient Position At rest   MEWS Score 3     2020 MEWS 2:  Visit Vitals  /74 (BP 1 Location: Left lower arm, BP Patient Position: At rest)   Pulse 71   Temp 97.4 °F (36.3 °C)   Resp 23   Ht 5' 2\" (1.575 m)   Wt 120.1 kg (264 lb 12.8 oz)   SpO2 96%   Breastfeeding No   BMI 48.43 kg/m²

## 2021-05-01 NOTE — PROGRESS NOTES
Aasuumed care for the pt, AXOx4 follows command, appears to have increased WOB, does not complain of any pain at this time, helped pt o the bedside commode, will continue to monitor pt.

## 2021-05-01 NOTE — PROGRESS NOTES
helped the patient Marin Lawson, who is a 67 y.o.,female, connect with the family with the Zoom Video Connection. The  provided the following Interventions:  Responded to consult and called daughter Dary Swenson to offer her a Zoom meeting. After faciltating this Zoom, I agreed to provide another to Ms. Magaña. The following outcomes were achieved:  Smiling, Ms. Taryn Guerrero appeared to enjoy her visit and look forward to another. She asked her daughter Vladimir Coma get everybody together for the next one. \"    Assessment:  There are no further spiritual or Yarsanism issues which require Spiritual Care Services interventions at this time. Plan:  Chaplains will provide another Zoom  After her daughter Dary Swenson calls back with a requested time.  recommends bedside caregivers page  on duty if patient shows signs of acute spiritual or emotional distress.      [address] [facility] [address] [place]  [phone]

## 2021-05-01 NOTE — PROGRESS NOTES
Bedside and Verbal shift change report given to Sahil (oncoming nurse) by Kasey Andres RN (offgoing nurse). Report included the following information SBAR and MAR.

## 2021-05-01 NOTE — PROGRESS NOTES
[address] [place] [address] [facility]. Pulmonary, Critical Care, and Sleep Medicine    Name: Nathalia Avalos MRN: [id number]   : 1945 Hospital: [address] [place] [address] Dr   Date: 2021  Admission Date: 2021     Chart and notes reviewed. Data reviewed. I have evaluated all findings. [x]I have reviewed the flowsheet and previous days notes. Interval HPI:  Patient is a 78 y. o. female with a past medical history of HTN, lupus, cognitive impairment, knee replacement, and IVC filter replacement, who states she has had flulike symptoms for last week or more. Patient was diagnosed with Covid 11 days-2021 ago however did not show symptoms until 9 days ago.    Patient was tested because other members of the household were positive.  She began to develop myalgias, progressive cough and dyspnea.    Patient was monitoring herself closely at home and she was also using her old oxygen tank for last few days as her saturation was in the low 80s.  However, she started becoming more short winded and required more oxygen so they decided to came to the emergency room.    Patient denies any chest pain abdominal pain.    She had nausea and vomiting episode x1 yesterday as well as some loose bowel movement.    She states that after coming to the hospital this has subsided  Patient is currently on high flow nasal cannula.    Denies smoking cigarettes or drinking any alcohol.   Patient is followed by Dr. Jason Gutierrez in clinic -pt with nocturnal hypoxemia and pulmonary HTN possibly related to Lupus, followed by Dr. Kamla Lowe. 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 Pt refused split night study ordered to R/o HO/OHS as etiology of pulmonary HTN. Supplemental O2 HS was ordered which pt has refused to use and was asking Dr. Cinthia Welch to discontinue the O2 but eventually agreed to keep it    Subjective 21     Patient was seen and examined at bedside today.   She was sitting up on the bed today  Patient weaned on HFNC to 30 L 40% FiO2  She denies any SOB, chest pain, nausea, vomiting, abdominal pain. ROS:A comprehensive review of systems was negative. Patient Active Problem List   Diagnosis Code    Hypertension I10    Lupus ([facility] M32.9    Arthritis M19.90    Neuropathy G62.9    [facility], [facility] E66.01    Lumbar spinal stenosis M48.061    HNP (herniated nucleus pulposus), lumbar M51.26    Pneumonia due to COVID-19 virus U07.1, J12.82    Hypoxia R09.02       Vital Signs:  Visit Vitals  BP (!) 157/95 (BP 1 Location: Left lower arm, BP Patient Position: At rest)   Pulse (!) 53   Temp 97.9 °F (36.6 °C)   Resp 14   Ht 5' 2\" (1.575 m)   Wt 120.1 kg (264 lb 12.8 oz)   SpO2 93%   Breastfeeding No   BMI 48.43 kg/m²       O2 Device: Hi flow nasal cannula, Heated(Vapo)   O2 Flow Rate (L/min): 30 l/min   Temp (24hrs), Av.8 °F (36.6 °C), Min:97.5 °F (36.4 °C), Max:98 °F (36.7 °C)       Intake/Output:   Last shift:      No intake/output data recorded.   Last 3 shifts:  1901 -  0700  In: 723 [P.O.:723]  Out: -     Intake/Output Summary (Last 24 hours) at 2021 0929  Last data filed at 2021 1718  Gross per 24 hour   Intake 723 ml   Output    Net 723 ml      Current Facility-Administered Medications   Medication Dose Route Frequency    dexamethasone (DECADRON) 4 mg/mL injection 10 mg  10 mg IntraVENous Q24H    ipratropium-albuterol (COMBIVENT RESPIMAT) 20 mcg-100 mcg inhalation spray  1 Puff Inhalation Q6H RT    sodium chloride (NS) flush 5-40 mL  5-40 mL IntraVENous Q8H    enoxaparin (LOVENOX) injection 40 mg  40 mg SubCUTAneous Q24H    guaiFENesin ER (MUCINEX) tablet 600 mg  600 mg Oral Q12H    insulin lispro (HUMALOG) injection   SubCUTAneous AC&HS    sertraline (ZOLOFT) tablet 150 mg  150 mg Oral DAILY    hydrOXYchloroQUINE (PLAQUENIL) tablet 200 mg  200 mg Oral BID    pantoprazole (PROTONIX) tablet 40 mg  40 mg Oral ACB    ascorbic acid (vitamin C) (VITAMIN C) tablet 500 mg 500 mg Oral BID    omega-3 acid ethyl esters (LOVAZA) capsule 1,000 mg  1 g Oral BID WITH MEALS    cloNIDine HCL (CATAPRES) tablet 0.1 mg  0.1 mg Oral BID         Telemetry: [x]Sinus []A-flutter []Paced    []A-fib []Multiple PVCs                  Physical Exam:      General:  Alert, cooperative, no distress, appears stated age. Head:  Normocephalic, without obvious abnormality, atraumatic. Eyes:  Conjunctivae/corneas clear. PERRL, EOMs intact. Nose: Nares normal. Septum midline. Mucosa normal. No drainage or sinus tenderness. Throat: Lips, mucosa, and tongue normal. Teeth and gums normal.   Neck: Supple, symmetrical, trachea midline, no adenopathy, thyroid: no enlargment/tenderness/nodules, no carotid bruit and no JVD. Back:   Symmetric, no curvature. ROM normal.   Lungs:   Clear to auscultation bilaterally. Chest wall:  No tenderness or deformity. Heart:  Regular rate and rhythm, S1, S2 normal, no murmur, click, rub or gallop. Abdomen:   Obese. Soft, non-tender. Bowel sounds normal. No masses,  No organomegaly. Extremities: Extremities normal, atraumatic, no cyanosis or edema. Pulses: 2+ and symmetric all extremities. Skin: Skin color, texture, turgor normal. No rashes or lesions   Lymph nodes: Cervical, supraclavicular, and axillary nodes normal.   Neurologic: Grossly nonfocal           DATA:  MAR reviewed and pertinent medications noted or modified as needed    Labs:  Recent Labs     05/01/21 0219 04/30/21  0328   WBC 7.3 6.3   HGB 13.0 13.0   HCT 39.5 39.4    335     Recent Labs     05/01/21 0219 04/30/21  0328 04/29/21  0315    140 140   K 4.7 3.9 3.8    101 103   CO2 34* 36* 36*   * 124* 127*   BUN 30* 31* 31*   CREA 0.42* 0.41* 0.36*   CA 8.6 8.5 8.7   MG 2.0  --  2.1   PHOS 3.6 2.9 2.8   ALB 2.6*  --   --    ALT 27  --   --      No results for input(s): PH, PCO2, PO2, HCO3, FIO2 in the last 72 hours.   No results for input(s): FIO2I, IFO2, HCO3I, IHCO3, HCOPOC, PCO2I, PCOPOC, IPHI, PHI, PHPOC, PO2I, PO2POC in the last 72 hours. No lab exists for component: IPOC2    Imaging:  [x]   I have personally reviewed the patients radiographs and reports  XR Results (most recent):  Results from Hospital Encounter encounter on 04/25/21   XR CHEST SNGL V    Narrative CHEST RADIOGRAPH-1 VIEW    INDICATION: Cough    COMPARISON: Chest x-ray 2/28/2020    FINDINGS: Cardiac silhouette is stable. Atherosclerosis noted. Mild diffuse  interstitial prominence. Bilateral mid to lower lung patchy opacities. Probable  small bilateral pleural effusions. Lungs are hypoinflated. No evidence for  pneumothorax. No acute osseous finding. Impression 1. Mild interstitial edema versus interstitial infiltrate. 2. Small bilateral pleural effusions with overlying mid to lower lung patchy  opacities. These could represent atelectasis but superimposed infiltrate/edema  not excluded. CT Results (most recent):  Results from Hospital Encounter encounter on 02/27/20   CTA CHEST W OR W WO CONT    Narrative EXAM:  CTA Chest with Contrast         CLINICAL INDICATION:  Shortness of breath and low O2 saturation. COMPARISON:  No prior CTA chest.  No VQ scan. Chest x-ray dated 02/24/20. CT  abdomen-pelvis 02/12/09. TECHNIQUE:    - Helical volumetric sections of the chest are obtained with CT pulmonary  angiogram protocol. Subsequently, sagittal and coronal multiplanar  reconstruction images are obtained.   Maximum intensity projection images are  generated to better delineate the pulmonary vasculature, differentiate between  the pulmonary arteries and veins and to increase sensitivity to pulmonary  emboli.    - IV contrast 100 mL Isovue-370.  - Radiation dose optimization techniques are utilized as appropriate to the  exam, with combination of automated exposure control, adjustment of the mA  and/or kV according to patient's size (Including appropriate matching for  site-specific examinations), or use of iterative reconstruction technique. FINDINGS:     Pulmonary Arteries:  No convincing evidence for pulmonary embolism is detected. Lung, Pleura, Airways: Atelectatic changes at the lung bases bilaterally. No  dominant mass or discrete suspicious lung nodules. No definite airspace  opacities. Mediastinum, Clotilde:  No adenopathy. Aorta:  No evidence of aortic dissection. Apparent dilation of the ascending  aorta measuring about 4.3 cm. Base of Neck:  No acute findings. Axillae:  Unremarkable. Esophagus:  Unremarkable. Abdomen:  Both kidneys demonstrate multiple exophytic hypodensities. .  The  largest of the exophytic hypodensity is observed in the left kidney mid  interpolar region with an internal septation, measuring up to about 4.4 x 3.9  cm. Skeletal Structures:  No acute findings. Impression IMPRESSION:         1. No convincing evidence of pulmonary embolism. 2.  Atelectatic changes in both lungs especially at the lung bases. 3.  Multiple renal hypodensities. At least one of the renal cyst appears to  show a nonsimple feature, i.e. septation, not as apparent on the previous study. Recommend follow-up dedicated CT of the abdomen without and with contrast for  further delineation. 4.  Mild dilation of the ascending aorta.               IMPRESSION:   · Hypoxic respiratory failure secondary to COVID-19 pneumonia-slow improvement in oxygenation remains on high flow-improving oxygen requirements  · COVID-19 pneumonia  · History of nocturnal hypoxemia-FTs with mild restrictive ventilatory defect, Reduced diffusion capacity indicating a decrease in alveolar surface area for gas exchange   Mild obstruction suspected   · Pulmonary hypertension-group 1 and 3  · History of positive ANCA, UT-3,-Lupus on Plaquenil as outpatient  · Suspected obstructive sleep apnea/OHS on CPAP   · History of IVC filter placement     Patient Active Problem List   Diagnosis Code    Hypertension I10    Lupus ([facility] M32.9    Arthritis M19.90    Neuropathy G62.9    Obesity, morbid ([facility] E66.01    Lumbar spinal stenosis M48.061    HNP (herniated nucleus pulposus), lumbar M51.26    Pneumonia due to COVID-19 virus U07.1, J12.82    Hypoxia R09.02        RECOMMENDATIONS:   · Oxygen-continue supplementation to maintain saturation more than 92%.  Currently needing high flow 30 L 40%  · Bronchial hygiene protocol  · Bronchodilators-continue Combivent Respimat  · Steroids-continue Decadron for treatment of COVID-19 pneumonia-completed 5 days of 10 mg twice daily now down to 10 mg daily dosing  · Can continue Plaquenil,  · Aspiration precautions  · Adjuvant therapy and follow-up of inflammatory markers per ID  · Need for further diagnostics-echocardiogram  · Out patient testing- PFT, 6 min walk, Polysomnogram  · Assess home Oxygen needs at discharge  · OT, PT, OOB and ambulate  · Healthy weight  · Will Follow  · DVT, PUD prophylaxis       Renny Barbour MD  05/01/21  Pulmonary, Critical Care Medicine  [address] [place] [address] [facility]

## 2021-05-01 NOTE — PROGRESS NOTES
[facility]  Progress Note    Patient: Salbador Raya Age: 67 y.o. : 1945 MR#: [id number] SSN: [id number]  Date: 2021     Subjective:     Breathing improved, improved air movement; cough productive of yellow mucous     Assessment/Plan:   1. COVID-19 Pneumonia   2. Acute respiratory failure with hypoxia secondary to COVID-19 pneumonia   3. History of nocturnal hypoxemia  4. Pulmonary HTN  5. HO on CPAP  6. History of IVC filter    Plan  1. Continue to monitor inflammatory markers. Check echo as per pulm recs  2. Monitor oxygen demand. Currently on HFNC 30L. Continue decadron x 10 day duration. Continue bronchodilators. abx therapy completed and discontinued on   3. Monitor metabolic panel and renal function   4. PT/OT eval and treat - rehab recommended  5. Assess for home oxygen needs  6. Updated daughter Houston Benedict [phone] on plan of care, Zoom call requested by family    Additional Notes:      Case discussed with:  [x]Patient  [x]Family  [x]Nursing  []Case Management  DVT Prophylaxis:  [x]Lovenox  []Hep SQ  []SCDs  []Coumadin   []On Heparin gtt    Objective:   VS:   Visit Vitals  /78 (BP 1 Location: Left lower arm, BP Patient Position: At rest)   Pulse (!) 59   Temp 97.6 °F (36.4 °C)   Resp 22   Ht 5' 2\" (1.575 m)   Wt 120.1 kg (264 lb 12.8 oz)   SpO2 96%   Breastfeeding No   BMI 48.43 kg/m²      Tmax/24hrs: Temp (24hrs), Av.8 °F (36.6 °C), Min:97.5 °F (36.4 °C), Max:98 °F (36.7 °C)      Intake/Output Summary (Last 24 hours) at 2021 1201  Last data filed at 2021 1718  Gross per 24 hour   Intake 723 ml   Output    Net 723 ml       General:  Alert, NAD  Cardiovascular:  RRR  Pulmonary:  BLL diminished.  BUL clear   GI:  +BS in all four quadrants, soft, non-tender  Extremities:  No edema; 2+ dorsalis pedis pulses bilaterally  Neuro: alert and oriented x4     Labs:    Recent Results (from the past 24 hour(s))   GLUCOSE, POC Collection Time: 04/30/21  4:08 PM   Result Value Ref Range    Glucose (POC) 134 (H) 70 - 110 mg/dL   GLUCOSE, POC    Collection Time: 04/30/21  9:06 PM   Result Value Ref Range    Glucose (POC) 116 (H) 70 - 110 mg/dL   PROCALCITONIN    Collection Time: 05/01/21  2:19 AM   Result Value Ref Range    Procalcitonin <0.05 ng/mL   C REACTIVE PROTEIN, QT    Collection Time: 05/01/21  2:19 AM   Result Value Ref Range    C-Reactive protein 0.9 (H) 0 - 0.3 mg/dL   METABOLIC PANEL, COMPREHENSIVE    Collection Time: 05/01/21  2:19 AM   Result Value Ref Range    Sodium 139 136 - 145 mmol/L    Potassium 4.7 3.5 - 5.5 mmol/L    Chloride 102 100 - 111 mmol/L    CO2 34 (H) 21 - 32 mmol/L    Anion gap 3 3.0 - 18 mmol/L    Glucose 135 (H) 74 - 99 mg/dL    BUN 30 (H) 7.0 - 18 MG/DL    Creatinine 0.42 (L) 0.6 - 1.3 MG/DL    BUN/Creatinine ratio 71 (H) 12 - 20      GFR est AA >60 >60 ml/min/1.73m2    GFR est non-AA >60 >60 ml/min/1.73m2    Calcium 8.6 8.5 - 10.1 MG/DL    Bilirubin, total 0.4 0.2 - 1.0 MG/DL    ALT (SGPT) 27 13 - 56 U/L    AST (SGOT) 19 10 - 38 U/L    Alk. phosphatase 67 45 - 117 U/L    Protein, total 5.4 (L) 6.4 - 8.2 g/dL    Albumin 2.6 (L) 3.4 - 5.0 g/dL    Globulin 2.8 2.0 - 4.0 g/dL    A-G Ratio 0.9 0.8 - 1.7     CBC WITH AUTOMATED DIFF    Collection Time: 05/01/21  2:19 AM   Result Value Ref Range    WBC 7.3 4.6 - 13.2 K/uL    RBC 4.52 4.20 - 5.30 M/uL    HGB 13.0 12.0 - 16.0 g/dL    HCT 39.5 35.0 - 45.0 %    MCV 87.4 74.0 - 97.0 FL    MCH 28.8 24.0 - 34.0 PG    MCHC 32.9 31.0 - 37.0 g/dL    RDW 12.6 11.6 - 14.5 %    PLATELET 092 369 - 943 K/uL    MPV 10.4 9.2 - 11.8 FL    NEUTROPHILS 82 (H) 40 - 73 %    LYMPHOCYTES 9 (L) 21 - 52 %    MONOCYTES 7 3 - 10 %    EOSINOPHILS 0 0 - 5 %    BASOPHILS 0 0 - 2 %    ABS. NEUTROPHILS 6.0 1.8 - 8.0 K/UL    ABS. LYMPHOCYTES 0.6 (L) 0.9 - 3.6 K/UL    ABS. MONOCYTES 0.5 0.05 - 1.2 K/UL    ABS. EOSINOPHILS 0.0 0.0 - 0.4 K/UL    ABS.  BASOPHILS 0.0 0.0 - 0.1 K/UL    DF AUTOMATED LD    Collection Time: 05/01/21  2:19 AM   Result Value Ref Range     (H) 81 - 234 U/L   MAGNESIUM    Collection Time: 05/01/21  2:19 AM   Result Value Ref Range    Magnesium 2.0 1.6 - 2.6 mg/dL   PHOSPHORUS    Collection Time: 05/01/21  2:19 AM   Result Value Ref Range    Phosphorus 3.6 2.5 - 4.9 MG/DL   GLUCOSE, POC    Collection Time: 05/01/21  7:54 AM   Result Value Ref Range    Glucose (POC) 103 70 - 110 mg/dL   GLUCOSE, POC    Collection Time: 05/01/21 11:56 AM   Result Value Ref Range    Glucose (POC) 75 70 - 110 mg/dL       Signed By: Chris Rivera NP     May 1, 2021

## 2021-05-01 NOTE — ROUTINE PROCESS
Bedside and Verbal shift change report given to Kayleigh RN (oncoming nurse) by Ana M Grant RN (offgoing nurse). Report included the following information SBAR, Kardex, MAR and Recent Results.     SITUATION:    Code Status: Full Code   Reason for Admission: Pneumonia due to COVID-19 virus [U07.1, J12.82]   Hypoxia [R09.02]    [facility] day: 6   Problem List:       Hospital Problems  Date Reviewed: 8/24/2020          Codes Class Noted POA    Pneumonia due to COVID-19 virus ICD-10-CM: U07.1, J12.82  ICD-9-CM: 480.8, 079.89  4/25/2021 Unknown        Hypoxia ICD-10-CM: R09.02  ICD-9-CM: 799.02  4/25/2021 Unknown              BACKGROUND:    Past Medical History:   Past Medical History:   Diagnosis Date    Arthritis     Bronchitis 01/01/2020    Hypertension     Indigestion     Lupus ([facility] [address]     Memory difficulty     Nausea & vomiting     Ringing of ears     sometimes    Thromboembolus ([facility] [address] 2009         Patient taking anticoagulants yes     ASSESSMENT:    Changes in Assessment Throughout Shift: No     Patient has Central Line: no Reasons if yes: N/A   Patient has Atkins Cath: no Reasons if yes: N/A      Last Vitals:     Vitals:    05/01/21 0106 05/01/21 0330 05/01/21 0446 05/01/21 0726   BP:   132/86    Pulse:   (!) 47    Resp:   18    Temp:   97.5 °F (36.4 °C)    SpO2: 94% 95% 94% 92%   Weight:   120.1 kg (264 lb 12.8 oz)    Height:            IV and DRAINS (will only show if present)   [REMOVED] Peripheral IV 04/25/21 Right Antecubital-Site Assessment: Clean, dry, & intact  Peripheral IV 04/25/21 Right Hand-Site Assessment: Clean, dry, & intact  [REMOVED] External Urinary Catheter 04/26/21-Site Assessment: Clean, dry, & intact  Peripheral IV 04/25/21 Left Antecubital-Site Assessment: Clean, dry, & intact     WOUND (if present)   Wound Type:  none   Dressing present Dressing Present : Yes   Wound Concerns/Notes:  none     PAIN    Pain Assessment    Pain Intensity 1: 0 (05/01/21 0446)    Pain Location 1: Back    Pain Intervention(s) 1: Medication (see MAR)    Patient Stated Pain Goal: 0  o Interventions for Pain:  none  o Intervention effective: no  o Time of last intervention:N/A    o Reassessment Completed: no      Last 3 Weights:  Last 3 Recorded Weights in this Encounter    04/29/21 0400 04/30/21 0401 05/01/21 0446   Weight: 115.7 kg (255 lb) 118.8 kg (262 lb) 120.1 kg (264 lb 12.8 oz)     Weight change: 1.27 kg (2 lb 12.8 oz)     INTAKE/OUPUT    Current Shift: No intake/output data recorded. Last three shifts: 04/29 1901 - 05/01 0700  In: 723 [P.O.:723]  Out: -      LAB RESULTS     Recent Labs     05/01/21 0219 04/30/21  0328   WBC 7.3 6.3   HGB 13.0 13.0   HCT 39.5 39.4    335        Recent Labs     05/01/21 0219 04/30/21  0328 04/29/21  0315    140 140   K 4.7 3.9 3.8   * 124* 127*   BUN 30* 31* 31*   CREA 0.42* 0.41* 0.36*   CA 8.6 8.5 8.7   MG 2.0  --  2.1       RECOMMENDATIONS AND DISCHARGE PLANNING     1. Pending tests/procedures/ Plan of Care or Other Needs:      2. Discharge plan for patient and Needs/Barriers: N/A    3. Estimated Discharge Date:N/A  Posted on Whiteboard in Patients Room: no      4. The patient's care plan was reviewed with the oncoming nurse. \"HEALS\" SAFETY CHECK      Fall Risk N/A   Total Score: 3    Safety Measures: Safety Measures: Bed/Chair alarm on, Bed/Chair-Wheels locked, Bed in low position, Call light within reach, Extra trach at bedside, Gripper socks, Side rails X 3    A safety check occurred in the patient's room between off going nurse and oncoming nurse listed above.     The safety check included the below items  Area Items   H  High Alert Medications - Verify all high alert medication drips (heparin, PCA, etc.)   E  Equipment - Suction is set up for ALL patients (with yanker)  - Red plugs utilized for all equipment (IV pumps, etc.)  - WOWs wiped down at end of shift.  - Room stocked with oxygen, suction, and other unit-specific supplies   A  Alarms - Bed alarm is set for fall risk patients  - Ensure chair alarm is in place and activated if patient is up in a chair   L  Lines - Check IV for any infiltration  - Atkins bag is empty if patient has a Atkins   - Tubing and IV bags are labeled   S  Safety   - Room is clean, patient is clean, and equipment is clean. - Hallways are clear from equipment besides carts. - Fall bracelet on for fall risk patients  - Ensure room is clear and free of clutter  - Suction is set up for ALL patients (with yanker)  - Hallways are clear from equipment besides carts.    - Isolation precautions followed, supplies available outside room, sign posted     Mariel Peralta RN

## 2021-05-02 LAB
ALBUMIN SERPL-MCNC: 2.4 G/DL (ref 3.4–5)
ALBUMIN/GLOB SERPL: 0.8 {RATIO} (ref 0.8–1.7)
ALP SERPL-CCNC: 63 U/L (ref 45–117)
ALT SERPL-CCNC: 27 U/L (ref 13–56)
ANION GAP SERPL CALC-SCNC: 0 MMOL/L (ref 3–18)
AST SERPL-CCNC: 17 U/L (ref 10–38)
BASOPHILS # BLD: 0 K/UL (ref 0–0.1)
BASOPHILS NFR BLD: 0 % (ref 0–2)
BILIRUB SERPL-MCNC: 0.5 MG/DL (ref 0.2–1)
BUN SERPL-MCNC: 30 MG/DL (ref 7–18)
BUN/CREAT SERPL: 73 (ref 12–20)
CALCIUM SERPL-MCNC: 8.8 MG/DL (ref 8.5–10.1)
CHLORIDE SERPL-SCNC: 103 MMOL/L (ref 100–111)
CO2 SERPL-SCNC: 35 MMOL/L (ref 21–32)
CREAT SERPL-MCNC: 0.41 MG/DL (ref 0.6–1.3)
DIFFERENTIAL METHOD BLD: ABNORMAL
EOSINOPHIL # BLD: 0 K/UL (ref 0–0.4)
EOSINOPHIL NFR BLD: 0 % (ref 0–5)
ERYTHROCYTE [DISTWIDTH] IN BLOOD BY AUTOMATED COUNT: 13.2 % (ref 11.6–14.5)
GLOBULIN SER CALC-MCNC: 3.1 G/DL (ref 2–4)
GLUCOSE BLD STRIP.AUTO-MCNC: 104 MG/DL (ref 70–110)
GLUCOSE BLD STRIP.AUTO-MCNC: 92 MG/DL (ref 70–110)
GLUCOSE BLD STRIP.AUTO-MCNC: 96 MG/DL (ref 70–110)
GLUCOSE BLD STRIP.AUTO-MCNC: 99 MG/DL (ref 70–110)
GLUCOSE SERPL-MCNC: 109 MG/DL (ref 74–99)
HCT VFR BLD AUTO: 39 % (ref 35–45)
HGB BLD-MCNC: 12.7 G/DL (ref 12–16)
LYMPHOCYTES # BLD: 0.6 K/UL (ref 0.9–3.6)
LYMPHOCYTES NFR BLD: 7 % (ref 21–52)
MCH RBC QN AUTO: 28.5 PG (ref 24–34)
MCHC RBC AUTO-ENTMCNC: 32.6 G/DL (ref 31–37)
MCV RBC AUTO: 87.4 FL (ref 74–97)
MONOCYTES # BLD: 0.6 K/UL (ref 0.05–1.2)
MONOCYTES NFR BLD: 7 % (ref 3–10)
NEUTS SEG # BLD: 6.7 K/UL (ref 1.8–8)
NEUTS SEG NFR BLD: 83 % (ref 40–73)
PLATELET # BLD AUTO: 385 K/UL (ref 135–420)
PMV BLD AUTO: 10.3 FL (ref 9.2–11.8)
POTASSIUM SERPL-SCNC: 4.8 MMOL/L (ref 3.5–5.5)
PROT SERPL-MCNC: 5.5 G/DL (ref 6.4–8.2)
RBC # BLD AUTO: 4.46 M/UL (ref 4.2–5.3)
SODIUM SERPL-SCNC: 138 MMOL/L (ref 136–145)
WBC # BLD AUTO: 8.1 K/UL (ref 4.6–13.2)

## 2021-05-02 PROCEDURE — 36415 COLL VENOUS BLD VENIPUNCTURE: CPT

## 2021-05-02 PROCEDURE — 74011250636 HC RX REV CODE- 250/636: Performed by: INTERNAL MEDICINE

## 2021-05-02 PROCEDURE — 99232 SBSQ HOSP IP/OBS MODERATE 35: CPT | Performed by: HOSPITALIST

## 2021-05-02 PROCEDURE — 74011250637 HC RX REV CODE- 250/637: Performed by: HOSPITALIST

## 2021-05-02 PROCEDURE — 94640 AIRWAY INHALATION TREATMENT: CPT

## 2021-05-02 PROCEDURE — 74011250637 HC RX REV CODE- 250/637: Performed by: NURSE PRACTITIONER

## 2021-05-02 PROCEDURE — 82962 GLUCOSE BLOOD TEST: CPT

## 2021-05-02 PROCEDURE — 74011250637 HC RX REV CODE- 250/637: Performed by: INTERNAL MEDICINE

## 2021-05-02 PROCEDURE — 80053 COMPREHEN METABOLIC PANEL: CPT

## 2021-05-02 PROCEDURE — 74011250636 HC RX REV CODE- 250/636: Performed by: NURSE PRACTITIONER

## 2021-05-02 PROCEDURE — 99232 SBSQ HOSP IP/OBS MODERATE 35: CPT | Performed by: INTERNAL MEDICINE

## 2021-05-02 PROCEDURE — 65660000000 HC RM CCU STEPDOWN

## 2021-05-02 PROCEDURE — 85025 COMPLETE CBC W/AUTO DIFF WBC: CPT

## 2021-05-02 RX ORDER — LORAZEPAM 2 MG/ML
0.5 INJECTION INTRAMUSCULAR
Status: DISCONTINUED | OUTPATIENT
Start: 2021-05-02 | End: 2021-05-04

## 2021-05-02 RX ADMIN — IPRATROPIUM BROMIDE AND ALBUTEROL 1 PUFF: 20; 100 SPRAY, METERED RESPIRATORY (INHALATION) at 14:55

## 2021-05-02 RX ADMIN — GUAIFENESIN 600 MG: 600 TABLET, EXTENDED RELEASE ORAL at 21:00

## 2021-05-02 RX ADMIN — Medication 10 ML: at 14:00

## 2021-05-02 RX ADMIN — Medication 500 MG: at 17:02

## 2021-05-02 RX ADMIN — OMEGA-3-ACID ETHYL ESTERS 1000 MG: 1 CAPSULE, LIQUID FILLED ORAL at 09:29

## 2021-05-02 RX ADMIN — PANTOPRAZOLE SODIUM 40 MG: 40 TABLET, DELAYED RELEASE ORAL at 09:29

## 2021-05-02 RX ADMIN — CLONIDINE HYDROCHLORIDE 0.1 MG: 0.1 TABLET ORAL at 09:28

## 2021-05-02 RX ADMIN — IPRATROPIUM BROMIDE AND ALBUTEROL 1 PUFF: 20; 100 SPRAY, METERED RESPIRATORY (INHALATION) at 21:02

## 2021-05-02 RX ADMIN — Medication 10 ML: at 21:01

## 2021-05-02 RX ADMIN — DEXAMETHASONE SODIUM PHOSPHATE 10 MG: 4 INJECTION, SOLUTION INTRAMUSCULAR; INTRAVENOUS at 21:00

## 2021-05-02 RX ADMIN — IPRATROPIUM BROMIDE AND ALBUTEROL 1 PUFF: 20; 100 SPRAY, METERED RESPIRATORY (INHALATION) at 02:00

## 2021-05-02 RX ADMIN — SERTRALINE HYDROCHLORIDE 150 MG: 50 TABLET ORAL at 09:29

## 2021-05-02 RX ADMIN — LORAZEPAM 0.5 MG: 2 INJECTION INTRAMUSCULAR; INTRAVENOUS at 03:45

## 2021-05-02 RX ADMIN — Medication 10 ML: at 05:31

## 2021-05-02 RX ADMIN — ENOXAPARIN SODIUM 40 MG: 40 INJECTION SUBCUTANEOUS at 16:54

## 2021-05-02 RX ADMIN — OMEGA-3-ACID ETHYL ESTERS 1000 MG: 1 CAPSULE, LIQUID FILLED ORAL at 16:55

## 2021-05-02 RX ADMIN — LORAZEPAM 0.5 MG: 2 INJECTION INTRAMUSCULAR; INTRAVENOUS at 21:00

## 2021-05-02 RX ADMIN — Medication 500 MG: at 09:28

## 2021-05-02 RX ADMIN — HYDROXYCHLOROQUINE SULFATE 200 MG: 200 TABLET ORAL at 09:28

## 2021-05-02 RX ADMIN — HYDROXYCHLOROQUINE SULFATE 200 MG: 200 TABLET ORAL at 17:00

## 2021-05-02 RX ADMIN — GUAIFENESIN 600 MG: 600 TABLET, EXTENDED RELEASE ORAL at 09:29

## 2021-05-02 RX ADMIN — IPRATROPIUM BROMIDE AND ALBUTEROL 1 PUFF: 20; 100 SPRAY, METERED RESPIRATORY (INHALATION) at 09:00

## 2021-05-02 NOTE — PROGRESS NOTES
Problem: Risk for Spread of Infection  Goal: Prevent transmission of infectious organism to others  Description: Prevent the transmission of infectious organisms to other patients, staff members, and visitors. Outcome: Progressing Towards Goal     Problem: Patient Education:  Go to Education Activity  Goal: Patient/Family Education  Outcome: Progressing Towards Goal     Problem: Falls - Risk of  Goal: *Absence of Falls  Description: Document Marie Ny Fall Risk and appropriate interventions in the flowsheet. Outcome: Progressing Towards Goal  Note: Fall Risk Interventions:  Mobility Interventions: Assess mobility with egress test, Bed/chair exit alarm, Communicate number of staff needed for ambulation/transfer, Patient to call before getting OOB         Medication Interventions: Bed/chair exit alarm, Evaluate medications/consider consulting pharmacy, Patient to call before getting OOB, Teach patient to arise slowly    Elimination Interventions: Bed/chair exit alarm, Call light in reach, Elevated toilet seat, Patient to call for help with toileting needs, Stay With Me (per policy), Toilet paper/wipes in reach, Toileting schedule/hourly rounds              Problem: Patient Education: Go to Patient Education Activity  Goal: Patient/Family Education  Outcome: Progressing Towards Goal     Problem: Pressure Injury - Risk of  Goal: *Prevention of pressure injury  Description: Document Francisco Scale and appropriate interventions in the flowsheet. Outcome: Progressing Towards Goal  Note: Pressure Injury Interventions:  Sensory Interventions: Assess need for specialty bed, Assess changes in LOC, Avoid rigorous massage over bony prominences, Discuss PT/OT consult with provider, Float heels, Keep linens dry and wrinkle-free, Maintain/enhance activity level, Minimize linen layers, Monitor skin under medical devices, Pressure redistribution bed/mattress (bed type), Turn and reposition approx.  every two hours (pillows and wedges if needed)    Moisture Interventions: Absorbent underpads, Assess need for specialty bed, Apply protective barrier, creams and emollients, Check for incontinence Q2 hours and as needed, Limit adult briefs, Maintain skin hydration (lotion/cream), Minimize layers, Moisture barrier    Activity Interventions: Assess need for specialty bed, Pressure redistribution bed/mattress(bed type), PT/OT evaluation    Mobility Interventions: Assess need for specialty bed, Chair cushion, Float heels, HOB 30 degrees or less, Pressure redistribution bed/mattress (bed type), PT/OT evaluation, Turn and reposition approx. every two hours(pillow and wedges)    Nutrition Interventions: Discuss nutritional consult with provider, Offer support with meals,snacks and hydration, Document food/fluid/supplement intake    Friction and Shear Interventions: Apply protective barrier, creams and emollients, Feet elevated on foot rest, Foam dressings/transparent film/skin sealants, HOB 30 degrees or less, Lift team/patient mobility team, Minimize layers, Transferring/repositioning devices                Problem: Airway Clearance - Ineffective  Goal: Achieve or maintain patent airway  Outcome: Progressing Towards Goal     Problem: Gas Exchange - Impaired  Goal: Absence of hypoxia  Outcome: Progressing Towards Goal  Goal: Promote optimal lung function  Outcome: Progressing Towards Goal     Problem: Breathing Pattern - Ineffective  Goal: Ability to achieve and maintain a regular respiratory rate  Outcome: Progressing Towards Goal     Problem: Breathing Pattern - Ineffective  Goal: Ability to achieve and maintain a regular respiratory rate  Outcome: Progressing Towards Goal     Problem:  Body Temperature -  Risk of, Imbalanced  Goal: Ability to maintain a body temperature within defined limits  Outcome: Progressing Towards Goal  Goal: Will regain or maintain usual level of consciousness  Outcome: Progressing Towards Goal  Goal: Complications related to the disease process, condition or treatment will be avoided or minimized  Outcome: Progressing Towards Goal     Problem: Isolation Precautions - Risk of Spread of Infection  Goal: Prevent transmission of infectious organism to others  Outcome: Progressing Towards Goal     Problem: Nutrition Deficits  Goal: Optimize nutrtional status  Outcome: Progressing Towards Goal

## 2021-05-02 NOTE — PROGRESS NOTES
Problem: Risk for Spread of Infection  Goal: Prevent transmission of infectious organism to others  Description: Prevent the transmission of infectious organisms to other patients, staff members, and visitors. Outcome: Progressing Towards Goal     Problem: Patient Education:  Go to Education Activity  Goal: Patient/Family Education  Outcome: Progressing Towards Goal     Problem: Falls - Risk of  Goal: *Absence of Falls  Description: Document Poonam Lorena Fall Risk and appropriate interventions in the flowsheet. Outcome: Progressing Towards Goal  Note: Fall Risk Interventions:  Mobility Interventions: Assess mobility with egress test, Bed/chair exit alarm, Communicate number of staff needed for ambulation/transfer, Patient to call before getting OOB         Medication Interventions: Bed/chair exit alarm, Evaluate medications/consider consulting pharmacy, Patient to call before getting OOB, Teach patient to arise slowly    Elimination Interventions: Bed/chair exit alarm, Call light in reach, Elevated toilet seat, Patient to call for help with toileting needs, Stay With Me (per policy), Toilet paper/wipes in reach, Toileting schedule/hourly rounds              Problem: Patient Education: Go to Patient Education Activity  Goal: Patient/Family Education  Outcome: Progressing Towards Goal     Problem: Pressure Injury - Risk of  Goal: *Prevention of pressure injury  Description: Document Francisco Scale and appropriate interventions in the flowsheet. Outcome: Progressing Towards Goal  Note: Pressure Injury Interventions:  Sensory Interventions: Assess need for specialty bed, Assess changes in LOC, Avoid rigorous massage over bony prominences, Discuss PT/OT consult with provider, Float heels, Keep linens dry and wrinkle-free, Maintain/enhance activity level, Minimize linen layers, Monitor skin under medical devices, Pressure redistribution bed/mattress (bed type), Turn and reposition approx.  every two hours (pillows and wedges if needed)    Moisture Interventions: Absorbent underpads, Assess need for specialty bed, Apply protective barrier, creams and emollients, Check for incontinence Q2 hours and as needed, Limit adult briefs, Maintain skin hydration (lotion/cream), Minimize layers, Moisture barrier    Activity Interventions: Assess need for specialty bed, Pressure redistribution bed/mattress(bed type), PT/OT evaluation    Mobility Interventions: Assess need for specialty bed, Chair cushion, Float heels, HOB 30 degrees or less, Pressure redistribution bed/mattress (bed type), PT/OT evaluation, Turn and reposition approx. every two hours(pillow and wedges)    Nutrition Interventions: Discuss nutritional consult with provider, Offer support with meals,snacks and hydration, Document food/fluid/supplement intake    Friction and Shear Interventions: Apply protective barrier, creams and emollients, Feet elevated on foot rest, Foam dressings/transparent film/skin sealants, HOB 30 degrees or less, Lift team/patient mobility team, Minimize layers, Transferring/repositioning devices                Problem: Patient Education: Go to Patient Education Activity  Goal: Patient/Family Education  Outcome: Progressing Towards Goal     Problem: Airway Clearance - Ineffective  Goal: Achieve or maintain patent airway  Outcome: Progressing Towards Goal     Problem: Gas Exchange - Impaired  Goal: Absence of hypoxia  Outcome: Progressing Towards Goal  Goal: Promote optimal lung function  Outcome: Progressing Towards Goal     Problem: Breathing Pattern - Ineffective  Goal: Ability to achieve and maintain a regular respiratory rate  Outcome: Progressing Towards Goal     Problem:  Body Temperature -  Risk of, Imbalanced  Goal: Ability to maintain a body temperature within defined limits  Outcome: Progressing Towards Goal  Goal: Will regain or maintain usual level of consciousness  Outcome: Progressing Towards Goal  Goal: Complications related to the disease process, condition or treatment will be avoided or minimized  Outcome: Progressing Towards Goal     Problem: Isolation Precautions - Risk of Spread of Infection  Goal: Prevent transmission of infectious organism to others  Outcome: Progressing Towards Goal     Problem: Nutrition Deficits  Goal: Optimize nutrtional status  Outcome: Progressing Towards Goal     Problem: Risk for Fluid Volume Deficit  Goal: Maintain normal heart rhythm  Outcome: Progressing Towards Goal  Goal: Maintain absence of muscle cramping  Outcome: Progressing Towards Goal  Goal: Maintain normal serum potassium, sodium, calcium, phosphorus, and pH  Outcome: Progressing Towards Goal     Problem: Loneliness or Risk for Loneliness  Goal: Demonstrate positive use of time alone when socialization is not possible  Outcome: Progressing Towards Goal     Problem: Fatigue  Goal: Verbalize increase energy and improved vitality  Outcome: Progressing Towards Goal     Problem: Patient Education: Go to Patient Education Activity  Goal: Patient/Family Education  Outcome: Progressing Towards Goal     Problem: Patient Education: Go to Patient Education Activity  Goal: Patient/Family Education  Outcome: Progressing Towards Goal     Problem: Pneumonia: Day 1  Goal: Off Pathway (Use only if patient is Off Pathway)  Outcome: Progressing Towards Goal  Goal: Activity/Safety  Outcome: Progressing Towards Goal  Goal: Consults, if ordered  Outcome: Progressing Towards Goal  Goal: Diagnostic Test/Procedures  Outcome: Progressing Towards Goal  Goal: Nutrition/Diet  Outcome: Progressing Towards Goal  Goal: Medications  Outcome: Progressing Towards Goal  Goal: Respiratory  Outcome: Progressing Towards Goal  Goal: Treatments/Interventions/Procedures  Outcome: Progressing Towards Goal  Goal: Psychosocial  Outcome: Progressing Towards Goal  Goal: *Oxygen saturation within defined limits  Outcome: Progressing Towards Goal  Goal: *Influenza vaccine administered (October-March)  Outcome: Progressing Towards Goal  Goal: *Pneumoccocal vaccine administered  Outcome: Progressing Towards Goal  Goal: *Hemodynamically stable  Outcome: Progressing Towards Goal  Goal: *Demonstrates progressive activity  Outcome: Progressing Towards Goal  Goal: *Tolerating diet  Outcome: Progressing Towards Goal     Problem: Pneumonia: Day 2  Goal: Off Pathway (Use only if patient is Off Pathway)  Outcome: Progressing Towards Goal  Goal: Activity/Safety  Outcome: Progressing Towards Goal  Goal: Consults, if ordered  Outcome: Progressing Towards Goal  Goal: Diagnostic Test/Procedures  Outcome: Progressing Towards Goal  Goal: Nutrition/Diet  Outcome: Progressing Towards Goal  Goal: Discharge Planning  Outcome: Progressing Towards Goal  Goal: Medications  Outcome: Progressing Towards Goal  Goal: Respiratory  Outcome: Progressing Towards Goal  Goal: Treatments/Interventions/Procedures  Outcome: Progressing Towards Goal  Goal: Psychosocial  Outcome: Progressing Towards Goal  Goal: *Oxygen saturation within defined limits  Outcome: Progressing Towards Goal  Goal: *Hemodynamically stable  Outcome: Progressing Towards Goal  Goal: *Demonstrates progressive activity  Outcome: Progressing Towards Goal  Goal: *Tolerating diet  Outcome: Progressing Towards Goal  Goal: *Optimal pain control at patient's stated goal  Outcome: Progressing Towards Goal     Problem: Pneumonia: Day 3  Goal: Off Pathway (Use only if patient is Off Pathway)  Outcome: Progressing Towards Goal  Goal: Activity/Safety  Outcome: Progressing Towards Goal  Goal: Consults, if ordered  Outcome: Progressing Towards Goal  Goal: Diagnostic Test/Procedures  Outcome: Progressing Towards Goal  Goal: Nutrition/Diet  Outcome: Progressing Towards Goal  Goal: Discharge Planning  Outcome: Progressing Towards Goal  Goal: Medications  Outcome: Progressing Towards Goal  Goal: Respiratory  Outcome: Progressing Towards Goal  Goal: Treatments/Interventions/Procedures  Outcome: Progressing Towards Goal  Goal: Psychosocial  Outcome: Progressing Towards Goal  Goal: *Oxygen saturation within defined limits  Outcome: Progressing Towards Goal  Goal: *Hemodynamically stable  Outcome: Progressing Towards Goal  Goal: *Demonstrates progressive activity  Outcome: Progressing Towards Goal  Goal: *Tolerating diet  Outcome: Progressing Towards Goal  Goal: *Describes available resources and support systems  Outcome: Progressing Towards Goal  Goal: *Optimal pain control at patient's stated goal  Outcome: Progressing Towards Goal     Problem: Pneumonia: Day 4  Goal: Off Pathway (Use only if patient is Off Pathway)  Outcome: Progressing Towards Goal  Goal: Activity/Safety  Outcome: Progressing Towards Goal  Goal: Nutrition/Diet  Outcome: Progressing Towards Goal  Goal: Discharge Planning  Outcome: Progressing Towards Goal  Goal: Medications  Outcome: Progressing Towards Goal  Goal: Respiratory  Outcome: Progressing Towards Goal  Goal: Treatments/Interventions/Procedures  Outcome: Progressing Towards Goal  Goal: Psychosocial  Outcome: Progressing Towards Goal     Problem: Pneumonia: Discharge Outcomes  Goal: *Demonstrates progressive activity  Outcome: Progressing Towards Goal  Goal: *Describes follow-up/return visits to physicians  Outcome: Progressing Towards Goal  Goal: *Tolerating diet  Outcome: Progressing Towards Goal  Goal: *Verbalizes name, dosage, time, side effects, and number of days to continue medications  Outcome: Progressing Towards Goal  Goal: *Influenza immunization  Outcome: Progressing Towards Goal  Goal: *Pneumococcal immunization  Outcome: Progressing Towards Goal  Goal: *Respiratory status at baseline  Outcome: Progressing Towards Goal  Goal: *Vital signs within defined limits  Outcome: Progressing Towards Goal  Goal: *Describes available resources and support systems  Outcome: Progressing Towards Goal  Goal: *Optimal pain control at patient's stated goal  Outcome: Progressing Towards Goal

## 2021-05-02 NOTE — ROUTINE PROCESS
Bedside shift change report given to Roddy Lopes RN (oncoming nurse) by Tonya Tavarez RN (offgoing nurse). Report included the following information SBAR, Kardex, Intake/Output, MAR and Recent Results.

## 2021-05-02 NOTE — PROGRESS NOTES
[facility] Hospitalist Group  Progress Note    Patient: Daniel Thomas Age: 67 y.o. : 1945 MR#: [id number] SSN: [id number]  Date: 2021     Subjective:     Patient states she occasionally gets short of breath, she is noted dyspneic with conversation. She has a cough productive of thick white sputum, which she is able to expectorate. Appetite remains good. She denies constipation, chest pain. Assessment/Plan:   1. COVID-19 Pneumonia   2. Acute respiratory failure with hypoxia secondary to COVID-19 pneumonia   3. History of nocturnal hypoxemia  4. Pulmonary HTN  5. HO on CPAP  6. History of IVC filter    Plan  1. Continue to monitor inflammatory markers. Check echo as per pulm recs  2. Monitor oxygen demand. Currently on HFNC 30L. Continue decadron x 10 day duration. Continue bronchodilators. abx therapy completed and discontinued on   3. Monitor metabolic panel and renal function   4. PT/OT eval and treat - rehab recommended  5. Assess for home oxygen needs     daughter Jeanna Yin [phone]. Additional Notes:      Case discussed with:  [x]Patient  []Family  [x]Nursing  []Case Management  DVT Prophylaxis:  [x]Lovenox  []Hep SQ  []SCDs  []Coumadin   []On Heparin gtt    Objective:   VS:   Visit Vitals  /85 (BP 1 Location: Left lower arm, BP Patient Position: At rest)   Pulse 68   Temp 97.4 °F (36.3 °C)   Resp 22   Ht 5' 2\" (1.575 m)   Wt 120.1 kg (264 lb 12.8 oz)   SpO2 95%   Breastfeeding No   BMI 48.43 kg/m²      Tmax/24hrs: Temp (24hrs), Av.7 °F (36.5 °C), Min:97.4 °F (36.3 °C), Max:98.2 °F (36.8 °C)      Intake/Output Summary (Last 24 hours) at 2021 1346  Last data filed at 2021 0302  Gross per 24 hour   Intake 480 ml   Output    Net 480 ml       General:  Alert, NAD  Cardiovascular:  RRR  Pulmonary:  BLL diminished.  BUL clear   GI:  +BS in all four quadrants, soft, non-tender  Extremities:  No edema; 2+ dorsalis pedis pulses bilaterally  Neuro: alert and oriented x4     Labs:    Recent Results (from the past 24 hour(s))   GLUCOSE, POC    Collection Time: 05/01/21  3:49 PM   Result Value Ref Range    Glucose (POC) 93 70 - 110 mg/dL   GLUCOSE, POC    Collection Time: 05/01/21  8:38 PM   Result Value Ref Range    Glucose (POC) 86 70 - 104 mg/dL   METABOLIC PANEL, COMPREHENSIVE    Collection Time: 05/02/21  6:43 AM   Result Value Ref Range    Sodium 138 136 - 145 mmol/L    Potassium 4.8 3.5 - 5.5 mmol/L    Chloride 103 100 - 111 mmol/L    CO2 35 (H) 21 - 32 mmol/L    Anion gap 0 (L) 3.0 - 18 mmol/L    Glucose 109 (H) 74 - 99 mg/dL    BUN 30 (H) 7.0 - 18 MG/DL    Creatinine 0.41 (L) 0.6 - 1.3 MG/DL    BUN/Creatinine ratio 73 (H) 12 - 20      GFR est AA >60 >60 ml/min/1.73m2    GFR est non-AA >60 >60 ml/min/1.73m2    Calcium 8.8 8.5 - 10.1 MG/DL    Bilirubin, total 0.5 0.2 - 1.0 MG/DL    ALT (SGPT) 27 13 - 56 U/L    AST (SGOT) 17 10 - 38 U/L    Alk. phosphatase 63 45 - 117 U/L    Protein, total 5.5 (L) 6.4 - 8.2 g/dL    Albumin 2.4 (L) 3.4 - 5.0 g/dL    Globulin 3.1 2.0 - 4.0 g/dL    A-G Ratio 0.8 0.8 - 1.7     CBC WITH AUTOMATED DIFF    Collection Time: 05/02/21  6:43 AM   Result Value Ref Range    WBC 8.1 4.6 - 13.2 K/uL    RBC 4.46 4.20 - 5.30 M/uL    HGB 12.7 12.0 - 16.0 g/dL    HCT 39.0 35.0 - 45.0 %    MCV 87.4 74.0 - 97.0 FL    MCH 28.5 24.0 - 34.0 PG    MCHC 32.6 31.0 - 37.0 g/dL    RDW 13.2 11.6 - 14.5 %    PLATELET 934 318 - 798 K/uL    MPV 10.3 9.2 - 11.8 FL    NEUTROPHILS 83 (H) 40 - 73 %    LYMPHOCYTES 7 (L) 21 - 52 %    MONOCYTES 7 3 - 10 %    EOSINOPHILS 0 0 - 5 %    BASOPHILS 0 0 - 2 %    ABS. NEUTROPHILS 6.7 1.8 - 8.0 K/UL    ABS. LYMPHOCYTES 0.6 (L) 0.9 - 3.6 K/UL    ABS. MONOCYTES 0.6 0.05 - 1.2 K/UL    ABS. EOSINOPHILS 0.0 0.0 - 0.4 K/UL    ABS.  BASOPHILS 0.0 0.0 - 0.1 K/UL    DF AUTOMATED     GLUCOSE, POC    Collection Time: 05/02/21  8:09 AM   Result Value Ref Range    Glucose (POC) 104 70 - 110 mg/dL   GLUCOSE, POC Collection Time: 05/02/21 11:53 AM   Result Value Ref Range    Glucose (POC) 96 70 - 110 mg/dL       Signed By: Akosua Mcclendon MD     May 2, 2021

## 2021-05-02 NOTE — ROUTINE PROCESS
Bedside and Verbal shift change report given to [address] [place] [address] (oncoming nurse) by Fernando Rivero RN  (offgoing nurse). Report included the following information SBAR, Intake/Output, MAR, Recent Results and Cardiac Rhythm SR/SB.

## 2021-05-02 NOTE — PROGRESS NOTES
[facility]. Pulmonary, Critical Care, and Sleep Medicine    Name: Roderick Lyons MRN: [id number]   : 1945 Hospital: [address] [place] [address] Dr   Date: 2021  Admission Date: 2021     Chart and notes reviewed. Data reviewed. I have evaluated all findings. [x]I have reviewed the flowsheet and previous days notes. Interval HPI:  Patient is a 78 y. o. female with a past medical history of HTN, lupus, cognitive impairment, knee replacement, and IVC filter replacement, who states she has had flulike symptoms for last week or more. Patient was diagnosed with Covid 11 days-2021 ago however did not show symptoms until 9 days ago.    Patient was tested because other members of the household were positive.  She began to develop myalgias, progressive cough and dyspnea.    Patient was monitoring herself closely at home and she was also using her old oxygen tank for last few days as her saturation was in the low 80s.  However, she started becoming more short winded and required more oxygen so they decided to came to the emergency room.    Patient denies any chest pain abdominal pain.    She had nausea and vomiting episode x1 yesterday as well as some loose bowel movement.    She states that after coming to the hospital this has subsided  Patient is currently on high flow nasal cannula.    Denies smoking cigarettes or drinking any alcohol.   Patient is followed by Dr. Burton Martini in clinic -pt with nocturnal hypoxemia and pulmonary HTN possibly related to Lupus, followed by Dr. Tiana Maurice. 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 Pt refused split night study ordered to R/o HO/OHS as etiology of pulmonary HTN. Supplemental O2 HS was ordered which pt has refused to use and was asking Dr. Schuyler Hernandez to discontinue the O2 but eventually agreed to keep it    Subjective 21     Patient was seen and examined at bedside today.   She was sitting up on the bed today  Patient weaned on HFNC to 30 L 40% FiO2  She denies any SOB, chest pain, nausea, vomiting, abdominal pain. ROS:A comprehensive review of systems was negative. Patient Active Problem List   Diagnosis Code    Hypertension I10    Lupus ([facility] [address] M32.9    Arthritis M19.90    Neuropathy G62.9    [facility], [facility] E66.01    Lumbar spinal stenosis M48.061    HNP (herniated nucleus pulposus), lumbar M51.26    Pneumonia due to COVID-19 virus U07.1, J12.82    Hypoxia R09.02       Vital Signs:  Visit Vitals  BP (!) 152/83 (BP 1 Location: Left lower arm, BP Patient Position: At rest)   Pulse 60   Temp 98.2 °F (36.8 °C)   Resp 18   Ht 5' 2\" (1.575 m)   Wt 120.1 kg (264 lb 12.8 oz)   SpO2 93%   Breastfeeding No   BMI 48.43 kg/m²       O2 Device: Hi flow nasal cannula   O2 Flow Rate (L/min): 30 l/min   Temp (24hrs), Av.8 °F (36.6 °C), Min:97.4 °F (36.3 °C), Max:98.2 °F (36.8 °C)       Intake/Output:   Last shift:      No intake/output data recorded.   Last 3 shifts:  1901 -  0700  In: 480 [P.O.:480]  Out: -     Intake/Output Summary (Last 24 hours) at 2021 1105  Last data filed at 2021 0302  Gross per 24 hour   Intake 480 ml   Output    Net 480 ml      Current Facility-Administered Medications   Medication Dose Route Frequency    dexamethasone (DECADRON) 4 mg/mL injection 10 mg  10 mg IntraVENous Q24H    ipratropium-albuterol (COMBIVENT RESPIMAT) 20 mcg-100 mcg inhalation spray  1 Puff Inhalation Q6H RT    sodium chloride (NS) flush 5-40 mL  5-40 mL IntraVENous Q8H    enoxaparin (LOVENOX) injection 40 mg  40 mg SubCUTAneous Q24H    guaiFENesin ER (MUCINEX) tablet 600 mg  600 mg Oral Q12H    insulin lispro (HUMALOG) injection   SubCUTAneous AC&HS    sertraline (ZOLOFT) tablet 150 mg  150 mg Oral DAILY    hydrOXYchloroQUINE (PLAQUENIL) tablet 200 mg  200 mg Oral BID    pantoprazole (PROTONIX) tablet 40 mg  40 mg Oral ACB    ascorbic acid (vitamin C) (VITAMIN C) tablet 500 mg  500 mg Oral BID  omega-3 acid ethyl esters (LOVAZA) capsule 1,000 mg  1 g Oral BID WITH MEALS    cloNIDine HCL (CATAPRES) tablet 0.1 mg  0.1 mg Oral BID         Telemetry: [x]Sinus []A-flutter []Paced    []A-fib []Multiple PVCs                  Physical Exam:      General:  Alert, cooperative, no distress, appears stated age. Head:  Normocephalic, without obvious abnormality, atraumatic. Eyes:  Conjunctivae/corneas clear. PERRL, EOMs intact. Nose: Nares normal. Septum midline. Mucosa normal. No drainage or sinus tenderness. Throat: Lips, mucosa, and tongue normal. Teeth and gums normal.   Neck: Supple, symmetrical, trachea midline, no adenopathy, thyroid: no enlargment/tenderness/nodules, no carotid bruit and no JVD. Back:   Symmetric, no curvature. ROM normal.   Lungs:   Clear to auscultation bilaterally. Chest wall:  No tenderness or deformity. Heart:  Regular rate and rhythm, S1, S2 normal, no murmur, click, rub or gallop. Abdomen:   Obese. Soft, non-tender. Bowel sounds normal. No masses,  No organomegaly. Extremities: Extremities normal, atraumatic, no cyanosis or edema. Pulses: 2+ and symmetric all extremities. Skin: Skin color, texture, turgor normal. No rashes or lesions   Lymph nodes: Cervical, supraclavicular, and axillary nodes normal.   Neurologic: Grossly nonfocal           DATA:  MAR reviewed and pertinent medications noted or modified as needed    Labs:  Recent Labs     05/02/21  0643 05/01/21 0219 04/30/21  0328   WBC 8.1 7.3 6.3   HGB 12.7 13.0 13.0   HCT 39.0 39.5 39.4    342 335     Recent Labs     05/02/21  0643 05/01/21  0219 04/30/21  0328    139 140   K 4.8 4.7 3.9    102 101   CO2 35* 34* 36*   * 135* 124*   BUN 30* 30* 31*   CREA 0.41* 0.42* 0.41*   CA 8.8 8.6 8.5   MG  --  2.0  --    PHOS  --  3.6 2.9   ALB 2.4* 2.6*  --    ALT 27 27  --      No results for input(s): PH, PCO2, PO2, HCO3, FIO2 in the last 72 hours.   No results for input(s): FIO2I, IFO2, HCO3I, [address], [place], PCO2I, PCOPOC, IPHI, PHI, PHPOC, PO2I, PO2POC in the last 72 hours. No lab exists for component: IPOC2    Imaging:  [x]   I have personally reviewed the patients radiographs and reports  XR Results (most recent):  Results from Hospital Encounter encounter on 04/25/21   XR CHEST SNGL V    Narrative CHEST RADIOGRAPH-1 VIEW    INDICATION: Cough    COMPARISON: Chest x-ray 2/28/2020    FINDINGS: Cardiac silhouette is stable. Atherosclerosis noted. Mild diffuse  interstitial prominence. Bilateral mid to lower lung patchy opacities. Probable  small bilateral pleural effusions. Lungs are hypoinflated. No evidence for  pneumothorax. No acute osseous finding. Impression 1. Mild interstitial edema versus interstitial infiltrate. 2. Small bilateral pleural effusions with overlying mid to lower lung patchy  opacities. These could represent atelectasis but superimposed infiltrate/edema  not excluded. CT Results (most recent):  Results from Hospital Encounter encounter on 02/27/20   CTA CHEST W OR W WO CONT    Narrative EXAM:  CTA Chest with Contrast         CLINICAL INDICATION:  Shortness of breath and low O2 saturation. COMPARISON:  No prior CTA chest.  No VQ scan. Chest x-ray dated 02/24/20. CT  abdomen-pelvis 02/12/09. TECHNIQUE:    - Helical volumetric sections of the chest are obtained with CT pulmonary  angiogram protocol. Subsequently, sagittal and coronal multiplanar  reconstruction images are obtained.   Maximum intensity projection images are  generated to better delineate the pulmonary vasculature, differentiate between  the pulmonary arteries and veins and to increase sensitivity to pulmonary  emboli.    - IV contrast 100 mL Isovue-370.  - Radiation dose optimization techniques are utilized as appropriate to the  exam, with combination of automated exposure control, adjustment of the mA  and/or kV according to patient's size (Including appropriate matching for  site-specific examinations), or use of iterative reconstruction technique. FINDINGS:     Pulmonary Arteries:  No convincing evidence for pulmonary embolism is detected. Lung, Pleura, Airways: Atelectatic changes at the lung bases bilaterally. No  dominant mass or discrete suspicious lung nodules. No definite airspace  opacities. Mediastinum, Clotilde:  No adenopathy. Aorta:  No evidence of aortic dissection. Apparent dilation of the ascending  aorta measuring about 4.3 cm. Base of Neck:  No acute findings. Axillae:  Unremarkable. Esophagus:  Unremarkable. Abdomen:  Both kidneys demonstrate multiple exophytic hypodensities. .  The  largest of the exophytic hypodensity is observed in the left kidney mid  interpolar region with an internal septation, measuring up to about 4.4 x 3.9  cm. Skeletal Structures:  No acute findings. Impression IMPRESSION:         1. No convincing evidence of pulmonary embolism. 2.  Atelectatic changes in both lungs especially at the lung bases. 3.  Multiple renal hypodensities. At least one of the renal cyst appears to  show a nonsimple feature, i.e. septation, not as apparent on the previous study. Recommend follow-up dedicated CT of the abdomen without and with contrast for  further delineation. 4.  Mild dilation of the ascending aorta.               IMPRESSION:   · Hypoxic respiratory failure secondary to COVID-19 pneumonia-slow improvement in oxygenation remains on high flow-improving oxygen requirements  · COVID-19 pneumonia  · History of nocturnal hypoxemia-FTs with mild restrictive ventilatory defect, Reduced diffusion capacity indicating a decrease in alveolar surface area for gas exchange   Mild obstruction suspected   · Pulmonary hypertension-group 1 and 3  · History of positive ANCA, AK-3,-Lupus on Plaquenil as outpatient  · Suspected obstructive sleep apnea/OHS on CPAP   · History of IVC filter placement Patient Active Problem List   Diagnosis Code    Hypertension I10    Lupus ([facility] M32.9    Arthritis M19.90    Neuropathy G62.9    Obesity, morbid ([facility] E66.01    Lumbar spinal stenosis M48.061    HNP (herniated nucleus pulposus), lumbar M51.26    Pneumonia due to COVID-19 virus U07.1, J12.82    Hypoxia R09.02        RECOMMENDATIONS:   · Oxygen-continue supplementation to maintain saturation more than 92%.  Currently needing high flow 30 L 40% -discussed with respiratory therapist to switch to nasal cannula if tolerated  · Bronchial hygiene protocol  · Bronchodilators-continue Combivent Respimat  · Steroids-continue Decadron for treatment of COVID-19 pneumonia-completed 5 days of 10 mg twice daily now down to 10 mg daily dosing  · Can continue Plaquenil,  · Aspiration precautions  · Adjuvant therapy and follow-up of inflammatory markers per ID  · Need for further diagnostics-echocardiogram  · Out patient testing- PFT, 6 min walk, Polysomnogram  · Assess home Oxygen needs at discharge  · OT, PT, OOB and ambulate  · Healthy weight  · Will Follow in pulmonary clinic at discharge       Sharad Conrad MD  05/02/21  Pulmonary, Critical Care Medicine  [facility]

## 2021-05-03 ENCOUNTER — APPOINTMENT (OUTPATIENT)
Dept: NON INVASIVE DIAGNOSTICS | Age: 76
DRG: 871 | End: 2021-05-03
Attending: NURSE PRACTITIONER
Payer: MEDICARE

## 2021-05-03 LAB
ALBUMIN SERPL-MCNC: 2.4 G/DL (ref 3.4–5)
ALBUMIN/GLOB SERPL: 0.9 {RATIO} (ref 0.8–1.7)
ALP SERPL-CCNC: 72 U/L (ref 45–117)
ALT SERPL-CCNC: 26 U/L (ref 13–56)
ANION GAP SERPL CALC-SCNC: 4 MMOL/L (ref 3–18)
AST SERPL-CCNC: 18 U/L (ref 10–38)
BASOPHILS # BLD: 0 K/UL (ref 0–0.1)
BASOPHILS NFR BLD: 0 % (ref 0–2)
BILIRUB SERPL-MCNC: 0.4 MG/DL (ref 0.2–1)
BUN SERPL-MCNC: 30 MG/DL (ref 7–18)
BUN/CREAT SERPL: 63 (ref 12–20)
CALCIUM SERPL-MCNC: 8.7 MG/DL (ref 8.5–10.1)
CHLORIDE SERPL-SCNC: 103 MMOL/L (ref 100–111)
CO2 SERPL-SCNC: 33 MMOL/L (ref 21–32)
CREAT SERPL-MCNC: 0.48 MG/DL (ref 0.6–1.3)
DIFFERENTIAL METHOD BLD: ABNORMAL
EOSINOPHIL # BLD: 0.1 K/UL (ref 0–0.4)
EOSINOPHIL NFR BLD: 1 % (ref 0–5)
ERYTHROCYTE [DISTWIDTH] IN BLOOD BY AUTOMATED COUNT: 13.3 % (ref 11.6–14.5)
GLOBULIN SER CALC-MCNC: 2.8 G/DL (ref 2–4)
GLUCOSE BLD STRIP.AUTO-MCNC: 101 MG/DL (ref 70–110)
GLUCOSE BLD STRIP.AUTO-MCNC: 124 MG/DL (ref 70–110)
GLUCOSE BLD STRIP.AUTO-MCNC: 80 MG/DL (ref 70–110)
GLUCOSE BLD STRIP.AUTO-MCNC: 98 MG/DL (ref 70–110)
GLUCOSE SERPL-MCNC: 141 MG/DL (ref 74–99)
HCT VFR BLD AUTO: 39.9 % (ref 35–45)
HGB BLD-MCNC: 12.7 G/DL (ref 12–16)
LYMPHOCYTES # BLD: 0.6 K/UL (ref 0.9–3.6)
LYMPHOCYTES NFR BLD: 8 % (ref 21–52)
MCH RBC QN AUTO: 28.1 PG (ref 24–34)
MCHC RBC AUTO-ENTMCNC: 31.8 G/DL (ref 31–37)
MCV RBC AUTO: 88.3 FL (ref 74–97)
MONOCYTES # BLD: 0.6 K/UL (ref 0.05–1.2)
MONOCYTES NFR BLD: 7 % (ref 3–10)
NEUTS SEG # BLD: 6.8 K/UL (ref 1.8–8)
NEUTS SEG NFR BLD: 81 % (ref 40–73)
PLATELET # BLD AUTO: 382 K/UL (ref 135–420)
PMV BLD AUTO: 10.5 FL (ref 9.2–11.8)
POTASSIUM SERPL-SCNC: 4.5 MMOL/L (ref 3.5–5.5)
PROT SERPL-MCNC: 5.2 G/DL (ref 6.4–8.2)
RBC # BLD AUTO: 4.52 M/UL (ref 4.2–5.3)
SODIUM SERPL-SCNC: 140 MMOL/L (ref 136–145)
WBC # BLD AUTO: 8.4 K/UL (ref 4.6–13.2)

## 2021-05-03 PROCEDURE — 94640 AIRWAY INHALATION TREATMENT: CPT

## 2021-05-03 PROCEDURE — 74011250637 HC RX REV CODE- 250/637: Performed by: NURSE PRACTITIONER

## 2021-05-03 PROCEDURE — 97530 THERAPEUTIC ACTIVITIES: CPT

## 2021-05-03 PROCEDURE — APPSS30 APP SPLIT SHARED TIME 16-30 MINUTES: Performed by: NURSE PRACTITIONER

## 2021-05-03 PROCEDURE — 74011250636 HC RX REV CODE- 250/636: Performed by: NURSE PRACTITIONER

## 2021-05-03 PROCEDURE — 82962 GLUCOSE BLOOD TEST: CPT

## 2021-05-03 PROCEDURE — 93321 DOPPLER ECHO F-UP/LMTD STD: CPT

## 2021-05-03 PROCEDURE — 74011250637 HC RX REV CODE- 250/637: Performed by: HOSPITALIST

## 2021-05-03 PROCEDURE — 80053 COMPREHEN METABOLIC PANEL: CPT

## 2021-05-03 PROCEDURE — 65660000000 HC RM CCU STEPDOWN

## 2021-05-03 PROCEDURE — 2709999900 HC NON-CHARGEABLE SUPPLY

## 2021-05-03 PROCEDURE — 85025 COMPLETE CBC W/AUTO DIFF WBC: CPT

## 2021-05-03 PROCEDURE — 36415 COLL VENOUS BLD VENIPUNCTURE: CPT

## 2021-05-03 PROCEDURE — 74011250637 HC RX REV CODE- 250/637: Performed by: INTERNAL MEDICINE

## 2021-05-03 PROCEDURE — 74011250636 HC RX REV CODE- 250/636: Performed by: INTERNAL MEDICINE

## 2021-05-03 PROCEDURE — 97535 SELF CARE MNGMENT TRAINING: CPT

## 2021-05-03 PROCEDURE — 97164 PT RE-EVAL EST PLAN CARE: CPT

## 2021-05-03 PROCEDURE — 99232 SBSQ HOSP IP/OBS MODERATE 35: CPT | Performed by: HOSPITALIST

## 2021-05-03 RX ADMIN — ENOXAPARIN SODIUM 40 MG: 40 INJECTION SUBCUTANEOUS at 17:41

## 2021-05-03 RX ADMIN — DEXAMETHASONE SODIUM PHOSPHATE 10 MG: 4 INJECTION, SOLUTION INTRAMUSCULAR; INTRAVENOUS at 20:35

## 2021-05-03 RX ADMIN — Medication 10 ML: at 14:00

## 2021-05-03 RX ADMIN — HYDROXYCHLOROQUINE SULFATE 200 MG: 200 TABLET ORAL at 17:41

## 2021-05-03 RX ADMIN — GUAIFENESIN 600 MG: 600 TABLET, EXTENDED RELEASE ORAL at 09:57

## 2021-05-03 RX ADMIN — Medication 10 ML: at 22:02

## 2021-05-03 RX ADMIN — IPRATROPIUM BROMIDE AND ALBUTEROL 1 PUFF: 20; 100 SPRAY, METERED RESPIRATORY (INHALATION) at 08:00

## 2021-05-03 RX ADMIN — SERTRALINE HYDROCHLORIDE 150 MG: 50 TABLET ORAL at 09:57

## 2021-05-03 RX ADMIN — Medication 500 MG: at 09:57

## 2021-05-03 RX ADMIN — IPRATROPIUM BROMIDE AND ALBUTEROL 1 PUFF: 20; 100 SPRAY, METERED RESPIRATORY (INHALATION) at 19:58

## 2021-05-03 RX ADMIN — GUAIFENESIN 600 MG: 600 TABLET, EXTENDED RELEASE ORAL at 20:34

## 2021-05-03 RX ADMIN — Medication 500 MG: at 17:41

## 2021-05-03 RX ADMIN — PANTOPRAZOLE SODIUM 40 MG: 40 TABLET, DELAYED RELEASE ORAL at 09:57

## 2021-05-03 RX ADMIN — CLONIDINE HYDROCHLORIDE 0.1 MG: 0.1 TABLET ORAL at 09:57

## 2021-05-03 RX ADMIN — IPRATROPIUM BROMIDE AND ALBUTEROL 1 PUFF: 20; 100 SPRAY, METERED RESPIRATORY (INHALATION) at 14:00

## 2021-05-03 RX ADMIN — Medication 10 ML: at 06:05

## 2021-05-03 RX ADMIN — HYDROXYCHLOROQUINE SULFATE 200 MG: 200 TABLET ORAL at 09:57

## 2021-05-03 NOTE — PROGRESS NOTES
Chart reviewed. Pt is on 30L HFNC. Is normally on 2L at home. PT/OT  Recommending rehab but pt/family want home health with family assistance as she had a bad experience with rehab in the past. [facility] signed for [facility]. Will continue to monitor for discharge needs.   Reynaldo Cash RN - Outcomes Manager  [phone]

## 2021-05-03 NOTE — PROGRESS NOTES
Problem: Risk for Spread of Infection  Goal: Prevent transmission of infectious organism to others  Description: Prevent the transmission of infectious organisms to other patients, staff members, and visitors. Outcome: Progressing Towards Goal     Problem: Patient Education:  Go to Education Activity  Goal: Patient/Family Education  Outcome: Progressing Towards Goal     Problem: Pressure Injury - Risk of  Goal: *Prevention of pressure injury  Description: Document Francisco Scale and appropriate interventions in the flowsheet. Outcome: Progressing Towards Goal  Note: Pressure Injury Interventions:  Sensory Interventions: Assess need for specialty bed, Assess changes in LOC, Avoid rigorous massage over bony prominences, Discuss PT/OT consult with provider, Float heels, Keep linens dry and wrinkle-free, Maintain/enhance activity level, Minimize linen layers, Monitor skin under medical devices, Pad between skin to skin, Pressure redistribution bed/mattress (bed type), Turn and reposition approx. every two hours (pillows and wedges if needed)    Moisture Interventions: Apply protective barrier, creams and emollients, Absorbent underpads, Assess need for specialty bed, Check for incontinence Q2 hours and as needed, Internal/External urinary devices, Limit adult briefs, Maintain skin hydration (lotion/cream), Minimize layers, Moisture barrier    Activity Interventions: Assess need for specialty bed, PT/OT evaluation, Pressure redistribution bed/mattress(bed type)    Mobility Interventions: Assess need for specialty bed, Float heels, HOB 30 degrees or less, Pressure redistribution bed/mattress (bed type), Turn and reposition approx.  every two hours(pillow and wedges)    Nutrition Interventions: Document food/fluid/supplement intake, Discuss nutritional consult with provider, Offer support with meals,snacks and hydration    Friction and Shear Interventions: Apply protective barrier, creams and emollients Problem: Airway Clearance - Ineffective  Goal: Achieve or maintain patent airway  Outcome: Progressing Towards Goal     Problem: Gas Exchange - Impaired  Goal: Absence of hypoxia  Outcome: Progressing Towards Goal  Goal: Promote optimal lung function  Outcome: Progressing Towards Goal     Problem: Breathing Pattern - Ineffective  Goal: Ability to achieve and maintain a regular respiratory rate  Outcome: Progressing Towards Goal     Problem:  Body Temperature -  Risk of, Imbalanced  Goal: Ability to maintain a body temperature within defined limits  Outcome: Progressing Towards Goal  Goal: Will regain or maintain usual level of consciousness  Outcome: Progressing Towards Goal  Goal: Complications related to the disease process, condition or treatment will be avoided or minimized  Outcome: Progressing Towards Goal     Problem: Nutrition Deficits  Goal: Optimize nutrtional status  Outcome: Progressing Towards Goal     Problem: Risk for Fluid Volume Deficit  Goal: Maintain normal heart rhythm  Outcome: Progressing Towards Goal  Goal: Maintain absence of muscle cramping  Outcome: Progressing Towards Goal  Goal: Maintain normal serum potassium, sodium, calcium, phosphorus, and pH  Outcome: Progressing Towards Goal     Problem: Loneliness or Risk for Loneliness  Goal: Demonstrate positive use of time alone when socialization is not possible  Outcome: Progressing Towards Goal     Problem: Fatigue  Goal: Verbalize increase energy and improved vitality  Outcome: Progressing Towards Goal     Problem: Patient Education: Go to Patient Education Activity  Goal: Patient/Family Education  Outcome: Progressing Towards Goal     Problem: Patient Education: Go to Patient Education Activity  Goal: Patient/Family Education  Outcome: Progressing Towards Goal     Problem: Pneumonia: Day 1  Goal: Off Pathway (Use only if patient is Off Pathway)  Outcome: Resolved/Met  Goal: Activity/Safety  Outcome: Resolved/Met  Goal: Consults, if ordered  Outcome: Resolved/Met  Goal: Diagnostic Test/Procedures  Outcome: Resolved/Met  Goal: Nutrition/Diet  Outcome: Resolved/Met  Goal: Medications  Outcome: Resolved/Met  Goal: Respiratory  Outcome: Resolved/Met  Goal: Treatments/Interventions/Procedures  Outcome: Resolved/Met  Goal: Psychosocial  Outcome: Resolved/Met  Goal: *Oxygen saturation within defined limits  Outcome: Resolved/Met  Goal: *Pneumoccocal vaccine administered  Outcome: Resolved/Met  Goal: *Hemodynamically stable  Outcome: Resolved/Met  Goal: *Demonstrates progressive activity  Outcome: Resolved/Met  Goal: *Tolerating diet  Outcome: Resolved/Met     Problem: Pneumonia: Day 2  Goal: Off Pathway (Use only if patient is Off Pathway)  Outcome: Resolved/Met  Goal: Activity/Safety  Outcome: Resolved/Met  Goal: Consults, if ordered  Outcome: Resolved/Met  Goal: Diagnostic Test/Procedures  Outcome: Resolved/Met  Goal: Nutrition/Diet  Outcome: Resolved/Met  Goal: Discharge Planning  Outcome: Resolved/Met  Goal: Medications  Outcome: Resolved/Met  Goal: Respiratory  Outcome: Resolved/Met  Goal: Treatments/Interventions/Procedures  Outcome: Resolved/Met  Goal: Psychosocial  Outcome: Resolved/Met  Goal: *Oxygen saturation within defined limits  Outcome: Resolved/Met  Goal: *Hemodynamically stable  Outcome: Resolved/Met  Goal: *Demonstrates progressive activity  Outcome: Resolved/Met  Goal: *Tolerating diet  Outcome: Resolved/Met  Goal: *Optimal pain control at patient's stated goal  Outcome: Resolved/Met     Problem: Pneumonia: Day 3  Goal: Off Pathway (Use only if patient is Off Pathway)  Outcome: Resolved/Met  Goal: Activity/Safety  Outcome: Resolved/Met  Goal: Consults, if ordered  Outcome: Resolved/Met  Goal: Diagnostic Test/Procedures  Outcome: Resolved/Met  Goal: Nutrition/Diet  Outcome: Resolved/Met  Goal: Discharge Planning  Outcome: Resolved/Met  Goal: Medications  Outcome: Resolved/Met  Goal: Respiratory  Outcome: Resolved/Met  Goal: Treatments/Interventions/Procedures  Outcome: Resolved/Met  Goal: Psychosocial  Outcome: Resolved/Met  Goal: *Oxygen saturation within defined limits  Outcome: Resolved/Met  Goal: *Hemodynamically stable  Outcome: Resolved/Met  Goal: *Demonstrates progressive activity  Outcome: Resolved/Met  Goal: *Tolerating diet  Outcome: Resolved/Met  Goal: *Describes available resources and support systems  Outcome: Resolved/Met  Goal: *Optimal pain control at patient's stated goal  Outcome: Resolved/Met     Problem: Pneumonia: Day 4  Goal: Off Pathway (Use only if patient is Off Pathway)  Outcome: Resolved/Met  Goal: Activity/Safety  Outcome: Resolved/Met  Goal: Nutrition/Diet  Outcome: Resolved/Met  Goal: Discharge Planning  Outcome: Resolved/Met  Goal: Medications  Outcome: Resolved/Met  Goal: Respiratory  Outcome: Resolved/Met  Goal: Treatments/Interventions/Procedures  Outcome: Resolved/Met  Goal: Psychosocial  Outcome: Resolved/Met     Problem: Pneumonia: Discharge Outcomes  Goal: *Demonstrates progressive activity  Outcome: Progressing Towards Goal  Goal: *Describes follow-up/return visits to physicians  Outcome: Progressing Towards Goal  Goal: *Tolerating diet  Outcome: Progressing Towards Goal  Goal: *Verbalizes name, dosage, time, side effects, and number of days to continue medications  Outcome: Progressing Towards Goal  Goal: *Influenza immunization  Outcome: Progressing Towards Goal  Goal: *Pneumococcal immunization  Outcome: Progressing Towards Goal  Goal: *Respiratory status at baseline  Outcome: Progressing Towards Goal  Goal: *Vital signs within defined limits  Outcome: Progressing Towards Goal  Goal: *Describes available resources and support systems  Outcome: Progressing Towards Goal  Goal: *Optimal pain control at patient's stated goal  Outcome: Progressing Towards Goal

## 2021-05-03 NOTE — PROGRESS NOTES
Problem: Risk for Spread of Infection  Goal: Prevent transmission of infectious organism to others  Description: Prevent the transmission of infectious organisms to other patients, staff members, and visitors. Outcome: Progressing Towards Goal     Problem: Patient Education:  Go to Education Activity  Goal: Patient/Family Education  Outcome: Progressing Towards Goal     Problem: Falls - Risk of  Goal: *Absence of Falls  Description: Document Luz Marina Domingo Fall Risk and appropriate interventions in the flowsheet. Outcome: Progressing Towards Goal  Note: Fall Risk Interventions:  Mobility Interventions: Assess mobility with egress test, Communicate number of staff needed for ambulation/transfer, Bed/chair exit alarm, Patient to call before getting OOB         Medication Interventions: Bed/chair exit alarm, Evaluate medications/consider consulting pharmacy, Patient to call before getting OOB, Teach patient to arise slowly    Elimination Interventions: Bed/chair exit alarm, Call light in reach, Patient to call for help with toileting needs, Stay With Me (per policy), Toilet paper/wipes in reach, Toileting schedule/hourly rounds              Problem: Patient Education: Go to Patient Education Activity  Goal: Patient/Family Education  Outcome: Progressing Towards Goal     Problem: Pressure Injury - Risk of  Goal: *Prevention of pressure injury  Description: Document Francisco Scale and appropriate interventions in the flowsheet. Outcome: Progressing Towards Goal  Note: Pressure Injury Interventions:  Sensory Interventions: Assess need for specialty bed, Assess changes in LOC, Avoid rigorous massage over bony prominences, Discuss PT/OT consult with provider, Float heels, Keep linens dry and wrinkle-free, Maintain/enhance activity level, Minimize linen layers, Monitor skin under medical devices, Pad between skin to skin, Pressure redistribution bed/mattress (bed type), Turn and reposition approx.  every two hours (pillows and wedges if needed)    Moisture Interventions: Apply protective barrier, creams and emollients, Absorbent underpads, Assess need for specialty bed, Check for incontinence Q2 hours and as needed, Internal/External urinary devices, Limit adult briefs, Maintain skin hydration (lotion/cream), Minimize layers, Moisture barrier    Activity Interventions: Assess need for specialty bed, PT/OT evaluation, Pressure redistribution bed/mattress(bed type)    Mobility Interventions: Assess need for specialty bed, Float heels, HOB 30 degrees or less, Pressure redistribution bed/mattress (bed type), Turn and reposition approx. every two hours(pillow and wedges)    Nutrition Interventions: Document food/fluid/supplement intake, Discuss nutritional consult with provider, Offer support with meals,snacks and hydration    Friction and Shear Interventions: Apply protective barrier, creams and emollients                Problem: Patient Education: Go to Patient Education Activity  Goal: Patient/Family Education  Outcome: Progressing Towards Goal     Problem: Airway Clearance - Ineffective  Goal: Achieve or maintain patent airway  Outcome: Progressing Towards Goal     Problem: Gas Exchange - Impaired  Goal: Absence of hypoxia  Outcome: Progressing Towards Goal  Goal: Promote optimal lung function  Outcome: Progressing Towards Goal     Problem: Breathing Pattern - Ineffective  Goal: Ability to achieve and maintain a regular respiratory rate  Outcome: Progressing Towards Goal     Problem:  Body Temperature -  Risk of, Imbalanced  Goal: Ability to maintain a body temperature within defined limits  Outcome: Progressing Towards Goal  Goal: Will regain or maintain usual level of consciousness  Outcome: Progressing Towards Goal  Goal: Complications related to the disease process, condition or treatment will be avoided or minimized  Outcome: Progressing Towards Goal     Problem: Isolation Precautions - Risk of Spread of Infection  Goal: Prevent transmission of infectious organism to others  Outcome: Progressing Towards Goal     Problem: Nutrition Deficits  Goal: Optimize nutrtional status  Outcome: Progressing Towards Goal     Problem: Risk for Fluid Volume Deficit  Goal: Maintain normal heart rhythm  Outcome: Progressing Towards Goal  Goal: Maintain absence of muscle cramping  Outcome: Progressing Towards Goal  Goal: Maintain normal serum potassium, sodium, calcium, phosphorus, and pH  Outcome: Progressing Towards Goal     Problem: Loneliness or Risk for Loneliness  Goal: Demonstrate positive use of time alone when socialization is not possible  Outcome: Progressing Towards Goal     Problem: Fatigue  Goal: Verbalize increase energy and improved vitality  Outcome: Progressing Towards Goal     Problem: Patient Education: Go to Patient Education Activity  Goal: Patient/Family Education  Outcome: Progressing Towards Goal     Problem: Patient Education: Go to Patient Education Activity  Goal: Patient/Family Education  Outcome: Progressing Towards Goal     Problem: Pneumonia: Day 1  Goal: *Influenza vaccine administered (October-March)  Outcome: Progressing Towards Goal     Problem: Pneumonia: Discharge Outcomes  Goal: *Demonstrates progressive activity  Outcome: Progressing Towards Goal  Goal: *Describes follow-up/return visits to physicians  Outcome: Progressing Towards Goal  Goal: *Tolerating diet  Outcome: Progressing Towards Goal  Goal: *Verbalizes name, dosage, time, side effects, and number of days to continue medications  Outcome: Progressing Towards Goal  Goal: *Influenza immunization  Outcome: Progressing Towards Goal  Goal: *Pneumococcal immunization  Outcome: Progressing Towards Goal  Goal: *Respiratory status at baseline  Outcome: Progressing Towards Goal  Goal: *Vital signs within defined limits  Outcome: Progressing Towards Goal  Goal: *Describes available resources and support systems  Outcome: Progressing Towards Goal  Goal: *Optimal pain control at patient's stated goal  Outcome: Progressing Towards Goal     Problem: Pain  Goal: *Control of Pain  Outcome: Progressing Towards Goal  Goal: *PALLIATIVE CARE:  Alleviation of Pain  Outcome: Progressing Towards Goal     Problem: Patient Education: Go to Patient Education Activity  Goal: Patient/Family Education  Outcome: Progressing Towards Goal     Problem: Patient Education: Go to Patient Education Activity  Goal: Patient/Family Education  Outcome: Progressing Towards Goal     Problem: Patient Education: Go to Patient Education Activity  Goal: Patient/Family Education  Outcome: Progressing Towards Goal     Problem: Patient Education: Go to Patient Education Activity  Goal: Patient/Family Education  Outcome: Progressing Towards Goal     Problem: Nutrition Deficit  Goal: *Optimize nutritional status  Outcome: Progressing Towards Goal

## 2021-05-03 NOTE — PROGRESS NOTES
Problem: Mobility Impaired (Adult and Pediatric)  Goal: *Acute Goals and Plan of Care (Insert Text)  Description: Physical Therapy Goals  Updated to reflect slow progression 5/3/2021  1. Patient will move from supine to sit and sit to supine in bed with supervision. 2.  Patient will transfer from bed to chair and chair to bed with supervision/set-up using the least restrictive device. 3.  Patient will perform sit to stand with supervision/set-up. 4.  Patient will ambulate with supervision/set-up for 25 feet with the least restrictive device. Initiated 4/26/2021 and to be accomplished within 7 day(s)  1. Patient will move from supine to sit and sit to supine  in bed with modified independence. 2.  Patient will transfer from bed to chair and chair to bed with modified independence using the least restrictive device. 3.  Patient will perform sit to stand with modified independence. 4.  Patient will ambulate with modified independence for 100 feet with the least restrictive device. PLOF: Patient reports she was independent with self care and functional mobility using RW. Outcome: Progressing Towards Goal   PHYSICAL THERAPY RE-EVALUATION    Patient: Marin Lawson (62 y.o. female)  Date: 5/3/2021  Primary Diagnosis: Pneumonia due to COVID-19 virus [U07.1, J12.82]  Hypoxia [R09.02]  Precautions: Fall, Skin  ASSESSMENT :  Re-evaluation s/p prolonged admission; goals reviewed and updated as indicated. Droplet plus isolation. Co-treated with OT to maximize patient safety and participation in functional mobility. Anxiety limits mobility progression. Educated on positive self thoughts, breathing techniques, and benefits of EOB/OOB with ADLs. Seated on bedside commode with OT upon entry. On 30L O2. O2 saturation fluctuates between 84-95% with mobility. Respiratory rate 10s-50s with mobility. Max  bpm with standing activities. Decreased insight to safety with mobility.  Supervision for sit to stand transfers with use of momentum. Transferred back to EOB from bedside commode with supervision/min A and decreased safety insight. Completed seated BLE AROM. Sit to stand from EOB with supervision and additional time. Completed side steps with ww and min A and static standing balance at ww; 3 minutes. Returned to seated in bed with min A for sit to supine with HOB elevated. Educated on need for RN assistance with mobility; verbalized understanding. Call bell in reach. Patient will continue to benefit from skilled intervention to address the above impairments. PLAN :  Recommendations and Planned Interventions:  [x]           Bed Mobility Training             [x]    Neuromuscular Re-Education  [x]           Transfer Training                   []    Orthotic/Prosthetic Training  [x]           Gait Training                          []    Modalities  [x]           Therapeutic Exercises           []    Edema Management/Control  [x]           Therapeutic Activities            [x]    Family Training/Education  [x]           Patient Education  []           Other (comment):    Frequency/Duration: Patient will continue to be followed by physical therapy 1-2 times per day/4-7 days per week  to address goals. Discharge Recommendations: Rehab  Further Equipment Recommendations for Discharge: rolling walker     SUBJECTIVE:   Patient stated Tolu Rodríguez stayed home for a year.     OBJECTIVE DATA SUMMARY:     Past Medical History:   Diagnosis Date    Arthritis     Bronchitis 01/01/2020    Hypertension     Indigestion     Lupus ([facility] [address]     Memory difficulty     Nausea & vomiting     Ringing of ears     sometimes    Thromboembolus [facility]) 2009     Past Surgical History:   Procedure Laterality Date    HX CHOLECYSTECTOMY  1988    HX COLONOSCOPY      HX ORTHOPAEDIC  April 09'    right knee replacement     HX ORTHOPAEDIC  Sept 09'    left knee replacement    HX VASCULAR ACCESS      IVC filter    MT REMOVAL GALLBLADDER Critical Behavior:  Neurologic State: Alert  Orientation Level: Oriented to person;Oriented to place  Cognition: Follows commands     Psychosocial  Patient Behaviors: Anxious    Strength:    Manual Muscle Testing (LE)         R     L    Hip Flexion:   4+/5  4+/5  Knee EXT:   4+/5  4+/5  Knee FLEX:   4+/5  4+/5  Ankle DF:   4+/5  4+/5  _________________________________________________   Range Of Motion:  BLE AROM WFL   Functional Mobility:  Bed Mobility:  Sit to Supine: Minimum assistance  Transfers:  Sit to Stand: Minimum assistance  Stand to Sit: Minimum assistance  Balance:   Sitting: Impaired  Sitting - Static: Good (unsupported)  Sitting - Dynamic: Fair (occasional)  Standing: Impaired  Standing - Static: Fair  Standing - Dynamic : Fair  Ambulation/Gait Training:  Distance (ft): 5 Feet (ft)   Assistive Device: Walker, rolling  Ambulation - Level of Assistance: Minimal assistance  Therapeutic Exercises:   Seated balance 20 minutes  Standing balance 3 minutes  Sit to stand x2  Seated BLE AROM; knee extension x10  Pain:  Pain level pre-treatment: 0/10   Pain level post-treatment: 0/10     Activity Tolerance:   Fair    After treatment:   []         Patient left in no apparent distress sitting up in chair  [x]         Patient left in no apparent distress in bed  [x]         Call bell left within reach  [x]         Nursing notified  []         Caregiver present  []         Bed alarm activated  []         SCDs applied    COMMUNICATION/EDUCATION:   [x]         Role of physical therapy in the acute care setting. [x]         Fall prevention education was provided and the patient/caregiver indicated understanding. [x]         Patient/family have participated as able in goal setting and plan of care. [x]         Patient/family agree to work toward stated goals and plan of care. []         Patient understands intent and goals of therapy, but is neutral about his/her participation.   []         Patient is unable to participate in goal setting/plan of care: ongoing with therapy staff.     Thank you for this referral.  Nohemi Underwood, PT   Time Calculation: 33 mins

## 2021-05-03 NOTE — PROGRESS NOTES
Titrate 25L     05/03/21 1349   Oxygen Therapy   O2 Sat (%) 97 %   Pulse via Oximetry 85 beats per minute   O2 Device Hi flow nasal cannula  (Vapotherm)   O2 Flow Rate (L/min) 25 l/min   O2 Temperature 91.4 °F (33 °C)   FIO2 (%) 35 %   Pre-Treatment   Breathing Pattern Regular   Breath Sounds Bilateral Diminished   Pulse 85   SpO2 97 %   Respirations 16   Post-Treatment   Breathing Pattern Regular   Breath Sounds Bilateral Diminished   Pulse 85   SpO2 97 %   Respirations 16   Treatment Tolerance Well   Procedures   $$ Subsequent Procedure MDI   Delivery Source MDI   Aerosolized Medications Other (comment)  (Combivent)

## 2021-05-03 NOTE — PROGRESS NOTES
Progress Note    Patient: Nathalia Avalos MRN: [id number]  CSN: [id number]    YOB: 1945  Age: 67 y.o. Sex: female    DOA: 4/25/2021 LOS:  LOS: 8 days               Subjective:     Pt finishing lunch at time of my evaluation. States still has some SOB. Reports increased anxiety but states she was able to get through it. No c/o chest pain, abdominal pain, N/V. Discussed with RN. Also discussed with RT about weaning HFNC. Chief Complaint:   Chief Complaint   Patient presents with    Flu Like Symptoms         Assessment/Plan       Assessment  1. Covid-19 Pneumonia  2. Acute respiratory failure with hypoxia secondary to Covid-19 Pneumonia  3. Pulmonary hypertension  4. HO on Cpap  5. Hx nocturnal hypoxemia  6. Hx IVC filter  7. Lupus   8. Morbid obesity, BMI >48%        Plan  1. Pulmonary following, appreciate assistance. Currently on HFNC, titrate to keep oxygen sats >92%. IV abx therapy completed and dc'd on 4/28. Completed Decadron 10 mg BID x 5 days now on 10 mg daily. Combivent Respimat q6h. Incentive spirometry. Need to assess home oxygen needs at [place]. Will need outpatient testing- PFT, 6 min walk, polysomnogram per pulm. 2. Echo pending  3. Monitor accuchecks ac/hs given IV steroid use  4. Monitor vital signs, weight per unit protocol  5. Fall precautions  6. PT, OT followig- rec rehab however pt/family wants home health w/ family assistance  7. CM following to assist w/ dc planning         Diet: Diabetic, Ensure Enlive TID  Code status: FULL    Contact: daughter Socorro Mitchell [phone]. Spoke with daughter at 1:56 pm to update on plan of care. Informed of continued use of HFNC but this has been weaned. Daughter confirmed plan is home w/ home health as pt had a bad experience in past with rehab. Pt would not be alone.     Case discussed with:  [x]Patient  [x]Family  [x]Nursing  [x]Case Management  DVT Prophylaxis:  [x]Lovenox  []Hep SQ  []SCDs  []Coumadin   []On Heparin gtt      Sixto Jarrell NP-C  [facility]  pager [phone]      Objective:     Physical Exam:  Visit Vitals  /87 (BP 1 Location: Left lower arm, BP Patient Position: At rest)   Pulse 88   Temp 98.2 °F (36.8 °C)   Resp 15   Ht 5' 2\" (1.575 m)   Wt 120.1 kg (264 lb 12.8 oz)   SpO2 94%   Breastfeeding No   BMI 48.43 kg/m²        General:         Alert, cooperative, no acute distress. Obese. HEENT: NC, Atraumatic. PERRLA, anicteric sclerae. Lungs: Diminished. Mild conversational dyspnea. Heart:  Regular  rhythm,  No murmur, No Rubs, No Gallops  Abdomen: Soft, Non distended, Non tender. +Bowel sounds, no HSM  Extremities: No c/c/e  Psych:   Good insight. Anxious at times but not at present. Neurologic:  Alert and oriented X 3. No acute neurological deficits      Intake and Output:  Current Shift:  05/02 1901 - 05/03 0700  In: 480 [P.O.:480]  Out: -   Last three shifts:  05/01 0701 - 05/02 1900  In: 480 [P.O.:480]  Out: -     Labs: Results:       Chemistry Recent Labs     05/03/21 0141 05/02/21 0643 05/01/21 0219   * 109* 135*    138 139   K 4.5 4.8 4.7    103 102   CO2 33* 35* 34*   BUN 30* 30* 30*   CREA 0.48* 0.41* 0.42*   CA 8.7 8.8 8.6   AGAP 4 0* 3   BUCR 63* 73* 71*   AP 72 63 67   TP 5.2* 5.5* 5.4*   ALB 2.4* 2.4* 2.6*   GLOB 2.8 3.1 2.8   AGRAT 0.9 0.8 0.9      CBC w/Diff Recent Labs     05/03/21 0141 05/02/21 0643 05/01/21 0219   WBC 8.4 8.1 7.3   RBC 4.52 4.46 4.52   HGB 12.7 12.7 13.0   HCT 39.9 39.0 39.5    385 342   GRANS 81* 83* 82*   LYMPH 8* 7* 9*   EOS 1 0 0      Cardiac Enzymes No results for input(s): CPK, CKND1, PETRA in the last 72 hours. No lab exists for component: CKRMB, TROIP   Coagulation No results for input(s): PTP, INR, APTT, INREXT in the last 72 hours.     Lipid Panel Lab Results   Component Value Date/Time    Cholesterol, total 165 11/02/2019 12:48 PM    HDL Cholesterol 82 (H) 11/02/2019 12:48 PM    LDL, calculated 71 11/02/2019 12:48 PM    VLDL, calculated 12 11/02/2019 12:48 PM    Triglyceride 60 11/02/2019 12:48 PM    CHOL/HDL Ratio 2.0 11/02/2019 12:48 PM      BNP No results for input(s): BNPP in the last 72 hours.    Liver Enzymes Recent Labs     05/03/21  0141   TP 5.2*   ALB 2.4*   AP 72      Thyroid Studies Lab Results   Component Value Date/Time    TSH 2.79 06/14/2018 12:13 PM          Procedures/imaging: see electronic medical records for all procedures/Xrays and details which were not copied into this note but were reviewed prior to creation of Plan    Medications:   Current Facility-Administered Medications   Medication Dose Route Frequency    LORazepam (ATIVAN) injection 0.5 mg  0.5 mg IntraVENous Q8H PRN    dexamethasone (DECADRON) 4 mg/mL injection 10 mg  10 mg IntraVENous Q24H    melatonin tablet 12 mg  12 mg Oral QHS PRN    ipratropium-albuterol (COMBIVENT RESPIMAT) 20 mcg-100 mcg inhalation spray  1 Puff Inhalation Q6H RT    sodium chloride (NS) flush 5-40 mL  5-40 mL IntraVENous Q8H    sodium chloride (NS) flush 5-40 mL  5-40 mL IntraVENous PRN    acetaminophen (TYLENOL) tablet 650 mg  650 mg Oral Q6H PRN    Or    acetaminophen (TYLENOL) suppository 650 mg  650 mg Rectal Q6H PRN    polyethylene glycol (MIRALAX) packet 17 g  17 g Oral DAILY PRN    ondansetron (ZOFRAN) injection 4 mg  4 mg IntraVENous Q6H PRN    enoxaparin (LOVENOX) injection 40 mg  40 mg SubCUTAneous Q24H    guaiFENesin ER (MUCINEX) tablet 600 mg  600 mg Oral Q12H    insulin lispro (HUMALOG) injection   SubCUTAneous AC&HS    glucose chewable tablet 16 g  4 Tab Oral PRN    glucagon (GLUCAGEN) injection 1 mg  1 mg IntraMUSCular PRN    dextrose (D50W) injection syrg 12.5-25 g  25-50 mL IntraVENous PRN    sertraline (ZOLOFT) tablet 150 mg  150 mg Oral DAILY    hydrOXYchloroQUINE (PLAQUENIL) tablet 200 mg  200 mg Oral BID    hydrALAZINE (APRESOLINE) 20 mg/mL injection 10 mg  10 mg IntraVENous Q6H PRN    pantoprazole (PROTONIX) tablet 40 mg  40 mg Oral ACB    ascorbic acid (vitamin C) (VITAMIN C) tablet 500 mg  500 mg Oral BID    omega-3 acid ethyl esters (LOVAZA) capsule 1,000 mg  1 g Oral BID WITH MEALS    cloNIDine HCL (CATAPRES) tablet 0.1 mg  0.1 mg Oral BID

## 2021-05-03 NOTE — PROGRESS NOTES
Chg'd water on HFNC     05/03/21 0852   Oxygen Therapy   O2 Sat (%) 95 %   Pulse via Oximetry 69 beats per minute   O2 Device Hi flow nasal cannula  (Vapotherm)   O2 Flow Rate (L/min) 30 l/min   O2 Temperature 91.4 °F (33 °C)   FIO2 (%) 35 %   Pre-Treatment   Breathing Pattern Regular   Breath Sounds Bilateral Diminished   Pulse 69   SpO2 95 %   Respirations 29   Post-Treatment   Breathing Pattern Regular   Breath Sounds Bilateral Diminished   Pulse 69   SpO2 95 %   Respirations 29   Treatment Tolerance Well   Procedures   $$ Subsequent Procedure MDI   Delivery Source MDI   Aerosolized Medications Other (comment)  (Combivent)

## 2021-05-03 NOTE — ROUTINE PROCESS
Bedside and Verbal shift change report given to [address] [place] [address] (oncoming nurse) by Romaine Fierro RN  (offgoing nurse). Report included the following information SBAR, Intake/Output, MAR, Recent Results and Cardiac Rhythm SR/SB.

## 2021-05-03 NOTE — PROGRESS NOTES
Nutrition Assessment     Type and Reason for Visit: Reassess, Consult    Nutrition Recommendations/Plan:   - Continue current nutrition interventions. Monitor and encourage po intake. Nutrition Assessment:  Consuming 50-75% of recent meals. BM today. Malnutrition Assessment:  Malnutrition Status:  At risk for malnutrition (specify)(weight loss PTA, poor appetite)     Estimated Daily Nutrient Needs:  Energy (kcal):  8125-1218  Protein (g):         Fluid (ml/day):  9267-0647    Nutrition Related Findings:  Last BM 5/3      Current Nutrition Therapies:  DIET NUTRITIONAL SUPPLEMENTS Breakfast, Lunch, Dinner; Ensure [employer]  DIET DIABETIC WITH OPTIONS Consistent Carb 1800kcal; Regular; 2 GM NA (House Low NA); AHA-LOW-CHOL FAT    Anthropometric Measures:  · Height:  5' 2\" (157.5 cm)  · Current Body Wt:  117.8 kg (259 lb 11.2 oz)  · BMI: 47.5    Nutrition Diagnosis:   · Unintended weight loss related to early satiety as evidenced by weight loss(25 lb, 9% weight loss x 9 months)      Nutrition Intervention:  Food and/or Nutrient Delivery: Continue current diet, Continue oral nutrition supplement  Nutrition Education and Counseling: Education not indicated  Coordination of Nutrition Care: Continue to monitor while inpatient    Goals:  PO nutrition intake will meet >75% of patient estimated nutritional needs within the next 7 days       Nutrition Monitoring and Evaluation:   Behavioral-Environmental Outcomes: None identified  Food/Nutrient Intake Outcomes: Diet advancement/tolerance, Food and nutrient intake, Supplement intake  Physical Signs/Symptoms Outcomes: Biochemical data, GI status, Nutrition focused physical findings, Meal time behavior    Discharge Planning:    Continue current diet, Continue oral nutrition supplement     Electronically signed by Catrachito Samayoa RD, [facility] on 5/3/2021 at 2:20 PM    Contact Number: [phone]

## 2021-05-03 NOTE — PROGRESS NOTES
Problem: Self Care Deficits Care Plan (Adult)  Goal: *Acute Goals and Plan of Care (Insert Text)  Description: Occupational Therapy Goals  Initiated 4/26/2021 within 7 day(s). 1.  Patient will perform bed mobility in preparation for selfcare with modified independence. 2.  Patient will perform upper body dressing with modified independence. 3.  Patient will perform lower body dressing with supervision/set-up using AE prn.  4.  Patient will perform toilet transfers with supervision/set-up. 5.  Patient will perform all aspects of toileting with supervision/set-up. 6.  Patient will participate in upper extremity therapeutic exercise/activities with modified independence for 8 minutes. 7.  Patient will utilize energy conservation techniques during functional activities with verbal cues. Prior Level of Function: Patient needed assist LB self-care and used a RW for functional mobility PTA. Outcome: Progressing Towards Goal   OCCUPATIONAL THERAPY TREATMENT    Patient: Ino Henson (88 y.o. female)  Date: 5/3/2021  Diagnosis: Pneumonia due to COVID-19 virus [U07.1, J12.82]  Hypoxia [R09.02] <principal problem not specified>       Precautions: Fall, Skin  PLOF: Patient needed assist LB self-care and used a RW for functional mobility PTA. Chart, occupational therapy assessment, plan of care, and goals were reviewed. ASSESSMENT:  Pt cleared by RN for OT tx at this time. Partial PT co tx to maximize pt safety and participation. Pt benefited from vc's throughout session for proper pacing and deep breathing technique to reduce anxiety and increase (I) and safety during all self care tasks. Pt presented on [facility] upon therapist arrival reporting she needed to have a BM and on 30L HFNC. PT presented in room for increased safety during functional mobility tasks. Pt required MAX A to winifred area hygiene utilizing RW with increased time 2/2 fatigue and generalized weakness.   Pt performed STS transfer with Supervision with rocking technique and maneuvered back to EOB with MIN A requiring mod cueing for proper safety. After a few minute rest break pt stood again with Supervision utilizing RW and took side steps laterally along side of bed with MIN A for RW mgmt tolerating ~ 3 mins to improve overall functional activity tolerance needed for ADL carryover. Pt returned to sitting EOB with MIN A. She able to readjust CABRERA socks with SBA utilizing bend forward method. Pt was assisted back to supine  MIN A with HOB elevated. SPO2 monitored throughout tx ranging 84-95% on 30L HFNC, RR fluctuating 10s-50s, and HR  bpm during activity. Pt left with all needs within reach and 30L HFNC intact. RN made aware of pt's participation and position. Progression toward goals:  []          Improving appropriately and progressing toward goals  [x]          Improving slowly and progressing toward goals  []          Not making progress toward goals and plan of care will be adjusted     PLAN:  Patient continues to benefit from skilled intervention to address the above impairments. Continue treatment per established plan of care. Discharge Recommendations:  Rehab   Further Equipment Recommendations for Discharge: RW     SUBJECTIVE:   Patient stated  I want to be back walking around my house. \"     OBJECTIVE DATA SUMMARY:   Cognitive/Behavioral Status:  Neurologic State: Alert  Orientation Level: Oriented to person, Oriented to place  Cognition: Follows commands  Safety/Judgement: Fall prevention    Functional Mobility and Transfers for ADLs:   Bed Mobility:        Sit to Supine: Minimum assistance      Transfers:  Sit to Stand: Minimum assistance  Stand to Sit: Minimum assistance         Toilet Transfer : Minimum assistance from BSC-BED        Balance:  Sitting: Impaired  Sitting - Static: Good (unsupported)  Sitting - Dynamic: Fair (occasional)  Standing: Impaired  Standing - Static: Fair  Standing - Dynamic : Fair    ADL Intervention:       Lower Body Dressing Assistance  Socks: Stand-by assistance  Leg Crossed Method Used: No  Position Performed: Bending forward method;Seated in chair    Toileting  Toileting Assistance: Maximum assistance  Bowel Hygiene: Maximum assistance  Adaptive Equipment: Walker             Pain:  Pain level pre-treatment: 0/10   Pain level post-treatment: 0/10      Activity Tolerance:    Fair 2/2 anxiety   Please refer to the flowsheet for vital signs taken during this treatment. After treatment:   []  Patient left in no apparent distress sitting up in chair  [x]  Patient left in no apparent distress in bed  [x]  Call bell left within reach  [x]  Nursing notified  []  Caregiver present  [x]  Bed alarm activated    COMMUNICATION/EDUCATION:   [x] Role of Occupational Therapy in the acute care setting  [] Home safety education was provided and the patient/caregiver indicated understanding. [x] Patient/family have participated as able in working towards goals and plan of care. [x] Patient/family agree to work toward stated goals and plan of care. [] Patient understands intent and goals of therapy, but is neutral about his/her participation. [] Patient is unable to participate in goal setting and plan of care.       Thank you for this referral.  DAYANNA Neves  Time Calculation: 38 mins

## 2021-05-03 NOTE — ROUTINE PROCESS
Bedside shift change report given to Luz Erickson RN (oncoming nurse) by Alexy Carlos RN (offgoing nurse). Report included the following information SBAR, Kardex, Intake/Output, MAR and Recent Results.

## 2021-05-04 LAB
ALBUMIN SERPL-MCNC: 2.5 G/DL (ref 3.4–5)
ALBUMIN/GLOB SERPL: 0.8 {RATIO} (ref 0.8–1.7)
ALP SERPL-CCNC: 73 U/L (ref 45–117)
ALT SERPL-CCNC: 31 U/L (ref 13–56)
ANION GAP SERPL CALC-SCNC: 2 MMOL/L (ref 3–18)
AST SERPL-CCNC: 20 U/L (ref 10–38)
BASOPHILS # BLD: 0 K/UL (ref 0–0.1)
BASOPHILS NFR BLD: 0 % (ref 0–2)
BILIRUB SERPL-MCNC: 0.4 MG/DL (ref 0.2–1)
BUN SERPL-MCNC: 26 MG/DL (ref 7–18)
BUN/CREAT SERPL: 55 (ref 12–20)
CALCIUM SERPL-MCNC: 8.7 MG/DL (ref 8.5–10.1)
CHLORIDE SERPL-SCNC: 104 MMOL/L (ref 100–111)
CO2 SERPL-SCNC: 32 MMOL/L (ref 21–32)
CREAT SERPL-MCNC: 0.47 MG/DL (ref 0.6–1.3)
DIFFERENTIAL METHOD BLD: ABNORMAL
ECHO LV INTERNAL DIMENSION DIASTOLIC: 4.16 CM (ref 3.9–5.3)
ECHO LV INTERNAL DIMENSION SYSTOLIC: 1.58 CM
ECHO LV IVSD: 1.58 CM (ref 0.6–0.9)
ECHO LV MASS 2D: 259.1 G (ref 67–162)
ECHO LV MASS INDEX 2D: 120.4 G/M2 (ref 43–95)
ECHO LV POSTERIOR WALL DIASTOLIC: 1.52 CM (ref 0.6–0.9)
ECHO RV TAPSE: 2.17 CM (ref 1.5–2)
ECHO TV REGURGITANT MAX VELOCITY: 235.49 CM/S
ECHO TV REGURGITANT PEAK GRADIENT: 22.18 MMHG
EOSINOPHIL # BLD: 0 K/UL (ref 0–0.4)
EOSINOPHIL NFR BLD: 0 % (ref 0–5)
ERYTHROCYTE [DISTWIDTH] IN BLOOD BY AUTOMATED COUNT: 13.6 % (ref 11.6–14.5)
GLOBULIN SER CALC-MCNC: 3 G/DL (ref 2–4)
GLUCOSE BLD STRIP.AUTO-MCNC: 103 MG/DL (ref 70–110)
GLUCOSE BLD STRIP.AUTO-MCNC: 126 MG/DL (ref 70–110)
GLUCOSE BLD STRIP.AUTO-MCNC: 91 MG/DL (ref 70–110)
GLUCOSE BLD STRIP.AUTO-MCNC: 95 MG/DL (ref 70–110)
GLUCOSE SERPL-MCNC: 132 MG/DL (ref 74–99)
HCT VFR BLD AUTO: 37.2 % (ref 35–45)
HGB BLD-MCNC: 12 G/DL (ref 12–16)
LYMPHOCYTES # BLD: 0.5 K/UL (ref 0.9–3.6)
LYMPHOCYTES NFR BLD: 5 % (ref 21–52)
MCH RBC QN AUTO: 28.6 PG (ref 24–34)
MCHC RBC AUTO-ENTMCNC: 32.3 G/DL (ref 31–37)
MCV RBC AUTO: 88.8 FL (ref 74–97)
MONOCYTES # BLD: 0.5 K/UL (ref 0.05–1.2)
MONOCYTES NFR BLD: 5 % (ref 3–10)
NEUTS SEG # BLD: 8.3 K/UL (ref 1.8–8)
NEUTS SEG NFR BLD: 88 % (ref 40–73)
PLATELET # BLD AUTO: 352 K/UL (ref 135–420)
PMV BLD AUTO: 10.6 FL (ref 9.2–11.8)
POTASSIUM SERPL-SCNC: 4.9 MMOL/L (ref 3.5–5.5)
PROT SERPL-MCNC: 5.5 G/DL (ref 6.4–8.2)
RBC # BLD AUTO: 4.19 M/UL (ref 4.2–5.3)
SODIUM SERPL-SCNC: 138 MMOL/L (ref 136–145)
WBC # BLD AUTO: 9.4 K/UL (ref 4.6–13.2)

## 2021-05-04 PROCEDURE — 74011250637 HC RX REV CODE- 250/637: Performed by: NURSE PRACTITIONER

## 2021-05-04 PROCEDURE — 80053 COMPREHEN METABOLIC PANEL: CPT

## 2021-05-04 PROCEDURE — 85025 COMPLETE CBC W/AUTO DIFF WBC: CPT

## 2021-05-04 PROCEDURE — 74011250637 HC RX REV CODE- 250/637: Performed by: HOSPITALIST

## 2021-05-04 PROCEDURE — 36415 COLL VENOUS BLD VENIPUNCTURE: CPT

## 2021-05-04 PROCEDURE — 94640 AIRWAY INHALATION TREATMENT: CPT

## 2021-05-04 PROCEDURE — 74011250636 HC RX REV CODE- 250/636: Performed by: INTERNAL MEDICINE

## 2021-05-04 PROCEDURE — APPSS30 APP SPLIT SHARED TIME 16-30 MINUTES: Performed by: NURSE PRACTITIONER

## 2021-05-04 PROCEDURE — 74011250637 HC RX REV CODE- 250/637: Performed by: INTERNAL MEDICINE

## 2021-05-04 PROCEDURE — 65660000000 HC RM CCU STEPDOWN

## 2021-05-04 PROCEDURE — 82962 GLUCOSE BLOOD TEST: CPT

## 2021-05-04 PROCEDURE — 74011250636 HC RX REV CODE- 250/636: Performed by: NURSE PRACTITIONER

## 2021-05-04 RX ORDER — LORAZEPAM 0.5 MG/1
0.5 TABLET ORAL
Status: DISCONTINUED | OUTPATIENT
Start: 2021-05-04 | End: 2021-05-06 | Stop reason: HOSPADM

## 2021-05-04 RX ADMIN — LORAZEPAM 0.5 MG: 0.5 TABLET ORAL at 23:14

## 2021-05-04 RX ADMIN — IPRATROPIUM BROMIDE AND ALBUTEROL 1 PUFF: 20; 100 SPRAY, METERED RESPIRATORY (INHALATION) at 14:00

## 2021-05-04 RX ADMIN — HYDROXYCHLOROQUINE SULFATE 200 MG: 200 TABLET ORAL at 09:30

## 2021-05-04 RX ADMIN — IPRATROPIUM BROMIDE AND ALBUTEROL 1 PUFF: 20; 100 SPRAY, METERED RESPIRATORY (INHALATION) at 08:20

## 2021-05-04 RX ADMIN — Medication 500 MG: at 09:30

## 2021-05-04 RX ADMIN — SERTRALINE HYDROCHLORIDE 150 MG: 50 TABLET ORAL at 09:30

## 2021-05-04 RX ADMIN — IPRATROPIUM BROMIDE AND ALBUTEROL 1 PUFF: 20; 100 SPRAY, METERED RESPIRATORY (INHALATION) at 20:50

## 2021-05-04 RX ADMIN — DEXAMETHASONE SODIUM PHOSPHATE 10 MG: 4 INJECTION, SOLUTION INTRAMUSCULAR; INTRAVENOUS at 21:20

## 2021-05-04 RX ADMIN — CLONIDINE HYDROCHLORIDE 0.1 MG: 0.1 TABLET ORAL at 17:06

## 2021-05-04 RX ADMIN — GUAIFENESIN 600 MG: 600 TABLET, EXTENDED RELEASE ORAL at 21:27

## 2021-05-04 RX ADMIN — GUAIFENESIN 600 MG: 600 TABLET, EXTENDED RELEASE ORAL at 09:31

## 2021-05-04 RX ADMIN — Medication 500 MG: at 17:06

## 2021-05-04 RX ADMIN — PANTOPRAZOLE SODIUM 40 MG: 40 TABLET, DELAYED RELEASE ORAL at 09:31

## 2021-05-04 RX ADMIN — ENOXAPARIN SODIUM 40 MG: 40 INJECTION SUBCUTANEOUS at 17:06

## 2021-05-04 RX ADMIN — LORAZEPAM 0.5 MG: 2 INJECTION INTRAMUSCULAR; INTRAVENOUS at 00:23

## 2021-05-04 RX ADMIN — Medication 10 ML: at 06:19

## 2021-05-04 RX ADMIN — Medication 10 ML: at 21:30

## 2021-05-04 RX ADMIN — Medication 10 ML: at 14:00

## 2021-05-04 RX ADMIN — CLONIDINE HYDROCHLORIDE 0.1 MG: 0.1 TABLET ORAL at 09:31

## 2021-05-04 RX ADMIN — HYDROXYCHLOROQUINE SULFATE 200 MG: 200 TABLET ORAL at 17:06

## 2021-05-04 NOTE — PROGRESS NOTES
Bedside shift change report given to An Kitchen RN (oncoming nurse) by Johana Mcfadden RN (offgoing nurse). Report included the following information SBAR, Kardex, Intake/Output, MAR and Recent Results.

## 2021-05-04 NOTE — ROUTINE PROCESS
[facility] report included SBAR, MAR, and Kardex. Gave verbal report to Lizeth Mckeon RN, report included SBAR, MAR, and Kardex.

## 2021-05-04 NOTE — PROGRESS NOTES
Problem: Risk for Spread of Infection  Goal: Prevent transmission of infectious organism to others  Description: Prevent the transmission of infectious organisms to other patients, staff members, and visitors. Outcome: Progressing Towards Goal     Problem: Patient Education:  Go to Education Activity  Goal: Patient/Family Education  Outcome: Progressing Towards Goal     Problem: Falls - Risk of  Goal: *Absence of Falls  Description: Document Marcie Che Fall Risk and appropriate interventions in the flowsheet. Outcome: Progressing Towards Goal  Note: Fall Risk Interventions:  Mobility Interventions: Bed/chair exit alarm         Medication Interventions: Assess postural VS orthostatic hypotension    Elimination Interventions: Call light in reach              Problem: Patient Education: Go to Patient Education Activity  Goal: Patient/Family Education  Outcome: Progressing Towards Goal     Problem: Pressure Injury - Risk of  Goal: *Prevention of pressure injury  Description: Document Francisco Scale and appropriate interventions in the flowsheet.   Outcome: Progressing Towards Goal  Note: Pressure Injury Interventions:  Sensory Interventions: Assess changes in LOC    Moisture Interventions: Absorbent underpads    Activity Interventions: PT/OT evaluation    Mobility Interventions: Chair cushion, HOB 30 degrees or less    Nutrition Interventions: Offer support with meals,snacks and hydration    Friction and Shear Interventions: HOB 30 degrees or less                Problem: Patient Education: Go to Patient Education Activity  Goal: Patient/Family Education  Outcome: Progressing Towards Goal     Problem: Airway Clearance - Ineffective  Goal: Achieve or maintain patent airway  Outcome: Progressing Towards Goal     Problem: Gas Exchange - Impaired  Goal: Absence of hypoxia  Outcome: Progressing Towards Goal  Goal: Promote optimal lung function  Outcome: Progressing Towards Goal     Problem: Breathing Pattern - Ineffective  Goal: Ability to achieve and maintain a regular respiratory rate  Outcome: Progressing Towards Goal     Problem:  Body Temperature -  Risk of, Imbalanced  Goal: Ability to maintain a body temperature within defined limits  Outcome: Progressing Towards Goal  Goal: Will regain or maintain usual level of consciousness  Outcome: Progressing Towards Goal  Goal: Complications related to the disease process, condition or treatment will be avoided or minimized  Outcome: Progressing Towards Goal     Problem: Isolation Precautions - Risk of Spread of Infection  Goal: Prevent transmission of infectious organism to others  Outcome: Progressing Towards Goal     Problem: Nutrition Deficits  Goal: Optimize nutrtional status  Outcome: Progressing Towards Goal     Problem: Risk for Fluid Volume Deficit  Goal: Maintain normal heart rhythm  Outcome: Progressing Towards Goal  Goal: Maintain absence of muscle cramping  Outcome: Progressing Towards Goal  Goal: Maintain normal serum potassium, sodium, calcium, phosphorus, and pH  Outcome: Progressing Towards Goal     Problem: Loneliness or Risk for Loneliness  Goal: Demonstrate positive use of time alone when socialization is not possible  Outcome: Progressing Towards Goal     Problem: Fatigue  Goal: Verbalize increase energy and improved vitality  Outcome: Progressing Towards Goal     Problem: Patient Education: Go to Patient Education Activity  Goal: Patient/Family Education  Outcome: Progressing Towards Goal     Problem: Patient Education: Go to Patient Education Activity  Goal: Patient/Family Education  Outcome: Progressing Towards Goal     Problem: Pneumonia: Day 1  Goal: *Influenza vaccine administered (October-March)  Outcome: Progressing Towards Goal     Problem: Pneumonia: Discharge Outcomes  Goal: *Demonstrates progressive activity  Outcome: Progressing Towards Goal  Goal: *Describes follow-up/return visits to physicians  Outcome: Progressing Towards Goal  Goal: *Tolerating diet  Outcome: Progressing Towards Goal  Goal: *Verbalizes name, dosage, time, side effects, and number of days to continue medications  Outcome: Progressing Towards Goal  Goal: *Influenza immunization  Outcome: Progressing Towards Goal  Goal: *Pneumococcal immunization  Outcome: Progressing Towards Goal  Goal: *Respiratory status at baseline  Outcome: Progressing Towards Goal  Goal: *Vital signs within defined limits  Outcome: Progressing Towards Goal  Goal: *Describes available resources and support systems  Outcome: Progressing Towards Goal  Goal: *Optimal pain control at patient's stated goal  Outcome: Progressing Towards Goal     Problem: Pain  Goal: *Control of Pain  Outcome: Progressing Towards Goal  Goal: *PALLIATIVE CARE:  Alleviation of Pain  Outcome: Progressing Towards Goal     Problem: Patient Education: Go to Patient Education Activity  Goal: Patient/Family Education  Outcome: Progressing Towards Goal     Problem: Patient Education: Go to Patient Education Activity  Goal: Patient/Family Education  Outcome: Progressing Towards Goal     Problem: Patient Education: Go to Patient Education Activity  Goal: Patient/Family Education  Outcome: Progressing Towards Goal     Problem: Patient Education: Go to Patient Education Activity  Goal: Patient/Family Education  Outcome: Progressing Towards Goal     Problem: Nutrition Deficit  Goal: *Optimize nutritional status  Outcome: Progressing Towards Goal

## 2021-05-04 NOTE — PROGRESS NOTES
Progress Note    Patient: Daniel Thomas MRN: [id number]  CSN: [id number]    YOB: 1945  Age: 67 y.o. Sex: female    DOA: 4/25/2021 LOS:  LOS: 9 days               Subjective:       Pt seen and examined. I assisted her to [facility]. She is still on HFNC 25L and 30%. She did have REYNOLDS while getting to [facility] but recovers quickly. No c/o chest pain, abdominal pain, N/V. Discussed with RN, changing IV ativan to oral ativan prn q8h. Chief Complaint:   Chief Complaint   Patient presents with    Flu Like Symptoms         Assessment/Plan       Assessment  1. Covid-19 Pneumonia  2. Acute respiratory failure with hypoxia secondary to Covid-19 Pneumonia  3. Pulmonary hypertension  4. HO on Cpap  5. Hx nocturnal hypoxemia  6. Hx IVC filter  7. Lupus   8. Morbid obesity, BMI >48%        Plan  1. Pulmonary signed off, appreciate assistance. Currently on HFNC, titrate to keep oxygen sats >92%. IV abx therapy completed and dc'd on 4/28. Completed Decadron 10 mg BID x 5 days now on 10 mg daily. Combivent Respimat q6h. Incentive spirometry. Need to assess home oxygen needs at [place]. Will need outpatient testing- PFT, 6 min walk, polysomnogram per pulm. 2. Echo 5/3 with EF 55-60%, moderate concentric hypertrophy, pulmonary arterial systolic pressure is 30 mmHg (40 mmHg in July 2020). 3. Monitor accuchecks ac/hs given IV steroid use  4. Monitor vital signs, weight per unit protocol  5. Fall precautions  6. PT, OT followig- rec rehab however pt/family wants home health w/ family assistance  7. CM following to assist w/ dc planning         Diet: Diabetic, Ensure Enlive TID  Code status: FULL    Contact: daughter Jeanna Yin [phone]. Spoke with daughter at 1:03 pm to update on plan of care and hospitalization. Informed of echo results and still attempting to wean down from HFNC but running into difficulty as pt oxygen sats decrease slightly with exertion, however recovers quickly.  Will continue supportive care. All questions answered. Case discussed with:  [x]Patient  [x]Family  [x]Nursing  [x]Case Management  DVT Prophylaxis:  [x]Lovenox  []Hep SQ  []SCDs  []Coumadin   []On Heparin ADRIANNA Evans  [facility]  pager [phone]      Objective:     Physical Exam:  Visit Vitals  /74   Pulse 63   Temp 97.8 °F (36.6 °C)   Resp 20   Ht 5' 2\" (1.575 m)   Wt 119.7 kg (264 lb)   SpO2 94%   Breastfeeding No   BMI 48.29 kg/m²        General:         Alert, cooperative, no acute distress. Obese. HEENT: NC, Atraumatic. PERRLA, anicteric sclerae. Lungs: Diminished. Dyspnea on exertion when assisting to [facility] but recovers. Heart:  Regular  rhythm,  No murmur, No Rubs, No Gallops  Abdomen: Soft, Non distended, Non tender. +Bowel sounds, no HSM  Extremities: No c/c/e  Psych:   Good insight. Anxious at times but not at present. Neurologic:  Alert and oriented X 3. No acute neurological deficits      Intake and Output:  Current Shift:  05/03 1901 - 05/04 0700  In: -   Out: 600 [Urine:600]  Last three shifts:  05/02 0701 - 05/03 1900  In: 480 [P.O.:480]  Out: -     Labs: Results:       Chemistry Recent Labs     05/04/21 0357 05/03/21 0141 05/02/21  0643   * 141* 109*    140 138   K 4.9 4.5 4.8    103 103   CO2 32 33* 35*   BUN 26* 30* 30*   CREA 0.47* 0.48* 0.41*   CA 8.7 8.7 8.8   AGAP 2* 4 0*   BUCR 55* 63* 73*   AP 73 72 63   TP 5.5* 5.2* 5.5*   ALB 2.5* 2.4* 2.4*   GLOB 3.0 2.8 3.1   AGRAT 0.8 0.9 0.8      CBC w/Diff Recent Labs     05/04/21 0357 05/03/21 0141 05/02/21  0643   WBC 9.4 8.4 8.1   RBC 4.19* 4.52 4.46   HGB 12.0 12.7 12.7   HCT 37.2 39.9 39.0    382 385   GRANS 88* 81* 83*   LYMPH 5* 8* 7*   EOS 0 1 0      Cardiac Enzymes No results for input(s): CPK, CKND1, PETRA in the last 72 hours. No lab exists for component: CKRMB, TROIP   Coagulation No results for input(s): PTP, INR, APTT, INREXT, INREXT in the last 72 hours.     Lipid Panel Lab Results   Component Value Date/Time    Cholesterol, total 165 11/02/2019 12:48 PM    HDL Cholesterol 82 (H) 11/02/2019 12:48 PM    LDL, calculated 71 11/02/2019 12:48 PM    VLDL, calculated 12 11/02/2019 12:48 PM    Triglyceride 60 11/02/2019 12:48 PM    CHOL/HDL Ratio 2.0 11/02/2019 12:48 PM      BNP No results for input(s): BNPP in the last 72 hours.    Liver Enzymes Recent Labs     05/04/21  0357   TP 5.5*   ALB 2.5*   AP 73      Thyroid Studies Lab Results   Component Value Date/Time    TSH 2.79 06/14/2018 12:13 PM          Procedures/imaging: see electronic medical records for all procedures/Xrays and details which were not copied into this note but were reviewed prior to creation of Plan    Medications:   Current Facility-Administered Medications   Medication Dose Route Frequency    LORazepam (ATIVAN) injection 0.5 mg  0.5 mg IntraVENous Q8H PRN    dexamethasone (DECADRON) 4 mg/mL injection 10 mg  10 mg IntraVENous Q24H    melatonin tablet 12 mg  12 mg Oral QHS PRN    ipratropium-albuterol (COMBIVENT RESPIMAT) 20 mcg-100 mcg inhalation spray  1 Puff Inhalation Q6H RT    sodium chloride (NS) flush 5-40 mL  5-40 mL IntraVENous Q8H    sodium chloride (NS) flush 5-40 mL  5-40 mL IntraVENous PRN    acetaminophen (TYLENOL) tablet 650 mg  650 mg Oral Q6H PRN    Or    acetaminophen (TYLENOL) suppository 650 mg  650 mg Rectal Q6H PRN    polyethylene glycol (MIRALAX) packet 17 g  17 g Oral DAILY PRN    ondansetron (ZOFRAN) injection 4 mg  4 mg IntraVENous Q6H PRN    enoxaparin (LOVENOX) injection 40 mg  40 mg SubCUTAneous Q24H    guaiFENesin ER (MUCINEX) tablet 600 mg  600 mg Oral Q12H    insulin lispro (HUMALOG) injection   SubCUTAneous AC&HS    glucose chewable tablet 16 g  4 Tab Oral PRN    glucagon (GLUCAGEN) injection 1 mg  1 mg IntraMUSCular PRN    dextrose (D50W) injection syrg 12.5-25 g  25-50 mL IntraVENous PRN    sertraline (ZOLOFT) tablet 150 mg  150 mg Oral DAILY    hydrOXYchloroQUINE (PLAQUENIL) tablet 200 mg  200 mg Oral BID    hydrALAZINE (APRESOLINE) 20 mg/mL injection 10 mg  10 mg IntraVENous Q6H PRN    pantoprazole (PROTONIX) tablet 40 mg  40 mg Oral ACB    ascorbic acid (vitamin C) (VITAMIN C) tablet 500 mg  500 mg Oral BID    omega-3 acid ethyl esters (LOVAZA) capsule 1,000 mg  1 g Oral BID WITH MEALS    cloNIDine HCL (CATAPRES) tablet 0.1 mg  0.1 mg Oral BID

## 2021-05-04 NOTE — PROGRESS NOTES
Problem: Falls - Risk of  Goal: *Absence of Falls  Description: Document Abigail Ford Fall Risk and appropriate interventions in the flowsheet.   Outcome: Progressing Towards Goal  Note: Fall Risk Interventions:  Mobility Interventions: Bed/chair exit alarm         Medication Interventions: Bed/chair exit alarm    Elimination Interventions: Bed/chair exit alarm, Call light in reach              Problem: Pain  Goal: *Control of Pain  5/3/2021 2323 by Roxanne Contreras  Outcome: Progressing Towards Goal  5/3/2021 2321 by Roxanne Contreras  Outcome: Progressing Towards Goal

## 2021-05-04 NOTE — PROGRESS NOTES
Patient stable, will wean to NC after noon.      05/04/21 0827   Oxygen Therapy   O2 Sat (%) 94 %   Pulse via Oximetry 73 beats per minute   O2 Device Hi flow nasal cannula  (Vapotherm)   O2 Flow Rate (L/min) 25 l/min   O2 Temperature 91.4 °F (33 °C)   FIO2 (%) 30 %   Pre-Treatment   Breathing Pattern Regular   Breath Sounds Bilateral Diminished   Pulse 73   SpO2 94 %   Respirations 20   Post-Treatment   Breathing Pattern Regular   Breath Sounds Bilateral Diminished   Pulse 73   SpO2 94 %   Respirations 20   Treatment Tolerance Well   Procedures   $$ Subsequent Procedure MDI   Delivery Source MDI   Aerosolized Medications Other (comment)  (Combivent)

## 2021-05-05 LAB
ALBUMIN SERPL-MCNC: 2.4 G/DL (ref 3.4–5)
ALBUMIN/GLOB SERPL: 1 {RATIO} (ref 0.8–1.7)
ALP SERPL-CCNC: 74 U/L (ref 45–117)
ALT SERPL-CCNC: 27 U/L (ref 13–56)
ANION GAP SERPL CALC-SCNC: 6 MMOL/L (ref 3–18)
AST SERPL-CCNC: 17 U/L (ref 10–38)
BASOPHILS # BLD: 0 K/UL (ref 0–0.1)
BASOPHILS NFR BLD: 0 % (ref 0–2)
BILIRUB SERPL-MCNC: 0.4 MG/DL (ref 0.2–1)
BUN SERPL-MCNC: 25 MG/DL (ref 7–18)
BUN/CREAT SERPL: 54 (ref 12–20)
CALCIUM SERPL-MCNC: 8.2 MG/DL (ref 8.5–10.1)
CHLORIDE SERPL-SCNC: 106 MMOL/L (ref 100–111)
CO2 SERPL-SCNC: 29 MMOL/L (ref 21–32)
CREAT SERPL-MCNC: 0.46 MG/DL (ref 0.6–1.3)
DIFFERENTIAL METHOD BLD: ABNORMAL
EOSINOPHIL # BLD: 0 K/UL (ref 0–0.4)
EOSINOPHIL NFR BLD: 0 % (ref 0–5)
ERYTHROCYTE [DISTWIDTH] IN BLOOD BY AUTOMATED COUNT: 13.9 % (ref 11.6–14.5)
GLOBULIN SER CALC-MCNC: 2.4 G/DL (ref 2–4)
GLUCOSE BLD STRIP.AUTO-MCNC: 100 MG/DL (ref 70–110)
GLUCOSE BLD STRIP.AUTO-MCNC: 119 MG/DL (ref 70–110)
GLUCOSE BLD STRIP.AUTO-MCNC: 129 MG/DL (ref 70–110)
GLUCOSE BLD STRIP.AUTO-MCNC: 92 MG/DL (ref 70–110)
GLUCOSE SERPL-MCNC: 137 MG/DL (ref 74–99)
HCT VFR BLD AUTO: 36.3 % (ref 35–45)
HGB BLD-MCNC: 11.7 G/DL (ref 12–16)
LYMPHOCYTES # BLD: 0.6 K/UL (ref 0.9–3.6)
LYMPHOCYTES NFR BLD: 7 % (ref 21–52)
MCH RBC QN AUTO: 28.8 PG (ref 24–34)
MCHC RBC AUTO-ENTMCNC: 32.2 G/DL (ref 31–37)
MCV RBC AUTO: 89.4 FL (ref 74–97)
MONOCYTES # BLD: 0.5 K/UL (ref 0.05–1.2)
MONOCYTES NFR BLD: 6 % (ref 3–10)
NEUTS SEG # BLD: 7.6 K/UL (ref 1.8–8)
NEUTS SEG NFR BLD: 86 % (ref 40–73)
PLATELET # BLD AUTO: 298 K/UL (ref 135–420)
PMV BLD AUTO: 10.6 FL (ref 9.2–11.8)
POTASSIUM SERPL-SCNC: 5.2 MMOL/L (ref 3.5–5.5)
PROT SERPL-MCNC: 4.8 G/DL (ref 6.4–8.2)
RBC # BLD AUTO: 4.06 M/UL (ref 4.2–5.3)
SODIUM SERPL-SCNC: 141 MMOL/L (ref 136–145)
WBC # BLD AUTO: 8.8 K/UL (ref 4.6–13.2)

## 2021-05-05 PROCEDURE — APPSS30 APP SPLIT SHARED TIME 16-30 MINUTES: Performed by: NURSE PRACTITIONER

## 2021-05-05 PROCEDURE — 94640 AIRWAY INHALATION TREATMENT: CPT

## 2021-05-05 PROCEDURE — 74011250637 HC RX REV CODE- 250/637: Performed by: NURSE PRACTITIONER

## 2021-05-05 PROCEDURE — 97168 OT RE-EVAL EST PLAN CARE: CPT

## 2021-05-05 PROCEDURE — 80053 COMPREHEN METABOLIC PANEL: CPT

## 2021-05-05 PROCEDURE — 65660000000 HC RM CCU STEPDOWN

## 2021-05-05 PROCEDURE — 85025 COMPLETE CBC W/AUTO DIFF WBC: CPT

## 2021-05-05 PROCEDURE — 74011250636 HC RX REV CODE- 250/636: Performed by: NURSE PRACTITIONER

## 2021-05-05 PROCEDURE — 36415 COLL VENOUS BLD VENIPUNCTURE: CPT

## 2021-05-05 PROCEDURE — 74011250637 HC RX REV CODE- 250/637: Performed by: HOSPITALIST

## 2021-05-05 PROCEDURE — 99232 SBSQ HOSP IP/OBS MODERATE 35: CPT | Performed by: HOSPITALIST

## 2021-05-05 PROCEDURE — 97530 THERAPEUTIC ACTIVITIES: CPT

## 2021-05-05 PROCEDURE — 82962 GLUCOSE BLOOD TEST: CPT

## 2021-05-05 PROCEDURE — 74011250637 HC RX REV CODE- 250/637: Performed by: INTERNAL MEDICINE

## 2021-05-05 PROCEDURE — 97535 SELF CARE MNGMENT TRAINING: CPT

## 2021-05-05 PROCEDURE — 74011250636 HC RX REV CODE- 250/636: Performed by: INTERNAL MEDICINE

## 2021-05-05 PROCEDURE — 97110 THERAPEUTIC EXERCISES: CPT

## 2021-05-05 RX ADMIN — GUAIFENESIN 600 MG: 600 TABLET, EXTENDED RELEASE ORAL at 10:22

## 2021-05-05 RX ADMIN — ENOXAPARIN SODIUM 40 MG: 40 INJECTION SUBCUTANEOUS at 18:44

## 2021-05-05 RX ADMIN — IPRATROPIUM BROMIDE AND ALBUTEROL 1 PUFF: 20; 100 SPRAY, METERED RESPIRATORY (INHALATION) at 14:00

## 2021-05-05 RX ADMIN — Medication 10 ML: at 21:16

## 2021-05-05 RX ADMIN — SERTRALINE HYDROCHLORIDE 150 MG: 50 TABLET ORAL at 10:23

## 2021-05-05 RX ADMIN — PANTOPRAZOLE SODIUM 40 MG: 40 TABLET, DELAYED RELEASE ORAL at 10:22

## 2021-05-05 RX ADMIN — HYDROXYCHLOROQUINE SULFATE 200 MG: 200 TABLET ORAL at 10:23

## 2021-05-05 RX ADMIN — Medication 500 MG: at 18:44

## 2021-05-05 RX ADMIN — CLONIDINE HYDROCHLORIDE 0.1 MG: 0.1 TABLET ORAL at 18:44

## 2021-05-05 RX ADMIN — IPRATROPIUM BROMIDE AND ALBUTEROL 1 PUFF: 20; 100 SPRAY, METERED RESPIRATORY (INHALATION) at 21:10

## 2021-05-05 RX ADMIN — Medication 500 MG: at 10:22

## 2021-05-05 RX ADMIN — IPRATROPIUM BROMIDE AND ALBUTEROL 1 PUFF: 20; 100 SPRAY, METERED RESPIRATORY (INHALATION) at 08:13

## 2021-05-05 RX ADMIN — GUAIFENESIN 600 MG: 600 TABLET, EXTENDED RELEASE ORAL at 21:17

## 2021-05-05 RX ADMIN — Medication 10 ML: at 06:18

## 2021-05-05 RX ADMIN — CLONIDINE HYDROCHLORIDE 0.1 MG: 0.1 TABLET ORAL at 10:22

## 2021-05-05 RX ADMIN — DEXAMETHASONE SODIUM PHOSPHATE 10 MG: 4 INJECTION, SOLUTION INTRAMUSCULAR; INTRAVENOUS at 21:17

## 2021-05-05 RX ADMIN — HYDROXYCHLOROQUINE SULFATE 200 MG: 200 TABLET ORAL at 18:44

## 2021-05-05 NOTE — PROGRESS NOTES
Problem: Falls - Risk of  Goal: *Absence of Falls  Description: Document [place] Fall Risk and appropriate interventions in the flowsheet.   Outcome: Progressing Towards Goal  Note: Fall Risk Interventions:  Mobility Interventions: Bed/chair exit alarm         Medication Interventions: Assess postural VS orthostatic hypotension    Elimination Interventions: Call light in reach              Problem: Pain  Goal: *Control of Pain  Outcome: Progressing Towards Goal     Problem: Pain  Goal: *PALLIATIVE CARE:  Alleviation of Pain  Outcome: Progressing Towards Goal

## 2021-05-05 NOTE — PROGRESS NOTES
Problem: Self Care Deficits Care Plan (Adult)  Goal: *Acute Goals and Plan of Care (Insert Text)  Description: Occupational Therapy Goals  Initiated 4/26/2021 within 7 day(s). Re-evaluated 5/5/2021, continue with all goals. 1.  Patient will perform bed mobility in preparation for selfcare with modified independence. 2.  Patient will perform upper body dressing with modified independence. 3.  Patient will perform lower body dressing with supervision/set-up using AE prn.  4.  Patient will perform toilet transfers with supervision/set-up. 5.  Patient will perform all aspects of toileting with supervision/set-up. 6.  Patient will participate in upper extremity therapeutic exercise/activities with modified independence for 8 minutes. 7.  Patient will utilize energy conservation techniques during functional activities with verbal cues. Prior Level of Function: Patient needed assist LB self-care and used a RW for functional mobility PTA. Outcome: Progressing Towards Goal  OCCUPATIONAL THERAPY RE-EVALUATION    Patient: Adrian Hernandez (34 y.o. female)  Date: 5/5/2021  Primary Diagnosis: Pneumonia due to COVID-19 virus [U07.1, J12.82]  Hypoxia [R09.02]        Precautions: Fall(Droplet plus)    ASSESSMENT :  Pt is finishing up PT session upon entry, agreeable to participate in OT re-evaluation. Based on the objective data described below, the patient demonstrates gradual improvement in functional mobility, endurance, and her participation in ADLs. Pt benefiting from VCs for EC techniques and safety during functional txfrs and ADLs. Pt may benefit from [facility] techniques handout to maximize carryover of education. Pt was able to perform functional txfr to [facility] with CGA and required Min A for thoroughness following BM and additional time. Pt educated on adaptive strategies for toileting and LB ADLs tasks. Pt demonstrated understanding.    Pt's O2 sats remained 89-98% on 3.5L O2 NC throughout functional activities. New orders received and acknowledged due to pt is already on OT caseload. Patient will benefit from skilled intervention to address the above impairments. Patient's rehabilitation potential is considered to be Fair  Factors which may influence rehabilitation potential include:   []             None noted  []             Mental ability/status  [x]             Medical condition  [x]             Home/family situation and support systems  [x]             Safety awareness  []             Pain tolerance/management  []             Other:      PLAN :  Recommendations and Planned Interventions:   [x]               Self Care Training                  [x]      Therapeutic Activities  [x]               Functional Mobility Training   [x]      Cognitive Retraining  [x]               Therapeutic Exercises           [x]      Endurance Activities  [x]               Balance Training                    [x]      Neuromuscular Re-Education  []               Visual/Perceptual Training     [x]      Home Safety Training  [x]               Patient Education                   [x]      Family Training/Education  []               Other (comment):    Frequency/Duration: Patient will be followed by occupational therapy 1-2 times per day/4-7 days per week to address goals. Discharge Recommendations: Home Health with family assistance 24/7, vs Rehab  Further Equipment Recommendations for Discharge: bedside commode and shower chair     SUBJECTIVE:   Patient stated I feel better.     OBJECTIVE DATA SUMMARY:   Hospital course since last seen and reason for reevaluation: Pt is due for OT re-evaluation. Pt appears to be benefiting from receiving OT services during this hospitalization stay. Pt is making steady progress toward goals but continues to be limited by decreased endurance.  OT will continue to follow the patient for further intervention during this hospitalization, in order to maximize ADL performance and overall functional independence. Past Medical History:   Diagnosis Date    Arthritis     Bronchitis 01/01/2020    Hypertension     Indigestion     Lupus ([facility] [address]     Memory difficulty     Nausea & vomiting     Ringing of ears     sometimes    Thromboembolus [facility]) 2009     Past Surgical History:   Procedure Laterality Date    HX CHOLECYSTECTOMY  1988    HX COLONOSCOPY      HX ORTHOPAEDIC  April 09'    right knee replacement     HX ORTHOPAEDIC  Sept 09'    left knee replacement    HX VASCULAR ACCESS      IVC filter    CA REMOVAL GALLBLADDER       Barriers to Learning/Limitations: None  Compensate with: visual, verbal, tactile, kinesthetic cues/model    Home Situation:   Home Situation  Home Environment: Private residence  # Steps to Enter: 0  Wheelchair Ramp: Yes  One/Two Story Residence: One story  Living Alone: No  Support Systems: Child(carina), Family member(s)  Patient Expects to be Discharged to[de-identified] Private residence  Current DME Used/Available at Home[facility] [employer] [facility] [facility]  Tub or Shower Type: Tub/Shower combination  [x]  Right hand dominant   []  Left hand dominant    Cognitive/Behavioral Status:  Neurologic State: Alert  Orientation Level: Oriented X4  Cognition: Follows commands  Safety/Judgement: Awareness of environment; Fall prevention    Skin: visible skin intact  Edema: none noted    Vision/Perceptual:       Acuity: Within Defined Limits     Coordination: BUE  Coordination: Within functional limits  Fine Motor Skills-Upper: Left Intact; Right Intact    Gross Motor Skills-Upper: Left Intact; Right Intact    Balance:  Sitting: Intact  Standing: Impaired; With support  Standing - Static: Fair  Standing - Dynamic : Fair    Strength: BUE  Strength: Generally decreased, functional   Tone & Sensation: BUE  Tone: Normal  Sensation: Intact   Range of Motion: BUE  AROM: Within functional limits   Functional Mobility and Transfers for ADLs:  Bed Mobility:     Supine to Sit: Stand-by assistance  Sit to Supine: Minimum assistance     Transfers:  Sit to Stand: Stand-by assistance;Contact guard assistance  Stand to Sit: Contact guard assistance   Toilet Transfer : Contact guard assistance; Additional time(to [facility])    ADL Assessment:   Feeding: Setup  Oral Facial Hygiene/Grooming: Setup  Bathing: Moderate assistance  Upper Body Dressing: Supervision  Lower Body Dressing: Minimum assistance  Toileting: Minimum assistance   ADL Intervention:   Cognitive Retraining  Safety/Judgement: Awareness of environment; Fall prevention  Pain:  Pain level pre-treatment: none reported  Pain level post-treatment: none reported    Activity Tolerance:   Fair  Please refer to the flowsheet for vital signs taken during this treatment. After treatment:   [] Patient left in no apparent distress sitting up in chair  [x] Patient left in no apparent distress in bed  [x] Call bell left within reach  [x] Nursing notified  [] Caregiver present  [] Bed alarm activated    COMMUNICATION/EDUCATION:   [x] Role of Occupational Therapy in the acute care setting  [x] Home safety education was provided and the patient/caregiver indicated understanding. [x] Patient/family have participated as able in goal setting and plan of care. [x] Patient/family agree to work toward stated goals and plan of care. [] Patient understands intent and goals of therapy, but is neutral about his/her participation. [] Patient is unable to participate in goal setting and plan of care.     Thank you for this referral.  Felicia De La Vega OTR/L  Time Calculation: 46 mins

## 2021-05-05 NOTE — ROUTINE PROCESS
Received verbal report from [facility], report included SBAR, MAR, and Kardex. Gave verbal report to SANDRA Mitchell 23, report included SBAR, MAR, and Kardex.

## 2021-05-05 NOTE — PROGRESS NOTES
Titrate 4L NC     05/05/21 0817   Oxygen Therapy   O2 Sat (%) 95 %   Pulse via Oximetry 84 beats per minute   O2 Device Nasal cannula   O2 Flow Rate (L/min) 4 l/min   Pre-Treatment   Breathing Pattern Regular   Breath Sounds Bilateral Diminished   Pulse 84   SpO2 95 %   Respirations 18   Post-Treatment   Breathing Pattern Regular   Breath Sounds Bilateral Diminished   Pulse 84   SpO2 95 %   Respirations 18   Treatment Tolerance Well   Procedures   $$ Subsequent Procedure MDI   Delivery Source MDI   Aerosolized Medications Other (comment)  (Combivent)

## 2021-05-05 NOTE — PROGRESS NOTES
Progress Note    Patient: Selma Baker MRN: [id number]  CSN: [id number]    YOB: 1945  Age: 67 y.o. Sex: female    DOA: 4/25/2021 LOS:  LOS: 10 days               Subjective:     Patient states she is doing well. She is currently on 4 LNC from [facility]. States respiratory status improved. Denies any chest pain or chest discomfort. No n/v/d/c or abd pain. Chief Complaint:   Chief Complaint   Patient presents with    Flu Like Symptoms         Assessment/Plan       Assessment  1. Sepsis with associated acute respiratory failure present on admission d/t #2. Now improved. 2. Covid-19 Pneumonia  3. Acute respiratory failure with hypoxia secondary to Covid-19 Pneumonia, improving  4. Pulmonary hypertension  5. HO on Cpap  6. Hx nocturnal hypoxemia  7. Hx IVC filter  8. Lupus   9. Morbid obesity, BMI >48%      Plan  1. Down graded to 4L via NC. RT following, continue to titrate to keep oxygen sats >92%. Combivent Respimat q6h. Incentive spirometry. Need to assess home oxygen needs at dc. Will need outpatient testing- PFT, 6 min walk, polysomnogram per pulm. 2. Echo 5/3 with EF 55-60%, moderate concentric hypertrophy, pulmonary arterial systolic pressure is 30 mmHg (40 mmHg in July 2020). 3. Monitor vital signs, weight per unit protocol  4. Fall precautions  5. PT, OT followig- rec rehab however pt/family wants home health w/ family assistance  6. CM following to assist w/ dc planning       Code status: FULL  DVT prophylaxis: Lovenox. 2:13 PM. I spoke with daughter Armando Gill [phone]. Daughter states patient has oxygen at 2L NC at home. All questions answered.     Case discussed with:  [x]Patient  [x]Family  [x]Nursing  [x]Case Management  DVT Prophylaxis:  [x]Lovenox  []Hep SQ  []SCDs  []Coumadin   []On Heparin gtt    Objective:     Physical Exam:  Visit Vitals  /69   Pulse 68   Temp 97.9 °F (36.6 °C)   Resp 17   Ht 5' 2\" (1.575 m)   Wt 119.7 kg (264 lb)   SpO2 95% Breastfeeding No   BMI 48.29 kg/m²        General:         Alert, cooperative, no acute distress. Obese. HEENT: NC, Atraumatic. PERRLA, anicteric sclerae. Lungs: Diminished BLL  Heart:  Regular  rhythm,  No murmur, No Rubs, No Gallops  Abdomen: Soft, Non distended, Non tender. +Bowel sounds, no HSM  Extremities: No c/c/e  Psych:   Good insight. Anxious at times but not at present. Neurologic:  Alert and oriented X 3.   No acute neurological deficits

## 2021-05-05 NOTE — PROGRESS NOTES
Physician Progress Note      Ryan Wagner  CSN #:                  [id number]  :                       1945  ADMIT DATE:       2021 12:10 PM  [address] [place] DATE:  RESPONDING  PROVIDER #:        Ana Paula Johnson MD          QUERY TEXT:    Dear Hospitalist  Patient admitted with Acute respiratory failure with hypoxia secondary to COVID-19 pneumonia . Per MD Documentation reflects Sepsis with associated acute resp. failure which was present on admission due to COVID-19 pneumonia  in [facility]  dated 21. If possible, please document in the progress notes and discharge summary if Sepsis was: The medical record reflects the following:  Risk Factors: Acute Hypoxic respiratory failure secondary to COVID-19 pneumonia    Clinical Indicators: EDMD PE Positive for activity change, chills and fatigue , EDVS 3/4 SIRS  Pul 99, RR27 WBCs 4.4 , hypotensive in ED bp 80/36  MAPS in 's  [facility] Patient brought to room 2, triage nurse states her O2 was 59% on RA code sepsis called on the patient EDLABs  wbc 4.4, crp 13.2    Treatment: IV rocephin  IV Zithromax ended 21 ,high flow 65% FiO2 25 L ,BIPAP/CPAP at nighttime IVFLDs NS    Thank you  Pavithra Jacobson RN     Options provided:  -- Sepsis ruled out after study  -- Sepsis with associated acute resp. failure which was present on admission due to COVID-19 pneumonia  confirmed after study  -- Sepsis with associated acute resp. failure which was present on admission due to COVID-19 pneumonia  treated and resolved  -- Other - I will add my own diagnosis  -- Disagree - Not applicable / Not valid  -- Disagree - Clinically unable to determine / Unknown  -- Refer to Clinical Documentation Reviewer    PROVIDER RESPONSE TEXT:    sepsis with associated acute resp. failure which was present on admission due to COVID-19 pneumonia confirmed after study. Query created by:  Brandee Fields on 2021 6:04 PM      Electronically signed by:  Ana Paula Dobbins MD Aneta Ramirez MD 5/5/2021 12:41 PM

## 2021-05-05 NOTE — PROGRESS NOTES
Problem: Mobility Impaired (Adult and Pediatric)  Goal: *Acute Goals and Plan of Care (Insert Text)  Description: Physical Therapy Goals  Updated to reflect slow progression 5/3/2021  1. Patient will move from supine to sit and sit to supine in bed with supervision. 2.  Patient will transfer from bed to chair and chair to bed with supervision/set-up using the least restrictive device. 3.  Patient will perform sit to stand with supervision/set-up. 4.  Patient will ambulate with supervision/set-up for 25 feet with the least restrictive device. Initiated 4/26/2021 and to be accomplished within 7 day(s)  1. Patient will move from supine to sit and sit to supine  in bed with modified independence. 2.  Patient will transfer from bed to chair and chair to bed with modified independence using the least restrictive device. 3.  Patient will perform sit to stand with modified independence. 4.  Patient will ambulate with modified independence for 100 feet with the least restrictive device. PLOF: Patient reports she was independent with self care and functional mobility using RW. Outcome: Progressing Towards Goal    PHYSICAL THERAPY TREATMENT    Patient: Ryder Iglesias (48 y.o. female)  Date: 5/5/2021  Diagnosis: Pneumonia due to COVID-19 virus [U07.1, J12.82]  Hypoxia [R09.02] <principal problem not specified>       Precautions: Fall, Skin      ASSESSMENT:  Patient received supine in NAD and agreeable to PT treatment. Patient reports she is tired, though willing to participate in OOB mobility. Supine to sit with SBA and HOB 45 degrees. She performs seated LE exercises per flow sheet. Patient stands up at [facility] with minimal assistance and vc's for safe hand placement. She ambulates 5 ft laterally along EOB with min A for steadying and management of RW. Patient returns to EOB for rest break. O2 87-95% with activity on 3.5 L NC.  Patient returns to standing and performs marching with min A to steady walker and vc's to stand up right. Patient sitting EOB in NAD and handed off to OT. Progression toward goals:   []      Improving appropriately and progressing toward goals  [x]      Improving slowly and progressing toward goals  []      Not making progress toward goals and plan of care will be adjusted     PLAN:  Patient continues to benefit from skilled intervention to address the above impairments. Continue treatment per established plan of care. Discharge Recommendations:  Rehab  Further Equipment Recommendations for Discharge:  bedside commode, shower chair, and rolling walker     SUBJECTIVE:   Patient stated I am tired.     OBJECTIVE DATA SUMMARY:   Critical Behavior:  Neurologic State: Alert  Orientation Level: Oriented X4  Cognition: Follows commands  Safety/Judgement: Fall prevention  Functional Mobility Training:  Bed Mobility:    Supine to Sit: Stand-by assistance           Transfers:  Sit to Stand: Minimum assistance  Stand to Sit: Contact guard assistance             Balance:  Sitting: Intact; With support  Standing: Impaired; With support  Standing - Static: Fair  Standing - Dynamic : Fair         Ambulation/Gait Training:  Distance (ft): 5 Feet (ft)  Assistive Device: Walker, rolling  Ambulation - Level of Assistance: Minimal assistance  Gait Abnormalities: Decreased step clearance  Base of Support: Widened  Step Length: Right shortened;Left shortened    Therapeutic Exercises:         EXERCISE   Sets   Reps   Active Active Assist   Passive Self ROM   Comments   Ankle Pumps 1 15  [x] [] [] []    Quad Sets/Glut Sets    [] [] [] [] Hold for 5 secs   Hamstring Sets   [] [] [] []    Short Arc Quads   [] [] [] []    Heel Slides   [] [] [] []    Straight Leg Raises   [] [] [] []    Hip Add   [] [] [] [] Hold for 5 secs, w/ pillow squeeze   Long Arc Quads 1 10 [x] [] [] [] 3 second hold   Seated Marching 1 10 [x] [] [] []    Standing Marching 1 8 [x] [] [] []       [] [] [] []        Pain:  Pain level pre-treatment: 0/10  Pain level post-treatment: 0/10   Pain Intervention(s): Medication (see MAR); Rest, Ice, Repositioning   Response to intervention: Nurse notified, See doc flow    Activity Tolerance:   fair  Please refer to the flowsheet for vital signs taken during this treatment. After treatment:   [] Patient left in no apparent distress sitting up in chair  [] Patient left in no apparent distress in bed  [x] Call bell left within reach  [x] Nursing notified  [] Caregiver present  [] Bed alarm activated  [] SCDs applied      COMMUNICATION/EDUCATION:   [x]         Role of Physical Therapy in the acute care setting. [x]         Fall prevention education was provided and the patient/caregiver indicated understanding. [x]         Patient/family have participated as able in working toward goals and plan of care. [x]         Patient/family agree to work toward stated goals and plan of care. []         Patient understands intent and goals of therapy, but is neutral about his/her participation.   []         Patient is unable to participate in stated goals/plan of care: ongoing with therapy staff.  []         Other:        Ciaran Hill, PT   Time Calculation: 24 mins

## 2021-05-05 NOTE — PROGRESS NOTES
Chart reviewed. PT/OT recommending snf/rehab. Called pt's daughter Elizabeth Holguin and left a message for her to call CM back concerning pt's dispo   CM spoke with Jerzy Lepe last week and she wanted pt to return home with [address] [place] [address] Road when discharged because of previous bad experience at snf. CM will continue to follow along. 1446: Pt's daughter Elizabeth Iron called back. She stated she spoke with her mom and her mom wants to go home when discharged so plan is home with home health. Updated Dr. Griselda Christians.     Vanessa Domingo, NABEELN RN  Care Management  Pager: [phone]

## 2021-05-06 VITALS
HEART RATE: 65 BPM | DIASTOLIC BLOOD PRESSURE: 84 MMHG | RESPIRATION RATE: 22 BRPM | BODY MASS INDEX: 48.58 KG/M2 | HEIGHT: 62 IN | SYSTOLIC BLOOD PRESSURE: 131 MMHG | OXYGEN SATURATION: 98 % | TEMPERATURE: 97.7 F | WEIGHT: 264 LBS

## 2021-05-06 LAB
ALBUMIN SERPL-MCNC: 2.4 G/DL (ref 3.4–5)
ALBUMIN/GLOB SERPL: 0.9 {RATIO} (ref 0.8–1.7)
ALP SERPL-CCNC: 72 U/L (ref 45–117)
ALT SERPL-CCNC: 26 U/L (ref 13–56)
ANION GAP SERPL CALC-SCNC: 3 MMOL/L (ref 3–18)
AST SERPL-CCNC: 13 U/L (ref 10–38)
BASOPHILS # BLD: 0 K/UL (ref 0–0.1)
BASOPHILS NFR BLD: 0 % (ref 0–2)
BILIRUB SERPL-MCNC: 0.4 MG/DL (ref 0.2–1)
BUN SERPL-MCNC: 24 MG/DL (ref 7–18)
BUN/CREAT SERPL: 63 (ref 12–20)
CALCIUM SERPL-MCNC: 8.3 MG/DL (ref 8.5–10.1)
CHLORIDE SERPL-SCNC: 107 MMOL/L (ref 100–111)
CO2 SERPL-SCNC: 30 MMOL/L (ref 21–32)
CREAT SERPL-MCNC: 0.38 MG/DL (ref 0.6–1.3)
DIFFERENTIAL METHOD BLD: ABNORMAL
EOSINOPHIL # BLD: 0 K/UL (ref 0–0.4)
EOSINOPHIL NFR BLD: 0 % (ref 0–5)
ERYTHROCYTE [DISTWIDTH] IN BLOOD BY AUTOMATED COUNT: 13.9 % (ref 11.6–14.5)
GLOBULIN SER CALC-MCNC: 2.7 G/DL (ref 2–4)
GLUCOSE BLD STRIP.AUTO-MCNC: 136 MG/DL (ref 70–110)
GLUCOSE SERPL-MCNC: 135 MG/DL (ref 74–99)
HCT VFR BLD AUTO: 36.8 % (ref 35–45)
HGB BLD-MCNC: 11.7 G/DL (ref 12–16)
LYMPHOCYTES # BLD: 0.7 K/UL (ref 0.9–3.6)
LYMPHOCYTES NFR BLD: 7 % (ref 21–52)
MCH RBC QN AUTO: 29.1 PG (ref 24–34)
MCHC RBC AUTO-ENTMCNC: 31.8 G/DL (ref 31–37)
MCV RBC AUTO: 91.5 FL (ref 74–97)
MONOCYTES # BLD: 0.5 K/UL (ref 0.05–1.2)
MONOCYTES NFR BLD: 5 % (ref 3–10)
NEUTS SEG # BLD: 7.9 K/UL (ref 1.8–8)
NEUTS SEG NFR BLD: 86 % (ref 40–73)
PLATELET # BLD AUTO: 282 K/UL (ref 135–420)
PMV BLD AUTO: 10.8 FL (ref 9.2–11.8)
POTASSIUM SERPL-SCNC: 5.2 MMOL/L (ref 3.5–5.5)
PROT SERPL-MCNC: 5.1 G/DL (ref 6.4–8.2)
RBC # BLD AUTO: 4.02 M/UL (ref 4.2–5.3)
SODIUM SERPL-SCNC: 140 MMOL/L (ref 136–145)
WBC # BLD AUTO: 9.2 K/UL (ref 4.6–13.2)

## 2021-05-06 PROCEDURE — APPSS30 APP SPLIT SHARED TIME 16-30 MINUTES: Performed by: NURSE PRACTITIONER

## 2021-05-06 PROCEDURE — 80053 COMPREHEN METABOLIC PANEL: CPT

## 2021-05-06 PROCEDURE — 94640 AIRWAY INHALATION TREATMENT: CPT

## 2021-05-06 PROCEDURE — 85025 COMPLETE CBC W/AUTO DIFF WBC: CPT

## 2021-05-06 PROCEDURE — 74011250637 HC RX REV CODE- 250/637: Performed by: INTERNAL MEDICINE

## 2021-05-06 PROCEDURE — 74011250637 HC RX REV CODE- 250/637: Performed by: NURSE PRACTITIONER

## 2021-05-06 PROCEDURE — APPSS15 APP SPLIT SHARED TIME 0-15 MINUTES: Performed by: NURSE PRACTITIONER

## 2021-05-06 PROCEDURE — 99239 HOSP IP/OBS DSCHRG MGMT >30: CPT | Performed by: HOSPITALIST

## 2021-05-06 PROCEDURE — 82962 GLUCOSE BLOOD TEST: CPT

## 2021-05-06 PROCEDURE — 77010033678 HC OXYGEN DAILY

## 2021-05-06 PROCEDURE — 74011250637 HC RX REV CODE- 250/637: Performed by: HOSPITALIST

## 2021-05-06 PROCEDURE — 36415 COLL VENOUS BLD VENIPUNCTURE: CPT

## 2021-05-06 RX ORDER — OMEGA-3-ACID ETHYL ESTERS 1 G/1
1 CAPSULE, LIQUID FILLED ORAL 2 TIMES DAILY WITH MEALS
Qty: 60 CAP | Refills: 0 | Status: SHIPPED | OUTPATIENT
Start: 2021-05-06

## 2021-05-06 RX ORDER — CLONIDINE HYDROCHLORIDE 0.1 MG/1
0.1 TABLET ORAL 2 TIMES DAILY
Qty: 60 TAB | Refills: 0 | Status: SHIPPED | OUTPATIENT
Start: 2021-05-06

## 2021-05-06 RX ADMIN — CLONIDINE HYDROCHLORIDE 0.1 MG: 0.1 TABLET ORAL at 10:39

## 2021-05-06 RX ADMIN — IPRATROPIUM BROMIDE AND ALBUTEROL 1 PUFF: 20; 100 SPRAY, METERED RESPIRATORY (INHALATION) at 02:04

## 2021-05-06 RX ADMIN — HYDROXYCHLOROQUINE SULFATE 200 MG: 200 TABLET ORAL at 10:39

## 2021-05-06 RX ADMIN — SERTRALINE HYDROCHLORIDE 150 MG: 50 TABLET ORAL at 10:39

## 2021-05-06 RX ADMIN — GUAIFENESIN 600 MG: 600 TABLET, EXTENDED RELEASE ORAL at 10:41

## 2021-05-06 RX ADMIN — IPRATROPIUM BROMIDE AND ALBUTEROL 1 PUFF: 20; 100 SPRAY, METERED RESPIRATORY (INHALATION) at 14:00

## 2021-05-06 RX ADMIN — Medication 500 MG: at 10:39

## 2021-05-06 RX ADMIN — PANTOPRAZOLE SODIUM 40 MG: 40 TABLET, DELAYED RELEASE ORAL at 10:39

## 2021-05-06 RX ADMIN — Medication 10 ML: at 06:29

## 2021-05-06 RX ADMIN — IPRATROPIUM BROMIDE AND ALBUTEROL 1 PUFF: 20; 100 SPRAY, METERED RESPIRATORY (INHALATION) at 08:00

## 2021-05-06 NOTE — ROUTINE PROCESS
1900: Verbal shift change report given to ZARIA Wilson (oncoming nurse) by Shyam Ortez RN (offgoing nurse). Report included the following information SBAR, Kardex and Cardiac Rhythm NSR.  
 
0700: Verbal shift change report given to Shyam Ortez RN (oncoming nurse) by ZARIA Wilson (offgoing nurse). Report included the following information SBAR, Kardex and Cardiac Rhythm NSR.

## 2021-05-06 NOTE — DISCHARGE SUMMARY
[facility]  Discharge Summary       Patient: Ana Maria Martinez Age: 67 y.o. : 1945 MR#: [id number] SSN: [id number]  PCP on record: LEVI Hartmann  Admit date: 2021  Discharge date: 2021    Disposition:    []Home   [x]Home with Home Health   []SNF/NH   []Rehab   []Home with family   []Alternate Facility:____________________    Discharge Diagnoses:                             COVID-19 pneumonia     Admission diagnoses:  Sepsis with associated acute respiratory failure secondary to COVID-19 pneumonia   Acute respiratory failure with hypoxia secondary to COVID-19 pneumonia   COVID-19 pneumonia   Pulmonary HTN    Past Medical History   HO on CPAP  History of nocturnal hypoxemia. 2L NC at home  History of IVC filter   Lupus   Morbid obesity   Discharge Medications:     Current Discharge Medication List      START taking these medications    Details   cloNIDine HCL (CATAPRES) 0.1 mg tablet Take 1 Tab by mouth two (2) times a day. Qty: 60 Tab, Refills: 0      omega-3 acid ethyl esters (LOVAZA) 1 gram capsule Take 1 Cap by mouth two (2) times daily (with meals). Qty: 60 Cap, Refills: 0      ipratropium-albuteroL (COMBIVENT RESPIMAT)  mcg/actuation inhaler Take 1 Puff by inhalation four (4) times daily. Qty: 1 Inhaler, Refills: 0         CONTINUE these medications which have NOT CHANGED    Details   sertraline (Zoloft) 50 mg tablet Take 150 mg by mouth daily. acetaminophen (TYLENOL) 500 mg tablet Take 1,000 mg by mouth every six (6) hours as needed for Pain. ibuprofen (MOTRIN) 200 mg tablet Take 800 mg by mouth two (2) times a day. OXYGEN-AIR DELIVERY SYSTEMS 2 L by IntraNASal route continuous. hydroxychloroquine (PLAQUENIL) 200 mg tablet Take 200 mg by mouth two (2) times a day.          STOP taking these medications       benazepril-hydrochlorothiazide (LOTENSIN HCT) 20-25 mg per tablet Comments:   Reason for Stopping: Consults:    ID  Pulmonology   PT/OT  Speech   Nutrition   Hospital Course by Problem   Per H&P on 4/25 Sue Nunn is a 67 y.o.  female with hx of lupus on Plaquenil, hypertension, thromboembolism s/p IVC filter, bronchitis and intermittent home oxygen use who presented to the ED today complaining of worsening myalgias, cough and dyspnea. Patient was diagnosed with Covid 11 days ago however did not show symptoms until 9 days ago. Patient was tested because other members of the household were positive. She began to develop myalgias, progressive cough and dyspnea. Family checked her pulse ox and her oxygen levels have been between 80s-90s. She has been using home oxygen intermittently but ran out today. Of note patient was resistant to coming to the hospital but she agreed because she was feeling worse and worse. No complaint of nausea, vomiting but has not been wanting to eat however she has been keeping up with fluids. Typically she is not very active but her activity has since declined since presentation of symptoms. Work-up in the ED, patient with low-grade temp of 99.1, normal POC lactic acid, WBC 4.4, D-dimer 0.79, potassium 2.8, BUN 23, creatinine 0.40, CRP 14.5, procalcitonin <0.05, chest x-ray showed mild interstitial edema versus interstitial infiltrate, small bilateral pleural effusions with overlying mid to lower lung patchy opacities. Blood cultures obtained x2. She was started on IV Zithromax and Rocephin. Patient was given 1 dose of IV Decadron 6 mg. She also received IV potassium replacement to total 20 mEq and 40 mEq orally. Pt is now on HFNC 65%. We are asked to admit for further management of care. Summary of hospital course  ID consult. Recommendations during hospital course:  IV abx ceftriaxone and azithromycin til 4/28. Decadron 10 mg every BID for 5 days followed by 10 mg IV every 24 hours for 5 days. Equal 10 days of decadron therapy.  [facility]   Pulmonology consult. Recommendations during hospital course:  Monitor oxygen demand and wean oxygen as tolerated. HFNC (65%) initiated this admission and weaned down to 2-4 L NC upon discharge. Bronchodilators, IV steroids, and as needed breathing treatments. Aspiration precautions. Discussed prone position with patient but was unable to tolerated due to body habitus. BIPAP/CPAP at night time as needed. Recommend outpatient testing=PFT, 6 min walk, polysomnogram. Assess for home oxygen needs. PT/OT/Speech worked with patient throughout the hospital course. Nutrition consult at risk for malnutrition and added nutritional supplements TID with meals/Ensure Tommy Garcia     Today's examination of the patient revealed:     Subjective:    All 10 systems reviewed and negative   Objective:   VS:   Visit Vitals  /70 (BP 1 Location: Right arm, BP Patient Position: At rest)   Pulse 61   Temp 98.2 °F (36.8 °C)   Resp 16   Ht 5' 2\" (1.575 m)   Wt 119.7 kg (264 lb)   SpO2 97%   Breastfeeding No   BMI 48.29 kg/m²      Tmax/24hrs: Temp (24hrs), Av °F (36.7 °C), Min:97.8 °F (36.6 °C), Max:98.2 °F (36.8 °C)     Input/Output:     Intake/Output Summary (Last 24 hours) at 2021 0933  Last data filed at 2021 0450  Gross per 24 hour   Intake    Output 300 ml   Net -300 ml       General:  Alert, NAD  Cardiovascular:  RRR  Pulmonary:  LSC throughout  GI:  +BS in all four quadrants, soft, non-tender  Extremities:  No edema; 2+ dorsalis pedis pulses bilaterally  Neurology: Patient A&O x4    Labs:    Recent Results (from the past 24 hour(s))   GLUCOSE, POC    Collection Time: 21  1:26 PM   Result Value Ref Range    Glucose (POC) 100 70 - 110 mg/dL   GLUCOSE, POC    Collection Time: 21  5:34 PM   Result Value Ref Range    Glucose (POC) 119 (H) 70 - 110 mg/dL   GLUCOSE, POC    Collection Time: 21  9:15 PM   Result Value Ref Range    Glucose (POC) 92 70 - 274 mg/dL   METABOLIC PANEL, COMPREHENSIVE Collection Time: 05/06/21  1:50 AM   Result Value Ref Range    Sodium 140 136 - 145 mmol/L    Potassium 5.2 3.5 - 5.5 mmol/L    Chloride 107 100 - 111 mmol/L    CO2 30 21 - 32 mmol/L    Anion gap 3 3.0 - 18 mmol/L    Glucose 135 (H) 74 - 99 mg/dL    BUN 24 (H) 7.0 - 18 MG/DL    Creatinine 0.38 (L) 0.6 - 1.3 MG/DL    BUN/Creatinine ratio 63 (H) 12 - 20      GFR est AA >60 >60 ml/min/1.73m2    GFR est non-AA >60 >60 ml/min/1.73m2    Calcium 8.3 (L) 8.5 - 10.1 MG/DL    Bilirubin, total 0.4 0.2 - 1.0 MG/DL    ALT (SGPT) 26 13 - 56 U/L    AST (SGOT) 13 10 - 38 U/L    Alk. phosphatase 72 45 - 117 U/L    Protein, total 5.1 (L) 6.4 - 8.2 g/dL    Albumin 2.4 (L) 3.4 - 5.0 g/dL    Globulin 2.7 2.0 - 4.0 g/dL    A-G Ratio 0.9 0.8 - 1.7     CBC WITH AUTOMATED DIFF    Collection Time: 05/06/21  1:50 AM   Result Value Ref Range    WBC 9.2 4.6 - 13.2 K/uL    RBC 4.02 (L) 4.20 - 5.30 M/uL    HGB 11.7 (L) 12.0 - 16.0 g/dL    HCT 36.8 35.0 - 45.0 %    MCV 91.5 74.0 - 97.0 FL    MCH 29.1 24.0 - 34.0 PG    MCHC 31.8 31.0 - 37.0 g/dL    RDW 13.9 11.6 - 14.5 %    PLATELET 440 856 - 617 K/uL    MPV 10.8 9.2 - 11.8 FL    NEUTROPHILS 86 (H) 40 - 73 %    LYMPHOCYTES 7 (L) 21 - 52 %    MONOCYTES 5 3 - 10 %    EOSINOPHILS 0 0 - 5 %    BASOPHILS 0 0 - 2 %    ABS. NEUTROPHILS 7.9 1.8 - 8.0 K/UL    ABS. LYMPHOCYTES 0.7 (L) 0.9 - 3.6 K/UL    ABS. MONOCYTES 0.5 0.05 - 1.2 K/UL    ABS. EOSINOPHILS 0.0 0.0 - 0.4 K/UL    ABS. BASOPHILS 0.0 0.0 - 0.1 K/UL    DF AUTOMATED     GLUCOSE, POC    Collection Time: 05/06/21  8:30 AM   Result Value Ref Range    Glucose (POC) 136 (H) 70 - 110 mg/dL     Additional Data Reviewed:     Condition: stable   Follow-up Appointments:   1. Your PCP: LEVI Malik, within 7-10 days   2.  Follow up with pulmonology in 2-4 weeks             >30 minutes spent coordinating this discharge (review instructions/follow-up, prescriptions, preparing report for sign off)    Signed:  Steffi Biggs NP  5/6/2021  9:33 AM

## 2021-05-06 NOTE — PROGRESS NOTES
Patient has transitional care follow up with LEVI Paul on 5/13/2021 at 9:45 am, telehealth appointment.

## 2021-05-06 NOTE — PROGRESS NOTES
Home health orders note. Per Elsy of [facility], they cannot accept pt because they are full for today. Called pt's [facility] Adrian [facility]. [facility] She stated she is at work at this time but can pick her mom up if pt is discharged before 5pm that she gets off work. Home health orders sent to [address] [place] [address] for pt's nurse to call pt's daughter Adrian Taylor when pt ready for d/c.    Spoke with [facility] U[facility]. They accepted pt. Discharge order noted for today. Pt has been accepted to [facility] agency. Met with patient and spoke with pt's daughter Adrian Taylor and are agreeable to the transition plan today. Transport has been arranged through pt's daughter . Patient's discharge summary and home health  orders have been forwarded to [facility] via [facility] bedside RN, Izzy Cleveland ,  to the transition plan. Discharge information has been documented on the AVS.        \"Important Message from [employer]". Reviewed document with patient's representative Adrian Taylor at phone number [phone] telephonically and addressed questions. A copy of the IMM letter left at bedside with patient. Original, with verbal signature noted, placed in patient's chart.       SARAH Riddle RN  Care Management  Pager: [phone]

## 2021-05-06 NOTE — PROGRESS NOTES
conducted a Follow up consultation and Spiritual Assessment for Alex Ruiz, who is a 67 y.o.,female. The  provided the following Interventions:  Continued the relationship of care and support. Listened empathically. Offered prayer and assurance of continued prayer on patients behalf. Chart reviewed. The following outcomes were achieved:  Patient expressed gratitude for 's visit. Assessment:  There are no further spiritual or Baptism issues which require Spiritual Care Services interventions at this time. Plan:  Chaplains will continue to follow and will provide pastoral care on an as needed/requested basis.  recommends bedside caregivers page  on duty if patient shows signs of acute spiritual or emotional distress.        [facility]   [phone]

## 2021-05-06 NOTE — PROGRESS NOTES
ARU/IPR REFERRAL CONTACT NOTE  [id number] [place] [address] for Physical Rehabilitation    RE: Eleanora Cushing     Thank you for the opportunity to review this patient's case for admission to [id number] [place] [address] for Physical Rehabilitation. Based on our pre-admission screening:     [x ] Our Team/Medical Director is following this case. Comments: Patients desire is to return home with home health. Will sign off a this time      Again, Thank you for this referral. Should you have any questions please do not hesitate to call. Sincerely,  Vanessa Griggs. [place], [address] [address]   Jayne Hitchcock, RN  Admissions [facility]  [phone]

## 2021-05-06 NOTE — PROGRESS NOTES
Discharge medications reviewed with patient and appropriate educational materials and side effects teaching were provided. I have reviewed discharge instructions with the patient. The patient verbalized understanding. I have reviewed the provider's instructions with the patient, answering all questions to her satisfaction. PIV out

## 2021-05-06 NOTE — PROGRESS NOTES
Called daughter Ms Arely Soria to notify of discharge home today with [facility] Morales. Daughter is in agreement with discharge. Patient is in agreement with discharge. Notified daughter of new prescriptions e-script to listed pharmacy on file.

## 2021-05-07 ENCOUNTER — PATIENT OUTREACH (OUTPATIENT)
Dept: CASE MANAGEMENT | Age: 76
End: 2021-05-07

## 2021-05-07 RX ORDER — FLUTICASONE PROPIONATE AND SALMETEROL 250; 50 UG/1; UG/1
1 POWDER RESPIRATORY (INHALATION) EVERY 12 HOURS
Qty: 1 EACH | Refills: 0 | Status: SHIPPED | OUTPATIENT
Start: 2021-05-07

## 2021-05-07 NOTE — PROGRESS NOTES
Patient contacted regarding [facility] diagnosis\". Discussed COVID-19 related testing which was not done at this time. Test results were not done. Patient informed of results, if available? N/A     Care Transition Nurse contacted the patient by telephone to perform post discharge assessment. Call within 2 business days of discharge: Yes Verified name and  with patient as identifiers. Provided introduction to self, and explanation of the CTN/ACM role, and reason for call due to risk factors for infection and/or exposure to COVID-19. Symptoms reviewed with patient who verbalized the following:   - She is feeling better but weak. Due to no new or worsening symptoms encounter was not routed to provider for escalation. Discussed follow-up appointments. If no appointment was previously scheduled, appointment scheduling offered:  No, per chart review patient has PCP follow up appointment scheduled for [address] follow up appointment(s): No future appointments. Non-[facility] follow up appointment(s):   PCP      Advance Care Planning:   Does patient have an Advance Directive:  not on file per chart review at this time. Patient has following risk factors of: bronchitis and age over 72 . CTN reviewed discharge instructions, medical action plan and red flags such as increased shortness of breath, increasing fever and signs of decompensation with patient who verbalized understanding. Discussed exposure protocols and quarantine with CDC Guidelines What to do if you are sick with coronavirus disease .  Patient was given an opportunity for questions and concerns. The patient agrees to contact the [facility] exposure line [phone], local [facility]  [phone] and PCP office for questions related to their healthcare. CTN provided contact information for future needs. Reviewed with  patient on any new and changed medications related to discharge diagnosis.     - Patient advises that she is not going to change her blood pressure medication. She took 1 of the new pills and did not like it and it going to take her old medication that has worked well for her in the past.  Patient encouraged to follow up with PCP regarding her medications and follow up at urgent care/ed as needed. Was patient discharged with a pulse oximeter? No, per chart review. Discussed and confirmed pulse oximeter discharge instructions and when to notify provider or seek emergency care: n/a       Plan for follow-up call in 5-7 days/as needed based on severity of symptoms and risk factors. Patient advises that she attempted to fill the prescription for her inhaler but the cost was 300.00 and she could not afford to have prescription filled. With patient's permission CTN called the [facility]  for follow up. CTN related patient's report that the inhaler that was prescribed for patient at time of discharge was not affordable for her. CTN was asked if patient had the inhaler that she was using at time of hospital discharge? CTN called patient back and inquired if patient was given the inhaler that she was using at time of discharge. Patient replied no. CTN called and updated Hospitalist  as to patient's response. Hospitalist office manger advises that staff there will follow up with Dr. Cecelia Nuno and someone will follow up with patient. CTN called patient and provided update and that she should be expecting a phone call regarding follow up. CTN contact information provided to patient for follow up as well. Telephone call from  with DR. SÁNCHEZ [facility] received. Office manger advises  That the issue has been addressed and that patient has been contacted.      CTN returned call to the  with DR. SÁNCHEZ [facility] and related that patient had also reported that she is going back to using her prior BP medication and does not plan to use the newly prescribed medication.  related she would make the hospitalist staff member aware and would contact patient if needed.

## 2021-05-07 NOTE — PROGRESS NOTES
Transitional care department contacted this office to stat that the combivent that was prescribed upon discharge was not covered under patient insurance. This provider ordered advair one puff BID til she is able to get a follow up with PCP and/or pulmonology.

## 2021-05-28 ENCOUNTER — PATIENT OUTREACH (OUTPATIENT)
Dept: CASE MANAGEMENT | Age: 76
End: 2021-05-28

## 2021-05-28 NOTE — PROGRESS NOTES
Follow Up Telephone Call    Patient contacted via telephone call. Patient provided name and date of birth as identifiers. Patient relates:   - she is doing good   - she is still using Oxygen but wants to wean herself off of this if possible. - she is walking good   - She was able to obtain the inhaler and it is helping   - everything is progressing well    CTN discussed with patient to please all PCP or follow up with urgent care, ED as needed for any concerns. Patient/family has been provided the following resources and education related to COVID-19:                         Signs, symptoms and red flags related to COVID-19            CDC exposure and quarantine guidelines            [facility] exposure contact - [phone]            Contact for their local Department of Health                 No further outreach scheduled with this CTN/ACM. Episode of Care resolved. Patient has this CTN/ACM contact information if future needs arise.

## 2021-06-28 NOTE — PROGRESS NOTES
Problem: Risk for Spread of Infection  Goal: Prevent transmission of infectious organism to others  Description: Prevent the transmission of infectious organisms to other patients, staff members, and visitors. Outcome: Progressing Towards Goal     Problem: Patient Education:  Go to Education Activity  Goal: Patient/Family Education  Outcome: Progressing Towards Goal     Problem: Falls - Risk of  Goal: *Absence of Falls  Description: Document Sofiya Boss Fall Risk and appropriate interventions in the flowsheet. Outcome: Progressing Towards Goal  Note: Fall Risk Interventions:  Mobility Interventions: Bed/chair exit alarm         Medication Interventions: Assess postural VS orthostatic hypotension, Bed/chair exit alarm, Evaluate medications/consider consulting pharmacy, Patient to call before getting OOB, Teach patient to arise slowly    Elimination Interventions: Bed/chair exit alarm, Call light in reach, Toilet paper/wipes in reach, Patient to call for help with toileting needs              Problem: Patient Education: Go to Patient Education Activity  Goal: Patient/Family Education  Outcome: Progressing Towards Goal     Problem: Pressure Injury - Risk of  Goal: *Prevention of pressure injury  Description: Document Francisco Scale and appropriate interventions in the flowsheet.   Outcome: Progressing Towards Goal  Note: Pressure Injury Interventions:  Sensory Interventions: Assess changes in LOC, Assess need for specialty bed, Avoid rigorous massage over bony prominences, Chair cushion, Discuss PT/OT consult with provider, Keep linens dry and wrinkle-free, Maintain/enhance activity level, Minimize linen layers, Monitor skin under medical devices, Pressure redistribution bed/mattress (bed type)    Moisture Interventions: Absorbent underpads, Apply protective barrier, creams and emollients, Assess need for specialty bed, Internal/External urinary devices, Maintain skin hydration (lotion/cream), Minimize layers, Moisture barrier    Activity Interventions: Increase time out of bed, Pressure redistribution bed/mattress(bed type), Chair cushion    Mobility Interventions: Assess need for specialty bed, Chair cushion, Float heels, PT/OT evaluation, Pressure redistribution bed/mattress (bed type)    Nutrition Interventions: Document food/fluid/supplement intake    Friction and Shear Interventions: Apply protective barrier, creams and emollients, Lift sheet, Lift team/patient mobility team, Minimize layers                Problem: Patient Education: Go to Patient Education Activity  Goal: Patient/Family Education  Outcome: Progressing Towards Goal     Problem: Airway Clearance - Ineffective  Goal: Achieve or maintain patent airway  Outcome: Progressing Towards Goal     Problem: Gas Exchange - Impaired  Goal: Absence of hypoxia  Outcome: Progressing Towards Goal  Goal: Promote optimal lung function  Outcome: Progressing Towards Goal     Problem: Breathing Pattern - Ineffective  Goal: Ability to achieve and maintain a regular respiratory rate  Outcome: Progressing Towards Goal     Problem:  Body Temperature -  Risk of, Imbalanced  Goal: Ability to maintain a body temperature within defined limits  Outcome: Progressing Towards Goal  Goal: Will regain or maintain usual level of consciousness  Outcome: Progressing Towards Goal  Goal: Complications related to the disease process, condition or treatment will be avoided or minimized  Outcome: Progressing Towards Goal     Problem: Isolation Precautions - Risk of Spread of Infection  Goal: Prevent transmission of infectious organism to others  Outcome: Progressing Towards Goal     Problem: Nutrition Deficits  Goal: Optimize nutrtional status  Outcome: Progressing Towards Goal     Problem: Risk for Fluid Volume Deficit  Goal: Maintain normal heart rhythm  Outcome: Progressing Towards Goal  Goal: Maintain absence of muscle cramping  Outcome: Progressing Towards Goal  Goal: Maintain normal serum potassium, sodium, calcium, phosphorus, and pH  Outcome: Progressing Towards Goal     Problem: Loneliness or Risk for Loneliness  Goal: Demonstrate positive use of time alone when socialization is not possible  Outcome: Progressing Towards Goal     Problem: Fatigue  Goal: Verbalize increase energy and improved vitality  Outcome: Progressing Towards Goal     Problem: Patient Education: Go to Patient Education Activity  Goal: Patient/Family Education  Outcome: Progressing Towards Goal     Problem: Patient Education: Go to Patient Education Activity  Goal: Patient/Family Education  Outcome: Progressing Towards Goal     Problem: Pneumonia: Day 1  Goal: Off Pathway (Use only if patient is Off Pathway)  Outcome: Progressing Towards Goal  Goal: Activity/Safety  Outcome: Progressing Towards Goal  Goal: Consults, if ordered  Outcome: Progressing Towards Goal  Goal: Diagnostic Test/Procedures  Outcome: Progressing Towards Goal  Goal: Nutrition/Diet  Outcome: Progressing Towards Goal  Goal: Medications  Outcome: Progressing Towards Goal  Goal: Respiratory  Outcome: Progressing Towards Goal  Goal: Treatments/Interventions/Procedures  Outcome: Progressing Towards Goal  Goal: Psychosocial  Outcome: Progressing Towards Goal  Goal: *Oxygen saturation within defined limits  Outcome: Progressing Towards Goal  Goal: *Influenza vaccine administered (October-March)  Outcome: Progressing Towards Goal  Goal: *Pneumoccocal vaccine administered  Outcome: Progressing Towards Goal  Goal: *Hemodynamically stable  Outcome: Progressing Towards Goal  Goal: *Demonstrates progressive activity  Outcome: Progressing Towards Goal  Goal: *Tolerating diet  Outcome: Progressing Towards Goal     Problem: Pneumonia: Day 2  Goal: Off Pathway (Use only if patient is Off Pathway)  Outcome: Progressing Towards Goal  Goal: Activity/Safety  Outcome: Progressing Towards Goal  Goal: Consults, if ordered  Outcome: Progressing Towards Goal  Goal: Diagnostic Test/Procedures  Outcome: Progressing Towards Goal  Goal: Nutrition/Diet  Outcome: Progressing Towards Goal  Goal: Discharge Planning  Outcome: Progressing Towards Goal  Goal: Medications  Outcome: Progressing Towards Goal  Goal: Respiratory  Outcome: Progressing Towards Goal  Goal: Treatments/Interventions/Procedures  Outcome: Progressing Towards Goal  Goal: Psychosocial  Outcome: Progressing Towards Goal  Goal: *Oxygen saturation within defined limits  Outcome: Progressing Towards Goal  Goal: *Hemodynamically stable  Outcome: Progressing Towards Goal  Goal: *Demonstrates progressive activity  Outcome: Progressing Towards Goal  Goal: *Tolerating diet  Outcome: Progressing Towards Goal  Goal: *Optimal pain control at patient's stated goal  Outcome: Progressing Towards Goal     Problem: Pneumonia: Day 3  Goal: Off Pathway (Use only if patient is Off Pathway)  Outcome: Progressing Towards Goal  Goal: Activity/Safety  Outcome: Progressing Towards Goal  Goal: Consults, if ordered  Outcome: Progressing Towards Goal  Goal: Diagnostic Test/Procedures  Outcome: Progressing Towards Goal  Goal: Nutrition/Diet  Outcome: Progressing Towards Goal  Goal: Discharge Planning  Outcome: Progressing Towards Goal  Goal: Medications  Outcome: Progressing Towards Goal  Goal: Respiratory  Outcome: Progressing Towards Goal  Goal: Treatments/Interventions/Procedures  Outcome: Progressing Towards Goal  Goal: Psychosocial  Outcome: Progressing Towards Goal  Goal: *Oxygen saturation within defined limits  Outcome: Progressing Towards Goal  Goal: *Hemodynamically stable  Outcome: Progressing Towards Goal  Goal: *Demonstrates progressive activity  Outcome: Progressing Towards Goal  Goal: *Tolerating diet  Outcome: Progressing Towards Goal  Goal: *Describes available resources and support systems  Outcome: Progressing Towards Goal  Goal: *Optimal pain control at patient's stated goal  Outcome: Progressing Towards Goal     Problem: Pneumonia: Day 4  Goal: Off Pathway (Use only if patient is Off Pathway)  Outcome: Progressing Towards Goal  Goal: Activity/Safety  Outcome: Progressing Towards Goal  Goal: Nutrition/Diet  Outcome: Progressing Towards Goal  Goal: Discharge Planning  Outcome: Progressing Towards Goal  Goal: Medications  Outcome: Progressing Towards Goal  Goal: Respiratory  Outcome: Progressing Towards Goal  Goal: Treatments/Interventions/Procedures  Outcome: Progressing Towards Goal  Goal: Psychosocial  Outcome: Progressing Towards Goal     Problem: Pneumonia: Discharge Outcomes  Goal: *Demonstrates progressive activity  Outcome: Progressing Towards Goal  Goal: *Describes follow-up/return visits to physicians  Outcome: Progressing Towards Goal  Goal: *Tolerating diet  Outcome: Progressing Towards Goal  Goal: *Verbalizes name, dosage, time, side effects, and number of days to continue medications  Outcome: Progressing Towards Goal  Goal: *Influenza immunization  Outcome: Progressing Towards Goal  Goal: *Pneumococcal immunization  Outcome: Progressing Towards Goal  Goal: *Respiratory status at baseline  Outcome: Progressing Towards Goal  Goal: *Vital signs within defined limits  Outcome: Progressing Towards Goal  Goal: *Describes available resources and support systems  Outcome: Progressing Towards Goal  Goal: *Optimal pain control at patient's stated goal  Outcome: Progressing Towards Goal Yes

## 2021-09-27 ENCOUNTER — TELEPHONE (OUTPATIENT)
Dept: PULMONOLOGY | Age: 76
End: 2021-09-27

## 2021-09-27 NOTE — TELEPHONE ENCOUNTER
Pt called stating that she does not want to use her home oxygen anymore. She stated that she does not need it and would like to have It picked up. Pt uses [employer].  Please advise [phone]

## 2021-09-27 NOTE — TELEPHONE ENCOUNTER
Patient stating she does not need home O2 and is requesting we send an order to [address] to have it picked up. Spoke with patient and explained we have to test patient's to make sure they don't need the O2 before we can send an order to have it taken out of the home. Patient states she isn't using the oxygen and she is not going to use the oxygen and she wants it out. She states it is just sitting there costing money.

## 2021-09-28 DIAGNOSIS — G47.34 NOCTURNAL HYPOXEMIA: Primary | ICD-10-CM

## 2021-09-29 NOTE — TELEPHONE ENCOUNTER
Order to discontinue home oxygen, per patient request, has been faxed to [address] [phone]. Patient notified order has been sent.

## 2022-02-01 NOTE — ANESTHESIA PREPROCEDURE EVALUATION
Relevant Problems   No relevant active problems       Anesthetic History     PONV          Review of Systems / Medical History  Patient summary reviewed and pertinent labs reviewed    Pulmonary  Within defined limits                 Neuro/Psych   Within defined limits           Cardiovascular    Hypertension                   GI/Hepatic/Renal  Within defined limits              Endo/Other        Morbid obesity and arthritis     Other Findings              Physical Exam    Airway  Mallampati: II  TM Distance: 4 - 6 cm  Neck ROM: normal range of motion   Mouth opening: Diminished (comment)     Cardiovascular    Rhythm: regular  Rate: normal         Dental    Dentition: Poor dentition and Upper partial plate     Pulmonary  Breath sounds clear to auscultation               Abdominal  GI exam deferred       Other Findings            Anesthetic Plan    ASA: 3  Anesthesia type: general          Induction: Intravenous  Anesthetic plan and risks discussed with: Patient Additional Notes: Patient consent was obtained to proceed with the visit and recommended plan of care after discussion of all risks and benefits, including the risks of COVID-19 exposure. Detail Level: Simple Render Risk Assessment In Note?: yes

## 2022-03-18 PROBLEM — J12.82 PNEUMONIA DUE TO COVID-19 VIRUS: Status: ACTIVE | Noted: 2021-04-25

## 2022-03-18 PROBLEM — E66.01 OBESITY, MORBID (HCC): Status: ACTIVE | Noted: 2019-04-22

## 2022-03-18 PROBLEM — U07.1 PNEUMONIA DUE TO COVID-19 VIRUS: Status: ACTIVE | Noted: 2021-04-25

## 2022-03-19 PROBLEM — R09.02 HYPOXIA: Status: ACTIVE | Noted: 2021-04-25

## 2022-03-19 PROBLEM — M48.061 LUMBAR SPINAL STENOSIS: Status: ACTIVE | Noted: 2020-02-27

## 2022-03-19 PROBLEM — M51.26 HNP (HERNIATED NUCLEUS PULPOSUS), LUMBAR: Status: ACTIVE | Noted: 2020-02-27

## 2022-05-24 NOTE — PROGRESS NOTES
Bedside and Verbal shift change report given to Cheng Mcdonald RN (oncoming nurse) by Vincent Alvarado RN (offgoing nurse). Report included the following information SBAR, Kardex, [facility], Recent Results and Cardiac Rhythm Sinus Meron Braxton. CC:   Chief Complaint   Patient presents with   • Results     HISTORY OF THE PRESENT ILLNESS: Patient is a 57 y.o. female. This pleasant patient is here today to follow-up on MRI results.    Health Maintenance: Reviewed    Adhesive capsulitis of right shoulder  Patient presents today to review MRI results of her right shoulder. Patient continues to take Celebrex daily and has been participating in physical therapy, reporting that it does not seem to be beneficial and that she continues to experience pain that is affecting her activities of daily living.  Results as follows:  1.  Tendinopathy of the supraspinatus and infraspinatus tendons. No full-thickness tear or tendon retraction is identified.   2.  Thickening of the inferior glenohumeral ligament with mildly increased T2 signal. Findings suggest a component of adhesive capsulitis.  3.  Mild degenerative change in the acromioclavicular joint with a tiny subacromial enthesophyte and mild lateral downsloping    Allergies: Lisinopril  Current Outpatient Medications Ordered in Epic   Medication Sig Dispense Refill   • rosuvastatin (CRESTOR) 10 MG Tab TAKE 1 TABLET BY MOUTH EVERY DAY IN THE EVENING 90 Tablet 3   • omeprazole (PRILOSEC) 40 MG delayed-release capsule Take 1 Capsule by mouth every day. 90 Capsule 3   • levothyroxine (SYNTHROID) 100 MCG Tab TAKE 1 TABLET BY MOUTH IN  THE MORNING ON AN EMPTY  STOMACH 90 Tablet 1   • buPROPion (WELLBUTRIN XL) 300 MG XL tablet      • sertraline (ZOLOFT) 100 MG Tab      • Semaglutide,0.25 or 0.5MG/DOS, (OZEMPIC, 0.25 OR 0.5 MG/DOSE,) 2 MG/1.5ML Solution Pen-injector Inject 0.5 mg under the skin every 7 days. 6 mL 3   • gabapentin (NEURONTIN) 100 MG Cap TAKE 2 CAPSULES BY MOUTH AT BEDTIME. 180 Capsule 1   • celecoxib (CELEBREX) 100 MG Cap TAKE 1 CAPSULE BY MOUTH TWICE A  Capsule 3   • buPROPion (WELLBUTRIN XL) 150 MG XL tablet Take 150 mg by mouth every morning.     • glucose blood (CONTOUR NEXT TEST) strip 1 Strip by  Other route every day. Use to test blood sugar 1 time daily 100 Strip 3   • lamotrigine (LAMICTAL) 200 MG tablet Take 200 mg by mouth every day.     • sumatriptan (IMITREX) 100 MG tablet Take 1 Tab by mouth Once PRN. 10 Tab 11   • traZODone (DESYREL) 100 MG Tab TAKE 1/2 TO 1 TABLET BY  MOUTH AT BEDTIME AS NEEDED  FOR SLEEP 90 Tab 0     No current Epic-ordered facility-administered medications on file.     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 last surgery removed last joint of all toes except for big toe     Social History     Tobacco Use   • Smoking status: Former Smoker     Packs/day: 1.00     Years: 38.00     Pack years: 38.00     Types: Cigarettes     Start date: 1978     Quit date: 2016     Years since quittin.7   • Smokeless tobacco: Never Used   Vaping Use   • Vaping Use: Never used   Substance Use Topics   • Alcohol use: Not Currently     Comment: holidays   • Drug use: No     Social History     Social History Narrative   • Not on file     Family History   Problem Relation Age of Onset   • Bipolar disorder Father    • Alcohol abuse Father         ETOH   • Other Father         Cirrhosis   • Diabetes Mother    • Hypertension Mother    • Other Mother         gilberts syndrome   • Alcohol abuse Sister    • Psychiatric Illness Sister    • Alcohol abuse Maternal Grandmother    • Other Maternal Grandmother         Brain Tumor   • Diabetes Maternal  "Grandfather    • Alzheimer's Disease Maternal Grandfather    • Cancer Maternal Grandfather         Skin   • Cancer Maternal Aunt         stomach   • Diabetes Maternal Uncle    • Alcohol abuse Maternal Uncle    • Stroke Maternal Uncle    • Diabetes Sister    • Hyperlipidemia Sister    • Anxiety disorder Sister    • Other Sister         benign uterine tumor   • Obesity Sister    • Obesity Sister    • Alcohol/Drug Maternal Uncle         prescription narcotics   • Cancer Maternal Uncle      ROS:   See HPI.        Exam: /68 (BP Location: Left arm, Patient Position: Sitting, BP Cuff Size: Adult)   Pulse 93   Temp 35.8 °C (96.5 °F) (Temporal)   Ht 1.727 m (5' 8\")   Wt 75.3 kg (166 lb)   SpO2 93%  Body mass index is 25.24 kg/m².    General: Well nourished, well developed female in NAD, awake and conversant.  Eyes: Normal conjunctiva, anicteric.  Round symmetrical pupils.  ENT: Hearing grossly intact.  No nasal discharge.  Neck: Neck is supple.  No masses or thyromegaly.  CV: No lower extremity edema.  Respiratory: Respirations are nonlabored.  No wheezing.  Abdomen: Non-Distended.  Skin: Warm.  No rashes or ulcers.  MSK: Normal ambulation.  No clubbing or cyanosis.  Neuro: Sensation and CN II-XII grossly normal.  Psych: Alert and oriented.  Cooperative, appropriate mood and affect, normal judgment.      Assessment/Plan:  1. Adhesive capsulitis of right shoulder  Referral to orthopedics for further management. Continue Celebrex daily. Recommend not doing physical therapy until evaluated by orthopedics. Encourage regular right shoulder movement.   - Referral to Orthopedics     Educated in proper administration of medication(s) ordered today including safety, possible SE, risks, benefits, rationale and alternatives to therapy.   Supportive care, differential diagnoses, and indications for immediate follow-up discussed with patient.    Pathogenesis of diagnosis discussed including typical length and natural " progression.    Instructed to return to clinic or nearest emergency department for any change in condition, further concerns, or worsening of symptoms.  Patient states understanding of the plan of care and discharge instructions.    Return in about 8 weeks (around 7/20/2022) for Preventative Annual, Follow up Labs.    Please note that this dictation was created using voice recognition software. I have made every reasonable attempt to correct obvious errors, but I expect that there are errors of grammar and possibly content that I did not discover before finalizing the note.

## 2023-02-17 NOTE — Clinical Note
Status[de-identified] INPATIENT [101]   Type of Bed: Stepdown [17]   Inpatient Hospitalization Certified Necessary for the Following Reasons: 3.  Patient receiving treatment that can only be provided in an inpatient setting (further clarification in H&P documentation)   Admitting Diagnosis: Pneumonia due to COVID-19 virus [[id number]]   Admitting Physician: Earlene Mederos   Attending Physician: Earlene Mederos   Estimated Length of Stay: 2 Midnights   Discharge Plan[de-identified] Home with Office Follow-up Suturegard Intro: Intraoperative tissue expansion was performed, utilizing the SUTUREGARD device, in order to reduce wound tension.

## 2023-12-31 ENCOUNTER — HOSPITAL ENCOUNTER (EMERGENCY)
Facility: HOSPITAL | Age: 78
Discharge: HOME | End: 2023-12-31
Attending: EMERGENCY MEDICINE
Payer: MEDICARE

## 2023-12-31 ENCOUNTER — APPOINTMENT (OUTPATIENT)
Facility: HOSPITAL | Age: 78
End: 2023-12-31
Payer: MEDICARE

## 2023-12-31 VITALS
TEMPERATURE: 98.6 F | OXYGEN SATURATION: 100 % | HEART RATE: 75 BPM | BODY MASS INDEX: 48.29 KG/M2 | WEIGHT: 264 LBS | RESPIRATION RATE: 23 BRPM | SYSTOLIC BLOOD PRESSURE: 125 MMHG | DIASTOLIC BLOOD PRESSURE: 87 MMHG

## 2023-12-31 DIAGNOSIS — R06.00 DYSPNEA, UNSPECIFIED TYPE: Primary | ICD-10-CM

## 2023-12-31 LAB
ALBUMIN SERPL-MCNC: 3.2 G/DL (ref 3.4–5)
ALBUMIN/GLOB SERPL: 1.1 (ref 0.8–1.7)
ALP SERPL-CCNC: 80 U/L (ref 45–117)
ALT SERPL-CCNC: 13 U/L (ref 13–56)
ANION GAP SERPL CALC-SCNC: 4 MMOL/L (ref 3–18)
AST SERPL-CCNC: 12 U/L (ref 10–38)
BASOPHILS # BLD: 0.1 K/UL (ref 0–0.1)
BASOPHILS NFR BLD: 1 % (ref 0–2)
BILIRUB SERPL-MCNC: 0.7 MG/DL (ref 0.2–1)
BUN SERPL-MCNC: 18 MG/DL (ref 7–18)
BUN/CREAT SERPL: 33 (ref 12–20)
CALCIUM SERPL-MCNC: 8.8 MG/DL (ref 8.5–10.1)
CHLORIDE SERPL-SCNC: 104 MMOL/L (ref 100–111)
CO2 SERPL-SCNC: 31 MMOL/L (ref 21–32)
CREAT SERPL-MCNC: 0.54 MG/DL (ref 0.6–1.3)
DIFFERENTIAL METHOD BLD: ABNORMAL
EOSINOPHIL # BLD: 0.2 K/UL (ref 0–0.4)
EOSINOPHIL NFR BLD: 2 % (ref 0–5)
ERYTHROCYTE [DISTWIDTH] IN BLOOD BY AUTOMATED COUNT: 13.6 % (ref 11.6–14.5)
GLOBULIN SER CALC-MCNC: 3 G/DL (ref 2–4)
GLUCOSE SERPL-MCNC: 95 MG/DL (ref 74–99)
HCT VFR BLD AUTO: 40.5 % (ref 35–45)
HGB BLD-MCNC: 12.9 G/DL (ref 12–16)
IMM GRANULOCYTES # BLD AUTO: 0.1 K/UL (ref 0–0.04)
IMM GRANULOCYTES NFR BLD AUTO: 1 % (ref 0–0.5)
LYMPHOCYTES # BLD: 1.5 K/UL (ref 0.9–3.6)
LYMPHOCYTES NFR BLD: 16 % (ref 21–52)
MAGNESIUM SERPL-MCNC: 2.1 MG/DL (ref 1.6–2.6)
MCH RBC QN AUTO: 29.5 PG (ref 24–34)
MCHC RBC AUTO-ENTMCNC: 31.9 G/DL (ref 31–37)
MCV RBC AUTO: 92.7 FL (ref 78–100)
MONOCYTES # BLD: 1.1 K/UL (ref 0.05–1.2)
MONOCYTES NFR BLD: 12 % (ref 3–10)
NEUTS SEG # BLD: 6.3 K/UL (ref 1.8–8)
NEUTS SEG NFR BLD: 69 % (ref 40–73)
NRBC # BLD: 0 K/UL (ref 0–0.01)
NRBC BLD-RTO: 0 PER 100 WBC
NT PRO BNP: 657 PG/ML (ref 0–1800)
PLATELET # BLD AUTO: 217 K/UL (ref 135–420)
PMV BLD AUTO: 10.6 FL (ref 9.2–11.8)
POTASSIUM SERPL-SCNC: 4.2 MMOL/L (ref 3.5–5.5)
PROT SERPL-MCNC: 6.2 G/DL (ref 6.4–8.2)
RBC # BLD AUTO: 4.37 M/UL (ref 4.2–5.3)
SODIUM SERPL-SCNC: 139 MMOL/L (ref 136–145)
TROPONIN I SERPL HS-MCNC: 40 NG/L (ref 0–54)
WBC # BLD AUTO: 9.2 K/UL (ref 4.6–13.2)

## 2023-12-31 PROCEDURE — 85025 COMPLETE CBC W/AUTO DIFF WBC: CPT

## 2023-12-31 PROCEDURE — 94761 N-INVAS EAR/PLS OXIMETRY MLT: CPT

## 2023-12-31 PROCEDURE — 93005 ELECTROCARDIOGRAM TRACING: CPT | Performed by: EMERGENCY MEDICINE

## 2023-12-31 PROCEDURE — 83880 ASSAY OF NATRIURETIC PEPTIDE: CPT

## 2023-12-31 PROCEDURE — 96375 TX/PRO/DX INJ NEW DRUG ADDON: CPT

## 2023-12-31 PROCEDURE — 83735 ASSAY OF MAGNESIUM: CPT

## 2023-12-31 PROCEDURE — 6370000000 HC RX 637 (ALT 250 FOR IP): Performed by: EMERGENCY MEDICINE

## 2023-12-31 PROCEDURE — 71045 X-RAY EXAM CHEST 1 VIEW: CPT

## 2023-12-31 PROCEDURE — 84484 ASSAY OF TROPONIN QUANT: CPT

## 2023-12-31 PROCEDURE — 80053 COMPREHEN METABOLIC PANEL: CPT

## 2023-12-31 PROCEDURE — 6360000002 HC RX W HCPCS: Performed by: EMERGENCY MEDICINE

## 2023-12-31 PROCEDURE — 99285 EMERGENCY DEPT VISIT HI MDM: CPT

## 2023-12-31 PROCEDURE — 96374 THER/PROPH/DIAG INJ IV PUSH: CPT

## 2023-12-31 RX ORDER — KETOROLAC TROMETHAMINE 15 MG/ML
15 INJECTION, SOLUTION INTRAMUSCULAR; INTRAVENOUS
Status: COMPLETED | OUTPATIENT
Start: 2023-12-31 | End: 2023-12-31

## 2023-12-31 RX ORDER — PROCHLORPERAZINE EDISYLATE 5 MG/ML
10 INJECTION INTRAMUSCULAR; INTRAVENOUS
Status: COMPLETED | OUTPATIENT
Start: 2023-12-31 | End: 2023-12-31

## 2023-12-31 RX ORDER — ALBUTEROL SULFATE 90 UG/1
2 AEROSOL, METERED RESPIRATORY (INHALATION) 4 TIMES DAILY PRN
Qty: 18 G | Refills: 0 | Status: SHIPPED | OUTPATIENT
Start: 2023-12-31 | End: 2024-01-01

## 2023-12-31 RX ORDER — ALBUTEROL SULFATE 2.5 MG/3ML
2.5 SOLUTION RESPIRATORY (INHALATION)
Status: COMPLETED | OUTPATIENT
Start: 2023-12-31 | End: 2023-12-31

## 2023-12-31 RX ADMIN — ALBUTEROL SULFATE 2.5 MG: 2.5 SOLUTION RESPIRATORY (INHALATION) at 16:21

## 2023-12-31 RX ADMIN — KETOROLAC TROMETHAMINE 15 MG: 15 INJECTION, SOLUTION INTRAMUSCULAR; INTRAVENOUS at 16:22

## 2023-12-31 RX ADMIN — PROCHLORPERAZINE EDISYLATE 10 MG: 5 INJECTION INTRAMUSCULAR; INTRAVENOUS at 16:21

## 2023-12-31 RX ADMIN — SERTRALINE HYDROCHLORIDE 150 MG: 50 TABLET ORAL at 16:54

## 2023-12-31 NOTE — ED NOTES
PATIENT REVIEWED AND DISCHARGE FOR HOME, PERIPHERAL IV ACCESS REMOVED, DISCHARGE PAPERS GIVEN AND EXPLAINED, ALL QUESTIONS AND CONCERN CLARIFIED. PATIENT LEFT DEPT FOR HOME IN NIL DISTRESS

## 2023-12-31 NOTE — ED PROVIDER NOTES
[facility] EMERGENCY DEPT  EMERGENCY DEPARTMENT ENCOUNTER    Patient Name: Gabriela Araiza  MRN: [id number]  YOB: 1945  Provider: Vinod Hahn MD  PCP: Paula Klein PA   Time/Date of evaluation: 3:11 PM EST on 12/31/23    History of Presenting Illness     Chief Complaint   Patient presents with    Shortness of Breath       History Provided By: Patient and Patient's Daughter     History (Narative):   Gabriela Araiza is a 78 y.o. female who presents to the emergency department with a complaint of shortness of breath and headache since last night.  She is also been coughing for several days.  Denies fevers chills nausea vomiting diarrhea constipation no specific chest pain just feels like she cannot quite get a full deep breath.    Nursing Notes were all reviewed and agreed with or any disagreements were addressed in the HPI.    Past History     Past Medical History:  Past Medical History:   Diagnosis Date    Arthritis     Bronchitis 01/01/2020    Hypertension     Indigestion     Lupus (HCC)     Memory difficulty     Nausea & vomiting     Ringing of ears     sometimes    Thromboembolus (HCC) 2009       Past Surgical History:  Past Surgical History:   Procedure Laterality Date    CHOLECYSTECTOMY  1988    COLONOSCOPY      ORTHOPEDIC SURGERY  Sept 09'    left knee replacement    ORTHOPEDIC SURGERY  April 09'    right knee replacement     REMOVAL GALLBLADDER      VASCULAR SURGERY      IVC filter       Family History:  Family History   Problem Relation Age of Onset    Stroke Father     Cancer Father         prastate        Social History:  Social History     Tobacco Use    Smoking status: Never    Smokeless tobacco: Never    Tobacco comments:     Quit smoking: heavy exposure to second hand smoke growing up   Substance Use Topics    Alcohol use: No    Drug use: Never       Medications:  No current facility-administered medications for this encounter.     Current Outpatient Medications

## 2023-12-31 NOTE — ED TRIAGE NOTES
PATIENT TAKEN TO ROOM FROM TRIAGE. WITH COMPLAINT OF SHORTNESS OF BREATH AND HEADACHE X LAST NIGHT    PATIENT ATTACHED TO MONITOR PATIENT TO BE SEEN BY PROVIDER RELATIVE AT BED SIDE.

## 2023-12-31 NOTE — ED NOTES
PATIENT MEDICATED AS PER MAR, PATIENT INFORMED OF EFFECTS OF MEDICATION. ALLERGIES VERIFIED AT THIS TIME BY PATIENT.      PATIENT GIVEN BLANKET FOR COMFORT

## 2023-12-31 NOTE — ED NOTES
PATIENT HAVING INCREASE WORKED OF BREATHING, PATIENT PLACED ON 2L OXYGEN VIA NASAL CANULA, RELATIVE AT BED SIDE

## 2024-01-01 LAB
EKG ATRIAL RATE: 98 BPM
EKG DIAGNOSIS: NORMAL
EKG P-R INTERVAL: 178 MS
EKG Q-T INTERVAL: 362 MS
EKG QRS DURATION: 80 MS
EKG QTC CALCULATION (BAZETT): 462 MS
EKG R AXIS: -11 DEGREES
EKG T AXIS: 4 DEGREES
EKG VENTRICULAR RATE: 98 BPM

## 2024-01-01 PROCEDURE — 93010 ELECTROCARDIOGRAM REPORT: CPT | Performed by: INTERNAL MEDICINE

## 2024-01-01 RX ORDER — ALBUTEROL SULFATE 90 UG/1
2 AEROSOL, METERED RESPIRATORY (INHALATION) 4 TIMES DAILY PRN
Qty: 18 G | Refills: 0 | Status: SHIPPED | OUTPATIENT
Start: 2024-01-01

## 2024-03-01 NOTE — TELEPHONE ENCOUNTER
Her dtr can not bring her on that day, because she is her ride and has to give at least a week's notice.
Pt calling to get the results of an echo that she had done on 7/15. She would also like to know if her oxygen equipment can be picked up because she does not feel like she needs it.  Please call [phone]
Pt was given apt for 8/24 but dtr can't bring her that day. Next available opening not until 9/17. Is there anytime you can see her before then? Pt's dtr said she could bring her on 8/31 if possible.
Treatment Goal Explanation (Does Not Render In The Note): Stable for the purposes of categorizing medical decision making is defined by the specific treatment goals for an individual patient. A patient that is not at their treatment goal is not stable, even if the condition has not changed and there is no short- term threat to life or function.

## (undated) DEVICE — Device

## (undated) DEVICE — SOLUTION IV 1000ML 0.9% SOD CHL

## (undated) DEVICE — TRAY MYEL SFTY +

## (undated) DEVICE — SUTURE VCRL SZ 1 L18IN ABSRB VLT CT-1 L36MM 1/2 CIR J741D

## (undated) DEVICE — INTENDED FOR TISSUE SEPARATION, AND OTHER PROCEDURES THAT REQUIRE A SHARP SURGICAL BLADE TO PUNCTURE OR CUT.: Brand: BARD-PARKER SAFETY BLADES SIZE 15, STERILE

## (undated) DEVICE — POWDER HEMOSTAT GEL 1GR --

## (undated) DEVICE — (D)PREP SKN CHLRAPRP APPL 26ML -- CONVERT TO ITEM 371833

## (undated) DEVICE — FLEX ADVANTAGE 3000CC: Brand: FLEX ADVANTAGE

## (undated) DEVICE — SPIROMETER INCENT 2500ML W ONE W VLV

## (undated) DEVICE — SYR 10ML LUER LOK 1/5ML GRAD --

## (undated) DEVICE — GARMENT,MEDLINE,DVT,INT,CALF,MED, GEN2: Brand: MEDLINE

## (undated) DEVICE — REM POLYHESIVE ADULT PATIENT RETURN ELECTRODE: Brand: VALLEYLAB

## (undated) DEVICE — KIT CLN UP BON SECOURS MARYV

## (undated) DEVICE — INTENDED FOR TISSUE SEPARATION, AND OTHER PROCEDURES THAT REQUIRE A SHARP SURGICAL BLADE TO PUNCTURE OR CUT.: Brand: BARD-PARKER SAFETY BLADES SIZE 20, STERILE

## (undated) DEVICE — KIT RETRCT SHIM DISP FOR MINIMAL ACCS SYS MAXCESS 4

## (undated) DEVICE — SUTURE MCRYL SZ 3-0 L27IN ABSRB UD L24MM PS-1 3/8 CIR PRIM Y936H

## (undated) DEVICE — NEEDLE HYPO 22GA L1.5IN BLK S STL HUB POLYPR SHLD REG BVL

## (undated) DEVICE — NDL SPNE 22GX8IN --

## (undated) DEVICE — WAX SURG 2.5GM HEMSTAT BNE BEESWAX PARAFFIN ISO PALMITATE

## (undated) DEVICE — OPTIFOAM GENTLE SA, POSTOP, 4X8: Brand: MEDLINE

## (undated) DEVICE — (D)NDL SPNE 22GX15CM -- DISC BY MFR W/NO SUB

## (undated) DEVICE — MEDIA CONTRAST 10ML 200MG/ML 41%

## (undated) DEVICE — 3.0MM PRECISION NEURO (MATCH HEAD)

## (undated) DEVICE — STERILE POLYISOPRENE POWDER-FREE SURGICAL GLOVES: Brand: PROTEXIS

## (undated) DEVICE — BLANKET WRM AD W50XL85.8IN PACU FULL BODY FORC AIR

## (undated) DEVICE — SUTURE VCRL SZ 2-0 L18IN ABSRB VLT CT-1 L36MM 1/2 CIR J739D

## (undated) DEVICE — (D)BNDG ADHESIVE FABRIC 3/4X3 -- DISC BY MFR USE ITEM 357960

## (undated) DEVICE — STOCKING COMPR XL L16-18IN LNG 19MMHG ANK 10-11IN CALF

## (undated) DEVICE — JACKSON TABLE POSITIONER KIT: Brand: MEDLINE INDUSTRIES, INC.